# Patient Record
Sex: MALE | Race: BLACK OR AFRICAN AMERICAN | Employment: OTHER | URBAN - METROPOLITAN AREA
[De-identification: names, ages, dates, MRNs, and addresses within clinical notes are randomized per-mention and may not be internally consistent; named-entity substitution may affect disease eponyms.]

---

## 2017-01-03 ENCOUNTER — HOSPITAL ENCOUNTER (OUTPATIENT)
Dept: WOUND CARE | Age: 52
Discharge: HOME | End: 2017-01-03
Attending: PODIATRIST | Admitting: PODIATRIST

## 2017-01-03 VITALS
HEART RATE: 97 BPM | DIASTOLIC BLOOD PRESSURE: 90 MMHG | RESPIRATION RATE: 18 BRPM | TEMPERATURE: 98.5 F | SYSTOLIC BLOOD PRESSURE: 147 MMHG

## 2017-01-03 RX ORDER — LIDOCAINE 50 MG/G
OINTMENT TOPICAL ONCE
Status: COMPLETED | OUTPATIENT
Start: 2017-01-03 | End: 2017-01-03

## 2017-01-03 RX ADMIN — LIDOCAINE: 50 OINTMENT TOPICAL at 13:38

## 2017-01-06 ENCOUNTER — HOSPITAL ENCOUNTER (OUTPATIENT)
Dept: WOUND CARE | Age: 52
Discharge: HOME | End: 2017-01-06
Attending: PODIATRIST | Admitting: PODIATRIST

## 2017-01-10 ENCOUNTER — HOSPITAL ENCOUNTER (OUTPATIENT)
Dept: WOUND CARE | Age: 52
Discharge: HOME | End: 2017-01-10
Attending: PODIATRIST | Admitting: PODIATRIST

## 2017-01-16 ENCOUNTER — OFFICE VISIT (OUTPATIENT)
Dept: BARIATRICS/WEIGHT MGMT | Age: 52
End: 2017-01-16

## 2017-01-16 VITALS
WEIGHT: 315 LBS | BODY MASS INDEX: 40.43 KG/M2 | RESPIRATION RATE: 15 BRPM | SYSTOLIC BLOOD PRESSURE: 134 MMHG | HEART RATE: 88 BPM | HEIGHT: 74 IN | DIASTOLIC BLOOD PRESSURE: 88 MMHG

## 2017-01-16 DIAGNOSIS — E66.01 MORBID OBESITY WITH BMI OF 50.0-59.9, ADULT (HCC): Primary | ICD-10-CM

## 2017-01-16 PROCEDURE — 99213 OFFICE O/P EST LOW 20 MIN: CPT | Performed by: FAMILY MEDICINE

## 2017-01-16 ASSESSMENT — ENCOUNTER SYMPTOMS
EYES NEGATIVE: 1
GASTROINTESTINAL NEGATIVE: 1
RESPIRATORY NEGATIVE: 1

## 2017-01-17 ENCOUNTER — HOSPITAL ENCOUNTER (OUTPATIENT)
Dept: WOUND CARE | Age: 52
Discharge: HOME | End: 2017-01-17
Attending: PODIATRIST | Admitting: PODIATRIST

## 2017-01-17 VITALS
SYSTOLIC BLOOD PRESSURE: 132 MMHG | HEART RATE: 76 BPM | RESPIRATION RATE: 18 BRPM | DIASTOLIC BLOOD PRESSURE: 77 MMHG | TEMPERATURE: 98.1 F

## 2017-01-17 RX ORDER — LIDOCAINE HYDROCHLORIDE 40 MG/ML
SOLUTION TOPICAL ONCE
Status: COMPLETED | OUTPATIENT
Start: 2017-01-17 | End: 2017-01-17

## 2017-01-17 RX ADMIN — LIDOCAINE HYDROCHLORIDE: 40 SOLUTION TOPICAL at 16:26

## 2017-01-17 ASSESSMENT — PAIN SCALES - GENERAL: PAINLEVEL_OUTOF10: 3

## 2017-01-17 ASSESSMENT — PAIN DESCRIPTION - PAIN TYPE: TYPE: ACUTE PAIN

## 2017-01-17 ASSESSMENT — PAIN DESCRIPTION - ORIENTATION: ORIENTATION: LEFT;LOWER

## 2017-01-17 ASSESSMENT — PAIN DESCRIPTION - DESCRIPTORS: DESCRIPTORS: ACHING

## 2017-01-17 ASSESSMENT — PAIN DESCRIPTION - LOCATION: LOCATION: LEG

## 2017-01-24 ENCOUNTER — HOSPITAL ENCOUNTER (OUTPATIENT)
Dept: WOUND CARE | Age: 52
Discharge: HOME | End: 2017-01-24
Attending: PODIATRIST | Admitting: PODIATRIST

## 2017-01-24 VITALS — RESPIRATION RATE: 16 BRPM | TEMPERATURE: 98.6 F | SYSTOLIC BLOOD PRESSURE: 138 MMHG | DIASTOLIC BLOOD PRESSURE: 85 MMHG

## 2017-01-24 RX ORDER — IBUPROFEN 800 MG/1
800 TABLET ORAL EVERY 6 HOURS PRN
Qty: 120 TABLET | Refills: 3 | Status: ON HOLD | OUTPATIENT
Start: 2017-01-24 | End: 2020-06-10 | Stop reason: HOSPADM

## 2017-01-24 RX ORDER — LIDOCAINE HYDROCHLORIDE 40 MG/ML
SOLUTION TOPICAL ONCE
Status: DISCONTINUED | OUTPATIENT
Start: 2017-01-24 | End: 2017-01-25 | Stop reason: HOSPADM

## 2017-01-27 ENCOUNTER — HOSPITAL ENCOUNTER (OUTPATIENT)
Dept: WOUND CARE | Age: 52
Discharge: HOME | End: 2017-01-27
Attending: PODIATRIST | Admitting: PODIATRIST

## 2017-01-31 ENCOUNTER — HOSPITAL ENCOUNTER (OUTPATIENT)
Dept: WOUND CARE | Age: 52
Discharge: HOME | End: 2017-01-31
Attending: PODIATRIST | Admitting: PODIATRIST

## 2017-01-31 VITALS
RESPIRATION RATE: 16 BRPM | DIASTOLIC BLOOD PRESSURE: 66 MMHG | SYSTOLIC BLOOD PRESSURE: 101 MMHG | HEART RATE: 99 BPM | TEMPERATURE: 98 F

## 2017-01-31 RX ORDER — LIDOCAINE HYDROCHLORIDE 40 MG/ML
SOLUTION TOPICAL ONCE
Status: COMPLETED | OUTPATIENT
Start: 2017-01-31 | End: 2017-01-31

## 2017-01-31 RX ADMIN — LIDOCAINE HYDROCHLORIDE: 40 SOLUTION TOPICAL at 16:56

## 2017-02-03 ENCOUNTER — HOSPITAL ENCOUNTER (OUTPATIENT)
Dept: WOUND CARE | Age: 52
Discharge: HOME | End: 2017-02-03
Attending: PODIATRIST | Admitting: PODIATRIST

## 2017-02-07 ENCOUNTER — HOSPITAL ENCOUNTER (OUTPATIENT)
Dept: WOUND CARE | Age: 52
Discharge: HOME | End: 2017-02-07
Attending: PODIATRIST | Admitting: PODIATRIST

## 2017-02-07 VITALS
SYSTOLIC BLOOD PRESSURE: 123 MMHG | HEART RATE: 71 BPM | DIASTOLIC BLOOD PRESSURE: 82 MMHG | TEMPERATURE: 98.2 F | RESPIRATION RATE: 16 BRPM

## 2017-02-07 RX ORDER — LIDOCAINE HYDROCHLORIDE 40 MG/ML
SOLUTION TOPICAL ONCE
Status: COMPLETED | OUTPATIENT
Start: 2017-02-07 | End: 2017-02-07

## 2017-02-07 RX ADMIN — LIDOCAINE HYDROCHLORIDE: 40 SOLUTION TOPICAL at 15:10

## 2017-02-14 ENCOUNTER — OFFICE VISIT (OUTPATIENT)
Dept: BARIATRICS/WEIGHT MGMT | Age: 52
End: 2017-02-14

## 2017-02-14 ENCOUNTER — HOSPITAL ENCOUNTER (OUTPATIENT)
Dept: WOUND CARE | Age: 52
Discharge: HOME | End: 2017-02-14
Attending: PODIATRIST | Admitting: PODIATRIST

## 2017-02-14 VITALS
DIASTOLIC BLOOD PRESSURE: 97 MMHG | SYSTOLIC BLOOD PRESSURE: 176 MMHG | RESPIRATION RATE: 14 BRPM | HEART RATE: 92 BPM | TEMPERATURE: 98.1 F

## 2017-02-14 VITALS
SYSTOLIC BLOOD PRESSURE: 138 MMHG | DIASTOLIC BLOOD PRESSURE: 76 MMHG | HEIGHT: 74 IN | BODY MASS INDEX: 40.43 KG/M2 | HEART RATE: 76 BPM | WEIGHT: 315 LBS

## 2017-02-14 DIAGNOSIS — Z71.3 DIETARY COUNSELING AND SURVEILLANCE: ICD-10-CM

## 2017-02-14 DIAGNOSIS — E66.01 MORBID OBESITY WITH BMI OF 50.0-59.9, ADULT (HCC): Primary | ICD-10-CM

## 2017-02-14 PROCEDURE — 99213 OFFICE O/P EST LOW 20 MIN: CPT | Performed by: FAMILY MEDICINE

## 2017-02-14 RX ORDER — LIDOCAINE HYDROCHLORIDE 40 MG/ML
SOLUTION TOPICAL ONCE
Status: COMPLETED | OUTPATIENT
Start: 2017-02-14 | End: 2017-02-14

## 2017-02-14 RX ADMIN — LIDOCAINE HYDROCHLORIDE: 40 SOLUTION TOPICAL at 16:57

## 2017-02-14 ASSESSMENT — ENCOUNTER SYMPTOMS
RESPIRATORY NEGATIVE: 1
GASTROINTESTINAL NEGATIVE: 1
EYES NEGATIVE: 1

## 2017-02-17 ENCOUNTER — HOSPITAL ENCOUNTER (OUTPATIENT)
Dept: WOUND CARE | Age: 52
Discharge: HOME | End: 2017-02-17
Attending: PODIATRIST | Admitting: PODIATRIST

## 2017-02-21 ENCOUNTER — HOSPITAL ENCOUNTER (OUTPATIENT)
Dept: WOUND CARE | Age: 52
Discharge: HOME | End: 2017-02-21
Attending: PODIATRIST | Admitting: PODIATRIST

## 2017-02-21 VITALS — DIASTOLIC BLOOD PRESSURE: 90 MMHG | SYSTOLIC BLOOD PRESSURE: 151 MMHG

## 2017-02-24 ENCOUNTER — HOSPITAL ENCOUNTER (OUTPATIENT)
Dept: WOUND CARE | Age: 52
Discharge: HOME | End: 2017-02-24
Attending: PODIATRIST | Admitting: PODIATRIST

## 2017-02-28 ENCOUNTER — HOSPITAL ENCOUNTER (OUTPATIENT)
Dept: WOUND CARE | Age: 52
Discharge: HOME | End: 2017-02-28
Attending: PODIATRIST | Admitting: PODIATRIST

## 2017-02-28 VITALS
DIASTOLIC BLOOD PRESSURE: 80 MMHG | RESPIRATION RATE: 18 BRPM | TEMPERATURE: 98.2 F | SYSTOLIC BLOOD PRESSURE: 132 MMHG | HEART RATE: 83 BPM

## 2017-09-19 ENCOUNTER — HOSPITAL ENCOUNTER (OUTPATIENT)
Dept: WOUND CARE | Age: 52
Discharge: HOME | End: 2017-09-19
Attending: PODIATRIST | Admitting: PODIATRIST

## 2017-09-19 VITALS
HEIGHT: 74 IN | RESPIRATION RATE: 18 BRPM | DIASTOLIC BLOOD PRESSURE: 99 MMHG | WEIGHT: 315 LBS | BODY MASS INDEX: 40.43 KG/M2 | TEMPERATURE: 99.1 F | SYSTOLIC BLOOD PRESSURE: 162 MMHG | HEART RATE: 96 BPM

## 2017-09-19 DIAGNOSIS — L97.929 VENOUS STASIS ULCER OF LEFT LOWER EXTREMITY (HCC): Primary | ICD-10-CM

## 2017-09-19 DIAGNOSIS — I83.029 VENOUS STASIS ULCER OF LEFT LOWER EXTREMITY (HCC): Primary | ICD-10-CM

## 2017-09-19 RX ORDER — LIDOCAINE HYDROCHLORIDE 40 MG/ML
2.5 SOLUTION TOPICAL ONCE
Status: COMPLETED | OUTPATIENT
Start: 2017-09-19 | End: 2017-09-19

## 2017-09-19 RX ADMIN — LIDOCAINE HYDROCHLORIDE 2.5 ML: 40 SOLUTION TOPICAL at 14:05

## 2017-09-19 ASSESSMENT — PAIN DESCRIPTION - PAIN TYPE: TYPE: ACUTE PAIN

## 2017-09-19 ASSESSMENT — PAIN DESCRIPTION - LOCATION: LOCATION: LEG

## 2017-09-19 ASSESSMENT — PAIN DESCRIPTION - ORIENTATION: ORIENTATION: LEFT

## 2017-09-19 ASSESSMENT — PAIN SCALES - GENERAL: PAINLEVEL_OUTOF10: 5

## 2017-09-19 ASSESSMENT — PAIN DESCRIPTION - DESCRIPTORS: DESCRIPTORS: BURNING

## 2017-09-21 ENCOUNTER — OFFICE VISIT (OUTPATIENT)
Dept: BARIATRICS/WEIGHT MGMT | Age: 52
End: 2017-09-21

## 2017-09-21 VITALS
HEART RATE: 84 BPM | BODY MASS INDEX: 40.43 KG/M2 | WEIGHT: 315 LBS | HEIGHT: 74 IN | DIASTOLIC BLOOD PRESSURE: 94 MMHG | SYSTOLIC BLOOD PRESSURE: 144 MMHG

## 2017-09-21 DIAGNOSIS — Z71.3 DIETARY COUNSELING AND SURVEILLANCE: ICD-10-CM

## 2017-09-21 DIAGNOSIS — E66.01 MORBID OBESITY WITH BMI OF 50.0-59.9, ADULT (HCC): Primary | ICD-10-CM

## 2017-09-21 DIAGNOSIS — R03.0 ELEVATED BP WITHOUT DIAGNOSIS OF HYPERTENSION: ICD-10-CM

## 2017-09-21 PROCEDURE — 99213 OFFICE O/P EST LOW 20 MIN: CPT | Performed by: FAMILY MEDICINE

## 2017-09-21 ASSESSMENT — ENCOUNTER SYMPTOMS
RESPIRATORY NEGATIVE: 1
GASTROINTESTINAL NEGATIVE: 1
EYES NEGATIVE: 1

## 2017-09-22 ENCOUNTER — HOSPITAL ENCOUNTER (OUTPATIENT)
Dept: WOUND CARE | Age: 52
Discharge: HOME | End: 2017-09-22
Attending: PODIATRIST | Admitting: PODIATRIST

## 2017-09-22 ENCOUNTER — TELEPHONE (OUTPATIENT)
Dept: BARIATRICS/WEIGHT MGMT | Age: 52
End: 2017-09-22

## 2017-09-22 ENCOUNTER — HOSPITAL ENCOUNTER (OUTPATIENT)
Dept: VASCULAR LAB | Age: 52
Discharge: HOME | End: 2017-09-22
Attending: PODIATRIST | Admitting: PODIATRIST

## 2017-09-22 VITALS
DIASTOLIC BLOOD PRESSURE: 98 MMHG | RESPIRATION RATE: 16 BRPM | TEMPERATURE: 98.7 F | HEART RATE: 78 BPM | SYSTOLIC BLOOD PRESSURE: 190 MMHG

## 2017-09-22 DIAGNOSIS — L97.929 VENOUS STASIS ULCER OF LEFT LOWER EXTREMITY (HCC): Primary | ICD-10-CM

## 2017-09-22 DIAGNOSIS — I83.029 VENOUS STASIS ULCER OF LEFT LOWER EXTREMITY (HCC): Primary | ICD-10-CM

## 2017-09-22 DIAGNOSIS — I83.029 VARICOSE VEINS OF LEFT LOWER EXTREMITY WITH ULCER (HCC): ICD-10-CM

## 2017-09-22 DIAGNOSIS — L97.929 VARICOSE VEINS OF LEFT LOWER EXTREMITY WITH ULCER (HCC): ICD-10-CM

## 2017-09-26 ENCOUNTER — HOSPITAL ENCOUNTER (OUTPATIENT)
Dept: WOUND CARE | Age: 52
Discharge: HOME | End: 2017-09-26
Attending: PODIATRIST | Admitting: PODIATRIST

## 2017-09-26 VITALS
RESPIRATION RATE: 16 BRPM | DIASTOLIC BLOOD PRESSURE: 97 MMHG | TEMPERATURE: 98.8 F | HEART RATE: 97 BPM | SYSTOLIC BLOOD PRESSURE: 196 MMHG

## 2017-09-26 RX ORDER — LIDOCAINE HYDROCHLORIDE 40 MG/ML
2.5 SOLUTION TOPICAL ONCE
Status: COMPLETED | OUTPATIENT
Start: 2017-09-26 | End: 2017-09-26

## 2017-09-26 RX ADMIN — LIDOCAINE HYDROCHLORIDE 2.5 ML: 40 SOLUTION TOPICAL at 17:05

## 2017-09-29 ENCOUNTER — HOSPITAL ENCOUNTER (OUTPATIENT)
Dept: WOUND CARE | Age: 52
Discharge: HOME | End: 2017-09-29
Attending: PODIATRIST | Admitting: PODIATRIST

## 2017-09-29 ENCOUNTER — HOSPITAL ENCOUNTER (OUTPATIENT)
Dept: WOUND CARE | Age: 52
Discharge: HOME | End: 2017-09-29

## 2017-09-29 VITALS
TEMPERATURE: 97.2 F | DIASTOLIC BLOOD PRESSURE: 111 MMHG | HEART RATE: 81 BPM | SYSTOLIC BLOOD PRESSURE: 184 MMHG | RESPIRATION RATE: 18 BRPM

## 2017-09-29 ASSESSMENT — PAIN DESCRIPTION - ORIENTATION: ORIENTATION: LEFT

## 2017-09-29 ASSESSMENT — PAIN DESCRIPTION - LOCATION: LOCATION: LEG

## 2017-09-29 ASSESSMENT — PAIN DESCRIPTION - DESCRIPTORS: DESCRIPTORS: ACHING

## 2017-09-29 ASSESSMENT — PAIN SCALES - GENERAL: PAINLEVEL_OUTOF10: 5

## 2017-09-29 ASSESSMENT — PAIN DESCRIPTION - PAIN TYPE: TYPE: ACUTE PAIN

## 2017-09-29 NOTE — IP AVS SNAPSHOT
After Visit Summary  (Discharge Instructions)    Medication List for Home    Based on the information you provided to us as well as any changes during this visit, the following is your updated medication list.  Compare this with your prescription bottles at home. If you have any questions or concerns, contact your primary care physician's office. Daily Medication List (This medication list can be shared with any healthcare provider who is helping you manage your medications)      ASK your doctor about these medications if you have questions        Last Dose    Next Dose Due AM NOON PM NIGHT    Compression Stockings Misc   by Does not apply route Diagnosis: I83.029 Location of wound: Left Lower Leg Gradient IV (30-40 mm Hg) Style: Knee Length Extremity: Bilateral                                         GLUCOSAMINE HCL PO   Take by mouth daily                                         ibuprofen 800 MG tablet   Commonly known as:  ADVIL;MOTRIN   Take 1 tablet by mouth every 6 hours as needed for Pain                                         MULTI VITAMIN PO   Take by mouth daily                                         vitamin D 2000 units Caps capsule   Take 1 capsule by mouth daily                                                 Allergies as of 9/29/2017     No Known Allergies      Immunizations as of 9/29/2017     Name Date Dose VIS Date Route    Tdap (Boostrix, Adacel) 1/24/2013 0.5 mL 1/24/2012 Intramuscular      Last Vitals          Most Recent Value    Temp  97.2 °F (36.2 °C)    Pulse  81    Resp  18    BP  (!)  184/111         After Visit Summary    This summary was created for you. Thank you for entrusting your care to us.   The following information includes details about your hospital/visit stay along with steps you should take to help with your recovery once you leave the hospital.  In this packet, you will find information about the topics listed below: · Instructions about your medications including a list of your home medications  · A summary of your hospital visit  · Follow-up appointments once you have left the hospital  · Your care plan at home      You may receive a survey regarding the care you received during your stay. Your input is valuable to us. We encourage you to complete and return your survey in the envelope provided. We hope you will choose us in the future for your healthcare needs. Patient Information     Patient Name NICOLLE Mike 1965      Care Provided at:     Name Address Phone       1 [facility] [address] [facility]  [address] [place], [place] [id number] [phone]            Your Visit    Here you will find information about your visit, including the reason for your visit. Please take this sheet with you when you visit your doctor or other health care provider in the future. It will help determine the best possible medical care for you at that time. If you have any questions once you leave the hospital, please call the department phone number listed below. Why you were here     Your primary diagnosis was:  Not on File      Visit Information     Date & Time Department Dept. Phone    2017 [facility] WOUND CARE [phone]       Follow-up Appointments    Below is a list of your follow-up and future appointments. This may not be a complete list as you may have made appointments directly with providers that we are not aware of or your providers may have made some for you. Please call your providers to confirm appointments. It is important to keep your appointments. Please bring your current insurance card, photo ID, co-pay, and all medication bottles to your appointment. If self-pay, payment is expected at the time of service.         Future Appointments     2017 10:30 AM     [facility] ([phone]) Please arrive 15 minutes prior to appointment time, bring insurance card and photo ID.    [address] [place] [address]         Preventive Care        Date Due    Hepatitis C screening is recommended for all adults regardless of risk factors born between [facility] at least once (lifetime) who have never been tested. 1965    Colonoscopy 6/27/2015    Diabetes Screening 2/25/2017    Yearly Flu Vaccine (1) 9/1/2017    Cholesterol Screening 6/2/2020    Tetanus Combination Vaccine (2 - Td) 1/24/2023                 Care Plan Once You Return Home    This section includes instructions you will need to follow once you leave the hospital.  Your care team will discuss these with you, so you and those caring for you know how to best care for your health needs at home. This section may also include educational information about certain health topics that may be of help to you. Discharge [facility] Physician Orders and Discharge Instructions  Karli Adrian [address] [address]  Telephone: [phone]    NAME:  Ramy Mckeon III  YOB: 1965  MEDICAL RECORD NUMBER:  [id number]  DATE:  9/29/2017      Wound care: Continue to follow the instructions and recommendations from your last doctor visit. Please call if you have any questions or concerns.         Return Appointment:        Return Appointment with Dr Xin Thrasher is on 10/02/17      Your nurse  is:  Butch Moralez [place] [address]     Electronically signed by Samanta Cochran RN on 9/29/2017 at 3:42 PM     [address] [place] [address] Information: Should you experience any significant changes in your wound(s) or have questions about your wound care, please contact the [address] [place] [address] at [phone] Monday and Wednesday 8:00 am - 2:00 pm and Tuesday, Thursday and Friday from 8:00 am - 4:30 pm.   If you need help with your wound outside these hours and cannot wait until we are again available, contact your PCP or go to the hospital emergency room. Discharge Nurse Signature:_______________________    Date: ___________ Time:  ____________    Physician orders by:      Dr Lorinda Paschal Dr Weldon Felty Dr Veronda Furth Dr Murleen Quale Dr Venus Bolls Dr Serita Clipper       Physician Signature:__________________________________        The information contained in the After Visit Summary has been reviewed with me, the patient and/or responsible adult, by my health care provider(s). I had the opportunity to ask questions regarding this information. I have elected to receive; Patient Signature:_______________________    Date:_________Time:________         Patient unable to sign Discharge Instructions given to ECF/Transportation/POA        After Visit Summary    Comprehensive Discharge Instruction          [address] allows you to send messages to your doctor, view your test results, renew your prescriptions, schedule appointments, view visit notes, and more. How Do I Sign Up? 1. In your Internet browser, go to [email or web address]  2. Click on the Sign Up Now link in the Sign In box. You will see the New Member Sign Up page. 3. Enter your [employer] Access Code exactly as it appears below. You will not need to use this code after youve completed the sign-up process. If you do not sign up before the expiration date, you must request a new code. [employer] Access Code: [email or web address]  Expires: 11/18/2017  2:18 PM    4. Enter your Social Security Number ([id number]) and Date of Birth (mm/dd/yyyy) as indicated and click Submit. You will be taken to the next sign-up page. 5. Create a [employer] ID. This will be your [employer] login ID and cannot be changed, so think of one that is secure and easy to remember.

## 2017-10-03 ENCOUNTER — HOSPITAL ENCOUNTER (OUTPATIENT)
Dept: WOUND CARE | Age: 52
Discharge: HOME | End: 2017-10-03
Attending: PODIATRIST | Admitting: PODIATRIST

## 2017-10-03 VITALS
HEART RATE: 92 BPM | RESPIRATION RATE: 18 BRPM | DIASTOLIC BLOOD PRESSURE: 92 MMHG | SYSTOLIC BLOOD PRESSURE: 168 MMHG | TEMPERATURE: 98.3 F

## 2017-10-03 RX ORDER — LIDOCAINE HYDROCHLORIDE 40 MG/ML
2.5 SOLUTION TOPICAL ONCE
Status: COMPLETED | OUTPATIENT
Start: 2017-10-03 | End: 2017-10-03

## 2017-10-03 RX ADMIN — LIDOCAINE HYDROCHLORIDE 2.5 ML: 40 SOLUTION TOPICAL at 15:52

## 2017-10-03 ASSESSMENT — PAIN SCALES - GENERAL: PAINLEVEL_OUTOF10: 3

## 2017-10-03 ASSESSMENT — PAIN DESCRIPTION - ORIENTATION: ORIENTATION: LEFT

## 2017-10-03 ASSESSMENT — PAIN DESCRIPTION - LOCATION: LOCATION: LEG

## 2017-10-03 ASSESSMENT — PAIN DESCRIPTION - PAIN TYPE: TYPE: ACUTE PAIN

## 2017-10-03 NOTE — PROGRESS NOTES
[address]anna [address]   Progress Note and Procedure Note      Ana Rosa Jefferson III  MEDICAL RECORD NUMBER:  [id number]  AGE: 46 y.o. GENDER: male  : 1965  EPISODE DATE:  10/3/2017    Subjective:     No chief complaint on file. HISTORY of PRESENT ILLNESS HPI     Ana Rosa Jefferson III is a 46 y.o. male who presents today for wound/ulcer evaluation. History of Wound Context: left leg wound. He has kept his dressing clean and intact. He has not followed up with vascular surgery as recommended yet. Wound/Ulcer Pain Timing/Severity: constant  Quality of pain: burning  Severity:  3 / 10   Modifying Factors: None  Associated Signs/Symptoms: edema    Ulcer Identification:  Ulcer Type: venous  Contributing Factors: edema and venous stasis    Wound: N/A        PAST MEDICAL HISTORY        Diagnosis Date    Breast disorder 2009    MAMMOGRAM -VE. S/P BREAST SURGERY EVAL    Obesity     PPD positive     Venous stasis ulcer of left lower extremity ([facility] [address] 10/25/2016       PAST SURGICAL HISTORY    No past surgical history on file. FAMILY HISTORY    Family History   Problem Relation Age of Onset    Heart Disease Mother     Diabetes Maternal Uncle     Diabetes Paternal Uncle     Cancer Paternal Uncle      ?  pancreatic       SOCIAL HISTORY    Social History   Substance Use Topics    Smoking status: Never Smoker    Smokeless tobacco: Never Used    Alcohol use Yes      Comment: moderate use       ALLERGIES    No Known Allergies    MEDICATIONS    Current Outpatient Prescriptions on File Prior to Encounter   Medication Sig Dispense Refill    GLUCOSAMINE HCL PO Take by mouth daily      Multiple Vitamin (MULTI VITAMIN PO) Take by mouth daily      ibuprofen (ADVIL;MOTRIN) 800 MG tablet Take 1 tablet by mouth every 6 hours as needed for Pain 120 tablet 3    Cholecalciferol (VITAMIN D) 2000 UNITS CAPS capsule Take 1 capsule by mouth daily 30 capsule 3    Compression Stockings MISC by Does not apply route Diagnosis: I83.029  Location of wound: Left Lower Leg  Gradient IV (30-40 mm Hg)  Style: Knee Length  Extremity: Bilateral 1 each 2     No current facility-administered medications on file prior to encounter. REVIEW OF SYSTEMS    Pertinent items are noted in HPI. Objective:        BP (!) 168/92  Pulse 92  Temp 98.3 °F (36.8 °C) (Oral)   Resp 18    Wt Readings from Last 3 Encounters:   09/21/17 (!) 423 lb (191.9 kg)   09/19/17 (!) 390 lb (176.9 kg)   02/14/17 (!) 392 lb 8 oz (178 kg)       PHYSICAL EXAM    DP/PT pulses palpable bilaterally. CFT brisk to all digits. Digits are pink and warm to the touch. Hair growth is normal in appearance. No calf pain with palpation noted. Patient has pitting edema noted on the bilateral lower extremities. Epicritic sensation is grossly intact bilaterally. Negative clonus and babinski reflex is down going. Wound noted on the posterior lateral aspect of the left lower extremity. Wound has fibrotic and nonviable tissue that extends down through and includes the subcutaneous tissue/fascia/muscle. After debridement the wound has a fibro-granular base with eschar noted around the periphery of the wound. There is no surrounding erythema, edema, warmth or malodor noted. The wound does not probe or track to bone. Assessment:      Patient Active Problem List   Diagnosis Code    Family history of early CAD Z80.55    PPD positive R76.11    Vitamin D deficiency E55.9    Edema R60.9    Morbid obesity ([address] E66.01    Anemia D64.9    Venous stasis ulcer of left lower extremity ([facility] [address] I83.029        Excisional Debridement Procedure Note  Indications:  Based on my examination of this patient's wound(s)/ulcer(s) today, debridement is required to promote healing and evaluate the wound base. Performed by: Sofia White DPM    Consent obtained?  Yes    Time out taken: Yes    Pain Control: Anesthetic: 4% Topical Xylocaine     Debridement: Excisional Achieved: by pressure    Procedural Pain: 3  / 10     Post Procedural Pain: 3 / 10     Response to treatment:  Well tolerated by patient. Plan:   E/M x 30 minutes of a new problem of left leg ulceration. Venous reflux study was significantly abnormal.  As patient has had a history of re ulceration despite wearing compression stocking will refer to Dr. Nhi Sullivan for further evaluation and management of his chronic venous disease as it is likely contributing to his re ulcerating. Will control edema with multilayer compression dressing. Will see patient back in 3-4 days for a dressing change due to the amount of drainage in the wound. Treatment Note please see attached Discharge Instructions    In my professional opinion this patient would benefit from HBO Therapy: No    Written patient dismissal instructions given to patient and signed by patient or POA.          RTC 1 week    Electronically signed by Randolph Espinosa DPM on 10/3/2017 at 4:48 PM

## 2017-10-03 NOTE — IP AVS SNAPSHOT
After Visit Summary  (Discharge Instructions)    Medication List for Home    Based on the information you provided to us as well as any changes during this visit, the following is your updated medication list.  Compare this with your prescription bottles at home. If you have any questions or concerns, contact your primary care physician's office. Daily Medication List (This medication list can be shared with any healthcare provider who is helping you manage your medications)      Notice     Cannot display patient medications because the patient has not yet been checked in. Allergies as of 10/3/2017     No Known Allergies      Immunizations as of 10/3/2017     Name Date Dose VIS Date Route    Tdap (Boostrix, Adacel) 2013 0.5 mL 2012 Intramuscular      Last Vitals          Most Recent Value    Temp  98.3 °F (36.8 °C)    Pulse  92    Resp  18    BP  (!)  168/92         After Visit Summary    This summary was created for you. Thank you for entrusting your care to us. The following information includes details about your hospital/visit stay along with steps you should take to help with your recovery once you leave the hospital.  In this packet, you will find information about the topics listed below:    · Instructions about your medications including a list of your home medications  · A summary of your hospital visit  · Follow-up appointments once you have left the hospital  · Your care plan at home      You may receive a survey regarding the care you received during your stay. Your input is valuable to us. We encourage you to complete and return your survey in the envelope provided. We hope you will choose us in the future for your healthcare needs.           Patient Information     Patient Name NICOLLE Alberts [facility] 1965      Care Provided at:     Name Address Phone       [address] [facility] [address] [address] Your Visit    Here you will find information about your visit, including the reason for your visit. Please take this sheet with you when you visit your doctor or other health care provider in the future. It will help determine the best possible medical care for you at that time. If you have any questions once you leave the hospital, please call the department phone number listed below. Why you were here     Your primary diagnosis was:  Not on File      Visit Information     Date & Time Provider Department Dept. Phone    10/3/2017 Jhon Diallo DPM [facility] WOUND CARE [phone]       Follow-up Appointments    Below is a list of your follow-up and future appointments. This may not be a complete list as you may have made appointments directly with providers that we are not aware of or your providers may have made some for you. Please call your providers to confirm appointments. It is important to keep your appointments. Please bring your current insurance card, photo ID, co-pay, and all medication bottles to your appointment. If self-pay, payment is expected at the time of service. Future Appointments     11/1/2017 10:30 AM     Appointment with Mckenna Dickens MD at [address] [place] [address] [facility] ([phone])   Please arrive 15 minutes prior to appointment time, bring insurance card and photo ID.    [address]         Preventive Care        Date Due    Hepatitis C screening is recommended for all adults regardless of risk factors born between [facility] at least once (lifetime) who have never been tested.  1965    Colonoscopy 6/27/2015    Diabetes Screening 2/25/2017    Yearly Flu Vaccine (1) 9/1/2017    Cholesterol Screening 6/2/2020    Tetanus Combination Vaccine (2 - Td) 1/24/2023                 Care Plan Once You Return Home    This section includes instructions you will need to follow once you leave [place]  Ace Wrap  Cover Roll Tape     Other:    Avoid contact of tape with skin. Change dressing:  Daily     Every Other Day  Three times per week    Once a week  Do Not Change Dressing    Other:                            · Compression:  Type:Profore            Multilayer Compression Wrap Applied in Clinic Right Leg Left Leg  · Do not get leg(s) with wrap wet. ·  If wraps become too tight call the center or completely remove the wrap. · Elevate leg(s) above the level of the heart when sitting. · Avoid prolonged standing in one place. [facility] remove wrap, cleanse affected leg(s) with soap and water, apply wound dressings as ordered above and reapply compression wraps on:     Off-Loading:    Off-loading when:  walking   in bed  sitting     Total non-weight bearing:   Right Leg   Left Leg      Partial weight bearing:    Right Leg   Left Leg      Assistive Device:  Walker  Crutches   Wheelchair  Roll About    Surgical shoe/Darco     Podus Boot(s)    Prevalon Boot(s)   CROW Boot     [employer]  Other:        Dietary:  Important dietary reminders:  1. Increase Protein intake (i.e. Lean meats, fish, eggs, legumes, and yogurt)  2. No added salt  3. If diabetic, follow a diabetic diet and check glucose prior to meals or as instructed by your physician.         Return Appointment:   Wound and dressing supply provider:    ECF or Home Healthcare:   Nurse visit:     Return Appointment: With Dr Fuller Rather  in [address] [place] [address])     Ordered tests:       Consults:  Weight Management  Diabetes Education  Vascular Surgery   Endocrinology  Nutrition Counseling   Lymphedema Therapy     Your nurse  is:  Kalpana     Electronically signed by Britta Rutledge RN on 10/3/2017 at 4:20 PM     [facility] Information: Should you experience any significant changes in your wound(s) or have questions about your wound care, please contact the [address] [place] [address] at [phone] Monday and has been given to me, the patient and/or responsible adult.            Patient Signature/Responsible Adult:____________________    Clinician Signature:_____________________    Date:_____________________    Time:_____________________

## 2017-10-06 ENCOUNTER — HOSPITAL ENCOUNTER (OUTPATIENT)
Dept: WOUND CARE | Age: 52
Discharge: HOME | End: 2017-10-06
Attending: PODIATRIST | Admitting: PODIATRIST

## 2017-10-06 NOTE — IP AVS SNAPSHOT
Patient Information     Patient Name DOB Lamount Romberg 1965         This is your updated medication list to keep with you all times      Notice     Cannot display patient medications because the patient has not yet been checked in.

## 2017-10-06 NOTE — IP AVS SNAPSHOT
After Visit Summary  (Discharge Instructions)    Medication List for Home    Based on the information you provided to us as well as any changes during this visit, the following is your updated medication list.  Compare this with your prescription bottles at home. If you have any questions or concerns, contact your primary care physician's office. Daily Medication List (This medication list can be shared with any healthcare provider who is helping you manage your medications)      Notice     Cannot display patient medications because the patient has not yet been checked in. Allergies as of 10/6/2017     No Known Allergies      Immunizations as of 10/6/2017     Name Date Dose VIS Date Route    Tdap (Boostrix, Adacel) 2013 0.5 mL 2012 Intramuscular         After Visit Summary    This summary was created for you. Thank you for entrusting your care to us. The following information includes details about your hospital/visit stay along with steps you should take to help with your recovery once you leave the hospital.  In this packet, you will find information about the topics listed below:    · Instructions about your medications including a list of your home medications  · A summary of your hospital visit  · Follow-up appointments once you have left the hospital  · Your care plan at home      You may receive a survey regarding the care you received during your stay. Your input is valuable to us. We encourage you to complete and return your survey in the envelope provided. We hope you will choose us in the future for your healthcare needs.           Patient Information     Patient Name NICOLLE Seay 1965      Care Provided at:     Name Address Phone       [address] [employer] [facility] [address] [place] [id number] [phone]            Your Visit    Here you will find information about your visit, including the reason for

## 2017-10-06 NOTE — PROGRESS NOTES
Multilayer Compression Wrap   (Not Unna) Below the Knee    NAME:  Arya Waterman III  YOB: 1965  MEDICAL RECORD NUMBER:  [id number]  DATE:  10/6/2017       [x] Removed old Multilayer wrap if indicated and wash leg with mild soap/water.  [x] Applied moisturizing agent to dry skin as needed.  [x] Applied primary and secondary dressing as ordered    [x] Applied multilayered dressing below the knee to lle (/Profore) per [employer].  [x] Instructed patient/caregiver not to remove dressing and to keep it clean and dry.  [x] Instructed patient/caregiver on complications to report to provider, such as pain, numbness in toes, heavy drainage, and slippage of dressing.  [] Instructed patient on purpose of compression dressing and on activity and exercise recommendations.        Applied per   Guidelines    Electronically signed by Josselin Hernandez RN on 10/6/2017 at 10:32 AM

## 2017-10-10 ENCOUNTER — HOSPITAL ENCOUNTER (OUTPATIENT)
Dept: WOUND CARE | Age: 52
Discharge: HOME | End: 2017-10-10
Attending: PODIATRIST | Admitting: PODIATRIST

## 2017-10-10 VITALS
SYSTOLIC BLOOD PRESSURE: 168 MMHG | DIASTOLIC BLOOD PRESSURE: 97 MMHG | TEMPERATURE: 98.9 F | RESPIRATION RATE: 18 BRPM | HEART RATE: 79 BPM

## 2017-10-10 RX ORDER — LIDOCAINE HYDROCHLORIDE 40 MG/ML
2.5 SOLUTION TOPICAL ONCE
Status: COMPLETED | OUTPATIENT
Start: 2017-10-10 | End: 2017-10-10

## 2017-10-10 RX ADMIN — LIDOCAINE HYDROCHLORIDE 2.5 ML: 40 SOLUTION TOPICAL at 15:29

## 2017-10-10 NOTE — PROGRESS NOTES
[facility]   Progress Note and Procedure Note      Luli Fajardo III  MEDICAL RECORD NUMBER:  [id number]  AGE: 46 y.o. GENDER: male  : 1965  EPISODE DATE:  10/10/2017    Subjective:     Chief Complaint   Patient presents with    Wound Check     f/u left lateral foot         HISTORY of PRESENT ILLNESS HPI     Luli Fajardo III is a 46 y.o. male who presents today for wound/ulcer evaluation. History of Wound Context: left leg wound. He has kept his dressing clean and intact. He has not followed up with vascular surgery as recommended yet but relates that he has an appointment scheduled. Wound/Ulcer Pain Timing/Severity: constant  Quality of pain: burning  Severity:  3 / 10   Modifying Factors: None  Associated Signs/Symptoms: edema    Ulcer Identification:  Ulcer Type: venous  Contributing Factors: edema and venous stasis    Wound: N/A        PAST MEDICAL HISTORY        Diagnosis Date    Breast disorder 2009    MAMMOGRAM -VE. S/P BREAST SURGERY EVAL    Obesity     PPD positive     Venous stasis ulcer of left lower extremity ([address] 10/25/2016       PAST SURGICAL HISTORY    No past surgical history on file. FAMILY HISTORY    Family History   Problem Relation Age of Onset    Heart Disease Mother     Diabetes Maternal Uncle     Diabetes Paternal Uncle     Cancer Paternal Uncle      ?  pancreatic       SOCIAL HISTORY    Social History   Substance Use Topics    Smoking status: Never Smoker    Smokeless tobacco: Never Used    Alcohol use Yes      Comment: moderate use       ALLERGIES    No Known Allergies    MEDICATIONS    Current Outpatient Prescriptions on File Prior to Encounter   Medication Sig Dispense Refill    GLUCOSAMINE HCL PO Take by mouth daily      Multiple Vitamin (MULTI VITAMIN PO) Take by mouth daily      ibuprofen (ADVIL;MOTRIN) 800 MG tablet Take 1 tablet by mouth every 6 hours as needed for Pain 120 tablet 3    Cholecalciferol (VITAMIN D) 2000 UNITS CAPS capsule Take 1 capsule by mouth daily 30 capsule 3    Compression Stockings MISC by Does not apply route Diagnosis: I83.029  Location of wound: Left Lower Leg  Gradient IV (30-40 mm Hg)  Style: Knee Length  Extremity: Bilateral 1 each 2     No current facility-administered medications on file prior to encounter. REVIEW OF SYSTEMS    Pertinent items are noted in HPI. Objective:        BP (!) 168/97   Pulse 79   Temp 98.9 °F (37.2 °C) (Oral)   Resp 18     Wt Readings from Last 3 Encounters:   09/21/17 (!) 423 lb (191.9 kg)   09/19/17 (!) 390 lb (176.9 kg)   02/14/17 (!) 392 lb 8 oz (178 kg)       PHYSICAL EXAM    DP/PT pulses palpable bilaterally. CFT brisk to all digits. Digits are pink and warm to the touch. Hair growth is normal in appearance. No calf pain with palpation noted. Patient has pitting edema noted on the bilateral lower extremities. Epicritic sensation is grossly intact bilaterally. Negative clonus and babinski reflex is down going. Wound noted on the posterior lateral aspect of the left lower extremity. Wound has fibrotic and nonviable tissue that extends down through and includes the subcutaneous tissue/fascia/muscle. After debridement the wound has a fibro-granular base with eschar noted around the periphery of the wound. There is no surrounding erythema, edema, warmth or malodor noted. The wound does not probe or track to bone. Assessment:      Patient Active Problem List   Diagnosis Code    Family history of early CAD Z80.55    PPD positive R76.11    Vitamin D deficiency E55.9    Edema R60.9    Morbid obesity ([address] E66.01    Anemia D64.9    Venous stasis ulcer of left lower extremity ([facility] I83.029        Excisional Debridement Procedure Note  Indications:  Based on my examination of this patient's wound(s)/ulcer(s) today, debridement is required to promote healing and evaluate the wound base. Performed by: Rosie Ivory DPM    Consent obtained?  Yes    Time Debrided:  61.75 sq cm    Diabetic/Pressure/Non Pressure Ulcers only:  Ulcer: Non-Pressure ulcer, muscle necrosis    Bleeding: Minimal    Hemostasis Achieved: by pressure    Procedural Pain: 3  / 10     Post Procedural Pain: 3 / 10     Response to treatment:  Well tolerated by patient. Plan:   E/M x 30 minutes of a new problem of left leg ulceration. Venous reflux study was significantly abnormal.  As patient has had a history of re ulceration despite wearing compression stocking will refer to Dr. Jasson Ko for further evaluation and management of his chronic venous disease as it is likely contributing to his re ulcerating. Will control edema with multilayer compression dressing. Will see patient back in 3-4 days for a dressing change due to the amount of drainage in the wound. Treatment Note please see attached Discharge Instructions    In my professional opinion this patient would benefit from HBO Therapy: No    Written patient dismissal instructions given to patient and signed by patient or POA.          RTC 1 week    Electronically signed by Cheyanne Watson DPM on 10/10/2017 at 3:51 PM

## 2017-10-10 NOTE — PROGRESS NOTES
Multilayer Compression Wrap   (Not Unna) Below the Knee    NAME:  Mel Huizar III  YOB: 1965  MEDICAL RECORD NUMBER:  [id number]  DATE:  10/10/2017       [x] Removed old Multilayer wrap if indicated and wash leg with mild soap/water.  [x] Applied moisturizing agent to dry skin as needed.  [x] Applied primary and secondary dressing as ordered    [x] Applied multilayered dressing below the left  knee to Profore per 's instructions.  [x] Instructed patient/caregiver not to remove dressing and to keep it clean and dry.  [x] Instructed patient/caregiver on complications to report to provider, such as pain, numbness in toes, heavy drainage, and slippage of dressing.  [x] Instructed patient on purpose of compression dressing and on activity and exercise recommendations.        Applied per   Guidelines    Electronically signed by Andrew Washburn RN on 10/10/2017 at 4:20 PM

## 2017-10-13 ENCOUNTER — HOSPITAL ENCOUNTER (OUTPATIENT)
Dept: WOUND CARE | Age: 52
Discharge: HOME | End: 2017-10-13
Attending: PODIATRIST | Admitting: PODIATRIST

## 2017-10-13 VITALS
HEART RATE: 83 BPM | SYSTOLIC BLOOD PRESSURE: 155 MMHG | RESPIRATION RATE: 18 BRPM | DIASTOLIC BLOOD PRESSURE: 80 MMHG | TEMPERATURE: 98.1 F

## 2017-10-17 ENCOUNTER — HOSPITAL ENCOUNTER (OUTPATIENT)
Dept: WOUND CARE | Age: 52
Discharge: HOME | End: 2017-10-17
Attending: PODIATRIST | Admitting: PODIATRIST

## 2017-10-17 VITALS
HEART RATE: 93 BPM | DIASTOLIC BLOOD PRESSURE: 85 MMHG | RESPIRATION RATE: 18 BRPM | SYSTOLIC BLOOD PRESSURE: 150 MMHG | TEMPERATURE: 98 F

## 2017-10-17 RX ORDER — LIDOCAINE HYDROCHLORIDE 40 MG/ML
SOLUTION TOPICAL ONCE
Status: COMPLETED | OUTPATIENT
Start: 2017-10-17 | End: 2017-10-17

## 2017-10-17 RX ORDER — ACETAMINOPHEN 325 MG/1
650 TABLET ORAL EVERY 6 HOURS PRN
Status: ON HOLD | COMMUNITY
End: 2020-06-10 | Stop reason: HOSPADM

## 2017-10-17 RX ADMIN — LIDOCAINE HYDROCHLORIDE: 40 SOLUTION TOPICAL at 15:47

## 2017-10-17 ASSESSMENT — PAIN SCALES - GENERAL: PAINLEVEL_OUTOF10: 0

## 2017-10-17 NOTE — PROGRESS NOTES
Multilayer Compression Wrap   (Not Unna) Below the Knee    NAME:  Ana Rosa Jefferson III  YOB: 1965  MEDICAL RECORD NUMBER:  [id number]  DATE:  10/17/2017       [x] Removed old Multilayer wrap if indicated and wash leg with mild soap/water.  [x] Applied moisturizing agent to dry skin as needed.  [x] Applied primary and secondary dressing as ordered    [x] Applied multilayered dressing below the knee to lle /Profore) per [facility] instructions.  [x] Instructed patient/caregiver not to remove dressing and to keep it clean and dry.  [x] Instructed patient/caregiver on complications to report to provider, such as pain, numbness in toes, heavy drainage, and slippage of dressing.  [x] Instructed patient on purpose of compression dressing and on activity and exercise recommendations.        Applied per   Guidelines    Electronically signed by Luis Felipe Meier RN on 10/17/2017 at 4:08 PM

## 2017-10-17 NOTE — PROGRESS NOTES
[facility]  Progress Note and Procedure Note      Ludin Post III  MEDICAL RECORD NUMBER:  [id number]  AGE: 46 y.o. GENDER: male  : 1965  EPISODE DATE:  10/17/2017    Subjective:       Chief Complaint   Patient presents with    Wound Check     left lower leg         HISTORY of PRESENT ILLNESS HPI     Ludin Post III is a 46 y.o. male who presents today for wound evaluation. History of Wound Context: LEFT posterior calf wound with mostly granular tissues and nice, beefy base, with continued improvement after debridement and compression  Wound Pain Timing/Severity: mild  Quality of pain: aching  Severity:  1 / 10   Modifying Factors: Pain worsens with edema uncontrolled  Associated Signs/Symptoms: edema and drainage    Ulcer Identification:  Ulcer Type: venous  Contributing Factors: edema, venous stasis, chronic pressure and obesity          PAST MEDICAL HISTORY        Diagnosis Date    Breast disorder 2009    MAMMOGRAM -VE. S/P BREAST SURGERY EVAL    Obesity     PPD positive     Venous stasis ulcer of left lower extremity ([address] 10/25/2016       PAST SURGICAL HISTORY    History reviewed. No pertinent surgical history. FAMILY HISTORY    Family History   Problem Relation Age of Onset    Heart Disease Mother     Diabetes Maternal Uncle     Diabetes Paternal Uncle     Cancer Paternal Uncle      ?  pancreatic       SOCIAL HISTORY    Social History   Substance Use Topics    Smoking status: Never Smoker    Smokeless tobacco: Never Used    Alcohol use Yes      Comment: moderate use       ALLERGIES    No Known Allergies    MEDICATIONS    Current Outpatient Prescriptions on File Prior to Encounter   Medication Sig Dispense Refill    GLUCOSAMINE HCL PO Take by mouth daily      Multiple Vitamin (MULTI VITAMIN PO) Take by mouth daily      Compression Stockings MISC by Does not apply route Diagnosis: I83.029  Location of wound: Left Lower Leg  Gradient IV (30-40 mm Hg)  Style: Knee Length  Extremity: Bilateral 1 each 2    ibuprofen (ADVIL;MOTRIN) 800 MG tablet Take 1 tablet by mouth every 6 hours as needed for Pain 120 tablet 3    Cholecalciferol (VITAMIN D) 2000 UNITS CAPS capsule Take 1 capsule by mouth daily 30 capsule 3     No current facility-administered medications on file prior to encounter. REVIEW OF SYSTEMS    Pertinent items are noted in HPI. Objective:      BP (!) 150/85   Pulse 93   Temp 98 °F (36.7 °C)   Resp 18     Wt Readings from Last 3 Encounters:   09/21/17 (!) 423 lb (191.9 kg)   09/19/17 (!) 390 lb (176.9 kg)   02/14/17 (!) 392 lb 8 oz (178 kg)       PHYSICAL EXAM    General Appearance: alert and oriented to person, place and time, well-developed and well-nourished, in no acute distress          Assessment:     Patient Active Problem List   Diagnosis    Family history of early CAD    PPD positive    Vitamin D deficiency    Edema    Morbid obesity ([address]    Anemia    Venous stasis ulcer of left lower extremity ([address]         Procedure Note  Indications:  Based on my examination of this patient's wound(s)/ulcer(s) today, debridement is required to promote healing and evaluate the extent healing. Performed by: Vickey Garcia DPM    Consent obtained: Yes    Time out taken:  Yes    Pain Control: Anesthetic  Anesthetic: 4% Topical Xylocaine       Debridement:Excisional Debridement    Using curette, scissors and forceps the wound(s)/ulcer(s) was/were sharply debrided down through and including the removal of subcutaneous tissue. Devitalized Tissue Debrided:  fibrin, slough, exudate and callus    Pre Debridement Measurements:  Are located in the [place]  Documentation Flow Sheet    Wound/Ulcer #: 5    Post Debridement Measurements:  Wound/Ulcer Descriptions are Pre Debridement except measurements:    Wound 09/19/17 Venous ulcer Leg Left; Lower; Lateral;Distal #5 (existing 1 month) (Active)   Wound Type Wound 10/17/2017  3:36 PM   Wound Venous 10/17/2017  3:36 PM   Dressing/Treatment Other (Comment) 9/22/2017 11:34 AM   Wound Cleansed Rinsed/Irrigated with saline 10/17/2017  3:36 PM   Wound Length (cm) 9.4 cm 10/17/2017  3:36 PM   Wound Width (cm) 6 cm 10/17/2017  3:36 PM   Wound Depth (cm)  0.1 10/17/2017  3:36 PM   Calculated Wound Size (cm^2) (l*w) 56.4 cm^2 10/17/2017  3:36 PM   Change in Wound Size % (l*w) -192.99 10/17/2017  3:36 PM   Distance Tunneling (cm) 0 cm 10/3/2017  3:43 PM   Undermining Starts ___ O'Clock 0 9/29/2017  3:31 PM   Undermining Maxium Distance (cm) 0 10/3/2017  3:43 PM   Wound Assessment Yellow 10/17/2017  3:36 PM   Margins Defined edges 10/17/2017  3:36 PM   Georgina-wound Assessment Brown; Maceration; White 10/17/2017  3:36 PM   Non-staged Wound Description Full thickness 10/13/2017  3:44 PM   [place]%Wound Bed 15 10/13/2017  3:44 PM   Red%Wound Bed 80 10/17/2017  3:36 PM   Yellow%Wound Bed 20 10/17/2017  3:36 PM   Black%Wound Bed 30 9/29/2017  3:31 PM   Purple%Wound Bed 50 9/22/2017  9:52 AM   Drainage Amount Moderate 10/17/2017  3:36 PM   Drainage Description Brown;Serosanguinous 10/17/2017  3:36 PM   Odor Mild 10/17/2017  3:36 PM   Op First Treatment Date 09/19/17 9/19/2017  1:36 PM   Number of days: 28       Percent of Wound Debrided: 50%    Total Surface Area Debrided:  28.2 sq cm     Diabetic/Pressure/Non Pressure Ulcers:  Ulcer: Diabetic ulcer, fat layer exposed        Bleeding:  Minimal    Hemostasis Achieved:  by pressure    Procedural Pain:  1  / 10     Post Procedural Pain:  0 / 10     Response to treatment:  Well tolerated by patient.      Plan:   Very nice response to therapies with serial debridement and applications of multi-layer compression    Treatment Note please see attached Discharge Instructions    In my professional opinion this patient would benefit from HBO Therapy: Undecided       Patient is to return to wound care center in:  1 week(s)    Written patient dismissal instructions given to patient and signed by Not Change Dressing   [] Other:                            · Compression:  Type: PROFORE Multilayer Compression Wrap Applied in Clinic []Right Leg [x]Left Leg  · Do not get leg(s) with wrap wet. ·  If wraps become too tight call the center or completely remove the wrap. · Elevate leg(s) above the level of the heart when sitting. · Avoid prolonged standing in one place. [] [facility] remove wrap, cleanse affected leg(s) with soap and water, apply wound dressings as ordered above and reapply compression wraps on:       Dietary:  Important dietary reminders:  1. Increase Protein intake (i.e. Lean meats, fish, eggs, legumes, and yogurt)  2. No added salt  3. If diabetic, follow a diabetic diet and check glucose prior to meals or as instructed by your physician. Return Appointment:  [] Wound and dressing supply provider:   [] ECF or Home Healthcare:  [] Nurse visit:  Friday Oct 20  [x] Return Appointment: With Dr Darius Costello  in  [address] [place] [address])    [] Ordered tests:       Consults: [] Weight Management [] Diabetes Education [] Vascular Surgery  [] Endocrinology [] Nutrition Counseling  [] Lymphedema Therapy     Your nurse  is: Butch Moralez [place] [address]     Electronically signed by Tuyet Luis RN on 10/17/2017 at 12:59 PM     [facility] Information: Should you experience any significant changes in your wound(s) or have questions about your wound care, please contact the [address] [place] [address] at [phone] Monday and Wednesday 8:00 am - 2:00 pm and Tuesday, Thursday and Friday from 8:00 am - 4:30 pm.   If you need help with your wound outside these hours and cannot wait until we are again available, contact your PCP or go to the hospital emergency room. PLEASE NOTE: IF YOU ARE UNABLE TO OBTAIN WOUND SUPPLIES, CONTINUE TO USE THE SUPPLIES YOU HAVE AVAILABLE UNTIL YOU ARE ABLE TO REACH US. IT IS MOST IMPORTANT TO KEEP THE WOUND COVERED AT ALL TIMES.       Discharge Nurse Signature:_______________________    Date: ___________ Time:  ____________    Physician orders by:     [x] Dr Dakota Crockett [] Dr Herminia Rasmussen    [] Dr Giselle Hussein     [] Dr Darrell Barr   [] Dr Robe Ayala  [] Dr Vonnie Jimenez       Physician Signature:__________________________________        The information contained in the After Visit Summary has been reviewed with me, the patient and/or responsible adult, by my health care provider(s). I had the opportunity to ask questions regarding this information. I have elected to receive;       Patient Signature:_______________________    Date:_________Time:________        [] Patient unable to sign Discharge Instructions given to ECF/Transportation/POA      []  After Visit Summary  []  Comprehensive Discharge Instruction              Electronically signed by Iman Alex DPM on 10/17/2017 at 4:23 PM

## 2017-10-24 ENCOUNTER — HOSPITAL ENCOUNTER (OUTPATIENT)
Dept: WOUND CARE | Age: 52
Discharge: HOME | End: 2017-10-25
Attending: PODIATRIST | Admitting: PODIATRIST

## 2017-10-24 VITALS
HEART RATE: 83 BPM | SYSTOLIC BLOOD PRESSURE: 143 MMHG | RESPIRATION RATE: 16 BRPM | TEMPERATURE: 98.1 F | DIASTOLIC BLOOD PRESSURE: 96 MMHG

## 2017-10-24 RX ORDER — LIDOCAINE HYDROCHLORIDE 40 MG/ML
SOLUTION TOPICAL ONCE
Status: COMPLETED | OUTPATIENT
Start: 2017-10-24 | End: 2017-10-24

## 2017-10-24 RX ADMIN — LIDOCAINE HYDROCHLORIDE: 40 SOLUTION TOPICAL at 16:59

## 2017-10-25 NOTE — PROGRESS NOTES
[facility] [address]   Progress Note and Procedure Note      Olya Wheeler III  MEDICAL RECORD NUMBER:  [id number]  AGE: 46 y.o. GENDER: male  : 1965  EPISODE DATE:  10/24/2017    Subjective:     Chief Complaint   Patient presents with    Wound Check     left leg         HISTORY of PRESENT ILLNESS HPI     Olya Wheeler III is a 46 y.o. male who presents today for wound/ulcer evaluation. History of Wound Context: left leg wound. He has kept his dressing clean and intact. He has not followed up with vascular surgery as recommended yet but relates that he has an appointment scheduled. Wound/Ulcer Pain Timing/Severity: constant  Quality of pain: burning  Severity:  3 / 10   Modifying Factors: None  Associated Signs/Symptoms: edema    Ulcer Identification:  Ulcer Type: venous  Contributing Factors: edema and venous stasis    Wound: N/A        PAST MEDICAL HISTORY        Diagnosis Date    Breast disorder 2009    MAMMOGRAM -VE. S/P BREAST SURGERY EVAL    Obesity     PPD positive     Venous stasis ulcer of left lower extremity ([address] 10/25/2016       PAST SURGICAL HISTORY    No past surgical history on file. FAMILY HISTORY    Family History   Problem Relation Age of Onset    Heart Disease Mother     Diabetes Maternal Uncle     Diabetes Paternal Uncle     Cancer Paternal Uncle      ?  pancreatic       SOCIAL HISTORY    Social History   Substance Use Topics    Smoking status: Never Smoker    Smokeless tobacco: Never Used    Alcohol use Yes      Comment: moderate use       ALLERGIES    No Known Allergies    MEDICATIONS    Current Outpatient Prescriptions on File Prior to Encounter   Medication Sig Dispense Refill    acetaminophen (TYLENOL) 325 MG tablet Take 650 mg by mouth every 6 hours as needed for Pain      GLUCOSAMINE HCL PO Take by mouth daily      Multiple Vitamin (MULTI VITAMIN PO) Take by mouth daily      ibuprofen (ADVIL;MOTRIN) 800 MG tablet Take 1 tablet by mouth every 6 hours wound(s)/ulcer(s) today, debridement is required to promote healing and evaluate the wound base. Performed by: Brannon Lawrence DPM    Consent obtained? Yes    Time out taken: Yes    Pain Control: Anesthetic: 4% Topical Xylocaine     Debridement: Excisional Debridement    Using curette, #15 blade scalpel, scissors and forceps the wound/ulcer was sharply debrided    down through and including the removal of  epidermis, dermis and subcutaneous tissue. Devitalized Tissue Debrided:  fibrin, slough and necrotic/eschar      Pre Debridement Measurements:  Are located in the [place]  Documentation Flow Sheet   Wound/Ulcer #: 5     Post  Debridement Measurements:    Wound 09/19/17 Venous ulcer Leg Left; Lower; Lateral;Distal #5 (existing 1 month) (Active)   Wound Type Wound 10/24/2017  4:46 PM   Wound Venous 10/24/2017  4:46 PM   Dressing/Treatment Other (Comment) 9/22/2017 11:34 AM   Wound Cleansed Rinsed/Irrigated with saline 10/24/2017  4:46 PM   Wound Length (cm) 9.5 cm 10/24/2017  4:46 PM   Wound Width (cm) 6 cm 10/24/2017  4:46 PM   Wound Depth (cm)  0.1 10/24/2017  4:46 PM   Calculated Wound Size (cm^2) (l*w) 55.2 cm^2 10/24/2017  4:46 PM   Change in Wound Size % (l*w) -186.75 10/24/2017  4:46 PM   Distance Tunneling (cm) 0 cm 10/3/2017  3:43 PM   Undermining Starts ___ O'Clock 0 9/29/2017  3:31 PM   Undermining Maxium Distance (cm) 0 10/3/2017  3:43 PM   Wound Assessment Black; Red 10/24/2017  4:46 PM   Margins Defined edges 10/24/2017  4:46 PM   Georgina-wound Assessment Eston Shames; Maceration 10/24/2017  4:46 PM   Non-staged Wound Description Full thickness 10/13/2017  3:44 PM   [place]%Wound Bed 15 10/13/2017  3:44 PM   Red%Wound Bed 80 10/24/2017  4:46 PM   Yellow%Wound Bed 20 10/24/2017  4:46 PM   Black%Wound Bed 30 9/29/2017  3:31 PM   Purple%Wound Bed 50 9/22/2017  9:52 AM   Drainage Amount Moderate 10/24/2017  4:46 PM   Drainage Description Brown;Yellow 10/24/2017  4:46 PM   Odor Strong 10/24/2017  4:46 PM   Op First Treatment Date 09/19/17 9/19/2017  1:36 PM   Number of days: 35     Percent of Wound/Ulcer Debrided: 100%    Total Surface Area Debrided:  57 sq cm    Diabetic/Pressure/Non Pressure Ulcers only:  Ulcer: Non-Pressure ulcer, muscle necrosis    Bleeding: Minimal    Hemostasis Achieved: by pressure    Procedural Pain: 3  / 10     Post Procedural Pain: 3 / 10     Response to treatment:  Well tolerated by patient. Plan:   E/M x 30 minutes of a new problem of left leg ulceration. Venous reflux study was significantly abnormal.  As patient has had a history of re ulceration despite wearing compression stocking will refer to Dr. Mirian Dos Santos for further evaluation and management of his chronic venous disease as it is likely contributing to his re ulcerating. Patient relates that he has a follow up appointment scheduled. Will apply hydrophera blue to the wound to decrease the hypertrophic granulation tissue. Will control edema with multilayer compression dressing. Will see patient back in 3-4 days for a dressing change due to the amount of drainage in the wound. Treatment Note please see attached Discharge Instructions    In my professional opinion this patient would benefit from HBO Therapy: No    Written patient dismissal instructions given to patient and signed by patient or POA.          RTC 1 week    Electronically signed by Sumanth Mills DPM on 10/25/2017 at 7:30 AM

## 2017-10-31 ENCOUNTER — HOSPITAL ENCOUNTER (OUTPATIENT)
Dept: WOUND CARE | Age: 52
Discharge: HOME | End: 2017-10-31
Attending: PODIATRIST | Admitting: PODIATRIST

## 2017-10-31 VITALS
TEMPERATURE: 98.1 F | RESPIRATION RATE: 16 BRPM | HEART RATE: 88 BPM | DIASTOLIC BLOOD PRESSURE: 88 MMHG | SYSTOLIC BLOOD PRESSURE: 147 MMHG

## 2017-10-31 RX ORDER — LIDOCAINE HYDROCHLORIDE 40 MG/ML
SOLUTION TOPICAL ONCE
Status: COMPLETED | OUTPATIENT
Start: 2017-10-31 | End: 2017-10-31

## 2017-10-31 RX ADMIN — LIDOCAINE HYDROCHLORIDE 2.5 ML: 40 SOLUTION TOPICAL at 15:40

## 2017-10-31 ASSESSMENT — PAIN SCALES - GENERAL: PAINLEVEL_OUTOF10: 0

## 2017-10-31 NOTE — PROGRESS NOTES
Cholecalciferol (VITAMIN D) 2000 UNITS CAPS capsule Take 1 capsule by mouth daily 30 capsule 3    GLUCOSAMINE HCL PO Take by mouth daily      Compression Stockings MISC by Does not apply route Diagnosis: I83.029  Location of wound: Left Lower Leg  Gradient IV (30-40 mm Hg)  Style: Knee Length  Extremity: Bilateral 1 each 2     No current facility-administered medications on file prior to encounter. REVIEW OF SYSTEMS    Pertinent items are noted in HPI. Objective:        BP (!) 147/88   Pulse 88   Temp 98.1 °F (36.7 °C) (Oral)   Resp 16     Wt Readings from Last 3 Encounters:   09/21/17 (!) 423 lb (191.9 kg)   09/19/17 (!) 390 lb (176.9 kg)   02/14/17 (!) 392 lb 8 oz (178 kg)       PHYSICAL EXAM    DP/PT pulses palpable bilaterally. CFT brisk to all digits. Digits are pink and warm to the touch. Hair growth is normal in appearance. No calf pain with palpation noted. Patient has pitting edema noted on the bilateral lower extremities. Epicritic sensation is grossly intact bilaterally. Negative clonus and babinski reflex is down going. Wound noted on the posterior lateral aspect of the left lower extremity. Wound has fibrotic and nonviable tissue that extends down through and includes the subcutaneous tissue. After debridement the wound has a fibro-granular base with eschar noted around the periphery of the wound. There is no surrounding erythema, edema, warmth or malodor noted. The wound does not probe or track to bone. There is hypertrophic granulation tissue noted at the superior aspect of the wound.        Assessment:      Patient Active Problem List   Diagnosis Code    Family history of early CAD Z80.55    PPD positive R76.11    Vitamin D deficiency E55.9    Edema R60.9    Morbid obesity ([address] E66.01    Anemia D64.9    Venous stasis ulcer of left lower extremity ([facility] [address] I83.029        Excisional Debridement Procedure Note  Indications:  Based on my examination of this patient's wound(s)/ulcer(s) today, debridement is required to promote healing and evaluate the wound base. Performed by: Dakota Crockett DPM    Consent obtained? Yes    Time out taken: Yes    Pain Control: Anesthetic: 4% Topical Xylocaine     Debridement: Excisional Debridement    Using curette, #15 blade scalpel, scissors and forceps the wound/ulcer was sharply debrided    down through and including the removal of  epidermis, dermis and subcutaneous tissue. Devitalized Tissue Debrided:  fibrin, slough and necrotic/eschar      Pre Debridement Measurements:  Are located in the [place]  Documentation Flow Sheet   Wound/Ulcer #: 5     Post  Debridement Measurements:    Wound 09/19/17 Venous ulcer Leg Left; Lower; Lateral;Distal #5 (existing 1 month) (Active)   Wound Type Wound 10/31/2017  3:33 PM   Wound Venous 10/31/2017  3:33 PM   Dressing/Treatment Other (Comment) 9/22/2017 11:34 AM   Wound Cleansed Rinsed/Irrigated with saline 10/31/2017  3:33 PM   Wound Length (cm) 8.2 cm 10/31/2017  3:33 PM   Wound Width (cm) 6.2 cm 10/31/2017  3:33 PM   Wound Depth (cm)  0.1 10/31/2017  3:33 PM   Calculated Wound Size (cm^2) (l*w) 48 cm^2 10/31/2017  3:33 PM   Change in Wound Size % (l*w) -149.35 10/31/2017  3:33 PM   Distance Tunneling (cm) 0 cm 10/3/2017  3:43 PM   Undermining Starts ___ O'Clock 0 9/29/2017  3:31 PM   Undermining Maxium Distance (cm) 0 10/3/2017  3:43 PM   Wound Assessment Red; White;Gray 10/31/2017  3:33 PM   Margins Defined edges 10/31/2017  3:33 PM   Georgina-wound Assessment Magnus Latrell; Maceration 10/24/2017  4:46 PM   Non-staged Wound Description Full thickness 10/13/2017  3:44 PM   [place]%Wound Bed 15 10/13/2017  3:44 PM   Red%Wound Bed 80 10/31/2017  3:33 PM   Yellow%Wound Bed 20 10/24/2017  4:46 PM   Black%Wound Bed 30 9/29/2017  3:31 PM   Purple%Wound Bed 50 9/22/2017  9:52 AM   Other%Wound Bed 20 10/31/2017  3:33 PM   Drainage Amount Moderate 10/31/2017  3:33 PM   Drainage Description Serosanguinous 10/31/2017  3:33 PM   Odor Strong 10/31/2017  3:33 PM   Op First Treatment Date 09/19/17 9/19/2017  1:36 PM   Number of days: 42     Percent of Wound/Ulcer Debrided: 100%    Total Surface Area Debrided:  50.84 sq cm    Diabetic/Pressure/Non Pressure Ulcers only:  Ulcer: Non-Pressure ulcer, muscle necrosis    Bleeding: Minimal    Hemostasis Achieved: by pressure    Procedural Pain: 3  / 10     Post Procedural Pain: 3 / 10     Response to treatment:  Well tolerated by patient. Plan:   E/M x 30 minutes of a new problem of left leg ulceration. Venous reflux study was significantly abnormal.  As patient has had a history of re ulceration despite wearing compression stocking will refer to Dr. Mary Kate Belcher for further evaluation and management of his chronic venous disease as it is likely contributing to his re ulcerating. Patient relates that he does not follow up appointment scheduled but will do so next week. He is running for Macrotherapy and has not had time due to the [place]. Will apply hydrophera blue to the wound to decrease the hypertrophic granulation tissue. Will control edema with multilayer compression dressing. Will see patient back in 3-4 days for a dressing change due to the amount of drainage in the wound. Treatment Note please see attached Discharge Instructions    In my professional opinion this patient would benefit from HBO Therapy: No    Written patient dismissal instructions given to patient and signed by patient or POA.          RTC 1 week    Electronically signed by Abdon Rucker DPM on 10/31/2017 at 3:56 PM

## 2017-10-31 NOTE — PROGRESS NOTES
Multilayer Compression Wrap   (Not Unna) Below the Knee    NAME:  Desi Nolasco III  YOB: 1965  MEDICAL RECORD NUMBER:  [id number]  DATE:  10/31/2017       [x] Removed old Multilayer wrap if indicated and wash leg with mild soap/water.  [x] Applied moisturizing agent to dry skin as needed.  [x] Applied primary and secondary dressing as ordered    [x] Applied multilayered dressing below the knee to left lower leg(s). profore 4 layer wrap    [x] Instructed patient/caregiver not to remove dressing and to keep it clean and dry.  [x] Instructed patient/caregiver on complications to report to provider, such as pain, numbness in toes, heavy drainage, and slippage of dressing.  [x] Instructed patient on purpose of compression dressing and on activity and exercise recommendations.     Electronically signed by Romi Dumont RN on 10/31/2017 at 4:08 PM

## 2017-11-03 ENCOUNTER — HOSPITAL ENCOUNTER (OUTPATIENT)
Dept: WOUND CARE | Age: 52
Discharge: HOME | End: 2017-11-03
Attending: SPECIALIST | Admitting: SPECIALIST

## 2017-11-03 VITALS
RESPIRATION RATE: 16 BRPM | TEMPERATURE: 98.2 F | SYSTOLIC BLOOD PRESSURE: 169 MMHG | HEART RATE: 85 BPM | DIASTOLIC BLOOD PRESSURE: 100 MMHG

## 2017-11-03 NOTE — PROGRESS NOTES
Multilayer Compression Wrap   (Not Unna) Below the Knee    NAME:  Maria D Mcfadden III  YOB: 1965  MEDICAL RECORD NUMBER:  [id number]  DATE:  11/3/2017       [x] Removed old Multilayer wrap if indicated and wash leg with mild soap/water.  [x] Applied moisturizing agent to dry skin as needed.  [x] Applied primary and secondary dressing as ordered    [x] Applied multilayered dressing below the left knee to foot ( Profore) per 's instructions.  [x] Instructed patient/caregiver not to remove dressing and to keep it clean and dry.  [x] Instructed patient/caregiver on complications to report to provider, such as pain, numbness in toes, heavy drainage, and slippage of dressing.  [x] Instructed patient on purpose of compression dressing and on activity and exercise recommendations.      Applied per  [employer] Guidelines    Electronically signed by Nicole Thomason.  Kristin Garcia on 11/3/2017 at 10:15 AM

## 2017-11-07 ENCOUNTER — HOSPITAL ENCOUNTER (OUTPATIENT)
Dept: WOUND CARE | Age: 52
Discharge: HOME | End: 2017-11-07
Attending: PODIATRIST | Admitting: PODIATRIST

## 2017-11-07 VITALS
DIASTOLIC BLOOD PRESSURE: 88 MMHG | SYSTOLIC BLOOD PRESSURE: 158 MMHG | HEART RATE: 76 BPM | TEMPERATURE: 98.5 F | RESPIRATION RATE: 16 BRPM

## 2017-11-07 RX ORDER — LIDOCAINE HYDROCHLORIDE 40 MG/ML
2.5 SOLUTION TOPICAL ONCE
Status: COMPLETED | OUTPATIENT
Start: 2017-11-07 | End: 2017-11-07

## 2017-11-07 RX ADMIN — LIDOCAINE HYDROCHLORIDE 2.5 ML: 40 SOLUTION TOPICAL at 17:08

## 2017-11-09 NOTE — PROGRESS NOTES
[facility]   Progress Note and Procedure Note      Veronica Solorzano III  MEDICAL RECORD NUMBER:  [id number]  AGE: 46 y.o. GENDER: male  : 1965  EPISODE DATE:  2017    Subjective:     Chief Complaint   Patient presents with    Wound Check     f/u left lower leg         HISTORY of PRESENT ILLNESS HPI     Veronica Solorzano III is a 46 y.o. male who presents today for wound/ulcer evaluation. History of Wound Context: left leg wound. He has kept his dressing clean and intact. He has not followed up with vascular surgery as recommended yet but relates that he has an appointment scheduled. Wound/Ulcer Pain Timing/Severity: constant  Quality of pain: burning  Severity:  3 / 10   Modifying Factors: None  Associated Signs/Symptoms: edema    Ulcer Identification:  Ulcer Type: venous  Contributing Factors: edema and venous stasis    Wound: N/A        PAST MEDICAL HISTORY        Diagnosis Date    Breast disorder 2009    MAMMOGRAM -VE. S/P BREAST SURGERY EVAL    Obesity     PPD positive     Venous stasis ulcer of left lower extremity ([facility] [address] 10/25/2016       PAST SURGICAL HISTORY    No past surgical history on file. FAMILY HISTORY    Family History   Problem Relation Age of Onset    Heart Disease Mother     Diabetes Maternal Uncle     Diabetes Paternal Uncle     Cancer Paternal Uncle      ?  pancreatic       SOCIAL HISTORY    Social History   Substance Use Topics    Smoking status: Never Smoker    Smokeless tobacco: Never Used    Alcohol use Yes      Comment: moderate use       ALLERGIES    No Known Allergies    MEDICATIONS    Current Outpatient Prescriptions on File Prior to Encounter   Medication Sig Dispense Refill    acetaminophen (TYLENOL) 325 MG tablet Take 650 mg by mouth every 6 hours as needed for Pain      GLUCOSAMINE HCL PO Take by mouth daily      Multiple Vitamin (MULTI VITAMIN PO) Take by mouth daily      ibuprofen (ADVIL;MOTRIN) 800 MG tablet Take 1 tablet by mouth every

## 2017-11-10 ENCOUNTER — HOSPITAL ENCOUNTER (OUTPATIENT)
Dept: WOUND CARE | Age: 52
Discharge: HOME | End: 2017-11-10
Attending: PODIATRIST | Admitting: PODIATRIST

## 2017-11-14 ENCOUNTER — HOSPITAL ENCOUNTER (OUTPATIENT)
Dept: WOUND CARE | Age: 52
Discharge: HOME | End: 2017-11-14
Attending: PODIATRIST | Admitting: PODIATRIST

## 2017-11-14 VITALS
HEART RATE: 98 BPM | TEMPERATURE: 98.3 F | RESPIRATION RATE: 16 BRPM | DIASTOLIC BLOOD PRESSURE: 93 MMHG | SYSTOLIC BLOOD PRESSURE: 156 MMHG

## 2017-11-14 RX ORDER — LIDOCAINE HYDROCHLORIDE 40 MG/ML
2.5 SOLUTION TOPICAL ONCE
Status: COMPLETED | OUTPATIENT
Start: 2017-11-14 | End: 2017-11-14

## 2017-11-14 RX ADMIN — LIDOCAINE HYDROCHLORIDE 2.5 ML: 40 SOLUTION TOPICAL at 15:33

## 2017-11-14 NOTE — PROGRESS NOTES
Multilayer Compression Wrap   (Not Unna) Below the Knee    NAME:  Luli Fajardo III  YOB: 1965  MEDICAL RECORD NUMBER:  [id number]  DATE:  11/14/2017       [x] Removed old Multilayer wrap if indicated and wash leg with mild soap/water.  [x] Applied moisturizing agent to dry skin as needed.  [x] Applied primary and secondary dressing as ordered    [x] Applied multilayered dressing below the left knee to   /Profore) per [facility] instructions.  [x] Instructed patient/caregiver not to remove dressing and to keep it clean and dry.  [x] Instructed patient/caregiver on complications to report to provider, such as pain, numbness in toes, heavy drainage, and slippage of dressing.  [x] Instructed patient on purpose of compression dressing and on activity and exercise recommendations.        Applied per   Guidelines    Electronically signed by Carlita Ledbetter RN on 11/14/2017 at 4:19 PM

## 2017-11-14 NOTE — PROGRESS NOTES
[facility]  Progress Note and Procedure Note      Gely Graves III  MEDICAL RECORD NUMBER:  [id number]  AGE: 46 y.o. GENDER: male  : 1965  EPISODE DATE:  2017    Subjective:       Chief Complaint   Patient presents with    Wound Check     f/u lle         HISTORY of PRESENT ILLNESS HPI     Gely Graves III is a 46 y.o. male who presents today for wound evaluation. History of Wound Context: LEFT lateral leg wound which has responded to debridement and compression  Wound Pain Timing/Severity: none  Quality of pain: N/A  Severity:  0 / 10   Modifying Factors: None  Associated Signs/Symptoms: edema    Ulcer Identification:  Ulcer Type: venous and diabetic  Contributing Factors: edema, venous stasis, lymphedema, diabetes, chronic pressure, decreased mobility and obesity          PAST MEDICAL HISTORY        Diagnosis Date    Breast disorder     MAMMOGRAM -VE. S/P BREAST SURGERY EVAL    Obesity     PPD positive     Venous stasis ulcer of left lower extremity ([address] 10/25/2016       PAST SURGICAL HISTORY    History reviewed. No pertinent surgical history. FAMILY HISTORY    Family History   Problem Relation Age of Onset    Heart Disease Mother     Diabetes Maternal Uncle     Diabetes Paternal Uncle     Cancer Paternal Uncle      ?  pancreatic       SOCIAL HISTORY    Social History   Substance Use Topics    Smoking status: Never Smoker    Smokeless tobacco: Never Used    Alcohol use Yes      Comment: moderate use       ALLERGIES    No Known Allergies    MEDICATIONS    Current Outpatient Prescriptions on File Prior to Encounter   Medication Sig Dispense Refill    acetaminophen (TYLENOL) 325 MG tablet Take 650 mg by mouth every 6 hours as needed for Pain      GLUCOSAMINE HCL PO Take by mouth daily      Multiple Vitamin (MULTI VITAMIN PO) Take by mouth daily      ibuprofen (ADVIL;MOTRIN) 800 MG tablet Take 1 tablet by mouth every 6 hours as needed for Pain 120 tablet 3 month) (Active)   Wound Type Wound 11/14/2017  3:34 PM   Wound Venous 11/14/2017  3:34 PM   Dressing/Treatment Other (Comment) 9/22/2017 11:34 AM   Wound Cleansed Rinsed/Irrigated with saline 11/14/2017  3:34 PM   Wound Length (cm) 8 cm 11/14/2017  3:34 PM   Wound Width (cm) 4 cm 11/14/2017  3:34 PM   Wound Depth (cm)  0.1 11/14/2017  3:34 PM   Calculated Wound Size (cm^2) (l*w) 32 cm^2 11/14/2017  3:34 PM   Change in Wound Size % (l*w) -66.23 11/14/2017  3:34 PM   Distance Tunneling (cm) 0 cm 10/3/2017  3:43 PM   Undermining Starts ___ O'Clock 0 9/29/2017  3:31 PM   Undermining Maxium Distance (cm) 0 10/3/2017  3:43 PM   Wound Assessment Red; White;Yellow;Pink 11/14/2017  3:34 PM   Drainage Amount Moderate 11/14/2017  3:34 PM   Drainage Description Brown;Yellow 11/14/2017  3:34 PM   Odor Mild 11/14/2017  3:34 PM   Margins Defined edges 11/14/2017  3:34 PM   Georgina-wound Assessment Brown 11/14/2017  3:34 PM   Non-staged Wound Description Full thickness 11/14/2017  3:34 PM   [place]%Wound Bed 10 11/14/2017  3:34 PM   Red%Wound Bed 70 11/14/2017  3:34 PM   Yellow%Wound Bed 10 11/14/2017  3:34 PM   Black%Wound Bed 30 9/29/2017  3:31 PM   Purple%Wound Bed 50 9/22/2017  9:52 AM   Other%Wound Bed 10 % white 11/14/2017  3:34 PM   Op First Treatment Date 09/19/17 9/19/2017  1:36 PM   Number of days: 56       Percent of Wound Debrided: 100%    Total Surface Area Debrided:  32.0 sq cm     Diabetic/Pressure/Non Pressure Ulcers:  Ulcer: Non-Pressure ulcer, fat layer exposed        Bleeding:  Minimal    Hemostasis Achieved:  by pressure    Procedural Pain:  0  / 10     Post Procedural Pain:  0 / 10     Response to treatment:  Well tolerated by patient.      Plan:   Continued serial debridement with compression LEFT leg    Treatment Note please see attached Discharge Instructions    In my professional opinion this patient would benefit from HBO Therapy: Undecided       Patient is to return to wound care center in:  1 week(s)    Written patient dismissal instructions given to patient and signed by patient or POA. Discharge [facility] Physician Orders and Discharge Instructions  Karli Arthurmaria elena Nguy[address], [facility]  Telephone: [phone]    NAME:  Edward Pizano III  YOB: 1965  MEDICAL RECORD NUMBER:  [id number]  DATE:  11/14/2017    Wash hands with soap and water prior to and after every dressing change. Wound Cleansing:   · Do not scrub or use excessive force. · With each dressing change, rinse wounds with 0.9% Saline. (May use wound wash or soft contact solution. Both can be purchased at a local drug store). · If unable to obtain saline, may use a gentle soap and water. · Keep wounds dry in the shower unless otherwise instructed by the physician. Topical Treatments:  Do not apply lotions, creams, or ointments to wound bed unless directed. [] Apply moisturizing lotion to skin surrounding the wound prior to dressing change.  [] Apply antifungal ointment to skin surrounding the wound prior to dressing change.  [] Apply thin film of moisture barrier ointment to skin immediately around wound. [] Other:      Dressings:           Wound Location: Left Lateral Lower Leg   [x] Apply Primary Dressing to wound:       [] Foam/Foam with Border  [] Mepitel/Non-adherent/Xeroform   [] Alginate with Silver    [] Alginate   [] Collagen [] Collagen with Silver     [] Hydrocolloid  [] Hydrafera Blue moistened with saline   [] MediHoney Paste/Gel [] Hydrogel      [x] Santyl with Moisten saline gauze    [] Bactroban/Mupirocin [] Polysporin  [] Other:      Pack wound loosely with  [] Iodoform   [] Plain Packing  [] Other      [x] Cover and Secure with:     [] Gauze [x] ABD [] Stretch bandage roll [] Kerlix   [] Coban [] Ace Wrap [] Cover Roll Tape    [] Other:    Avoid contact of tape with skin.     [] Change dressing: [] Daily    [] Every Other Day [] Three times per week   [] Once a week [x] Do Not Change Dressing   [] Other:                                  Compression:  Type:   profore  ·  Multilayer Compression Wrap Applied in Clinic []Right Leg [x]Left Leg  · Do not get leg(s) with wrap wet. ·  If wraps become too tight call the center or completely remove the wrap. · Elevate leg(s) above the level of the heart when sitting. · Avoid prolonged standing in one place. [] [facility] remove wrap, cleanse affected leg(s) with soap and water, apply wound dressings as ordered above and reapply compression wraps on:                 Dietary:  Important dietary reminders:  1. Increase Protein intake (i.e. Lean meats, fish, eggs, legumes, and yogurt)  2. No added salt  3. If diabetic, follow a diabetic diet and check glucose prior to meals or as instructed by your physician. Return Appointment:  [] Wound and dressing supply provider: []  [x] Return Appointment: With Dr Sahra Goodrich  in  [address] [place] [address])  NURSE VISIT Friday November 17TH      Consults: [] Weight Management [] Diabetes Education [] Vascular Surgery  [] Endocrinology [] Nutrition Counseling  [] Lymphedema Therapy     Your nurse  is: Corey Leo     Electronically signed by Severa Guile, RN on 11/14/2017 at 12:59 PM     [address] Information: Should you experience any significant changes in your wound(s) or have questions about your wound care, please contact the [address] [place] [address] at [phone] Monday and Wednesday 8:00 am - 2:00 pm and Tuesday, Thursday and Friday from 8:00 am - 4:30 pm.   If you need help with your wound outside these hours and cannot wait until we are again available, contact your PCP or go to the hospital emergency room. PLEASE NOTE: IF YOU ARE UNABLE TO OBTAIN WOUND SUPPLIES, CONTINUE TO USE THE SUPPLIES YOU HAVE AVAILABLE UNTIL YOU ARE ABLE TO REACH US.  IT IS MOST IMPORTANT TO KEEP THE WOUND COVERED AT ALL TIMES. Physician orders by:     [x] Dr Jamilah Johnson [] Dr Weldon Felty    [] Dr Fernando Antunez     [] Dr Nik Garcia   [] Dr Deb Cooper  [] Dr Rebecca Dash       Physician Signature:__________________________________        The information contained in the After Visit Summary has been reviewed with me, the patient and/or responsible adult, by my health care provider(s). I had the opportunity to ask questions regarding this information.   I have elected to receive;      [] Patient unable to sign Discharge Instructions given to ECF/Transportation/POA      []  After Visit Summary  []  Comprehensive Discharge Instruction              Electronically signed by Sidra Raymond DPM on 11/14/2017 at 5:00 PM

## 2017-11-17 ENCOUNTER — HOSPITAL ENCOUNTER (OUTPATIENT)
Dept: WOUND CARE | Age: 52
Discharge: HOME | End: 2017-11-17
Attending: SPECIALIST | Admitting: SPECIALIST

## 2017-11-17 VITALS
RESPIRATION RATE: 16 BRPM | TEMPERATURE: 98.7 F | DIASTOLIC BLOOD PRESSURE: 94 MMHG | SYSTOLIC BLOOD PRESSURE: 161 MMHG | HEART RATE: 78 BPM

## 2017-11-21 ENCOUNTER — HOSPITAL ENCOUNTER (OUTPATIENT)
Dept: WOUND CARE | Age: 52
Discharge: HOME | End: 2017-11-21
Attending: PODIATRIST | Admitting: PODIATRIST

## 2017-11-21 VITALS — DIASTOLIC BLOOD PRESSURE: 100 MMHG | SYSTOLIC BLOOD PRESSURE: 163 MMHG

## 2017-11-22 NOTE — PROGRESS NOTES
[facility]   Progress Note and Procedure Note      Olya Wheeler III  MEDICAL RECORD NUMBER:  [id number]  AGE: 46 y.o. GENDER: male  : 1965  EPISODE DATE:  2017    Subjective:     Chief Complaint   Patient presents with    Wound Check     f/u left lower leg         HISTORY of PRESENT ILLNESS HPI     Olya Wheeler III is a 46 y.o. male who presents today for wound/ulcer evaluation. History of Wound Context: left leg wound. He has kept his dressing clean and intact. He has not followed up with vascular surgery as recommended yet but relates that he has an appointment scheduled. Wound/Ulcer Pain Timing/Severity: constant  Quality of pain: burning  Severity:  3 / 10   Modifying Factors: None  Associated Signs/Symptoms: edema    Ulcer Identification:  Ulcer Type: venous  Contributing Factors: edema and venous stasis    Wound: N/A        PAST MEDICAL HISTORY        Diagnosis Date    Breast disorder 2009    MAMMOGRAM -VE. S/P BREAST SURGERY EVAL    Obesity     PPD positive     Venous stasis ulcer of left lower extremity ([facility] [address] 10/25/2016       PAST SURGICAL HISTORY    No past surgical history on file. FAMILY HISTORY    Family History   Problem Relation Age of Onset    Heart Disease Mother     Diabetes Maternal Uncle     Diabetes Paternal Uncle     Cancer Paternal Uncle      ?  pancreatic       SOCIAL HISTORY    Social History   Substance Use Topics    Smoking status: Never Smoker    Smokeless tobacco: Never Used    Alcohol use Yes      Comment: moderate use       ALLERGIES    No Known Allergies    MEDICATIONS    Current Outpatient Prescriptions on File Prior to Encounter   Medication Sig Dispense Refill    acetaminophen (TYLENOL) 325 MG tablet Take 650 mg by mouth every 6 hours as needed for Pain      GLUCOSAMINE HCL PO Take by mouth daily      Multiple Vitamin (MULTI VITAMIN PO) Take by mouth daily      ibuprofen (ADVIL;MOTRIN) 800 MG tablet Take 1 tablet by mouth every 6 hours as needed for Pain 120 tablet 3    Cholecalciferol (VITAMIN D) 2000 UNITS CAPS capsule Take 1 capsule by mouth daily 30 capsule 3    Compression Stockings MISC by Does not apply route Diagnosis: I83.029  Location of wound: Left Lower Leg  Gradient IV (30-40 mm Hg)  Style: Knee Length  Extremity: Bilateral 1 each 2     No current facility-administered medications on file prior to encounter. REVIEW OF SYSTEMS    Pertinent items are noted in HPI. Objective:        BP (!) 163/100     Wt Readings from Last 3 Encounters:   09/21/17 (!) 423 lb (191.9 kg)   09/19/17 (!) 390 lb (176.9 kg)   02/14/17 (!) 392 lb 8 oz (178 kg)       PHYSICAL EXAM    DP/PT pulses palpable bilaterally. CFT brisk to all digits. Digits are pink and warm to the touch. Hair growth is normal in appearance. No calf pain with palpation noted. Patient has pitting edema noted on the bilateral lower extremities. Epicritic sensation is grossly intact bilaterally. Negative clonus and babinski reflex is down going. Wound noted on the posterior lateral aspect of the left lower extremity. Wound has fibrotic and nonviable tissue that extends down through and includes the subcutaneous tissue. After debridement the wound has a fibro-granular base with eschar noted around the periphery of the wound. There is no surrounding erythema, edema, warmth or malodor noted. The wound does not probe or track to bone. There is hypertrophic granulation tissue noted at the superior aspect of the wound.        Assessment:      Patient Active Problem List   Diagnosis Code    Family history of early CAD Z80.55    PPD positive R76.11    Vitamin D deficiency E55.9    Edema R60.9    Morbid obesity ([address] E66.01    Anemia D64.9    Venous stasis ulcer of left lower extremity ([facility] [address] I83.029        Excisional Debridement Procedure Note  Indications:  Based on my examination of this patient's wound(s)/ulcer(s) today, debridement is required to PM   Number of days: 63     Percent of Wound/Ulcer Debrided: 100%    Total Surface Area Debrided: 42.5 sq cm    Diabetic/Pressure/Non Pressure Ulcers only:  Ulcer: Non-Pressure ulcer, fat layer exposed    Bleeding: Minimal    Hemostasis Achieved: by pressure    Procedural Pain: 3  / 10     Post Procedural Pain: 3 / 10     Response to treatment:  Well tolerated by patient. Plan:   E/M x 30 minutes of a new problem of left leg ulceration. Venous reflux study was significantly abnormal.  As patient has had a history of re ulceration despite wearing compression stocking will refer to Dr. Spike Trinidad for further evaluation and management of his chronic venous disease as it is likely contributing to his re ulcerating. Patient relates that he does have a follow up appointment scheduled but will do so next week. Will apply hydrophera blue to the wound to decrease the hypertrophic granulation tissue. Will control edema with multilayer compression dressing. Will see patient back in 3-4 days for a dressing change due to the amount of drainage in the wound. Treatment Note please see attached Discharge Instructions    In my professional opinion this patient would benefit from HBO Therapy: No    Written patient dismissal instructions given to patient and signed by patient or POA.          RTC 1 week    Electronically signed by Amanda Montemayor DPM on 11/22/2017 at 11:06 AM

## 2017-11-28 ENCOUNTER — HOSPITAL ENCOUNTER (OUTPATIENT)
Dept: WOUND CARE | Age: 52
Discharge: HOME | End: 2017-11-28
Attending: PODIATRIST | Admitting: PODIATRIST

## 2017-11-28 VITALS — HEART RATE: 88 BPM | SYSTOLIC BLOOD PRESSURE: 200 MMHG | RESPIRATION RATE: 18 BRPM | DIASTOLIC BLOOD PRESSURE: 106 MMHG

## 2017-11-28 RX ORDER — LIDOCAINE HYDROCHLORIDE 40 MG/ML
2.5 SOLUTION TOPICAL ONCE
Status: COMPLETED | OUTPATIENT
Start: 2017-11-28 | End: 2017-11-28

## 2017-11-28 RX ADMIN — LIDOCAINE HYDROCHLORIDE 2.5 ML: 40 SOLUTION TOPICAL at 16:20

## 2017-11-29 NOTE — PROGRESS NOTES
[facility]   Progress Note and Procedure Note      Ludin Post III  MEDICAL RECORD NUMBER:  [id number]  AGE: 46 y.o. GENDER: male  : 1965  EPISODE DATE:  2017    Subjective:     Chief Complaint   Patient presents with    Wound Check     f/u left lateral leg         HISTORY of PRESENT ILLNESS HPI     Ludin Post III is a 46 y.o. male who presents today for wound/ulcer evaluation. History of Wound Context: left leg wound. He has kept his dressing clean and intact. He has not followed up with vascular surgery as recommended yet but relates that he has an appointment scheduled. Wound/Ulcer Pain Timing/Severity: constant  Quality of pain: burning  Severity:  3 / 10   Modifying Factors: None  Associated Signs/Symptoms: edema    Ulcer Identification:  Ulcer Type: venous  Contributing Factors: edema and venous stasis    Wound: N/A        PAST MEDICAL HISTORY        Diagnosis Date    Breast disorder 2009    MAMMOGRAM -VE. S/P BREAST SURGERY EVAL    Obesity     PPD positive     Venous stasis ulcer of left lower extremity ([facility] [address] 10/25/2016       PAST SURGICAL HISTORY    No past surgical history on file. FAMILY HISTORY    Family History   Problem Relation Age of Onset    Heart Disease Mother     Diabetes Maternal Uncle     Diabetes Paternal Uncle     Cancer Paternal Uncle      ?  pancreatic       SOCIAL HISTORY    Social History   Substance Use Topics    Smoking status: Never Smoker    Smokeless tobacco: Never Used    Alcohol use Yes      Comment: moderate use       ALLERGIES    No Known Allergies    MEDICATIONS    Current Outpatient Prescriptions on File Prior to Encounter   Medication Sig Dispense Refill    acetaminophen (TYLENOL) 325 MG tablet Take 650 mg by mouth every 6 hours as needed for Pain      GLUCOSAMINE HCL PO Take by mouth daily      Multiple Vitamin (MULTI VITAMIN PO) Take by mouth daily      ibuprofen (ADVIL;MOTRIN) 800 MG tablet Take 1 tablet by mouth every 6 hours as needed for Pain 120 tablet 3    Cholecalciferol (VITAMIN D) 2000 UNITS CAPS capsule Take 1 capsule by mouth daily 30 capsule 3    Compression Stockings MISC by Does not apply route Diagnosis: I83.029  Location of wound: Left Lower Leg  Gradient IV (30-40 mm Hg)  Style: Knee Length  Extremity: Bilateral 1 each 2     No current facility-administered medications on file prior to encounter. REVIEW OF SYSTEMS    Pertinent items are noted in HPI. Objective:        BP (!) 200/106   Pulse 88   Resp 18     Wt Readings from Last 3 Encounters:   09/21/17 (!) 423 lb (191.9 kg)   09/19/17 (!) 390 lb (176.9 kg)   02/14/17 (!) 392 lb 8 oz (178 kg)       PHYSICAL EXAM    DP/PT pulses palpable bilaterally. CFT brisk to all digits. Digits are pink and warm to the touch. Hair growth is normal in appearance. No calf pain with palpation noted. Patient has pitting edema noted on the bilateral lower extremities. Epicritic sensation is grossly intact bilaterally. Negative clonus and babinski reflex is down going. Wound noted on the posterior lateral aspect of the left lower extremity. Wound has fibrotic and nonviable tissue that extends down through and includes the subcutaneous tissue. After debridement the wound has a fibro-granular base with eschar noted around the periphery of the wound. There is no surrounding erythema, edema, warmth or malodor noted. The wound does not probe or track to bone. There is hypertrophic granulation tissue noted at the superior aspect of the wound.        Assessment:      Patient Active Problem List   Diagnosis Code    Family history of early CAD Z80.55    PPD positive R76.11    Vitamin D deficiency E55.9    Edema R60.9    Morbid obesity ([address] E66.01    Anemia D64.9    Venous stasis ulcer of left lower extremity ([address] I83.029        Excisional Debridement Procedure Note  Indications:  Based on my examination of this patient's wound(s)/ulcer(s) today, debridement is required to promote healing and evaluate the wound base. Performed by: Sumanth Mills DPM    Consent obtained? Yes    Time out taken: Yes    Pain Control: Anesthetic: 4% Lidocaine Liquid Topical     Debridement: Excisional Debridement    Using curette, #15 blade scalpel, scissors and forceps the wound/ulcer was sharply debrided    down through and including the removal of  epidermis, dermis and subcutaneous tissue. Devitalized Tissue Debrided:  fibrin, slough and necrotic/eschar      Pre Debridement Measurements:  Are located in the [place]  Documentation Flow Sheet   Wound/Ulcer #: 5     Post  Debridement Measurements:  Wound 09/19/17 Venous ulcer Leg Left; Lower; Lateral;Distal #5 (existing 1 month) (Active)   Wound Type Wound 11/28/2017  4:19 PM   Wound Venous 11/28/2017  4:19 PM   Dressing/Treatment Other (Comment) 11/21/2017  4:19 PM   Wound Cleansed Rinsed/Irrigated with saline 11/28/2017  4:19 PM   Wound Length (cm) 5.5 cm 11/28/2017  4:19 PM   Wound Width (cm) 2 cm 11/28/2017  4:19 PM   Wound Depth (cm)  0.1 11/28/2017  4:19 PM   Calculated Wound Size (cm^2) (l*w) 9.54 cm^2 11/28/2017  4:19 PM   Change in Wound Size % (l*w) 50.44 11/28/2017  4:19 PM   Distance Tunneling (cm) 0 cm 10/3/2017  3:43 PM   Undermining Starts ___ O'Clock 0 9/29/2017  3:31 PM   Undermining Maxium Distance (cm) 0 10/3/2017  3:43 PM   Wound Assessment Red;Black 11/28/2017  4:19 PM   Drainage Amount Small 11/28/2017  4:19 PM   Drainage Description Serosanguinous 11/28/2017  4:19 PM   Odor Mild 11/28/2017  4:19 PM   Margins Defined edges 11/17/2017 11:23 AM   Georgina-wound Assessment Pink;Brown 11/28/2017  4:19 PM   Non-staged Wound Description Full thickness 11/28/2017  4:19 PM   [place]%Wound Bed 10 11/21/2017  4:19 PM   Red%Wound Bed 90 11/28/2017  4:19 PM   Yellow%Wound Bed 10 11/21/2017  4:19 PM   Black%Wound Bed 10 11/28/2017  4:19 PM   Purple%Wound Bed 50 9/22/2017  9:52 AM   Other%Wound Bed 10 % white 11/14/2017

## 2017-12-01 ENCOUNTER — HOSPITAL ENCOUNTER (OUTPATIENT)
Dept: WOUND CARE | Age: 52
Discharge: HOME | End: 2017-12-01
Attending: PODIATRIST | Admitting: PODIATRIST

## 2017-12-01 VITALS — RESPIRATION RATE: 18 BRPM | HEART RATE: 87 BPM | SYSTOLIC BLOOD PRESSURE: 200 MMHG | DIASTOLIC BLOOD PRESSURE: 102 MMHG

## 2017-12-01 NOTE — PROGRESS NOTES
Multilayer Compression Wrap   (Not Unna) Below the Knee    NAME:  Arya Waterman III  YOB: 1965  MEDICAL RECORD NUMBER:  [id number]  DATE:  12/1/2017       [x] Removed old Multilayer wrap if indicated and wash leg with mild soap/water.  [x] Applied moisturizing agent to dry skin as needed.  [x] Applied primary and secondary dressing as ordered    [x] Applied multilayered dressing below the knee to lle (Profore) per [facility] instructions.  [x] Instructed patient/caregiver not to remove dressing and to keep it clean and dry.  [x] Instructed patient/caregiver on complications to report to provider, such as pain, numbness in toes, heavy drainage, and slippage of dressing.  [x] Instructed patient on purpose of compression dressing and on activity and exercise recommendations.        Applied per   Guidelines    Electronically signed by Josselin Hernandez RN on 12/1/2017 at 10:05 AM

## 2017-12-05 ENCOUNTER — HOSPITAL ENCOUNTER (OUTPATIENT)
Dept: WOUND CARE | Age: 52
Discharge: HOME | End: 2017-12-05
Attending: PODIATRIST | Admitting: PODIATRIST

## 2017-12-05 VITALS
HEART RATE: 109 BPM | TEMPERATURE: 98.1 F | RESPIRATION RATE: 16 BRPM | DIASTOLIC BLOOD PRESSURE: 76 MMHG | SYSTOLIC BLOOD PRESSURE: 164 MMHG

## 2017-12-05 RX ORDER — LIDOCAINE HYDROCHLORIDE 40 MG/ML
2.5 SOLUTION TOPICAL ONCE
Status: COMPLETED | OUTPATIENT
Start: 2017-12-05 | End: 2017-12-05

## 2017-12-05 RX ADMIN — LIDOCAINE HYDROCHLORIDE 2.5 ML: 40 SOLUTION TOPICAL at 16:56

## 2017-12-05 NOTE — PROGRESS NOTES
Multilayer Compression Wrap   (Not Unna) Below the Knee    NAME:  Rolande Duverney III  YOB: 1965  MEDICAL RECORD NUMBER:  [id number]  DATE:  12/5/2017       [x] Removed old Multilayer wrap if indicated and wash leg with mild soap/water.  [x] Applied moisturizing agent to dry skin as needed.  [x] Applied primary and secondary dressing as ordered    [x] Applied multilayered dressing below the knee to lle( L/Profore) per [facility] instructions.  [x] Instructed patient/caregiver not to remove dressing and to keep it clean and dry.  [x] Instructed patient/caregiver on complications to report to provider, such as pain, numbness in toes, heavy drainage, and slippage of dressing.  [] Instructed patient on purpose of compression dressing and on activity and exercise recommendations.        Applied per   Guidelines    Electronically signed by Severa Guile, RN on 12/5/2017 at 5:22 PM

## 2017-12-05 NOTE — PROGRESS NOTES
Apligraf Treatment Note    NAME:  Gely Graves III  YOB: 1965  MEDICAL RECORD NUMBER:  [id number]  DATE:  12/5/2017    Goal: Patient will receive safe and proper application of skin substitute. Patient will comply with caring for dressing, offloading and reporting complications. [x]Expiration date and pH of Apligraf checked immediately prior to use. [x] Package intact prior to use and no damage noted. [x] Transport temperature controlled and acceptable. Apligraf was removed from protective sterile packaging by physician and applied to prepared wound bed. Apligraf was placed dermal side down onto the wound bed. Apligraf was applied toleft lower legand affixed with steri-strips by the physician. [x] Apligraf was covered with non-adherent ulcer dressing. [x] Applied=hydrogel over non-adherent. [x] Applied dry gauze and/or roll gauze. [] Coban or ace wrap was applied to secure graft and decrease edema. Patient/caregiver was instructed not to remove dressing and to keep it clean         and dry. Pt/family/caregiver was instructed on signs and symptoms of complications       to report such as draining through dressing, dressing falling down/slipping,            getting wet, or severe pain or tingling in toes   Pt/family/caregiver was instructed on need for offloading and elevation of               affected extremity and on use of prescribed offloading device. Apligraf may be applied a total of 5 times per wound over a 12 month period. Date of first application of Apligraf for this current wound is December 5, 2017.       Guidelines followed    Electronically signed by Nuris Myles RN on 12/5/2017 at 5:21 PM

## 2017-12-06 NOTE — PROGRESS NOTES
[facility]anna Teran [facility]   Progress Note and Procedure Note      Saman Perdomo III  MEDICAL RECORD NUMBER:  [id number]  AGE: 46 y.o. GENDER: male  : 1965  EPISODE DATE:  2017    Subjective:     Chief Complaint   Patient presents with    Wound Check     f/u left lower leg         HISTORY of PRESENT ILLNESS HPI     Saman Perdomo III is a 46 y.o. male who presents today for wound/ulcer evaluation. History of Wound Context: left leg wound. He has kept his dressing clean and intact. He has not followed up with vascular surgery as recommended yet but relates that he has an appointment scheduled for tomorrow. Wound/Ulcer Pain Timing/Severity: constant  Quality of pain: burning  Severity:  3 / 10   Modifying Factors: None  Associated Signs/Symptoms: edema    Ulcer Identification:  Ulcer Type: venous  Contributing Factors: edema and venous stasis    Wound: N/A        PAST MEDICAL HISTORY        Diagnosis Date    Breast disorder     MAMMOGRAM -VE. S/P BREAST SURGERY EVAL    Obesity     PPD positive     Venous stasis ulcer of left lower extremity ([facility] [address] 10/25/2016       PAST SURGICAL HISTORY    History reviewed. No pertinent surgical history. FAMILY HISTORY    Family History   Problem Relation Age of Onset    Heart Disease Mother     Diabetes Maternal Uncle     Diabetes Paternal Uncle     Cancer Paternal Uncle      ?  pancreatic       SOCIAL HISTORY    Social History   Substance Use Topics    Smoking status: Never Smoker    Smokeless tobacco: Never Used    Alcohol use Yes      Comment: moderate use       ALLERGIES    No Known Allergies    MEDICATIONS    Current Outpatient Prescriptions on File Prior to Encounter   Medication Sig Dispense Refill    acetaminophen (TYLENOL) 325 MG tablet Take 650 mg by mouth every 6 hours as needed for Pain      GLUCOSAMINE HCL PO Take by mouth daily      Multiple Vitamin (MULTI VITAMIN PO) Take by mouth daily      ibuprofen (ADVIL;MOTRIN) 800 MG tablet 9:52 AM   Other%Wound Bed 10 % white 11/14/2017  3:34 PM   Op First Treatment Date 09/19/17 9/19/2017  1:36 PM   Number of days: 77     Percent of Wound/Ulcer Debrided: 100%    Total Surface Area Debrided:4.1 sq cm    Diabetic/Pressure/Non Pressure Ulcers only:  Ulcer: Non-Pressure ulcer, fat layer exposed    Bleeding: Minimal    Hemostasis Achieved: by pressure    Procedural Pain: 3  / 10     Post Procedural Pain: 3 / 10     Response to treatment:  Well tolerated by patient. Procedure:  Skin Substitute Application    Performed by: Sofia White DPM    Ulcer Type:venous    Consent obtained: Yes    Time out taken: Yes    Product Utilized:         [x] Apligraf   [x]44 sq/cm   []88 sq/cm    []132 sq/cm  []176  Sq/cm        [] Dermagraft   [] 38 sq/cm   [] 76 sq/cm             [] EpiFix      [] 1.54 sq/cm disc   #of pieces  [] 2 pieces (3.08 sq/cm)    [] 3 pieces (4.62 sq/cm)                                 [] 6 sq/cm    [] 8sq/cm (Mesh sheet 3.5 X 3.5)   [] 12 sq/cm (Mesh sheet 4 X 4.5)      [] PriMatrix   [] 9 sq/cm   []16 sq/cm    []25 sq/cm  []36 sq/cm         [] PuraPly AM   []4 sq/cm   []8 sq/cm    []25 sq/cm   []54sq/cm               [] TheraSkin      [] 13 sq/cm    [] 39 sq/cm                     Fenestrated: Yes    Mesher Utilized:  No    Instrument(s) #15 blade scalpel and forceps    Skin Substitute was Applied to Ulcer Number(s):    Ulcer #: 5    Diabetic/Pressure/Non Pressure Ulcers:  Ulcer: Non-Pressure ulcer, fat layer exposed      Total Surface Area of Ulcer(s) Covered 4 sq/cm    Was the Product Layered  No     Amount of Product Applied 4 sq/cm     Amount of Product Wasted 40 sq/cm     Reason for Waste Wound Size smaller then product size      Surgically Fixated: Yes    Secured With: Steri Strips and Mepitel     Procedural Pain: 0/10     Post Procedural Pain: 0 / 10    Response to Treatment:  Well tolerated by patient. Plan:   E/M x 30 minutes of a problem of left leg ulceration.  Venous reflux

## 2017-12-12 ENCOUNTER — HOSPITAL ENCOUNTER (OUTPATIENT)
Dept: WOUND CARE | Age: 52
Discharge: HOME | End: 2017-12-12
Attending: PODIATRIST | Admitting: PODIATRIST

## 2017-12-12 VITALS
DIASTOLIC BLOOD PRESSURE: 98 MMHG | RESPIRATION RATE: 18 BRPM | TEMPERATURE: 98 F | SYSTOLIC BLOOD PRESSURE: 157 MMHG | HEART RATE: 89 BPM

## 2017-12-12 RX ORDER — LIDOCAINE HYDROCHLORIDE 40 MG/ML
2.5 SOLUTION TOPICAL ONCE
Status: COMPLETED | OUTPATIENT
Start: 2017-12-12 | End: 2017-12-12

## 2017-12-12 RX ADMIN — LIDOCAINE HYDROCHLORIDE 2.5 ML: 40 SOLUTION TOPICAL at 15:55

## 2017-12-12 NOTE — PROGRESS NOTES
Take 1 tablet by mouth every 6 hours as needed for Pain 120 tablet 3    Cholecalciferol (VITAMIN D) 2000 UNITS CAPS capsule Take 1 capsule by mouth daily 30 capsule 3    Compression Stockings MISC by Does not apply route Diagnosis: I83.029  Location of wound: Left Lower Leg  Gradient IV (30-40 mm Hg)  Style: Knee Length  Extremity: Bilateral 1 each 2     No current facility-administered medications on file prior to encounter. REVIEW OF SYSTEMS    Pertinent items are noted in HPI. Objective:        BP (!) 157/98   Pulse 89   Temp 98 °F (36.7 °C) (Oral)   Resp 18     Wt Readings from Last 3 Encounters:   09/21/17 (!) 423 lb (191.9 kg)   09/19/17 (!) 390 lb (176.9 kg)   02/14/17 (!) 392 lb 8 oz (178 kg)       PHYSICAL EXAM    DP/PT pulses palpable bilaterally. CFT brisk to all digits. Digits are pink and warm to the touch. Hair growth is normal in appearance. No calf pain with palpation noted. Patient has pitting edema noted on the bilateral lower extremities. Epicritic sensation is grossly intact bilaterally. Negative clonus and babinski reflex is down going. Wound noted on the posterior lateral aspect of the left lower extremity. Wound has fibrotic and nonviable tissue that extends down through and includes the subcutaneous tissue. After debridement the wound has a fibro-granular base with eschar noted around the periphery of the wound. There is no surrounding erythema, edema, warmth or malodor noted. The wound does not probe or track to bone. There is hypertrophic granulation tissue noted at the superior aspect of the wound.        Assessment:      Patient Active Problem List   Diagnosis Code    Family history of early CAD Z80.55    PPD positive R76.11    Vitamin D deficiency E55.9    Edema R60.9    Morbid obesity ([facility] [address] E66.01    Anemia D64.9    Venous stasis ulcer of left lower extremity ([facility] [address] I83.029        Excisional Debridement Procedure Note  Indications:  Based on my examination of this patient's wound(s)/ulcer(s) today, debridement is required to promote healing and evaluate the wound base. Performed by: Monica Anne DPM    Consent obtained? Yes    Time out taken: Yes    Pain Control: Anesthetic: 4% Lidocaine Liquid Topical     Debridement: Excisional Debridement    Using curette, #15 blade scalpel, scissors and forceps the wound/ulcer was sharply debrided    down through and including the removal of  epidermis, dermis and subcutaneous tissue. Devitalized Tissue Debrided:  fibrin, slough and necrotic/eschar      Pre Debridement Measurements:  Are located in the [place]  Documentation Flow Sheet   Wound/Ulcer #: 5     Post  Debridement Measurements:    Wound 09/19/17 Venous ulcer Leg Left; Lower; Lateral;Distal #5 (existing 1 month) (Active)   Wound Type Wound 12/12/2017  3:48 PM   Wound Venous 12/12/2017  3:48 PM   Dressing/Treatment Other (Comment) 11/21/2017  4:19 PM   Wound Cleansed Rinsed/Irrigated with saline 12/12/2017  3:48 PM   Wound Length (cm) 2 cm 12/12/2017  3:48 PM   Wound Width (cm) 2 cm 12/12/2017  3:48 PM   Wound Depth (cm)  0.1 12/12/2017  3:48 PM   Calculated Wound Size (cm^2) (l*w) 4 cm^2 12/12/2017  3:48 PM   Change in Wound Size % (l*w) 79.22 12/12/2017  3:48 PM   Distance Tunneling (cm) 0 cm 12/5/2017  4:57 PM   Undermining Starts ___ O'Clock 0 9/29/2017  3:31 PM   Undermining Maxium Distance (cm) 0 12/5/2017  4:57 PM   Wound Assessment Red 12/12/2017  3:48 PM   Drainage Amount Small 12/12/2017  3:48 PM   Drainage Description Yellow 12/12/2017  3:48 PM   Odor Mild 12/12/2017  3:48 PM   Margins Defined edges 11/17/2017 11:23 AM   Georgina-wound Assessment Brown;Pink 12/12/2017  3:48 PM   Non-staged Wound Description Full thickness 12/12/2017  3:48 PM   [place]%Wound Bed 10 11/21/2017  4:19 PM   Red%Wound Bed 100 12/12/2017  3:48 PM   Yellow%Wound Bed 10 11/21/2017  4:19 PM   Black%Wound Bed 10 11/28/2017  4:19 PM   Purple%Wound Bed 50 9/22/2017  9:52 AM

## 2017-12-15 ENCOUNTER — HOSPITAL ENCOUNTER (OUTPATIENT)
Dept: WOUND CARE | Age: 52
Discharge: HOME | End: 2017-12-15
Attending: SPECIALIST | Admitting: SPECIALIST

## 2017-12-15 VITALS
HEART RATE: 87 BPM | DIASTOLIC BLOOD PRESSURE: 92 MMHG | RESPIRATION RATE: 20 BRPM | SYSTOLIC BLOOD PRESSURE: 152 MMHG | TEMPERATURE: 98.1 F

## 2017-12-19 ENCOUNTER — HOSPITAL ENCOUNTER (OUTPATIENT)
Dept: WOUND CARE | Age: 52
Discharge: HOME | End: 2017-12-19
Attending: PODIATRIST | Admitting: PODIATRIST

## 2017-12-19 VITALS
DIASTOLIC BLOOD PRESSURE: 89 MMHG | HEART RATE: 93 BPM | RESPIRATION RATE: 20 BRPM | TEMPERATURE: 98.1 F | SYSTOLIC BLOOD PRESSURE: 179 MMHG

## 2017-12-19 RX ORDER — LIDOCAINE HYDROCHLORIDE 40 MG/ML
SOLUTION TOPICAL ONCE
Status: COMPLETED | OUTPATIENT
Start: 2017-12-19 | End: 2017-12-19

## 2017-12-19 RX ADMIN — LIDOCAINE HYDROCHLORIDE: 40 SOLUTION TOPICAL at 13:19

## 2017-12-21 NOTE — PROGRESS NOTES
examination of this patient's wound(s)/ulcer(s) today, debridement is required to promote healing and evaluate the wound base. Performed by: Dakota Crockett DPM    Consent obtained? Yes    Time out taken: Yes    Pain Control: Anesthetic: 4% Lidocaine Liquid Topical     Debridement: Excisional Debridement    Using curette, #15 blade scalpel, scissors and forceps the wound/ulcer was sharply debrided    down through and including the removal of  epidermis, dermis and subcutaneous tissue. Devitalized Tissue Debrided:  fibrin, slough and necrotic/eschar      Pre Debridement Measurements:  Are located in the [facility]  Documentation Flow Sheet   Wound/Ulcer #: 5     Post  Debridement Measurements:  Wound 09/19/17 Venous ulcer Leg Left; Lower; Lateral;Distal #5 (existing 1 month) (Active)   Wound Type Wound 12/19/2017  1:04 PM   Wound Venous 12/12/2017  3:48 PM   Dressing/Treatment Other (Comment) 12/19/2017  1:04 PM   Wound Cleansed Rinsed/Irrigated with saline 12/19/2017  1:04 PM   Wound Length (cm) 2 cm 12/19/2017  1:04 PM   Wound Width (cm) 2 cm 12/19/2017  1:04 PM   Wound Depth (cm)  0.1 12/19/2017  1:04 PM   Calculated Wound Size (cm^2) (l*w) 3.8 cm^2 12/19/2017  1:04 PM   Change in Wound Size % (l*w) 80.26 12/19/2017  1:04 PM   Distance Tunneling (cm) 0 cm 12/15/2017  3:35 PM   Undermining Starts ___ O'Clock 0 9/29/2017  3:31 PM   Undermining Maxium Distance (cm) 0 12/15/2017  3:35 PM   Wound Assessment Red 12/19/2017  1:04 PM   Drainage Amount Small 12/19/2017  1:04 PM   Drainage Description Brown 12/19/2017  1:04 PM   Odor None 12/15/2017  3:35 PM   Margins Defined edges 11/17/2017 11:23 AM   Georgina-wound Assessment Brown;Pink 12/19/2017  1:04 PM   Non-staged Wound Description Full thickness 12/19/2017  1:04 PM   [place]%Wound Bed 10 11/21/2017  4:19 PM   Red%Wound Bed 100 12/19/2017  1:04 PM   Yellow%Wound Bed 10 11/21/2017  4:19 PM   Black%Wound Bed 10 11/28/2017  4:19 PM   Purple%Wound Bed 50 9/22/2017  9:52 AM Other%Wound Bed 10 % white 11/14/2017  3:34 PM   Op First Treatment Date 09/19/17 9/19/2017  1:36 PM   Number of days: 92     Percent of Wound/Ulcer Debrided: 100%    Total Surface Area Debrided: 4 sq cm    Diabetic/Pressure/Non Pressure Ulcers only:  Ulcer: Non-Pressure ulcer, fat layer exposed    Bleeding: Minimal    Hemostasis Achieved: by pressure    Procedural Pain: 3  / 10     Post Procedural Pain: 3 / 10     Response to treatment:  Well tolerated by patient. Procedure:  Skin Substitute Application    Performed by: Sofia White DPM    Ulcer Type:venous    Consent obtained: Yes    Time out taken: Yes    Product Utilized:         [x] Apligraf   [x]44 sq/cm   []88 sq/cm    []132 sq/cm  []176  Sq/cm        [] Dermagraft   [] 38 sq/cm   [] 76 sq/cm             [] EpiFix      [] 1.54 sq/cm disc   #of pieces  [] 2 pieces (3.08 sq/cm)    [] 3 pieces (4.62 sq/cm)                                 [] 6 sq/cm    [] 8sq/cm (Mesh sheet 3.5 X 3.5)   [] 12 sq/cm (Mesh sheet 4 X 4.5)      [] PriMatrix   [] 9 sq/cm   []16 sq/cm    []25 sq/cm  []36 sq/cm         [] PuraPly AM   []4 sq/cm   []8 sq/cm    []25 sq/cm   []54sq/cm               [] TheraSkin      [] 13 sq/cm    [] 39 sq/cm                     Fenestrated: Yes    Mesher Utilized:  No    Instrument(s) #15 blade scalpel and forceps    Skin Substitute was Applied to Ulcer Number(s):    Ulcer #: 5    Diabetic/Pressure/Non Pressure Ulcers:  Ulcer: Non-Pressure ulcer, fat layer exposed      Total Surface Area of Ulcer(s) Covered 4 sq/cm    Was the Product Layered  No     Amount of Product Applied 4 sq/cm     Amount of Product Wasted 40 sq/cm     Reason for Waste Wound Size smaller then product size      Surgically Fixated: Yes    Secured With: Steri Strips and Mepitel     Procedural Pain: 0/10     Post Procedural Pain: 0 / 10    Response to Treatment:  Well tolerated by patient. Plan:   E/M x 30 minutes of a problem of left leg ulceration.  Venous reflux study

## 2017-12-22 ENCOUNTER — HOSPITAL ENCOUNTER (OUTPATIENT)
Dept: WOUND CARE | Age: 52
Discharge: HOME | End: 2017-12-22
Attending: PODIATRIST | Admitting: PODIATRIST

## 2017-12-22 VITALS
HEART RATE: 93 BPM | TEMPERATURE: 98.7 F | RESPIRATION RATE: 18 BRPM | DIASTOLIC BLOOD PRESSURE: 78 MMHG | SYSTOLIC BLOOD PRESSURE: 181 MMHG

## 2017-12-22 NOTE — PROGRESS NOTES
Multilayer Compression Wrap   (Not Unna) Below the Knee    NAME:  Teresita Mclaughlin III  YOB: 1965  MEDICAL RECORD NUMBER:  [id number]  DATE:  12/22/2017       [x] Removed old Multilayer wrap if indicated and wash leg with mild soap/water.  [x] Applied moisturizing agent to dry skin as needed.  [x] Applied primary and secondary dressing as ordered    [x] Applied multilayered dressing below the left knee Proforeper 's instructions.  [x] Instructed patient/caregiver not to remove dressing and to keep it clean and dry.  [x] Instructed patient/caregiver on complications to report to provider, such as pain, numbness in toes, heavy drainage, and slippage of dressing.  [x] Instructed patient on purpose of compression dressing and on activity and exercise recommendations.        Applied per   Guidelines    Electronically signed by Nancy Nguyen RN on 12/22/2017 at 3:31 PM

## 2017-12-27 ENCOUNTER — HOSPITAL ENCOUNTER (OUTPATIENT)
Dept: WOUND CARE | Age: 52
Discharge: HOME | End: 2017-12-27
Attending: PODIATRIST | Admitting: PODIATRIST

## 2017-12-27 VITALS
SYSTOLIC BLOOD PRESSURE: 157 MMHG | DIASTOLIC BLOOD PRESSURE: 67 MMHG | TEMPERATURE: 98.1 F | HEART RATE: 86 BPM | RESPIRATION RATE: 18 BRPM

## 2018-01-02 ENCOUNTER — HOSPITAL ENCOUNTER (OUTPATIENT)
Dept: WOUND CARE | Age: 53
Discharge: HOME | End: 2018-01-02
Attending: PODIATRIST | Admitting: PODIATRIST

## 2018-01-02 VITALS
SYSTOLIC BLOOD PRESSURE: 159 MMHG | DIASTOLIC BLOOD PRESSURE: 96 MMHG | HEART RATE: 90 BPM | TEMPERATURE: 98.1 F | RESPIRATION RATE: 18 BRPM

## 2018-01-02 RX ORDER — LIDOCAINE HYDROCHLORIDE 40 MG/ML
2.5 SOLUTION TOPICAL ONCE
Status: COMPLETED | OUTPATIENT
Start: 2018-01-02 | End: 2018-01-02

## 2018-01-02 RX ADMIN — LIDOCAINE HYDROCHLORIDE 2.5 ML: 40 SOLUTION TOPICAL at 13:36

## 2018-01-02 NOTE — PROGRESS NOTES
tablet by mouth every 6 hours as needed for Pain 120 tablet 3    Cholecalciferol (VITAMIN D) 2000 UNITS CAPS capsule Take 1 capsule by mouth daily 30 capsule 3    Compression Stockings MISC by Does not apply route Diagnosis: I83.029  Location of wound: Left Lower Leg  Gradient IV (30-40 mm Hg)  Style: Knee Length  Extremity: Bilateral 1 each 2     No current facility-administered medications on file prior to encounter. REVIEW OF SYSTEMS    Pertinent items are noted in HPI. Objective:        BP (!) 159/96   Pulse 90   Temp 98.1 °F (36.7 °C) (Oral)   Resp 18     Wt Readings from Last 3 Encounters:   09/21/17 (!) 423 lb (191.9 kg)   09/19/17 (!) 390 lb (176.9 kg)   02/14/17 (!) 392 lb 8 oz (178 kg)       PHYSICAL EXAM    DP/PT pulses palpable bilaterally. CFT brisk to all digits. Digits are pink and warm to the touch. Hair growth is normal in appearance. No calf pain with palpation noted. Patient has pitting edema noted on the bilateral lower extremities. Epicritic sensation is grossly intact bilaterally. Negative clonus and babinski reflex is down going. Wound noted on the posterior lateral aspect of the left lower extremity. Wound has fibrotic and nonviable tissue that extends down through and includes the subcutaneous tissue. After debridement the wound has a fibro-granular base. In addition to the fibrotic tissue, there is collagen stuck to the wound. Edema is well controlled with multilayer compression dressing. There is no surrounding erythema, edema, warmth or malodor noted. The wound does not probe or track to bone. There is hypertrophic granulation tissue noted at the superior aspect of the wound.        Assessment:      Patient Active Problem List   Diagnosis Code    Family history of early CAD Z80.55    PPD positive R76.11    Vitamin D deficiency E55.9    Edema R60.9    Morbid obesity ([address] E66.01    Anemia D64.9    Venous stasis ulcer of left lower extremity ([facility] [address] I83.029        Excisional Debridement Procedure Note  Indications:  Based on my examination of this patient's wound(s)/ulcer(s) today, debridement is required to promote healing and evaluate the wound base. Performed by: aKri Booker DPM    Consent obtained? Yes    Time out taken: Yes    Pain Control: Anesthetic: 4% Lidocaine Liquid Topical     Debridement: Excisional Debridement    Using curette, #15 blade scalpel, scissors and forceps the wound/ulcer was sharply debrided    down through and including the removal of  epidermis, dermis and subcutaneous tissue. Devitalized Tissue Debrided:  fibrin, slough and necrotic/eschar      Pre Debridement Measurements:  Are located in the [place]  Documentation Flow Sheet   Wound/Ulcer #: 5    Wound 09/19/17 Venous ulcer Leg Left; Lower; Lateral;Distal #5 (existing 1 month) (Active)   Wound Type Wound 1/2/2018  1:18 PM   Wound Venous 12/12/2017  3:48 PM   Dressing/Treatment Other (Comment) 12/27/2017  1:55 PM   Wound Cleansed Rinsed/Irrigated with saline 1/2/2018  1:18 PM   Wound Length (cm) 2 cm 1/2/2018  1:18 PM   Wound Width (cm) 1.4 cm 1/2/2018  1:18 PM   Wound Depth (cm)  0.1 1/2/2018  1:18 PM   Calculated Wound Size (cm^2) (l*w) 2.8 cm^2 1/2/2018  1:18 PM   Change in Wound Size % (l*w) 85.45 1/2/2018  1:18 PM   Distance Tunneling (cm) 0 cm 12/27/2017  1:55 PM   Undermining Starts ___ O'Clock 0 9/29/2017  3:31 PM   Undermining Maxium Distance (cm) 0 12/27/2017  1:55 PM   Wound Assessment Black; Red 1/2/2018  1:18 PM   Drainage Amount Small 1/2/2018  1:18 PM   Drainage Description Serosanguinous 1/2/2018  1:18 PM   Odor None 12/15/2017  3:35 PM   Margins Defined edges 11/17/2017 11:23 AM   Georgina-wound Assessment Brown 1/2/2018  1:18 PM   Non-staged Wound Description Full thickness 1/2/2018  1:18 PM   [place]%Wound Bed 10 11/21/2017  4:19 PM   Red%Wound Bed 90 1/2/2018  1:18 PM   Yellow%Wound Bed 10 11/21/2017  4:19 PM   Black%Wound Bed 10 1/2/2018  1:18 PM Purple%Wound Bed 50 9/22/2017  9:52 AM   Other%Wound Bed 10 % white 11/14/2017  3:34 PM   Op First Treatment Date 09/19/17 9/19/2017  1:36 PM   Number of days: 105   Percent of Wound/Ulcer Debrided: 100%    Total Surface Area Debrided: 2.8 sq cm    Diabetic/Pressure/Non Pressure Ulcers only:  Ulcer: Non-Pressure ulcer, fat layer exposed    Bleeding: Minimal    Hemostasis Achieved: by pressure    Procedural Pain: 3  / 10     Post Procedural Pain: 3 / 10     Response to treatment:  Well tolerated by patient. Plan:   E/M x 30 minutes of a problem of left leg ulceration. Since the collagen dressing was building up on the wound, I changed to an alginate. Will control edema with multilayer compression dressing. Will see patient back in 3-4 days for a dressing change due to the amount of drainage in the wound. Treatment Note please see attached Discharge Instructions    In my professional opinion this patient would benefit from HBO Therapy: No    Written patient dismissal instructions given to patient and signed by patient or POA.          RTC 1 week    Electronically signed by Tammi Purvis DPM on 1/2/2018 at 2:44 PM

## 2018-01-05 ENCOUNTER — HOSPITAL ENCOUNTER (OUTPATIENT)
Dept: WOUND CARE | Age: 53
Discharge: HOME | End: 2018-01-05
Attending: PODIATRIST | Admitting: PODIATRIST

## 2018-01-05 VITALS
HEART RATE: 94 BPM | SYSTOLIC BLOOD PRESSURE: 188 MMHG | TEMPERATURE: 98 F | DIASTOLIC BLOOD PRESSURE: 62 MMHG | RESPIRATION RATE: 18 BRPM

## 2018-01-05 NOTE — PROGRESS NOTES
Multilayer Compression Wrap   (Not Unna) Below the Knee    NAME:  Jean Marie Palma III  YOB: 1965  MEDICAL RECORD NUMBER:  [id number]  DATE:  1/5/2018       [x] Removed old Multilayer wrap if indicated and wash leg with mild soap/water.  [x] Applied moisturizing agent to dry skin as needed.  [x] Applied primary and secondary dressing as ordered    [x] Applied multilayered dressing below the knee to left lower leg(s). profore 4 layer wrap    [x] Instructed patient/caregiver not to remove dressing and to keep it clean and dry.  [x] Instructed patient/caregiver on complications to report to provider, such as pain, numbness in toes, heavy drainage, and slippage of dressing.  [x] Instructed patient on purpose of compression dressing and on activity and exercise recommendations.     Electronically signed by Milagros Daigle RN on 1/5/2018 at 9:47 AM

## 2018-01-09 ENCOUNTER — OFFICE VISIT (OUTPATIENT)
Dept: VASCULAR SURGERY | Age: 53
End: 2018-01-09

## 2018-01-09 ENCOUNTER — HOSPITAL ENCOUNTER (OUTPATIENT)
Dept: WOUND CARE | Age: 53
Discharge: HOME | End: 2018-01-09
Attending: PODIATRIST | Admitting: PODIATRIST

## 2018-01-09 VITALS
DIASTOLIC BLOOD PRESSURE: 72 MMHG | RESPIRATION RATE: 16 BRPM | SYSTOLIC BLOOD PRESSURE: 143 MMHG | TEMPERATURE: 98.2 F | HEART RATE: 82 BPM

## 2018-01-09 VITALS
OXYGEN SATURATION: 97 % | DIASTOLIC BLOOD PRESSURE: 94 MMHG | HEART RATE: 85 BPM | BODY MASS INDEX: 40.43 KG/M2 | HEIGHT: 74 IN | WEIGHT: 315 LBS | SYSTOLIC BLOOD PRESSURE: 160 MMHG

## 2018-01-09 DIAGNOSIS — I83.029 VENOUS STASIS ULCER OF LEFT LOWER EXTREMITY (HCC): Primary | ICD-10-CM

## 2018-01-09 DIAGNOSIS — L97.929 VENOUS STASIS ULCER OF LEFT LOWER EXTREMITY (HCC): Primary | ICD-10-CM

## 2018-01-09 PROCEDURE — 99204 OFFICE O/P NEW MOD 45 MIN: CPT | Performed by: SURGERY

## 2018-01-09 RX ORDER — LIDOCAINE HYDROCHLORIDE 40 MG/ML
2.5 SOLUTION TOPICAL ONCE
Status: DISCONTINUED | OUTPATIENT
Start: 2018-01-09 | End: 2018-01-10 | Stop reason: HOSPADM

## 2018-01-09 ASSESSMENT — ENCOUNTER SYMPTOMS
ORTHOPNEA: 0
EYES NEGATIVE: 1
RESPIRATORY NEGATIVE: 1
GASTROINTESTINAL NEGATIVE: 1

## 2018-01-09 NOTE — PROGRESS NOTES
continue compression at this point. His weight is a major factor as well and he needs to continue weight management. It appears to be up at this point. I will be happy to continue to follow him for his left ankle ulceration either endovascular office or in the wound care center.

## 2018-01-10 NOTE — PROGRESS NOTES
base.    Performed by: Makayla Sampson DPM    Consent obtained? Yes    Time out taken: Yes    Pain Control: Anesthetic: 4% Lidocaine Liquid Topical     Debridement: Excisional Debridement    Using curette, #15 blade scalpel, scissors and forceps the wound/ulcer was sharply debrided    down through and including the removal of  epidermis, dermis and subcutaneous tissue. Devitalized Tissue Debrided:  fibrin, slough and necrotic/eschar      Pre Debridement Measurements:  Are located in the [facility]  Documentation Flow Sheet   Wound/Ulcer #: 5     Post  Debridement Measurements:    Wound 09/19/17 Venous ulcer Leg Left; Lower; Lateral;Proximal #5 (existing 1 month) (Active)   Wound Type Wound 1/9/2018  3:36 PM   Wound Venous 12/12/2017  3:48 PM   Dressing/Treatment Other (Comment) 1/5/2018  9:46 AM   Wound Cleansed Rinsed/Irrigated with saline 1/9/2018  3:36 PM   Wound Length (cm) 2.3 cm 1/9/2018  3:36 PM   Wound Width (cm) 1.5 cm 1/9/2018  3:36 PM   Wound Depth (cm)  0.1 1/9/2018  3:36 PM   Calculated Wound Size (cm^2) (l*w) 3.45 cm^2 1/9/2018  3:36 PM   Change in Wound Size % (l*w) 82.08 1/9/2018  3:36 PM   Distance Tunneling (cm) 0 cm 1/9/2018  3:36 PM   Undermining Starts ___ O'Clock 0 9/29/2017  3:31 PM   Undermining Maxium Distance (cm) 0 1/9/2018  3:36 PM   Wound Assessment Red 1/9/2018  3:36 PM   Drainage Amount ROMERO 1/9/2018  3:36 PM   Drainage Description ROMERO 1/9/2018  3:36 PM   Odor Mild 1/9/2018  3:36 PM   Margins Defined edges 1/9/2018  3:36 PM   Georgina-wound Assessment Brown; Maceration; White;Pink 1/9/2018  3:36 PM   Non-staged Wound Description Full thickness 1/9/2018  3:36 PM   [place]%Wound Bed 0 1/9/2018  3:36 PM   Red%Wound Bed 95 1/9/2018  3:36 PM   Yellow%Wound Bed 5 1/9/2018  3:36 PM   Black%Wound Bed 0 1/9/2018  3:36 PM   Purple%Wound Bed 0 1/9/2018  3:36 PM   Other%Wound Bed 0 1/5/2018  9:42 AM   Op First Treatment Date 09/19/17 9/19/2017  1:36 PM   Number of days: 112       Wound 01/09/18 Leg Distal;Lateral;Lower (Active)   Wound Type Wound 1/9/2018  3:36 PM   Wound Cleansed Rinsed/Irrigated with saline 1/9/2018  3:36 PM   Wound Length (cm) 4.4 cm 1/9/2018  3:36 PM   Wound Width (cm) 2.5 cm 1/9/2018  3:36 PM   Wound Depth (cm)  0.1 1/9/2018  3:36 PM   Calculated Wound Size (cm^2) (l*w) 11 cm^2 1/9/2018  3:36 PM   Distance Tunneling (cm) 0 cm 1/9/2018  3:36 PM   Undermining Maxium Distance (cm) 0 1/9/2018  3:36 PM   Wound Assessment Red 1/9/2018  3:36 PM   Drainage Amount ROMERO 1/9/2018  3:36 PM   Drainage Description ROMERO 1/9/2018  3:36 PM   Odor Mild 1/9/2018  3:36 PM   Margins Defined edges 1/9/2018  3:36 PM   Georgina-wound Assessment White;Pink;Brown 1/9/2018  3:36 PM   Red%Wound Bed 100 1/9/2018  3:36 PM   Number of days: 0     Percent of Wound/Ulcer Debrided: 100%    Total Surface Area Debrided: 3.45 sq cm    Diabetic/Pressure/Non Pressure Ulcers only:  Ulcer: Non-Pressure ulcer, fat layer exposed    Bleeding: Minimal    Hemostasis Achieved: by pressure    Procedural Pain: 3  / 10     Post Procedural Pain: 3 / 10     Response to treatment:  Well tolerated by patient.            Procedure:  Skin Substitute Application    Performed by: Gurmeet Osborne DPM    Ulcer Type:venous    Consent obtained: Yes    Time out taken: Yes    Product Utilized:         [x] Apligraf   [x]44 sq/cm   []88 sq/cm    []132 sq/cm  []176  Sq/cm        [] Dermagraft   [] 38 sq/cm   [] 76 sq/cm             [] EpiFix      [] 1.54 sq/cm disc   #of pieces  [] 2 pieces (3.08 sq/cm)    [] 3 pieces (4.62 sq/cm)                                 [] 6 sq/cm    [] 8sq/cm (Mesh sheet 3.5 X 3.5)   [] 12 sq/cm (Mesh sheet 4 X 4.5)      [] PriMatrix   [] 9 sq/cm   []16 sq/cm    []25 sq/cm  []36 sq/cm         [] PuraPly AM   []4 sq/cm   []8 sq/cm    []25 sq/cm   []54sq/cm               [] TheraSkin      [] 13 sq/cm    [] 39 sq/cm                     Fenestrated: Yes    Mesher Utilized:  No    Instrument(s) #15 blade scalpel and forceps    Skin Substitute

## 2018-01-12 ENCOUNTER — HOSPITAL ENCOUNTER (OUTPATIENT)
Dept: WOUND CARE | Age: 53
Discharge: HOME | End: 2018-01-12
Attending: PODIATRIST | Admitting: PODIATRIST

## 2018-01-12 VITALS
DIASTOLIC BLOOD PRESSURE: 88 MMHG | SYSTOLIC BLOOD PRESSURE: 148 MMHG | RESPIRATION RATE: 18 BRPM | HEART RATE: 84 BPM | TEMPERATURE: 98.1 F

## 2018-01-16 ENCOUNTER — HOSPITAL ENCOUNTER (OUTPATIENT)
Dept: WOUND CARE | Age: 53
Discharge: HOME | End: 2018-01-16
Attending: PODIATRIST | Admitting: PODIATRIST

## 2018-01-16 VITALS
TEMPERATURE: 97.9 F | DIASTOLIC BLOOD PRESSURE: 90 MMHG | HEART RATE: 75 BPM | SYSTOLIC BLOOD PRESSURE: 157 MMHG | RESPIRATION RATE: 18 BRPM

## 2018-01-16 DIAGNOSIS — I83.013 VENOUS STASIS ULCER OF ANKLE, RIGHT (HCC): ICD-10-CM

## 2018-01-16 DIAGNOSIS — L97.319 VENOUS STASIS ULCER OF ANKLE, RIGHT (HCC): ICD-10-CM

## 2018-01-16 RX ORDER — LIDOCAINE HYDROCHLORIDE 40 MG/ML
SOLUTION TOPICAL ONCE
Status: COMPLETED | OUTPATIENT
Start: 2018-01-16 | End: 2018-01-16

## 2018-01-16 RX ADMIN — LIDOCAINE HYDROCHLORIDE 2.5 ML: 40 SOLUTION TOPICAL at 15:54

## 2018-01-16 ASSESSMENT — PAIN SCALES - GENERAL: PAINLEVEL_OUTOF10: 0

## 2018-01-16 NOTE — PROGRESS NOTES
Multilayer Compression Wrap   (Not Unna) Below the Knee    NAME:  Car Cole III  YOB: 1965  MEDICAL RECORD NUMBER:  [id number]  DATE:  1/16/2018       [x] Removed old Multilayer wrap if indicated and wash leg with mild soap/water.  [x] Applied moisturizing agent to dry skin as needed.  [x] Applied primary and secondary dressing as ordered    [x] Applied multilayered dressing below the bilateral   knee to  Proforeper 's instructions.  [x] Instructed patient/caregiver not to remove dressing and to keep it clean and dry.  [x] Instructed patient/caregiver on complications to report to provider, such as pain, numbness in toes, heavy drainage, and slippage of dressing.  [x] Instructed patient on purpose of compression dressing and on activity and exercise recommendations.        Applied per   Guidelines    Electronically signed by Melchor Jade RN on 1/16/2018 at 4:20 PM

## 2018-01-16 NOTE — PROGRESS NOTES
[facility]   Progress Note and Procedure Note      Marcellus Mcdaniels III  MEDICAL RECORD NUMBER:  [id number]  AGE: 46 y.o. GENDER: male  : 1965  EPISODE DATE:  2018    Subjective:     Chief Complaint   Patient presents with    Wound Check     left leg wound         HISTORY of PRESENT ILLNESS HPI     Marcellus Mcdaniels III is a 46 y.o. male who presents today for wound/ulcer evaluation. History of Wound Context: left leg wound. He has kept his dressing clean and intact. Wound/Ulcer Pain Timing/Severity: constant  Quality of pain: burning  Severity:  3  10   Modifying Factors: None  Associated Signs/Symptoms: edema    Ulcer Identification:  Ulcer Type: venous  Contributing Factors: edema and venous stasis    Wound: N/A        PAST MEDICAL HISTORY        Diagnosis Date    Breast disorder 2009    MAMMOGRAM -VE. S/P BREAST SURGERY EVAL    Obesity     PPD positive     Venous stasis ulcer of left lower extremity ([address] 10/25/2016       PAST SURGICAL HISTORY    History reviewed. No pertinent surgical history. FAMILY HISTORY    Family History   Problem Relation Age of Onset    Heart Disease Mother     Diabetes Maternal Uncle     Diabetes Paternal Uncle     Cancer Paternal Uncle      ?  pancreatic       SOCIAL HISTORY    Social History   Substance Use Topics    Smoking status: Never Smoker    Smokeless tobacco: Never Used    Alcohol use Yes      Comment: moderate use       ALLERGIES    No Known Allergies    MEDICATIONS    Current Outpatient Prescriptions on File Prior to Encounter   Medication Sig Dispense Refill    acetaminophen (TYLENOL) 325 MG tablet Take 650 mg by mouth every 6 hours as needed for Pain      GLUCOSAMINE HCL PO Take by mouth daily      Multiple Vitamin (MULTI VITAMIN PO) Take by mouth daily      ibuprofen (ADVIL;MOTRIN) 800 MG tablet Take 1 tablet by mouth every 6 hours as needed for Pain 120 tablet 3    Cholecalciferol (VITAMIN D) 2000 UNITS CAPS capsule Take 1  Vitamin D deficiency E55.9    Edema R60.9    Morbid obesity (HCC) E66.01    Anemia D64.9    Venous stasis ulcer of left lower extremity ([address] I83.029        Excisional Debridement Procedure Note  Indications:  Based on my examination of this patient's wound(s)/ulcer(s) today, debridement is required to promote healing and evaluate the wound base. Performed by: Robinette Epley, DPM    Consent obtained? Yes    Time out taken: Yes    Pain Control: Anesthetic: 4% Lidocaine Liquid Topical     Debridement: Excisional Debridement    Using curette, #15 blade scalpel, scissors and forceps the wound/ulcer was sharply debrided    down through and including the removal of  epidermis, dermis and subcutaneous tissue. Devitalized Tissue Debrided:  fibrin, slough and necrotic/eschar      Pre Debridement Measurements:  Are located in the Wound/Ulcer Documentation Flow Sheet   Wound/Ulcer #: 5, 6 and 7     Post  Debridement Measurements:    Wound 09/19/17 Venous ulcer Leg Left; Lower; Lateral;Proximal #5 (existing 1 month) (Active)   Wound Type Wound 1/16/2018  3:19 PM   Wound Venous 12/12/2017  3:48 PM   Dressing/Treatment Other (Comment) 1/5/2018  9:46 AM   Wound Cleansed Rinsed/Irrigated with saline 1/16/2018  3:19 PM   Wound Length (cm) 2 cm 1/16/2018  3:19 PM   Wound Width (cm) 2 cm 1/16/2018  3:19 PM   Wound Depth (cm)  0.1 1/16/2018  3:19 PM   Calculated Wound Size (cm^2) (l*w) 3.6 cm^2 1/16/2018  3:19 PM   Change in Wound Size % (l*w) 81.3 1/16/2018  3:19 PM   Distance Tunneling (cm) 0 cm 1/9/2018  3:36 PM   Undermining Starts ___ O'Clock 0 9/29/2017  3:31 PM   Undermining Maxium Distance (cm) 0 1/9/2018  3:36 PM   Wound Assessment Red; White 1/16/2018  3:19 PM   Drainage Amount Moderate 1/16/2018  3:19 PM   Drainage Description Serosanguinous 1/16/2018  3:19 PM   Odor Mild 1/16/2018  3:19 PM   Margins Defined edges 1/16/2018  3:19 PM   Georgina-wound Assessment ROMERO 1/12/2018  1:35 PM   Non-staged Wound Description Full thickness 1/16/2018  3:19 PM   [place]%Wound Bed 0 1/9/2018  3:36 PM   Red%Wound Bed 50 1/16/2018  3:19 PM   Yellow%Wound Bed 50 1/16/2018  3:19 PM   Black%Wound Bed 0 1/9/2018  3:36 PM   Purple%Wound Bed 0 1/9/2018  3:36 PM   Other%Wound Bed 0 1/5/2018  9:42 AM   Op First Treatment Date 09/19/17 9/19/2017  1:36 PM   Number of days: 119       Wound 01/09/18 Venous ulcer Leg Distal;Lateral;Lower #6 (Active)   Wound Type Wound 1/16/2018  3:19 PM   Wound Cleansed Rinsed/Irrigated with saline 1/16/2018  3:19 PM   Wound Length (cm) 5 cm 1/16/2018  3:19 PM   Wound Width (cm) 3 cm 1/16/2018  3:19 PM   Wound Depth (cm)  0.1 1/16/2018  3:19 PM   Calculated Wound Size (cm^2) (l*w) 11 cm^2 1/16/2018  3:19 PM   Change in Wound Size % (l*w) 0 1/16/2018  3:19 PM   Distance Tunneling (cm) 0 cm 1/9/2018  3:36 PM   Undermining Maxium Distance (cm) 0 1/9/2018  3:36 PM   Wound Assessment Black; Red 1/16/2018  3:19 PM   Drainage Amount Moderate 1/16/2018  3:19 PM   Drainage Description Serosanguinous 1/16/2018  3:19 PM   Odor Mild 1/16/2018  3:19 PM   Margins Defined edges 1/16/2018  3:19 PM   Georgina-wound Assessment White;Pink;Brown 1/9/2018  3:36 PM   Red%Wound Bed 50 1/16/2018  3:19 PM   Black%Wound Bed 50 1/16/2018  3:19 PM   Number of days: 6       Wound 01/16/18 Venous ulcer # 7 left lower leg; posterior  (since 1/2/2018) (Active)   Wound Type Wound 1/16/2018  3:19 PM   Wound Venous 1/16/2018  3:19 PM   Wound Cleansed Rinsed/Irrigated with saline 1/16/2018  3:19 PM   Wound Length (cm) 1 cm 1/16/2018  3:19 PM   Wound Width (cm) 2.5 cm 1/16/2018  3:19 PM   Wound Depth (cm)  0.1 1/16/2018  3:19 PM   Calculated Wound Size (cm^2) (l*w) 2.16 cm^2 1/16/2018  3:19 PM   Distance Tunneling (cm) 0 cm 1/16/2018  3:19 PM   Undermining Maxium Distance (cm) 0 1/16/2018  3:19 PM   Wound Assessment Yellow 1/16/2018  3:19 PM   Drainage Amount Small 1/16/2018  3:19 PM   Drainage Description Serosanguinous 1/16/2018  3:19 PM   Odor None 1/16/2018  3:19 PM Margins Defined edges 1/16/2018  3:19 PM   Georgina-wound Assessment Brown;Pink 1/16/2018  3:19 PM   Yellow%Wound Bed 100 1/16/2018  3:19 PM   Number of days: 0     Percent of Wound/Ulcer Debrided: 100%    Total Surface Area Debrided: 21.5sq cm    Diabetic/Pressure/Non Pressure Ulcers only:  Ulcer: Non-Pressure ulcer, fat layer exposed    Bleeding: Minimal    Hemostasis Achieved: by pressure    Procedural Pain: 3  / 10     Post Procedural Pain: 3 / 10     Response to treatment:  Well tolerated by patient. Plan:   E/Mx 30 minutes of new problem of venous wound on the right lower extremity. Will add compression to the right lower extremity. Will control edema with multilayer compression dressing. Will see patient back in 3-4 days for a dressing change due to the amount of drainage in the wound. Treatment Note please see attached Discharge Instructions    In my professional opinion this patient would benefit from HBO Therapy: No    Written patient dismissal instructions given to patient and signed by patient or POA.          RTC 1 week    Electronically signed by Larisa Sorensen DPM on 1/16/2018 at 4:10 PM

## 2018-01-19 ENCOUNTER — HOSPITAL ENCOUNTER (OUTPATIENT)
Dept: WOUND CARE | Age: 53
Discharge: HOME | End: 2018-01-19
Attending: PODIATRIST | Admitting: PODIATRIST

## 2018-01-19 VITALS
DIASTOLIC BLOOD PRESSURE: 93 MMHG | RESPIRATION RATE: 16 BRPM | HEART RATE: 90 BPM | TEMPERATURE: 97.5 F | SYSTOLIC BLOOD PRESSURE: 163 MMHG

## 2018-01-22 NOTE — PROGRESS NOTES
Multilayer Compression Wrap   (Not Unna) Below the Knee    NAME:  Michele Irvin III  YOB: 1965  MEDICAL RECORD NUMBER:  [id number]  DATE:  1/19/2018       [x] Removed old Multilayer wrap if indicated and wash leg with mild soap/water.  [x] Applied moisturizing agent to dry skin as needed.  [x] Applied primary and secondary dressing as ordered    [x] Applied multilayered dressing below the  Bilateral knee to  Profore per 's instructions.  [x] Instructed patient/caregiver not to remove dressing and to keep it clean and dry.  [x] Instructed patient/caregiver on complications to report to provider, such as pain, numbness in toes, heavy drainage, and slippage of dressing.  [x] Instructed patient on purpose of compression dressing and on activity and exercise recommendations.        Applied per  [employer] Guidelines    Electronically signed by Yessica Cates RN on 1/22/2018 at [address] PM

## 2018-01-23 ENCOUNTER — HOSPITAL ENCOUNTER (OUTPATIENT)
Dept: WOUND CARE | Age: 53
Discharge: HOME | End: 2018-01-23
Attending: PODIATRIST | Admitting: PODIATRIST

## 2018-01-23 VITALS
HEART RATE: 78 BPM | RESPIRATION RATE: 16 BRPM | DIASTOLIC BLOOD PRESSURE: 100 MMHG | SYSTOLIC BLOOD PRESSURE: 169 MMHG | TEMPERATURE: 98.3 F

## 2018-01-23 RX ORDER — LIDOCAINE HYDROCHLORIDE 40 MG/ML
2.5 SOLUTION TOPICAL ONCE
Status: COMPLETED | OUTPATIENT
Start: 2018-01-23 | End: 2018-01-23

## 2018-01-23 RX ADMIN — LIDOCAINE HYDROCHLORIDE 2.5 ML: 40 SOLUTION TOPICAL at 16:24

## 2018-01-23 NOTE — PROGRESS NOTES
PM   Margins Defined edges 1/23/2018  4:11 PM   Georgina-wound Assessment Brown 1/23/2018  4:11 PM   Non-staged Wound Description Full thickness 1/23/2018  4:11 PM   [place]%Wound Bed 0 1/9/2018  3:36 PM   Red%Wound Bed 90 1/23/2018  4:11 PM   Yellow%Wound Bed 10 1/23/2018  4:11 PM   Black%Wound Bed 0 1/9/2018  3:36 PM   Purple%Wound Bed 0 1/9/2018  3:36 PM   Other%Wound Bed 0 1/5/2018  9:42 AM   Op First Treatment Date 09/19/17 9/19/2017  1:36 PM   Number of days: 126       Wound 01/09/18 Venous ulcer Leg Distal;Lateral;Lower #6 (Active)   Wound Type Wound 1/23/2018  4:11 PM   Dressing/Treatment Other (Comment) 1/23/2018  4:11 PM   Wound Cleansed Rinsed/Irrigated with saline 1/23/2018  4:11 PM   Wound Length (cm) 1 cm 1/23/2018  4:11 PM   Wound Width (cm) 1 cm 1/23/2018  4:11 PM   Wound Depth (cm)  0.1 1/23/2018  4:11 PM   Calculated Wound Size (cm^2) (l*w) 0.42 cm^2 1/23/2018  4:11 PM   Change in Wound Size % (l*w) 96.18 1/23/2018  4:11 PM   Distance Tunneling (cm) 0 cm 1/9/2018  3:36 PM   Tunneling Position ___ O'Clock 0 1/23/2018  4:11 PM   Undermining Ends___ O'Clock 0 1/23/2018  4:11 PM   Undermining Maxium Distance (cm) 0 1/9/2018  3:36 PM   Wound Assessment Red 1/23/2018  4:11 PM   Drainage Amount Small 1/23/2018  4:11 PM   Drainage Description Yellow 1/23/2018  4:11 PM   Odor None 1/23/2018  4:11 PM   Margins Defined edges 1/23/2018  4:11 PM   Georgina-wound Assessment Brown 1/23/2018  4:11 PM   Non-staged Wound Description Full thickness 1/23/2018  4:11 PM   Red%Wound Bed 100 1/23/2018  4:11 PM   Yellow%Wound Bed 0 1/23/2018  4:11 PM   Black%Wound Bed 0 1/23/2018  4:11 PM   Number of days: 14       Wound 01/16/18 Venous ulcer # 7 left lower leg; posterior  (since 1/2/2018) (Active)   Wound Type Wound 1/23/2018  4:11 PM   Wound Venous 1/23/2018  4:11 PM   Dressing/Treatment Other (Comment) 1/23/2018  4:11 PM   Wound Cleansed Rinsed/Irrigated with saline 1/23/2018  4:11 PM   Wound Length (cm) 1 cm 1/23/2018  4:11 PM

## 2018-01-25 ENCOUNTER — OFFICE VISIT (OUTPATIENT)
Dept: BARIATRICS/WEIGHT MGMT | Age: 53
End: 2018-01-25

## 2018-01-25 VITALS
HEIGHT: 74 IN | WEIGHT: 315 LBS | SYSTOLIC BLOOD PRESSURE: 144 MMHG | DIASTOLIC BLOOD PRESSURE: 82 MMHG | BODY MASS INDEX: 40.43 KG/M2 | HEART RATE: 76 BPM

## 2018-01-25 DIAGNOSIS — R03.0 ELEVATED BP WITHOUT DIAGNOSIS OF HYPERTENSION: ICD-10-CM

## 2018-01-25 DIAGNOSIS — E66.01 MORBID OBESITY WITH BMI OF 50.0-59.9, ADULT (HCC): Primary | ICD-10-CM

## 2018-01-25 DIAGNOSIS — Z71.3 DIETARY COUNSELING AND SURVEILLANCE: ICD-10-CM

## 2018-01-25 PROCEDURE — 99213 OFFICE O/P EST LOW 20 MIN: CPT | Performed by: FAMILY MEDICINE

## 2018-01-25 ASSESSMENT — ENCOUNTER SYMPTOMS
EYES NEGATIVE: 1
GASTROINTESTINAL NEGATIVE: 1
RESPIRATORY NEGATIVE: 1

## 2018-01-25 NOTE — PROGRESS NOTES
Tal Gillespie III gained 8 lbs over 4 months. Current treatment plan:   Patient is not on medication. Negative side effects from medications? no  Patient is not on Optifast.   Patient is not on diet and exercise only plan. Treatment plan details: NA    Is patient adhering to diet/meal plan: No    Carbohydrate consumption: Not tracking    Breakfast: Skips    Lunch: 2 PM: chicken wings + honey bbq fritos    Dinner: 7:30 PM turkey/ham/cheese sandwich w/ de guzman + chix noodle soup    Consuming at least 64oz of calorie free fluids? 20 oz Lemonade, 80 oz alkaline water    Participating in intentional exercise? No d/t hip issues.      Plan/Goals:   - Attend support group  - Eat 4-5 small protein based snacks per day  - Eliminate lemonade    Handouts: Protein bars      [employer]

## 2018-01-25 NOTE — PATIENT INSTRUCTIONS
Patient Education        Learning About Obesity  What is obesity? Obesity means having so much body fat that your health is in danger. Having too much body fat can lead to type 2 diabetes, heart disease, high blood pressure, arthritis, sleep apnea, and stroke. Even if you don't feel bad now, think about these health risks. Do they seem like a good reason to start on a new path toward a healthier weight? Or do you have another personal, powerful reason for wanting to lose weight? Whatever it is, keep it in mind. It can be hard to change eating habits and exercise habits. But with your own reason and plan, you can do it. How do you know if your weight is in the obesity range? To know if your weight is in the obesity range, your doctor looks at your body mass index (BMI) and waist size. Your BMI is a number that is calculated from your weight and your height. To figure your BMI for yourself, get a BMI table from your doctor or use an online tool, such as http://www.[employer].com/ on the [employer] of [employer]. What causes obesity? When you take in more calories than you burn off, you gain weight. How you eat, how active you are, and other things affect how your body uses calories and whether you gain weight. If you have family members who have too much body fat, you may have inherited a tendency to gain weight. And your family also helps form your eating and lifestyle habits, which can lead to obesity. Also, our busy lives make it harder to plan and cook healthy meals. For many of us, it's easier to reach for prepared foods, go out to eat, or go to the drive-through. But these foods are often high in saturated fat and calories. Portions are often too large. What can you do to reach a healthy weight? Focus on health, not diets. Diets are hard to stay on and don't work in the long run.  It is very hard to stay with a diet that includes lots of big meal.  · Details about each meal (like eating out or at home, eating alone, or with friends or family). · What you do to be active. Look and plan. As you track, look for patterns that you may want to change. Take note of:  · When you eat and whether you skip meals. · How often you eat out. · How many fruits and vegetables you eat. · When you eat beyond feeling full. · When and why you eat for reasons other than being hungry. When you stray from your plan, don't get upset. Figure out what made you slip up and how you can fix it. Can you take medicines or have surgery to lose weight? Before your doctor will prescribe medicines or surgery, he or she will probably want you to be more active and follow your healthy eating plan for a period of time. These habits are key lifelong changes for managing your weight, with or without other medical treatment. And these changes can help you avoid weight-related health problems. Follow-up care is a key part of your treatment and safety. Be sure to make and go to all appointments, and call your doctor if you are having problems. It's also a good idea to know your test results and keep a list of the medicines you take. Where can you learn more? Go to https://[employer]. org and sign in to your [employer] account. Enter N111 in the [employer] box to learn more about \"Learning About Obesity. \"     If you do not have an account, please click on the \"Sign Up Now\" link. Current as of: October 13, 2016  Content Version: 11.5  © 3363-3407 Healthwise, Incorporated. Care instructions adapted under license by [facility] [facility] If you have questions about a medical condition or this instruction, always ask your healthcare professional. [place] [address] any warranty or liability for your use of this information.      Plan/Goals:   - Attend support group  - Eat 4-5 small protein based snacks per day  - Eliminate lemonade

## 2018-01-25 NOTE — PROGRESS NOTES
Patient: Rudi Jung III                      Encounter Date: 1/25/2018    YOB: 1965               Age: 46 y.o. Chief Complaint   Patient presents with    Weight Management     5th MWM       BP (!) 144/82   Pulse 76   Ht 6' 2\" (1.88 m)   Wt (!) 431 lb (195.5 kg)   BMI 55.34 kg/m²     Body mass index is 55.34 kg/m². HPI: 46 y.o. male with a long-standing history of obesity presents today for follow-up. He has gained 8 pounds since his last visit in September. Did not start OPTIFAST last time. Got busy with running in the election. He is motivated to start losing weight again. He would like to start OPTIFAST. Met with the dietitian today. Food recall and assessment reviewed. Exercise: []Cardio     []Resistance/strength training     [x]Other: None due to hip pain- will be getting an epidural injection soon     No Known Allergies      Current Outpatient Prescriptions:     acetaminophen (TYLENOL) 325 MG tablet, Take 650 mg by mouth every 6 hours as needed for Pain, Disp: , Rfl:     GLUCOSAMINE HCL PO, Take by mouth daily, Disp: , Rfl:     Multiple Vitamin (MULTI VITAMIN PO), Take by mouth daily, Disp: , Rfl:     Compression Stockings MISC, by Does not apply route Diagnosis: I83.029 Location of wound: Left Lower Leg Gradient IV (30-40 mm Hg) Style: Knee Length Extremity: Bilateral, Disp: 1 each, Rfl: 2    ibuprofen (ADVIL;MOTRIN) 800 MG tablet, Take 1 tablet by mouth every 6 hours as needed for Pain, Disp: 120 tablet, Rfl: 3    Cholecalciferol (VITAMIN D) 2000 UNITS CAPS capsule, Take 1 capsule by mouth daily, Disp: 30 capsule, Rfl: 3    Patient Active Problem List   Diagnosis    Family history of early CAD    PPD positive    Vitamin D deficiency    Edema    Morbid obesity (HCC)    Anemia    Venous stasis ulcer of left lower extremity (HCC)    Venous stasis ulcer of ankle, right (HCC)       Review of Systems   HENT: Negative. Eyes: Negative. Respiratory: Negative. mmol/L Final    CO2 10/24/2016 27  21 - 32 mmol/L Final    Anion Gap 10/24/2016 11  3 - 16 Final    Glucose 10/24/2016 102* 70 - 99 mg/dL Final    BUN 10/24/2016 11  7 - 20 mg/dL Final    CREATININE 10/24/2016 0.9  0.9 - 1.3 mg/dL Final    GFR Non- 10/24/2016 >60  >60 Final    Comment: >60 mL/min/1.73m2 EGFR, calc. for ages 25 and older using the  MDRD formula (not corrected for weight), is valid for stable  renal function.  GFR  10/24/2016 >60  >60 Final    Comment: Chronic Kidney Disease: less than 60 ml/min/1.73 sq.m. Kidney Failure: less than 15 ml/min/1.73 sq.m. Results valid for patients 18 years and older.  Calcium 10/24/2016 8.9  8.3 - 10.6 mg/dL Final    Vit D, 25-Hydroxy 10/24/2016 22.7* >=30 ng/mL Final    Comment: <=20 ng/mL. ........... Ileana Banks Deficient  21-29 ng/mL. ......... Ileana Bnaks Insufficient  >=30 ng/mL. ........ Ileana Banks Sufficient      Total Protein 10/24/2016 7.2  6.4 - 8.2 g/dL Final    Alb 10/24/2016 4.1  3.4 - 5.0 g/dL Final    Alkaline Phosphatase 10/24/2016 75  40 - 129 U/L Final    ALT 10/24/2016 18  10 - 40 U/L Final    AST 10/24/2016 20  15 - 37 U/L Final    Total Bilirubin 10/24/2016 0.6  0.0 - 1.0 mg/dL Final    Bilirubin, Direct 10/24/2016 <0.2  0.0 - 0.3 mg/dL Final    Bilirubin, Indirect 10/24/2016 see below  0.0 - 1.0 mg/dL Final    Comment: Indirect Bilirubin cannot be calculated since Total Bilirubin  and/or Direct Bilirubin is below measurable range. Assessment and Plan:    ICD-10-CM ICD-9-CM    1. Morbid obesity with BMI of 50.0-59.9, adult ([facility] [address] E66.01 278.01 Reinforced dietitian's plan:  - Attend support group  - Eat 4-5 small protein based snacks per day  - Eliminate ssb's    Check labs as ordered. Goal is to start OPTIFAST 4:1 plan at his next visit (labs permitting). Z68.43 V85.43    2.  Dietary counseling and surveillance Z71.3 V65.3        Nutrition plan: [] LCHF/Ketogenic   [] Modified low-calorie diet (low

## 2018-01-26 ENCOUNTER — HOSPITAL ENCOUNTER (OUTPATIENT)
Dept: WOUND CARE | Age: 53
Discharge: HOME | End: 2018-01-26
Attending: PODIATRIST | Admitting: PODIATRIST

## 2018-01-26 VITALS
DIASTOLIC BLOOD PRESSURE: 89 MMHG | HEART RATE: 82 BPM | SYSTOLIC BLOOD PRESSURE: 129 MMHG | TEMPERATURE: 98.1 F | RESPIRATION RATE: 16 BRPM

## 2018-01-26 DIAGNOSIS — E66.01 MORBID OBESITY WITH BMI OF 50.0-59.9, ADULT (HCC): ICD-10-CM

## 2018-01-26 LAB
A/G RATIO: 1.3 (ref 1.1–2.2)
ALBUMIN SERPL-MCNC: 4.2 G/DL (ref 3.4–5)
ALP BLD-CCNC: 83 U/L (ref 40–129)
ALT SERPL-CCNC: 20 U/L (ref 10–40)
ANION GAP SERPL CALCULATED.3IONS-SCNC: 10 MMOL/L (ref 3–16)
AST SERPL-CCNC: 17 U/L (ref 15–37)
BASOPHILS ABSOLUTE: 0 K/UL (ref 0–0.2)
BASOPHILS RELATIVE PERCENT: 0.6 %
BILIRUB SERPL-MCNC: 0.6 MG/DL (ref 0–1)
BUN BLDV-MCNC: 9 MG/DL (ref 7–20)
CALCIUM SERPL-MCNC: 9.2 MG/DL (ref 8.3–10.6)
CHLORIDE BLD-SCNC: 103 MMOL/L (ref 99–110)
CHOLESTEROL, TOTAL: 190 MG/DL (ref 0–199)
CO2: 29 MMOL/L (ref 21–32)
CREAT SERPL-MCNC: 0.7 MG/DL (ref 0.9–1.3)
EOSINOPHILS ABSOLUTE: 0.1 K/UL (ref 0–0.6)
EOSINOPHILS RELATIVE PERCENT: 1.6 %
FOLATE: >20 NG/ML (ref 4.78–24.2)
GFR AFRICAN AMERICAN: >60
GFR NON-AFRICAN AMERICAN: >60
GLOBULIN: 3.2 G/DL
GLUCOSE BLD-MCNC: 109 MG/DL (ref 70–99)
HCT VFR BLD CALC: 37.6 % (ref 40.5–52.5)
HDLC SERPL-MCNC: 49 MG/DL (ref 40–60)
HEMOGLOBIN: 12.3 G/DL (ref 13.5–17.5)
LDL CHOLESTEROL CALCULATED: 126 MG/DL
LYMPHOCYTES ABSOLUTE: 0.9 K/UL (ref 1–5.1)
LYMPHOCYTES RELATIVE PERCENT: 22.9 %
MCH RBC QN AUTO: 28.6 PG (ref 26–34)
MCHC RBC AUTO-ENTMCNC: 32.8 G/DL (ref 31–36)
MCV RBC AUTO: 87.1 FL (ref 80–100)
MONOCYTES ABSOLUTE: 0.4 K/UL (ref 0–1.3)
MONOCYTES RELATIVE PERCENT: 10.7 %
NEUTROPHILS ABSOLUTE: 2.5 K/UL (ref 1.7–7.7)
NEUTROPHILS RELATIVE PERCENT: 64.2 %
PDW BLD-RTO: 16.7 % (ref 12.4–15.4)
PLATELET # BLD: 278 K/UL (ref 135–450)
PMV BLD AUTO: 8 FL (ref 5–10.5)
POTASSIUM SERPL-SCNC: 4.6 MMOL/L (ref 3.5–5.1)
RBC # BLD: 4.31 M/UL (ref 4.2–5.9)
SODIUM BLD-SCNC: 142 MMOL/L (ref 136–145)
TOTAL PROTEIN: 7.4 G/DL (ref 6.4–8.2)
TRIGL SERPL-MCNC: 74 MG/DL (ref 0–150)
TSH REFLEX: 1.53 UIU/ML (ref 0.27–4.2)
VITAMIN B-12: 461 PG/ML (ref 211–911)
VITAMIN D 25-HYDROXY: 34.1 NG/ML
VLDLC SERPL CALC-MCNC: 15 MG/DL
WBC # BLD: 3.9 K/UL (ref 4–11)

## 2018-01-26 ASSESSMENT — PAIN SCALES - GENERAL: PAINLEVEL_OUTOF10: 0

## 2018-01-26 NOTE — PROGRESS NOTES
Multilayer Compression Wrap   (Not Unna) Below the Knee    NAME:  Emeka Noble III  YOB: 1965  MEDICAL RECORD NUMBER:  [id number]  DATE:  1/26/2018       [x] Removed old Multilayer wrap if indicated and wash leg with mild soap/water.  [x] Applied moisturizing agent to dry skin as needed.  [x] Applied primary and secondary dressing as ordered    [x] Applied multilayered dressing below the  Bilateral   knee to /Profore per [facility] instructions.  [x] Instructed patient/caregiver not to remove dressing and to keep it clean and dry.  [x] Instructed patient/caregiver on complications to report to provider, such as pain, numbness in toes, heavy drainage, and slippage of dressing.  [x] Instructed patient on purpose of compression dressing and on activity and exercise recommendations.        Applied per   Guidelines    Electronically signed by Nirmala Garcia RN on 1/26/2018 at 11:28 AM

## 2018-01-27 LAB
ESTIMATED AVERAGE GLUCOSE: 137 MG/DL
HBA1C MFR BLD: 6.4 %

## 2018-01-30 ENCOUNTER — HOSPITAL ENCOUNTER (OUTPATIENT)
Dept: WOUND CARE | Age: 53
Discharge: HOME | End: 2018-01-30
Attending: PODIATRIST | Admitting: PODIATRIST

## 2018-01-30 VITALS
HEART RATE: 109 BPM | SYSTOLIC BLOOD PRESSURE: 165 MMHG | RESPIRATION RATE: 16 BRPM | TEMPERATURE: 98 F | DIASTOLIC BLOOD PRESSURE: 84 MMHG

## 2018-01-30 RX ORDER — LIDOCAINE HYDROCHLORIDE 40 MG/ML
2.5 SOLUTION TOPICAL ONCE
Status: COMPLETED | OUTPATIENT
Start: 2018-01-30 | End: 2018-01-30

## 2018-01-30 RX ADMIN — LIDOCAINE HYDROCHLORIDE 2.5 ML: 40 SOLUTION TOPICAL at 17:02

## 2018-01-31 NOTE — PROGRESS NOTES
Note  Indications:  Based on my examination of this patient's wound(s)/ulcer(s) today, debridement is required to promote healing and evaluate the wound base. Performed by: Amanda Smith DPM    Consent obtained? Yes    Time out taken: Yes    Pain Control: Anesthetic: 4% Lidocaine Liquid Topical     Debridement: Excisional Debridement    Using curette, #15 blade scalpel, scissors and forceps the wound/ulcer was sharply debrided    down through and including the removal of  epidermis, dermis and subcutaneous tissue. Devitalized Tissue Debrided:  fibrin, slough and necrotic/eschar      Pre Debridement Measurements:  Are located in the [place]  Documentation Flow Sheet   Wound/Ulcer #: 5 and 7     Post  Debridement Measurements:  Wound 09/19/17 Venous ulcer Leg Left; Lower; Lateral;Proximal #5 (existing 1 month) (Active)   Wound Type Wound 1/30/2018  4:36 PM   Wound Venous 12/12/2017  3:48 PM   Dressing/Treatment Other (Comment) 1/26/2018 11:29 AM   Wound Cleansed Rinsed/Irrigated with saline 1/23/2018  4:11 PM   Wound Length (cm) 2cm 1/30/2018  4:36 PM   Wound Width (cm) 2 cm 1/30/2018  4:36 PM   Wound Depth (cm)  0.1 1/30/2018  4:36 PM   Calculated Wound Size (cm^2) (l*w) 3.23 cm^2 1/30/2018  4:36 PM   Change in Wound Size % (l*w) 83.22 1/30/2018  4:36 PM   Distance Tunneling (cm) 0 cm 1/9/2018  3:36 PM   Tunneling Position ___ O'Clock 0 1/23/2018  4:11 PM   Undermining Starts ___ O'Clock 0 9/29/2017  3:31 PM   Undermining Ends___ O'Clock 0 1/23/2018  4:11 PM   Undermining Maxium Distance (cm) 0 1/9/2018  3:36 PM   Wound Assessment Red;Yellow 1/30/2018  4:36 PM   Drainage Amount Small 1/30/2018  4:36 PM   Drainage Description Serosanguinous 1/30/2018  4:36 PM   Odor None 1/30/2018  4:36 PM   Margins Defined edges 1/30/2018  4:36 PM   Georgina-wound Assessment Brown 1/30/2018  4:36 PM   Non-staged Wound Description Full thickness 1/30/2018  4:36 PM   [place]%Wound Bed 0 1/9/2018  3:36 PM   Red%Wound Bed 95 1/30/2018

## 2018-02-02 ENCOUNTER — OFFICE VISIT (OUTPATIENT)
Dept: BARIATRICS/WEIGHT MGMT | Age: 53
End: 2018-02-02

## 2018-02-02 VITALS
HEIGHT: 74 IN | DIASTOLIC BLOOD PRESSURE: 80 MMHG | WEIGHT: 315 LBS | HEART RATE: 72 BPM | BODY MASS INDEX: 40.43 KG/M2 | SYSTOLIC BLOOD PRESSURE: 136 MMHG

## 2018-02-02 DIAGNOSIS — R73.03 PREDIABETES: ICD-10-CM

## 2018-02-02 DIAGNOSIS — Z71.3 DIETARY COUNSELING AND SURVEILLANCE: ICD-10-CM

## 2018-02-02 DIAGNOSIS — D64.9 ANEMIA, UNSPECIFIED TYPE: ICD-10-CM

## 2018-02-02 DIAGNOSIS — E66.01 MORBID OBESITY WITH BMI OF 50.0-59.9, ADULT (HCC): Primary | ICD-10-CM

## 2018-02-02 PROCEDURE — 99214 OFFICE O/P EST MOD 30 MIN: CPT | Performed by: FAMILY MEDICINE

## 2018-02-02 ASSESSMENT — ENCOUNTER SYMPTOMS
RESPIRATORY NEGATIVE: 1
GASTROINTESTINAL NEGATIVE: 1
EYES NEGATIVE: 1

## 2018-02-02 NOTE — PATIENT INSTRUCTIONS
Patient Education        Learning About Obesity  What is obesity? Obesity means having so much body fat that your health is in danger. Having too much body fat can lead to type 2 diabetes, heart disease, high blood pressure, arthritis, sleep apnea, and stroke. Even if you don't feel bad now, think about these health risks. Do they seem like a good reason to start on a new path toward a healthier weight? Or do you have another personal, powerful reason for wanting to lose weight? Whatever it is, keep it in mind. It can be hard to change eating habits and exercise habits. But with your own reason and plan, you can do it. How do you know if your weight is in the obesity range? To know if your weight is in the obesity range, your doctor looks at your body mass index (BMI) and waist size. Your BMI is a number that is calculated from your weight and your height. To figure your BMI for yourself, get a BMI table from your doctor or use an online tool, such as http://www.perez.com/ on the [employer] of [employer]. What causes obesity? When you take in more calories than you burn off, you gain weight. How you eat, how active you are, and other things affect how your body uses calories and whether you gain weight. If you have family members who have too much body fat, you may have inherited a tendency to gain weight. And your family also helps form your eating and lifestyle habits, which can lead to obesity. Also, our busy lives make it harder to plan and cook healthy meals. For many of us, it's easier to reach for prepared foods, go out to eat, or go to the drive-through. But these foods are often high in saturated fat and calories. Portions are often too large. What can you do to reach a healthy weight? Focus on health, not diets. Diets are hard to stay on and don't work in the long run.  It is very hard to stay with a diet that includes lots of big

## 2018-02-02 NOTE — PROGRESS NOTES
0.0  0.0 - 0.2 K/uL Final         Assessment and Plan:    ICD-10-CM ICD-9-CM    1. Morbid obesity with BMI of 50.0-59.9, adult ([facility] E66.01 278.01 Start OPTIFAST 4:1 meal plan as presented. Counseled on proper use and potential side effects. F/u in 2 weeks. Z68.43 V85.43    2. Dietary counseling and surveillance Z71.3 V65.3 1100-Joey OPTIFAST plan. 3. Anemia, unspecified type D64.9 285.9 Iron and TIBC      Ferritin  F/u with PCP. 4. Prediabetes R73.03 790.29 New diagnosis. Counseled on prediabetes. Discussed lifesyle modification +/- adding metformin. Patient prefers 3-6 months of lifestyle changes before considering metformin. F/u with PCP. Nutrition plan: [] LCHF/Ketogenic   [x] Modified low-calorie diet (low carb/low-joey)                 [] Low-calorie diet    []Maintenance       []Other    Exercise: [x]Cardio     [x]Resistance/strength training                       [x]ACSM recommendations (150 minutes/week in active weight loss)                              Behavior: [x]Motivational interviewing performed    [] Referral for counseling                         [x]Discussed strategies to overcome habits/challenges for focus         [] Stress management   [x] Stimulus control                    [] Sleep hygiene    Reviewed:  [x] Nutrition and the importance of regular protein intake  [x] Hidden carbohydrate sources  [x] Alcohol use  [x] Tobacco use   [x] Importance of exercise and reducing sedentary time  [x] Proper use of OPTIFAST and side effects   [x] Labs     Treatment start date: 2/3/18  16 weeks: 6/3/18  Starting weight: 434 pounds       Orders Placed This Encounter   Procedures    Iron and TIBC     Standing Status:   Future     Standing Expiration Date:   4/2/2018     Order Specific Question:   Is Patient Fasting? Answer:   yes     Order Specific Question:   No of Hours?      Answer:   8 hours    Ferritin     Standing Status:   Future     Standing Expiration Date:   2/2/2019       No

## 2018-02-06 ENCOUNTER — HOSPITAL ENCOUNTER (OUTPATIENT)
Dept: WOUND CARE | Age: 53
Discharge: HOME | End: 2018-02-06
Attending: PODIATRIST | Admitting: PODIATRIST

## 2018-02-06 VITALS
TEMPERATURE: 98.2 F | SYSTOLIC BLOOD PRESSURE: 112 MMHG | HEART RATE: 99 BPM | RESPIRATION RATE: 16 BRPM | DIASTOLIC BLOOD PRESSURE: 46 MMHG

## 2018-02-06 RX ORDER — LIDOCAINE HYDROCHLORIDE 40 MG/ML
2.5 SOLUTION TOPICAL ONCE
Status: COMPLETED | OUTPATIENT
Start: 2018-02-06 | End: 2018-02-06

## 2018-02-06 RX ADMIN — LIDOCAINE HYDROCHLORIDE 2.5 ML: 40 SOLUTION TOPICAL at 13:23

## 2018-02-06 NOTE — PROGRESS NOTES
[facility]   Progress Note and Procedure Note      Reid Lema III  MEDICAL RECORD NUMBER:  [id number]  AGE: 46 y.o. GENDER: male  : 1965  EPISODE DATE:  2018    Subjective:     Chief Complaint   Patient presents with    Wound Check     f/u left lower leg wound         HISTORY of PRESENT ILLNESS HPI     Reid Lema III is a 46 y.o. male who presents today for wound/ulcer evaluation. History of Wound Context: left leg wound. He has kept his dressing clean and intact. Wound/Ulcer Pain Timing/Severity: constant  Quality of pain: burning  Severity:  3 / 10   Modifying Factors: None  Associated Signs/Symptoms: edema    Ulcer Identification:  Ulcer Type: venous  Contributing Factors: edema and venous stasis    Wound: N/A        PAST MEDICAL HISTORY        Diagnosis Date    Breast disorder 2009    MAMMOGRAM -VE. S/P BREAST SURGERY EVAL    Obesity     PPD positive     Venous stasis ulcer of left lower extremity ([address] 10/25/2016       PAST SURGICAL HISTORY    History reviewed. No pertinent surgical history. FAMILY HISTORY    Family History   Problem Relation Age of Onset    Heart Disease Mother     Diabetes Maternal Uncle     Diabetes Paternal Uncle     Cancer Paternal Uncle      ?  pancreatic       SOCIAL HISTORY    Social History   Substance Use Topics    Smoking status: Never Smoker    Smokeless tobacco: Never Used    Alcohol use Yes      Comment: moderate use       ALLERGIES    No Known Allergies    MEDICATIONS    Current Outpatient Prescriptions on File Prior to Encounter   Medication Sig Dispense Refill    GLUCOSAMINE HCL PO Take by mouth daily      Multiple Vitamin (MULTI VITAMIN PO) Take by mouth daily      ibuprofen (ADVIL;MOTRIN) 800 MG tablet Take 1 tablet by mouth every 6 hours as needed for Pain 120 tablet 3    Cholecalciferol (VITAMIN D) 2000 UNITS CAPS capsule Take 1 capsule by mouth daily 30 capsule 3    acetaminophen (TYLENOL) 325 MG tablet Take 650 mg by mouth every 6 hours as needed for Pain      Compression Stockings MISC by Does not apply route Diagnosis: I83.029  Location of wound: Left Lower Leg  Gradient IV (30-40 mm Hg)  Style: Knee Length  Extremity: Bilateral 1 each 2     No current facility-administered medications on file prior to encounter. REVIEW OF SYSTEMS    Pertinent items are noted in HPI. Objective:        BP (!) 112/46   Pulse 99   Temp 98.2 °F (36.8 °C) (Oral)   Resp 16     Wt Readings from Last 3 Encounters:   02/02/18 (!) 434 lb (196.9 kg)   01/25/18 (!) 431 lb (195.5 kg)   01/09/18 (!) 439 lb 12.8 oz (199.5 kg)       PHYSICAL EXAM    DP/PT pulses palpable bilaterally. CFT brisk to all digits. Digits are pink and warm to the touch. Hair growth is normal in appearance. No calf pain with palpation noted. Patient has pitting edema noted on the bilateral lower extremities. Epicritic sensation is grossly intact bilaterally. Negative clonus and babinski reflex is down going. Wound noted on the posterior lateral aspect of the left lower extremity. Wound has fibrotic and nonviable tissue that extends down through and includes the subcutaneous tissue. After debridement the wound has a fibro-granular base. Edema is well controlled with multilayer compression dressing. There is no surrounding erythema, edema, warmth or malodor noted. The wound does not probe or track to bone. The previously noted right lower extremity wound has completely epithelialized. There is hypertrophic granulation tissue noted at the superior aspect of the wound.        Assessment:      Patient Active Problem List   Diagnosis Code    Family history of early CAD Z80.55    PPD positive R76.11    Vitamin D deficiency E55.9    Edema R60.9    Morbid obesity ([address] E66.01    Anemia D64.9    Venous stasis ulcer of left lower extremity ([address] I83.029    Venous stasis ulcer of ankle, right ([address] I83.013        Excisional Debridement Procedure 2/6/2018 12:39 PM   Drainage Description Serosanguinous 2/6/2018 12:39 PM   Odor None 2/6/2018 12:39 PM   Margins Defined edges 2/6/2018 12:39 PM   Georgina-wound Assessment Brown 2/6/2018 12:39 PM   Non-staged Wound Description Full thickness 2/6/2018 12:39 PM   [place]%Wound Bed 85 2/6/2018 12:39 PM   [place]%Wound Bed 15 2/6/2018 12:39 PM   Number of days: 0     Percent of Wound/Ulcer Debrided: 100%    Total Surface Area Debrided:  5.5 sq cm    Diabetic/Pressure/Non Pressure Ulcers only:  Ulcer: Non-Pressure ulcer, fat layer exposed    Bleeding: Minimal    Hemostasis Achieved: by pressure    Procedural Pain: 3  / 10     Post Procedural Pain: 3 / 10     Response to treatment:  Well tolerated by patient. Plan: Will control edema with multilayer compression dressing. Will see patient back in 3-4 days for a dressing change due to the amount of drainage in the wound. Treatment Note please see attached Discharge Instructions    In my professional opinion this patient would benefit from HBO Therapy: No    Written patient dismissal instructions given to patient and signed by patient or POA.          RTC 1 week    Electronically signed by Polo Boast, DPM on 2/6/2018 at 5:46 PM

## 2018-02-09 ENCOUNTER — HOSPITAL ENCOUNTER (OUTPATIENT)
Dept: WOUND CARE | Age: 53
Discharge: HOME | End: 2018-02-09
Attending: PODIATRIST | Admitting: PODIATRIST

## 2018-02-09 VITALS
RESPIRATION RATE: 18 BRPM | HEART RATE: 99 BPM | SYSTOLIC BLOOD PRESSURE: 155 MMHG | TEMPERATURE: 98.6 F | DIASTOLIC BLOOD PRESSURE: 88 MMHG

## 2018-02-13 ENCOUNTER — HOSPITAL ENCOUNTER (OUTPATIENT)
Dept: WOUND CARE | Age: 53
Discharge: HOME | End: 2018-02-13
Attending: PODIATRIST | Admitting: PODIATRIST

## 2018-02-13 VITALS
TEMPERATURE: 98.5 F | DIASTOLIC BLOOD PRESSURE: 70 MMHG | SYSTOLIC BLOOD PRESSURE: 144 MMHG | HEART RATE: 91 BPM | RESPIRATION RATE: 18 BRPM

## 2018-02-13 RX ORDER — LIDOCAINE HYDROCHLORIDE 40 MG/ML
2.5 SOLUTION TOPICAL ONCE
Status: DISCONTINUED | OUTPATIENT
Start: 2018-02-13 | End: 2018-02-14 | Stop reason: HOSPADM

## 2018-02-13 ASSESSMENT — PAIN SCALES - GENERAL: PAINLEVEL_OUTOF10: 0

## 2018-02-13 NOTE — PROGRESS NOTES
[address] [address]   Progress Note and Procedure Note      Faiza Mott III  MEDICAL RECORD NUMBER:  [id number]  AGE: 46 y.o. GENDER: male  : 1965  EPISODE DATE:  2018    Subjective:     Chief Complaint   Patient presents with    Wound Check     bilat lower extremities         HISTORY of PRESENT ILLNESS HPI     Faiza Mott III is a 46 y.o. male who presents today for wound/ulcer evaluation. History of Wound Context: left leg wound. He has kept his dressing clean and intact. Wound/Ulcer Pain Timing/Severity: constant  Quality of pain: burning  Severity:  3 / 10   Modifying Factors: None  Associated Signs/Symptoms: edema    Ulcer Identification:  Ulcer Type: venous  Contributing Factors: edema and venous stasis    Wound: N/A        PAST MEDICAL HISTORY        Diagnosis Date    Breast disorder 2009    MAMMOGRAM -VE. S/P BREAST SURGERY EVAL    Obesity     PPD positive     Venous stasis ulcer of left lower extremity ([address] 10/25/2016       PAST SURGICAL HISTORY    History reviewed. No pertinent surgical history. FAMILY HISTORY    Family History   Problem Relation Age of Onset    Heart Disease Mother     Diabetes Maternal Uncle     Diabetes Paternal Uncle     Cancer Paternal Uncle      ?  pancreatic       SOCIAL HISTORY    Social History   Substance Use Topics    Smoking status: Never Smoker    Smokeless tobacco: Never Used    Alcohol use Yes      Comment: moderate use       ALLERGIES    No Known Allergies    MEDICATIONS    Current Outpatient Prescriptions on File Prior to Encounter   Medication Sig Dispense Refill    acetaminophen (TYLENOL) 325 MG tablet Take 650 mg by mouth every 6 hours as needed for Pain      GLUCOSAMINE HCL PO Take by mouth daily      Multiple Vitamin (MULTI VITAMIN PO) Take by mouth daily      ibuprofen (ADVIL;MOTRIN) 800 MG tablet Take 1 tablet by mouth every 6 hours as needed for Pain 120 tablet 3    Cholecalciferol (VITAMIN D) 2000 UNITS CAPS Nish Graham DPM    Consent obtained? Yes    Time out taken: Yes    Pain Control: Anesthetic: 4% Lidocaine Liquid Topical     Debridement: Excisional Debridement    Using curette, #15 blade scalpel, scissors and forceps the wound/ulcer was sharply debrided    down through and including the removal of  epidermis, dermis and subcutaneous tissue. Devitalized Tissue Debrided:  fibrin, slough and necrotic/eschar      Pre Debridement Measurements:  Are located in the [place]  Documentation Flow Sheet   Wound/Ulcer #: 8     Post  Debridement Measurements:    Wound 02/06/18 #8 left lower leg; medial (had since 2/3/2018) (Active)   Wound Type Wound 2/13/2018  4:18 PM   Wound Venous 2/6/2018 12:39 PM   Wound Cleansed Rinsed/Irrigated with saline 2/13/2018  4:18 PM   Wound Length (cm) 1.5 cm 2/13/2018  4:18 PM   Wound Width (cm) 1 cm 2/13/2018  4:18 PM   Wound Depth (cm)  0.1 2/13/2018  4:18 PM   Calculated Wound Size (cm^2) (l*w) 1.2 cm^2 2/13/2018  4:18 PM   Change in Wound Size % (l*w) 64.71 2/13/2018  4:18 PM   Distance Tunneling (cm) 0 cm 2/13/2018  4:18 PM   Undermining Maxium Distance (cm) 0 2/13/2018  4:18 PM   Wound Assessment Yellow;Slough;Red;Pink 2/13/2018  4:18 PM   Drainage Amount Scant 2/13/2018  4:18 PM   Drainage Description Serosanguinous 2/13/2018  4:18 PM   Odor None 2/13/2018  4:18 PM   Margins Defined edges 2/13/2018  4:18 PM   [place]-wound Assessment Brown; White 2/13/2018  4:18 PM   Non-staged Wound Description Full thickness 2/13/2018  4:18 PM   [place]%Wound Bed 50 2/13/2018  4:18 PM   Red%Wound Bed 40 2/13/2018  4:18 PM   Yellow%Wound Bed 10 2/13/2018  4:18 PM   Number of days: 7     Percent of Wound/Ulcer Debrided: 100%    Total Surface Area Debrided:  1.5 sq cm    Diabetic/Pressure/Non Pressure Ulcers only:  Ulcer: Non-Pressure ulcer, fat layer exposed    Bleeding: Minimal    Hemostasis Achieved: by pressure    Procedural Pain: 3  / 10     Post Procedural Pain: 3 / 10     Response to treatment:

## 2018-02-16 ENCOUNTER — OFFICE VISIT (OUTPATIENT)
Dept: BARIATRICS/WEIGHT MGMT | Age: 53
End: 2018-02-16

## 2018-02-16 ENCOUNTER — HOSPITAL ENCOUNTER (OUTPATIENT)
Dept: WOUND CARE | Age: 53
Discharge: HOME | End: 2018-02-16
Attending: PODIATRIST | Admitting: PODIATRIST

## 2018-02-16 VITALS
TEMPERATURE: 98.7 F | SYSTOLIC BLOOD PRESSURE: 130 MMHG | RESPIRATION RATE: 20 BRPM | HEART RATE: 84 BPM | DIASTOLIC BLOOD PRESSURE: 89 MMHG

## 2018-02-16 VITALS
WEIGHT: 315 LBS | SYSTOLIC BLOOD PRESSURE: 136 MMHG | BODY MASS INDEX: 40.43 KG/M2 | HEIGHT: 74 IN | DIASTOLIC BLOOD PRESSURE: 82 MMHG | HEART RATE: 88 BPM

## 2018-02-16 DIAGNOSIS — Z71.3 DIETARY COUNSELING AND SURVEILLANCE: ICD-10-CM

## 2018-02-16 DIAGNOSIS — E66.01 MORBID OBESITY WITH BMI OF 50.0-59.9, ADULT (HCC): Primary | ICD-10-CM

## 2018-02-16 PROCEDURE — 99213 OFFICE O/P EST LOW 20 MIN: CPT | Performed by: FAMILY MEDICINE

## 2018-02-16 ASSESSMENT — ENCOUNTER SYMPTOMS
GASTROINTESTINAL NEGATIVE: 1
RESPIRATORY NEGATIVE: 1
EYES NEGATIVE: 1

## 2018-02-16 NOTE — PATIENT INSTRUCTIONS
Patient Education        Learning About Obesity  What is obesity? Obesity means having so much body fat that your health is in danger. Having too much body fat can lead to type 2 diabetes, heart disease, high blood pressure, arthritis, sleep apnea, and stroke. Even if you don't feel bad now, think about these health risks. Do they seem like a good reason to start on a new path toward a healthier weight? Or do you have another personal, powerful reason for wanting to lose weight? Whatever it is, keep it in mind. It can be hard to change eating habits and exercise habits. But with your own reason and plan, you can do it. How do you know if your weight is in the obesity range? To know if your weight is in the obesity range, your doctor looks at your body mass index (BMI) and waist size. Your BMI is a number that is calculated from your weight and your height. To figure your BMI for yourself, get a BMI table from your doctor or use an online tool, such as http://www.Side.Cr.com/ on the [employer]. What causes obesity? When you take in more calories than you burn off, you gain weight. How you eat, how active you are, and other things affect how your body uses calories and whether you gain weight. If you have family members who have too much body fat, you may have inherited a tendency to gain weight. And your family also helps form your eating and lifestyle habits, which can lead to obesity. Also, our busy lives make it harder to plan and cook healthy meals. For many of us, it's easier to reach for prepared foods, go out to eat, or go to the drive-through. But these foods are often high in saturated fat and calories. Portions are often too large. What can you do to reach a healthy weight? Focus on health, not diets. Diets are hard to stay on and don't work in the long run.  It is very hard to stay with a diet that includes lots of big

## 2018-02-20 ENCOUNTER — HOSPITAL ENCOUNTER (OUTPATIENT)
Dept: WOUND CARE | Age: 53
Discharge: HOME | End: 2018-02-20
Attending: PODIATRIST | Admitting: PODIATRIST

## 2018-02-20 VITALS
RESPIRATION RATE: 20 BRPM | TEMPERATURE: 98.5 F | SYSTOLIC BLOOD PRESSURE: 150 MMHG | HEART RATE: 103 BPM | DIASTOLIC BLOOD PRESSURE: 92 MMHG

## 2018-02-20 RX ORDER — LIDOCAINE HYDROCHLORIDE 40 MG/ML
SOLUTION TOPICAL ONCE
Status: COMPLETED | OUTPATIENT
Start: 2018-02-20 | End: 2018-02-20

## 2018-02-20 RX ADMIN — LIDOCAINE HYDROCHLORIDE: 40 SOLUTION TOPICAL at 16:02

## 2018-02-20 ASSESSMENT — PAIN DESCRIPTION - DESCRIPTORS: DESCRIPTORS: BURNING;STABBING

## 2018-02-20 ASSESSMENT — PAIN DESCRIPTION - PAIN TYPE: TYPE: ACUTE PAIN

## 2018-02-20 ASSESSMENT — PAIN SCALES - GENERAL: PAINLEVEL_OUTOF10: 4

## 2018-02-21 ENCOUNTER — HOSPITAL ENCOUNTER (OUTPATIENT)
Dept: WOUND CARE | Age: 53
Discharge: HOME | End: 2018-02-21
Attending: PODIATRIST | Admitting: PODIATRIST

## 2018-02-21 VITALS
SYSTOLIC BLOOD PRESSURE: 148 MMHG | HEART RATE: 104 BPM | DIASTOLIC BLOOD PRESSURE: 90 MMHG | RESPIRATION RATE: 16 BRPM | TEMPERATURE: 98 F

## 2018-02-21 DIAGNOSIS — D64.9 ANEMIA, UNSPECIFIED TYPE: ICD-10-CM

## 2018-02-21 LAB
FERRITIN: 214 NG/ML (ref 30–400)
IRON SATURATION: 25 % (ref 20–50)
IRON: 55 UG/DL (ref 59–158)
TOTAL IRON BINDING CAPACITY: 223 UG/DL (ref 260–445)

## 2018-02-22 NOTE — PROGRESS NOTES
Multilayer Compression Wrap   (Not Unna) Below the Knee    NAME:  Rudi Jung III  YOB: 1965  MEDICAL RECORD NUMBER:  [id number]  DATE:  2/20/2018       [x] Removed old Multilayer wrap if indicated and wash leg with mild soap/water.  [x] Applied moisturizing agent to dry skin as needed.  [x] Applied primary and secondary dressing as ordered    [x] Applied multilayered dressing below the knee to bilateral lower legs Profore per 's instructions.  [x] Instructed patient/caregiver not to remove dressing and to keep it clean and dry.  [x] Instructed patient/caregiver on complications to report to provider, such as pain, numbness in toes, heavy drainage, and slippage of dressing.  [x] Instructed patient on purpose of compression dressing and on activity and exercise recommendations.        Applied per   Guidelines    Electronically signed by Adele Musa RN on 2/22/2018 at 11:51 AM

## 2018-02-27 ENCOUNTER — HOSPITAL ENCOUNTER (OUTPATIENT)
Dept: WOUND CARE | Age: 53
Discharge: HOME | End: 2018-02-27
Attending: PODIATRIST | Admitting: PODIATRIST

## 2018-02-27 VITALS
TEMPERATURE: 98.4 F | DIASTOLIC BLOOD PRESSURE: 92 MMHG | RESPIRATION RATE: 16 BRPM | HEART RATE: 93 BPM | SYSTOLIC BLOOD PRESSURE: 141 MMHG

## 2018-02-27 RX ORDER — LIDOCAINE HYDROCHLORIDE 40 MG/ML
2.5 SOLUTION TOPICAL ONCE
Status: COMPLETED | OUTPATIENT
Start: 2018-02-27 | End: 2018-02-27

## 2018-02-27 RX ADMIN — LIDOCAINE HYDROCHLORIDE 2.5 ML: 40 SOLUTION TOPICAL at 16:10

## 2018-02-27 NOTE — PROGRESS NOTES
by mouth daily      Compression Stockings MISC by Does not apply route Diagnosis: I83.029  Location of wound: Left Lower Leg  Gradient IV (30-40 mm Hg)  Style: Knee Length  Extremity: Bilateral 1 each 2    ibuprofen (ADVIL;MOTRIN) 800 MG tablet Take 1 tablet by mouth every 6 hours as needed for Pain 120 tablet 3    Cholecalciferol (VITAMIN D) 2000 UNITS CAPS capsule Take 1 capsule by mouth daily 30 capsule 3     No current facility-administered medications on file prior to encounter. REVIEW OF SYSTEMS    Pertinent items are noted in HPI. Objective:        BP (!) 141/92   Pulse 93   Temp 98.4 °F (36.9 °C) (Oral)   Resp 16     Wt Readings from Last 3 Encounters:   02/16/18 (!) 408 lb 8 oz (185.3 kg)   02/02/18 (!) 434 lb (196.9 kg)   01/25/18 (!) 431 lb (195.5 kg)       PHYSICAL EXAM    DP/PT pulses palpable bilaterally. CFT brisk to all digits. Digits are pink and warm to the touch. Hair growth is normal in appearance. No calf pain with palpation noted. Patient has pitting edema noted on the bilateral lower extremities. Epicritic sensation is grossly intact bilaterally. Negative clonus and babinski reflex is down going. Wound noted on the proximal medial aspect of the left lower extremity. Wound has fibrotic and nonviable tissue that extends down through and includes the subcutaneous tissue. After debridement the wound has a granular base. Edema is well controlled with multilayer compression dressing. There is no surrounding erythema, edema, warmth or malodor noted. The wound does not probe or track to bone.       Assessment:      Patient Active Problem List   Diagnosis Code    Family history of early CAD Z80.55    PPD positive R76.11    Vitamin D deficiency E55.9    Edema R60.9    Morbid obesity ([address] E66.01    Anemia D64.9    Venous stasis ulcer of left lower extremity ([address] I83.029    Venous stasis ulcer of ankle, right ([address] I83.013        Excisional Debridement Procedure Debrided:  0.25 sq cm    Diabetic/Pressure/Non Pressure Ulcers only:  Ulcer: Non-Pressure ulcer, fat layer exposed    Bleeding: Minimal    Hemostasis Achieved: by pressure    Procedural Pain: 3  / 10     Post Procedural Pain: 3 / 10     Response to treatment:  Well tolerated by patient. Plan: Will control edema with compression stockings. Will make arrangements for patient to be fitted with compression stockings at his next visit. These are medically necessary to provide even compression for wound healing. Once stockings are available OK to d/c compression wraps and use stockings for compression to allow for daily dressing changes. Treatment Note please see attached Discharge Instructions    In my professional opinion this patient would benefit from HBO Therapy: No    Written patient dismissal instructions given to patient and signed by patient or POA.          RTC 1 week    Electronically signed by Sly Galdamez DPM on 2/27/2018 at 5:14 PM

## 2018-03-05 ENCOUNTER — OFFICE VISIT (OUTPATIENT)
Dept: BARIATRICS/WEIGHT MGMT | Age: 53
End: 2018-03-05

## 2018-03-05 VITALS
BODY MASS INDEX: 40.43 KG/M2 | HEART RATE: 80 BPM | HEIGHT: 74 IN | DIASTOLIC BLOOD PRESSURE: 84 MMHG | SYSTOLIC BLOOD PRESSURE: 126 MMHG | WEIGHT: 315 LBS

## 2018-03-05 DIAGNOSIS — Z71.3 DIETARY COUNSELING AND SURVEILLANCE: ICD-10-CM

## 2018-03-05 DIAGNOSIS — E66.01 MORBID OBESITY WITH BMI OF 50.0-59.9, ADULT (HCC): Primary | ICD-10-CM

## 2018-03-05 DIAGNOSIS — R73.03 PREDIABETES: ICD-10-CM

## 2018-03-05 PROCEDURE — 99213 OFFICE O/P EST LOW 20 MIN: CPT | Performed by: FAMILY MEDICINE

## 2018-03-05 ASSESSMENT — ENCOUNTER SYMPTOMS
GASTROINTESTINAL NEGATIVE: 1
RESPIRATORY NEGATIVE: 1
EYES NEGATIVE: 1

## 2018-03-05 NOTE — PROGRESS NOTES
PPD positive    Vitamin D deficiency    Edema    Morbid obesity ([facility]    Anemia    Venous stasis ulcer of left lower extremity (HCC)    Venous stasis ulcer of ankle, right ([facility]       Review of Systems   HENT: Negative. Eyes: Negative. Respiratory: Negative. Cardiovascular: Negative. Gastrointestinal: Negative. Endocrine: Negative. Musculoskeletal: Negative. Neurological: Negative. Psychiatric/Behavioral: Negative. Physical Exam   Constitutional: He is oriented to person, place, and time. He appears well-developed and well-nourished. Eyes: Conjunctivae and EOM are normal.   Neck: No thyromegaly present. Cardiovascular: Normal rate, regular rhythm and normal heart sounds. Exam reveals no gallop and no friction rub. No murmur heard. Pulmonary/Chest: Effort normal and breath sounds normal. No respiratory distress. He has no wheezes. He has no rales. Abdominal: Soft. Bowel sounds are normal. He exhibits no mass. There is no tenderness. There is no rebound and no guarding. Neurological: He is alert and oriented to person, place, and time. Skin: Skin is warm and dry. Psychiatric: He has a normal mood and affect. His behavior is normal. Judgment and thought content normal.       Orders Only on 02/21/2018   Component Date Value Ref Range Status    Iron 02/21/2018 55* 59 - 158 ug/dL Final    TIBC 02/21/2018 223* 260 - 445 ug/dL Final    Iron Saturation 02/21/2018 25  20 - 50 % Final    Ferritin 02/21/2018 214.0  30.0 - 400.0 ng/mL Final         Assessment and Plan:    ICD-10-CM ICD-9-CM    1. Morbid obesity with BMI of 50.0-59.9, adult ([facility] E66.01 278.01 Improving. Continue current management. Follow-up in 4 weeks. Check labs as ordered. Z68.43 V85.43    2. Dietary counseling and surveillance Z71.3 V65.3    3.  Prediabetes R73.03 790.29        Nutrition plan: [] LCHF/Ketogenic   [] Modified low-calorie diet (low carb/low-yogi)               [x] Low-calorie diet

## 2018-03-05 NOTE — PATIENT INSTRUCTIONS
changes in your eating habits. Instead of a diet, focus on lifestyle changes that will improve your health and achieve the right balance of energy and calories. To lose weight, you need to burn more calories than you take in. You can do it by eating healthy foods in reasonable amounts and becoming more active, even a little bit every day. Making small changes over time can add up to a lot. Make a plan for change. Many people have found that naming their reasons for change and staying focused on their plan can make a big difference. Work with your doctor to create a plan that is right for you. · Ask yourself: Fidel Rooney are my personal, most powerful reasons for wanting this change? What will my life look like when I've made the change? \"  · Set your long-term goal. Make it specific, such as \"I will lose x pounds. \"  · Break your long-term goal into smaller, short-term goals. Make these small steps specific and within your reach, things you know you can do. These steps are what keep you going from day to day. How can you stay on your plan for change? Be ready. Choose to start during a time when there are few events that might trigger slip-ups, like holidays, social events, and high-stress periods. Decide on your first few steps. Most people have more success when they make small changes, one step at a time. For example, you might switch a daily candy bar to a piece of fruit, walk 10 minutes more, or add more vegetables to a meal.  Line up your support people. Make sure you're not going to be alone as you make this change. Connect with people who understand how important it is to you. Ask family members and friends for help in keeping with your plan. And think about who could make it harder for you, and how to handle them. Try tracking. People who keep track of what they eat, feel, and do are better at losing weight. Try writing down things like:  · What and how much you eat.   · How you feel before and after each

## 2018-04-04 ENCOUNTER — OFFICE VISIT (OUTPATIENT)
Dept: BARIATRICS/WEIGHT MGMT | Age: 53
End: 2018-04-04

## 2018-04-04 VITALS
SYSTOLIC BLOOD PRESSURE: 142 MMHG | HEIGHT: 74 IN | BODY MASS INDEX: 40.43 KG/M2 | DIASTOLIC BLOOD PRESSURE: 88 MMHG | WEIGHT: 315 LBS | HEART RATE: 70 BPM

## 2018-04-04 DIAGNOSIS — E66.01 MORBID OBESITY WITH BMI OF 50.0-59.9, ADULT (HCC): Primary | ICD-10-CM

## 2018-04-04 DIAGNOSIS — Z71.3 DIETARY COUNSELING AND SURVEILLANCE: ICD-10-CM

## 2018-04-04 PROCEDURE — 99213 OFFICE O/P EST LOW 20 MIN: CPT | Performed by: FAMILY MEDICINE

## 2018-04-04 ASSESSMENT — ENCOUNTER SYMPTOMS
GASTROINTESTINAL NEGATIVE: 1
EYES NEGATIVE: 1
RESPIRATORY NEGATIVE: 1

## 2018-05-02 ENCOUNTER — TELEPHONE (OUTPATIENT)
Dept: BARIATRICS/WEIGHT MGMT | Age: 53
End: 2018-05-02

## 2018-05-18 ENCOUNTER — TELEPHONE (OUTPATIENT)
Dept: BARIATRICS/WEIGHT MGMT | Age: 53
End: 2018-05-18

## 2018-06-06 ENCOUNTER — OFFICE VISIT (OUTPATIENT)
Dept: BARIATRICS/WEIGHT MGMT | Age: 53
End: 2018-06-06

## 2018-06-06 VITALS
HEART RATE: 76 BPM | DIASTOLIC BLOOD PRESSURE: 88 MMHG | SYSTOLIC BLOOD PRESSURE: 146 MMHG | BODY MASS INDEX: 40.43 KG/M2 | WEIGHT: 315 LBS | HEIGHT: 74 IN

## 2018-06-06 DIAGNOSIS — Z71.3 DIETARY COUNSELING AND SURVEILLANCE: ICD-10-CM

## 2018-06-06 DIAGNOSIS — R03.0 ELEVATED BP WITHOUT DIAGNOSIS OF HYPERTENSION: ICD-10-CM

## 2018-06-06 DIAGNOSIS — E66.01 MORBID OBESITY WITH BMI OF 50.0-59.9, ADULT (HCC): Primary | ICD-10-CM

## 2018-06-06 DIAGNOSIS — F32.A DEPRESSION, UNSPECIFIED DEPRESSION TYPE: ICD-10-CM

## 2018-06-06 PROCEDURE — 99214 OFFICE O/P EST MOD 30 MIN: CPT | Performed by: FAMILY MEDICINE

## 2018-06-06 ASSESSMENT — ENCOUNTER SYMPTOMS
GASTROINTESTINAL NEGATIVE: 1
RESPIRATORY NEGATIVE: 1
EYES NEGATIVE: 1

## 2018-06-29 ENCOUNTER — OFFICE VISIT (OUTPATIENT)
Dept: BARIATRICS/WEIGHT MGMT | Age: 53
End: 2018-06-29

## 2018-06-29 VITALS
SYSTOLIC BLOOD PRESSURE: 140 MMHG | HEIGHT: 74 IN | BODY MASS INDEX: 40.43 KG/M2 | HEART RATE: 80 BPM | WEIGHT: 315 LBS | DIASTOLIC BLOOD PRESSURE: 80 MMHG

## 2018-06-29 DIAGNOSIS — R03.0 ELEVATED BP WITHOUT DIAGNOSIS OF HYPERTENSION: ICD-10-CM

## 2018-06-29 DIAGNOSIS — Z71.3 DIETARY COUNSELING AND SURVEILLANCE: ICD-10-CM

## 2018-06-29 DIAGNOSIS — Z71.89 INDIVIDUAL COUNSELING ENCOUNTER: Primary | ICD-10-CM

## 2018-06-29 DIAGNOSIS — E66.01 MORBID OBESITY WITH BMI OF 50.0-59.9, ADULT (HCC): Primary | ICD-10-CM

## 2018-06-29 PROCEDURE — 99213 OFFICE O/P EST LOW 20 MIN: CPT | Performed by: FAMILY MEDICINE

## 2018-06-29 PROCEDURE — 90834 PSYTX W PT 45 MINUTES: CPT | Performed by: SOCIAL WORKER

## 2018-06-29 NOTE — PROGRESS NOTES
Jazzy Morales III lost 2 lbs over 3 weeks. Current treatment plan:   Patient is not on medication. Negative side effects from medications? no  Patient is  on Optifast.   Patient is not on diet and exercise only plan. Treatment plan details: Optifast 4+1    Is patient adhering to diet/meal plan?: Inconsistent    Carbohydrate consumption: Utilizing plan    Breakfast: Sometimes might skip OR MR at 8am    Snack: MR at 11am OR skipping    Lunch: None    Snack: 2pm salad with chicken OR lean cuisine OR salmon, lettuce and tomato sandwich OR if off track with MR with chips    Dinner: MR at 5pm    Snack: MR at [facility] alcohol    Fluids: water, alcohol, some coffee    Consuming at least 64oz of calorie free fluids? Yes    Participating in intentional exercise?  No Details: Going to start exercise at [facility]    Plan/Goals:   Be consistent with meal plan  Set alarm for 8am MR to start the day off right  [facility]    Handouts: None    Matt Juarez

## 2018-06-29 NOTE — PROGRESS NOTES
Patient: Fred Mena III                      Encounter Date: 6/29/2018    YOB: 1965               Age: 48 y.o. Chief Complaint   Patient presents with    Weight Management     11th MWM, Optifast       BP (!) 140/80   Pulse 80   Ht 6' 2\" (1.88 m)   Wt (!) 407 lb (184.6 kg)   BMI 52.26 kg/m²     Body mass index is 52.26 kg/m². HPI: 48 y.o. male with a long-standing history of obesity presents today for his 3-week follow-up. He has lost  2 pounds since his last visit. Current treatment includes OPTIFAST 4:1 meal plan. Met with the dietitian today. Food recall and assessment reviewed. Still struggling with adhering to the meal plan. Had anniversary celebration and other social events that made it difficult to stick with the plan. He wants to continue OPTIFAST. Hasn't followed-up with his PCP regarding his depression/BP due to busy schedule. Has an appointment with behaviorist today. Diet: []LCHF/Ketogenic   []Modified low-calorie/low carb diet  [x]Low-calorie diet          []Maintenance       []Other:         Adherent?  []Yes     [x]No       Side effects: No         Exercise: []Cardio     []Resistance/strength training     [x]Other: No intentional exercise, but trying to be physically active     No Known Allergies      Current Outpatient Prescriptions:     Compression Stockings MISC, by Does not apply route Diagnosis: I83.333 Location of wound: Left Leg Gradient IV (30-40 mm Hg) Style: Knee Length Extremity: Bilateral, Disp: 1 each, Rfl: 0    acetaminophen (TYLENOL) 325 MG tablet, Take 650 mg by mouth every 6 hours as needed for Pain, Disp: , Rfl:     GLUCOSAMINE HCL PO, Take by mouth daily, Disp: , Rfl:     Multiple Vitamin (MULTI VITAMIN PO), Take by mouth daily, Disp: , Rfl:     Compression Stockings MISC, by Does not apply route Diagnosis: I83.029 Location of wound: Left Lower Leg Gradient IV (30-40 mm Hg) Style: Knee Length Extremity: Bilateral, Disp: 1 each, Rfl: 2   ibuprofen (ADVIL;MOTRIN) 800 MG tablet, Take 1 tablet by mouth every 6 hours as needed for Pain, Disp: 120 tablet, Rfl: 3    Cholecalciferol (VITAMIN D) 2000 UNITS CAPS capsule, Take 1 capsule by mouth daily, Disp: 30 capsule, Rfl: 3    Patient Active Problem List   Diagnosis    Family history of early CAD    PPD positive    Vitamin D deficiency    Edema    Morbid obesity ([address]    Anemia    Venous stasis ulcer of left lower extremity (HCC)    Venous stasis ulcer of ankle, right ([facility]       Review of Systems   HENT: Negative. Eyes: Negative. Respiratory: Negative. Cardiovascular: Negative. Gastrointestinal: Negative. Endocrine: Negative. Musculoskeletal: Negative. Neurological: Negative. Psychiatric/Behavioral: Negative. Physical Exam   Constitutional: He is oriented to person, place, and time. He appears well-developed and well-nourished. Cardiovascular: Normal rate, regular rhythm and normal heart sounds. Exam reveals no gallop and no friction rub. No murmur heard. Pulmonary/Chest: Effort normal and breath sounds normal. No respiratory distress. He has no wheezes. He has no rales. Musculoskeletal: He exhibits no edema. Neurological: He is alert and oriented to person, place, and time. Skin: Skin is warm and dry. Psychiatric: He has a normal mood and affect. His behavior is normal. Judgment and thought content normal.       Orders Only on 02/21/2018   Component Date Value Ref Range Status    Iron 02/21/2018 55* 59 - 158 ug/dL Final    TIBC 02/21/2018 223* 260 - 445 ug/dL Final    Iron Saturation 02/21/2018 25  20 - 50 % Final    Ferritin 02/21/2018 214.0  30.0 - 400.0 ng/mL Final         Assessment and Plan:    ICD-10-CM ICD-9-CM    1. Morbid obesity with BMI of 50.0-59.9, adult ([facility]) E66.01 278.01 Refocus on meal plan. Reinforced dietitian's recommendations.   Meet with behaviorist today to help with overcoming behavioral obstacles to successful weight loss.      Z68.43 V85.43    2. Dietary counseling and surveillance Z71.3 V65.3 OPTIFAST 4:1 meal plan. 3. Elevated BP without diagnosis of hypertension R03.0 796.2 F/u with PCP. Nutrition plan: [] LCHF/Ketogenic   [] Modified low-calorie diet (low carb/low-yogi)               [x] Low-calorie diet    []Maintenance       []Other    Exercise: []Cardio     []Resistance/strength training                       [x]ACSM recommendations (150 minutes/week in active weight loss)                              Behavior: [x]Motivational interviewing performed    [] Referral for counseling                         [x]Discussed strategies to overcome habits/challenges for focus         [x] Stress management   [x] Stimulus control                    [] Sleep hygiene    Reviewed:  [x] Nutrition and the importance of regular protein intake  [x] Hidden carbohydrate sources  [x] Alcohol use  [x] Tobacco use   [x] Importance of exercise and reducing sedentary time  [x] Proper use of OPTIFAST and side effects     No orders of the defined types were placed in this encounter. No Follow-up on file. This dictation was performed with a verbal recognition program (Dragon) and all efforts were made to ensure accuracy of this dictation. It is possible that there are still dictated errors within this note. If so, please bring any errors to my attention for correction.

## 2018-07-03 ASSESSMENT — ENCOUNTER SYMPTOMS
GASTROINTESTINAL NEGATIVE: 1
EYES NEGATIVE: 1
RESPIRATORY NEGATIVE: 1

## 2018-07-10 ENCOUNTER — HOSPITAL ENCOUNTER (OUTPATIENT)
Dept: WOUND CARE | Age: 53
Discharge: HOME | End: 2018-07-10
Attending: PODIATRIST | Admitting: PODIATRIST

## 2018-07-10 VITALS
RESPIRATION RATE: 18 BRPM | TEMPERATURE: 98.1 F | HEART RATE: 91 BPM | SYSTOLIC BLOOD PRESSURE: 146 MMHG | DIASTOLIC BLOOD PRESSURE: 97 MMHG

## 2018-07-10 RX ORDER — LIDOCAINE HYDROCHLORIDE 40 MG/ML
2.5 SOLUTION TOPICAL ONCE
Status: COMPLETED | OUTPATIENT
Start: 2018-07-10 | End: 2018-07-10

## 2018-07-10 RX ADMIN — LIDOCAINE HYDROCHLORIDE 2.5 ML: 40 SOLUTION TOPICAL at 15:39

## 2018-07-10 NOTE — PROGRESS NOTES
Multilayer Compression Wrap   (Not Unna) Below the Knee    NAME:  Yvon Burns III  YOB: 1965  MEDICAL RECORD NUMBER:  [id number]  DATE:  7/10/2018       [] Removed old Multilayer wrap if present and washed leg with mild soap/water.  [x] Applied moisturizing agent to dry skin as needed.  [x] Applied primary and secondary dressing as ordered     [x] Applied multilayered dressing below the knee to left lower leg(s)  (profore 4 layer) per 's instructions.  [x] Instructed patient/caregiver not to remove dressing and to keep it clean and dry.  [x] Instructed patient/caregiver on complications to report to provider, such as pain, numbness in toes, heavy drainage, and slippage of dressing.  [x] Instructed patient on purpose of compression dressing and on activity and exercise recommendations.        Applied per   Guidelines    Electronically signed by Alfred Fischer LPN on 6/85/6281 at 2:29 PM

## 2018-07-12 NOTE — PROGRESS NOTES
[facility]   Progress Note and Procedure Note      Elkin Harvey III  MEDICAL RECORD NUMBER:  [id number]  AGE: 48 y.o. GENDER: male  : 1965  EPISODE DATE:  7/10/2018    Subjective:     Chief Complaint   Patient presents with    Wound Check     returning patient new wound         HISTORY of PRESENT ILLNESS HPI     Elkin Harvey III is a 48 y.o. male who presents today for wound/ulcer evaluation. History of Wound Context: left leg wound. He relates that it opened up a couple of weeks ago. He relates that he has been wearing his compression stockings. Wound/Ulcer Pain Timing/Severity: constant  Quality of pain: burning  Severity:  3 / 10   Modifying Factors: None  Associated Signs/Symptoms: edema    Ulcer Identification:  Ulcer Type: venous  Contributing Factors: edema and venous stasis    Wound: N/A        PAST MEDICAL HISTORY        Diagnosis Date    Breast disorder 2009    MAMMOGRAM -VE. S/P BREAST SURGERY EVAL    Obesity     PPD positive     Venous stasis ulcer of left lower extremity ([address] 10/25/2016       PAST SURGICAL HISTORY    History reviewed. No pertinent surgical history. FAMILY HISTORY    Family History   Problem Relation Age of Onset    Heart Disease Mother     Diabetes Maternal Uncle     Diabetes Paternal Uncle     Cancer Paternal Uncle         ?  pancreatic       SOCIAL HISTORY    Social History   Substance Use Topics    Smoking status: Never Smoker    Smokeless tobacco: Never Used    Alcohol use Yes      Comment: moderate use       ALLERGIES    No Known Allergies    MEDICATIONS    Current Outpatient Prescriptions on File Prior to Encounter   Medication Sig Dispense Refill    acetaminophen (TYLENOL) 325 MG tablet Take 650 mg by mouth every 6 hours as needed for Pain      GLUCOSAMINE HCL PO Take by mouth daily      Multiple Vitamin (MULTI VITAMIN PO) Take by mouth daily      ibuprofen (ADVIL;MOTRIN) 800 MG tablet Take 1 tablet by mouth every 6 hours as needed for Pain 120 tablet 3    Cholecalciferol (VITAMIN D) 2000 UNITS CAPS capsule Take 1 capsule by mouth daily 30 capsule 3    Compression Stockings MISC by Does not apply route Diagnosis: I83.333  Location of wound: Left Leg  Gradient IV (30-40 mm Hg)  Style: Knee Length  Extremity: Bilateral 1 each 0    Compression Stockings MISC by Does not apply route Diagnosis: I83.029  Location of wound: Left Lower Leg  Gradient IV (30-40 mm Hg)  Style: Knee Length  Extremity: Bilateral 1 each 2     No current facility-administered medications on file prior to encounter. REVIEW OF SYSTEMS    Pertinent items are noted in HPI. Review of Systems: A 12 point review of symptoms is unremarkable with the exception of the chief complaint. Patient specifically denies nausea, fever, vomiting, chills, shortness of breath, chest pain, abdominal pain, constipation or difficulty urinating. Objective:        BP (!) 146/97   Pulse 91   Temp 98.1 °F (36.7 °C) (Oral)   Resp 18     Wt Readings from Last 3 Encounters:   06/29/18 (!) 407 lb (184.6 kg)   06/06/18 (!) 409 lb (185.5 kg)   04/04/18 (!) 390 lb (176.9 kg)       PHYSICAL EXAM    DP/PT pulses palpable bilaterally. CFT brisk to all digits. Digits are pink and warm to the touch. Hair growth is normal in appearance. No calf pain with palpation noted. Patient has pitting edema noted on the bilateral lower extremities. There are brawny pigmentary changes noted bilaterally at the distal aspect of the leg. Epicritic sensation is grossly intact bilaterally. Negative clonus and babinski reflex is down going. Wound noted on the proximal medial aspect of the left lower extremity. Wound has fibrotic and nonviable tissue that extends down through and includes the subcutaneous tissue. After debridement the wound has a granular base. There is no surrounding erythema, edema, warmth or malodor noted. The wound does not probe or track to bone.       Assessment: Patient Active Problem List   Diagnosis Code    Family history of early CAD Z80.55    PPD positive R76.11    Vitamin D deficiency E55.9    Edema R60.9    Morbid obesity ([facility] [address] E66.01    Anemia D64.9    Venous stasis ulcer of left lower extremity ([facility] I83.029    Venous stasis ulcer of ankle, right ([facility] [address] I83.013        Excisional Debridement Procedure Note  Indications:  Based on my examination of this patient's wound(s)/ulcer(s) today, debridement is required to promote healing and evaluate the wound base. Performed by: Kathia Dwyer DPM    Consent obtained? Yes    Time out taken: Yes    Pain Control: Anesthetic: 4% Lidocaine Liquid Topical     Debridement: Excisional Debridement    Using curette, #15 blade scalpel, scissors and forceps the wound/ulcer was sharply debrided    down through and including the removal of  epidermis, dermis and subcutaneous tissue. Devitalized Tissue Debrided:  fibrin, slough and necrotic/eschar      Pre Debridement Measurements:  Are located in the [place]  Documentation Flow Sheet   Wound/Ulcer #: 9 and 10     Post  Debridement Measurements:  Wound 07/10/18 #9 Left Lateral Lower Leg Proximal (approx 6/10/2018) (Active)   Wound Image   7/10/2018  3:35 PM   Wound Type Wound 7/10/2018  3:35 PM   Dressing/Treatment Other (Comment) 7/10/2018  3:35 PM   Wound Cleansed Rinsed/Irrigated with saline 7/10/2018  3:35 PM   Wound Length (cm) 2.5 cm 7/10/2018  3:35 PM   Wound Width (cm) 1.5 cm 7/10/2018  3:35 PM   Wound Depth (cm)  0.1 7/10/2018  3:35 PM   Calculated Wound Size (cm^2) (l*w) 2.76 cm^2 7/10/2018  3:35 PM   Wound Assessment Dry;Pink;Yellow 7/10/2018  3:35 PM   Drainage Amount Small 7/10/2018  3:35 PM   Drainage Description Serosanguinous; Yellow 7/10/2018  3:35 PM   Odor None 7/10/2018  3:35 PM   Margins Defined edges 7/10/2018  3:35 PM   Georgina-wound Assessment Dry 7/10/2018  3:35 PM   [place]%Wound Bed 30 7/10/2018  3:35 PM   Yellow%Wound Bed 70 7/10/2018  3:35 PM

## 2018-07-13 ENCOUNTER — HOSPITAL ENCOUNTER (OUTPATIENT)
Dept: WOUND CARE | Age: 53
Discharge: HOME | End: 2018-07-14
Attending: SPECIALIST | Admitting: SPECIALIST

## 2018-07-13 VITALS
RESPIRATION RATE: 16 BRPM | DIASTOLIC BLOOD PRESSURE: 74 MMHG | HEART RATE: 82 BPM | SYSTOLIC BLOOD PRESSURE: 174 MMHG | TEMPERATURE: 98 F

## 2018-07-13 NOTE — PROGRESS NOTES
Multilayer Compression Wrap   (Not Unna) Below the Knee    NAME:  Saman Eastman III  YOB: 1965  MEDICAL RECORD NUMBER:  [id number]  DATE:  7/13/2018       [x] Removed old Multilayer wrap if present and washed leg with mild soap/water.  [x] Applied moisturizing agent to dry skin as needed.  [x] Applied primary and secondary dressing as ordered     [x] Applied multilayered dressing below the knee to Left lower leg(s)  (Profore 4 Layer Wrap) per 's instructions.  [x] Instructed patient/caregiver not to remove dressing and to keep it clean and dry.  [x] Instructed patient/caregiver on complications to report to provider, such as pain, numbness in toes, heavy drainage, and slippage of dressing.  [x] Instructed patient on purpose of compression dressing and on activity and exercise recommendations.        Applied per   Guidelines    Electronically signed by Miguel A Mckinney RN on 7/13/2018 at 10:53 AM

## 2018-07-17 ENCOUNTER — HOSPITAL ENCOUNTER (OUTPATIENT)
Dept: WOUND CARE | Age: 53
Discharge: HOME | End: 2018-07-17
Payer: COMMERCIAL

## 2018-07-17 VITALS
DIASTOLIC BLOOD PRESSURE: 91 MMHG | TEMPERATURE: 98.5 F | RESPIRATION RATE: 16 BRPM | SYSTOLIC BLOOD PRESSURE: 158 MMHG | HEART RATE: 108 BPM

## 2018-07-17 PROCEDURE — 29581 APPL MULTLAYER CMPRN SYS LEG: CPT

## 2018-07-17 PROCEDURE — 11042 DBRDMT SUBQ TIS 1ST 20SQCM/<: CPT

## 2018-07-17 PROCEDURE — 6370000000 HC RX 637 (ALT 250 FOR IP): Performed by: PODIATRIST

## 2018-07-17 RX ORDER — LIDOCAINE HYDROCHLORIDE 40 MG/ML
2.5 SOLUTION TOPICAL ONCE
Status: COMPLETED | OUTPATIENT
Start: 2018-07-17 | End: 2018-07-17

## 2018-07-17 RX ADMIN — LIDOCAINE HYDROCHLORIDE 2.5 ML: 40 SOLUTION TOPICAL at 15:14

## 2018-07-19 NOTE — PROGRESS NOTES
[facility]   Progress Note and Procedure Note      Fred Mena III  MEDICAL RECORD NUMBER:  [id number]  AGE: 48 y.o. GENDER: male  : 1965  EPISODE DATE:  2018    Subjective:     Chief Complaint   Patient presents with    Wound Check     f/u left lower leg         HISTORY of PRESENT ILLNESS HPI     Fred Mena III is a 48 y.o. male who presents today for wound/ulcer evaluation. History of Wound Context: left leg wound. He relates that it opened up a couple of weeks ago. He relates that he has been wearing his compression stockings. Wound/Ulcer Pain Timing/Severity: constant  Quality of pain: burning  Severity:  3 / 10   Modifying Factors: None  Associated Signs/Symptoms: edema    Ulcer Identification:  Ulcer Type: venous  Contributing Factors: edema and venous stasis    Wound: N/A        PAST MEDICAL HISTORY        Diagnosis Date    Breast disorder 2009    MAMMOGRAM -VE. S/P BREAST SURGERY EVAL    Obesity     PPD positive     Venous stasis ulcer of left lower extremity ([address] 10/25/2016       PAST SURGICAL HISTORY    History reviewed. No pertinent surgical history. FAMILY HISTORY    Family History   Problem Relation Age of Onset    Heart Disease Mother     Diabetes Maternal Uncle     Diabetes Paternal Uncle     Cancer Paternal Uncle         ?  pancreatic       SOCIAL HISTORY    Social History   Substance Use Topics    Smoking status: Never Smoker    Smokeless tobacco: Never Used    Alcohol use Yes      Comment: moderate use       ALLERGIES    No Known Allergies    MEDICATIONS    Current Outpatient Prescriptions on File Prior to Encounter   Medication Sig Dispense Refill    GLUCOSAMINE HCL PO Take by mouth daily      Multiple Vitamin (MULTI VITAMIN PO) Take by mouth daily      Cholecalciferol (VITAMIN D) 2000 UNITS CAPS capsule Take 1 capsule by mouth daily 30 capsule 3    Compression Stockings MISC by Does not apply route Diagnosis: I83.333  Location of Patient Active Problem List   Diagnosis Code    Family history of early CAD Z80.55    PPD positive R76.11    Vitamin D deficiency E55.9    Edema R60.9    Morbid obesity ([facility] [address] E66.01    Anemia D64.9    Venous stasis ulcer of left lower extremity ([facility] [address] I83.029    Venous stasis ulcer of ankle, right ([facility] [address] I83.013        Excisional Debridement Procedure Note  Indications:  Based on my examination of this patient's wound(s)/ulcer(s) today, debridement is required to promote healing and evaluate the wound base. Performed by: Nano Begum DPM    Consent obtained? Yes    Time out taken: Yes    Pain Control: Anesthetic: 4% Lidocaine Liquid Topical     Debridement: Excisional Debridement    Using curette, #15 blade scalpel, scissors and forceps the wound/ulcer was sharply debrided    down through and including the removal of  epidermis, dermis and subcutaneous tissue. Devitalized Tissue Debrided:  fibrin, slough and necrotic/eschar      Pre Debridement Measurements:  Are located in the [place]  Documentation Flow Sheet   Wound/Ulcer #: 9 and 10     Post  Debridement Measurements:    Wound 07/10/18 #9 Left Lateral Lower Leg Proximal (approx 6/10/2018) (Active)   Wound Image   7/10/2018  3:35 PM   Wound Type Wound 7/17/2018  3:06 PM   Wound Venous 7/17/2018  3:06 PM   Dressing/Treatment Other (Comment) 7/17/2018  3:06 PM   Wound Cleansed Rinsed/Irrigated with saline 7/17/2018  3:06 PM   Wound Length (cm) 2.5 cm 7/17/2018  3:06 PM   Wound Width (cm) 1.5 cm 7/17/2018  3:06 PM   Wound Depth (cm)  0.1 7/17/2018  3:06 PM   Calculated Wound Size (cm^2) (l*w) 2.75 cm^2 7/17/2018  3:06 PM   Change in Wound Size % (l*w) 0.36 7/17/2018  3:06 PM   Distance Tunneling (cm) 0 cm 7/13/2018 10:57 AM   Undermining Maxium Distance (cm) 0 7/17/2018  3:06 PM   Wound Assessment Yellow;Slough; Red 7/17/2018  3:06 PM   Drainage Amount Small 7/17/2018  3:06 PM   Drainage Description Serosanguinous; Yellow 7/17/2018  3:06 PM   Odor

## 2018-07-20 ENCOUNTER — HOSPITAL ENCOUNTER (OUTPATIENT)
Dept: WOUND CARE | Age: 53
Discharge: HOME | End: 2018-07-20
Payer: COMMERCIAL

## 2018-07-20 VITALS
RESPIRATION RATE: 16 BRPM | DIASTOLIC BLOOD PRESSURE: 82 MMHG | TEMPERATURE: 98.1 F | SYSTOLIC BLOOD PRESSURE: 139 MMHG | HEART RATE: 82 BPM

## 2018-07-20 PROCEDURE — 29581 APPL MULTLAYER CMPRN SYS LEG: CPT

## 2018-07-24 ENCOUNTER — HOSPITAL ENCOUNTER (OUTPATIENT)
Dept: WOUND CARE | Age: 53
Discharge: HOME | End: 2018-07-24
Payer: COMMERCIAL

## 2018-07-24 VITALS
RESPIRATION RATE: 18 BRPM | DIASTOLIC BLOOD PRESSURE: 79 MMHG | HEART RATE: 86 BPM | TEMPERATURE: 98.1 F | SYSTOLIC BLOOD PRESSURE: 168 MMHG

## 2018-07-24 PROCEDURE — 29581 APPL MULTLAYER CMPRN SYS LEG: CPT

## 2018-07-24 PROCEDURE — 11042 DBRDMT SUBQ TIS 1ST 20SQCM/<: CPT

## 2018-07-24 PROCEDURE — 6370000000 HC RX 637 (ALT 250 FOR IP): Performed by: PODIATRIST

## 2018-07-24 RX ORDER — LIDOCAINE HYDROCHLORIDE 40 MG/ML
2.5 SOLUTION TOPICAL ONCE
Status: COMPLETED | OUTPATIENT
Start: 2018-07-24 | End: 2018-07-24

## 2018-07-24 RX ADMIN — LIDOCAINE HYDROCHLORIDE 2.5 ML: 40 SOLUTION TOPICAL at 14:39

## 2018-07-25 NOTE — PROGRESS NOTES
[facility]anna Teran [facility]   Progress Note and Procedure Note      Hyacinth Stanton III  MEDICAL RECORD NUMBER:  [id number]  AGE: 48 y.o. GENDER: male  : 1965  EPISODE DATE:  2018    Subjective:     Chief Complaint   Patient presents with    Wound Check     Left leg         HISTORY of PRESENT ILLNESS HPI     Hyacinth Stanton III is a 48 y.o. male who presents today for wound/ulcer evaluation. History of Wound Context: left leg wound. He relates that it opened up a couple of weeks ago. He relates that he has been wearing his compression stockings. Wound/Ulcer Pain Timing/Severity: constant  Quality of pain: burning  Severity:  3 / 10   Modifying Factors: None  Associated Signs/Symptoms: edema    Ulcer Identification:  Ulcer Type: venous  Contributing Factors: edema and venous stasis    Wound: N/A        PAST MEDICAL HISTORY        Diagnosis Date    Breast disorder 2009    MAMMOGRAM -VE. S/P BREAST SURGERY EVAL    Obesity     PPD positive     Venous stasis ulcer of left lower extremity ([address] 10/25/2016       PAST SURGICAL HISTORY    History reviewed. No pertinent surgical history. FAMILY HISTORY    Family History   Problem Relation Age of Onset    Heart Disease Mother     Diabetes Maternal Uncle     Diabetes Paternal Uncle     Cancer Paternal Uncle         ?  pancreatic       SOCIAL HISTORY    Social History   Substance Use Topics    Smoking status: Never Smoker    Smokeless tobacco: Never Used    Alcohol use Yes      Comment: moderate use       ALLERGIES    No Known Allergies    MEDICATIONS    Current Outpatient Prescriptions on File Prior to Encounter   Medication Sig Dispense Refill    acetaminophen (TYLENOL) 325 MG tablet Take 650 mg by mouth every 6 hours as needed for Pain      GLUCOSAMINE HCL PO Take by mouth daily      Multiple Vitamin (MULTI VITAMIN PO) Take by mouth daily      ibuprofen (ADVIL;MOTRIN) 800 MG tablet Take 1 tablet by mouth every 6 hours as needed for Pain 120 tablet 3    Cholecalciferol (VITAMIN D) 2000 UNITS CAPS capsule Take 1 capsule by mouth daily 30 capsule 3    Compression Stockings MISC by Does not apply route Diagnosis: I83.333  Location of wound: Left Leg  Gradient IV (30-40 mm Hg)  Style: Knee Length  Extremity: Bilateral 1 each 0    Compression Stockings MISC by Does not apply route Diagnosis: I83.029  Location of wound: Left Lower Leg  Gradient IV (30-40 mm Hg)  Style: Knee Length  Extremity: Bilateral 1 each 2     No current facility-administered medications on file prior to encounter. REVIEW OF SYSTEMS    Pertinent items are noted in HPI. Review of Systems: A 12 point review of symptoms is unremarkable with the exception of the chief complaint. Patient specifically denies nausea, fever, vomiting, chills, shortness of breath, chest pain, abdominal pain, constipation or difficulty urinating. Objective:        BP (!) 168/79   Pulse 86   Temp 98.1 °F (36.7 °C) (Oral)   Resp 18     Wt Readings from Last 3 Encounters:   06/29/18 (!) 407 lb (184.6 kg)   06/06/18 (!) 409 lb (185.5 kg)   04/04/18 (!) 390 lb (176.9 kg)       PHYSICAL EXAM    DP/PT pulses palpable bilaterally. CFT brisk to all digits. Digits are pink and warm to the touch. Hair growth is normal in appearance. No calf pain with palpation noted. Patient has pitting edema noted on the bilateral lower extremities. There are brawny pigmentary changes noted bilaterally at the distal aspect of the leg. Epicritic sensation is grossly intact bilaterally. Negative clonus and babinski reflex is down going. Wound noted on the proximal medial aspect of the left lower extremity. Wound has fibrotic and nonviable tissue that extends down through and includes the subcutaneous tissue. After debridement the wound has a granular base. There is no surrounding erythema, edema, warmth or malodor noted. The wound does not probe or track to bone.       Assessment:      Patient Active Problem List   Diagnosis Code    Family history of early CAD Z80.55    PPD positive R76.11    Vitamin D deficiency E55.9    Edema R60.9    Morbid obesity ([facility] [address] E66.01    Anemia D64.9    Venous stasis ulcer of left lower extremity ([facility] [address] I83.029    Venous stasis ulcer of ankle, right ([facility] [address] I83.013        Excisional Debridement Procedure Note  Indications:  Based on my examination of this patient's wound(s)/ulcer(s) today, debridement is required to promote healing and evaluate the wound base. Performed by: Janett Arshad DPM    Consent obtained? Yes    Time out taken: Yes    Pain Control: Anesthetic: 4% Lidocaine Liquid Topical     Debridement: Excisional Debridement    Using curette, #15 blade scalpel, scissors and forceps the wound/ulcer was sharply debrided    down through and including the removal of  epidermis, dermis and subcutaneous tissue.         Devitalized Tissue Debrided:  fibrin, slough and necrotic/eschar      Pre Debridement Measurements:  Are located in the [place]  Documentation Flow Sheet   Wound/Ulcer #: 9 and 10     Post  Debridement:  Wound 07/10/18 #9 Left Lateral Lower Leg Proximal (approx 6/10/2018) (Active)   Wound Image   7/10/2018  3:35 PM   Wound Type Wound 7/24/2018  2:36 PM   Wound Venous 7/24/2018  2:36 PM   Dressing/Treatment Other (Comment) 7/24/2018  2:36 PM   Wound Cleansed Rinsed/Irrigated with saline 7/24/2018  2:36 PM   Wound Length (cm) 2.5 cm 7/24/2018  2:36 PM   Wound Width (cm) 1 cm 7/24/2018  2:36 PM   Wound Depth (cm)  0.1 7/24/2018  2:36 PM   Calculated Wound Size (cm^2) (l*w) 2.1 cm^2 7/24/2018  2:36 PM   Change in Wound Size % (l*w) 23.91 7/24/2018  2:36 PM   Distance Tunneling (cm) 0 cm 7/13/2018 10:57 AM   Undermining Maxium Distance (cm) 0 7/17/2018  3:06 PM   Wound Assessment Red;Yellow 7/24/2018  2:36 PM   Drainage Amount Small 7/24/2018  2:36 PM   Drainage Description Serosanguinous 7/24/2018  2:36 PM   Odor None 7/24/2018  2:36 PM   Margins Defined edges 7/24/2018

## 2018-07-27 ENCOUNTER — HOSPITAL ENCOUNTER (OUTPATIENT)
Dept: WOUND CARE | Age: 53
Discharge: HOME | End: 2018-07-27
Payer: COMMERCIAL

## 2018-07-27 VITALS
TEMPERATURE: 98 F | RESPIRATION RATE: 18 BRPM | HEART RATE: 73 BPM | SYSTOLIC BLOOD PRESSURE: 152 MMHG | DIASTOLIC BLOOD PRESSURE: 83 MMHG

## 2018-07-27 PROCEDURE — 29581 APPL MULTLAYER CMPRN SYS LEG: CPT

## 2018-07-31 ENCOUNTER — HOSPITAL ENCOUNTER (OUTPATIENT)
Dept: WOUND CARE | Age: 53
Discharge: HOME | End: 2018-07-31
Payer: COMMERCIAL

## 2018-07-31 VITALS
TEMPERATURE: 98.3 F | HEART RATE: 73 BPM | RESPIRATION RATE: 18 BRPM | SYSTOLIC BLOOD PRESSURE: 164 MMHG | DIASTOLIC BLOOD PRESSURE: 84 MMHG

## 2018-07-31 PROCEDURE — 6370000000 HC RX 637 (ALT 250 FOR IP): Performed by: PODIATRIST

## 2018-07-31 PROCEDURE — 29581 APPL MULTLAYER CMPRN SYS LEG: CPT

## 2018-07-31 PROCEDURE — 11042 DBRDMT SUBQ TIS 1ST 20SQCM/<: CPT

## 2018-07-31 RX ORDER — LIDOCAINE HYDROCHLORIDE 40 MG/ML
2.5 SOLUTION TOPICAL ONCE
Status: COMPLETED | OUTPATIENT
Start: 2018-07-31 | End: 2018-07-31

## 2018-07-31 RX ADMIN — LIDOCAINE HYDROCHLORIDE 2.5 ML: 40 SOLUTION TOPICAL at 16:15

## 2018-08-03 ENCOUNTER — HOSPITAL ENCOUNTER (OUTPATIENT)
Dept: WOUND CARE | Age: 53
Discharge: HOME | End: 2018-08-03
Payer: COMMERCIAL

## 2018-08-03 VITALS
RESPIRATION RATE: 18 BRPM | HEART RATE: 94 BPM | TEMPERATURE: 98.4 F | DIASTOLIC BLOOD PRESSURE: 80 MMHG | SYSTOLIC BLOOD PRESSURE: 157 MMHG

## 2018-08-03 PROCEDURE — 29581 APPL MULTLAYER CMPRN SYS LEG: CPT

## 2018-08-03 NOTE — PROGRESS NOTES
Multilayer Compression Wrap   (Not Unna) Below the Knee    NAME:  Rey Rae III  YOB: 1965  MEDICAL RECORD NUMBER:  [id number]  DATE:  8/3/2018       [x] Removed old Multilayer wrap if indicated and wash leg with mild soap/water.  [x] Applied moisturizing agent to dry skin as needed.  [x] Applied primary and secondary dressing as ordered    [x] Applied multilayered dressing below the knee to Left lower leg(s). profore four layer     [x] Instructed patient/caregiver not to remove dressing and to keep it clean and dry.  [x] Instructed patient/caregiver on complications to report to provider, such as pain, numbness in toes, heavy drainage, and slippage of dressing.  [x] Instructed patient on purpose of compression dressing and on activity and exercise recommendations.     Electronically signed by Josiah Harding RN on 8/3/2018 at 4:27 PM

## 2018-08-14 ENCOUNTER — HOSPITAL ENCOUNTER (OUTPATIENT)
Dept: WOUND CARE | Age: 53
Discharge: HOME | End: 2018-08-14
Payer: COMMERCIAL

## 2018-08-14 VITALS
TEMPERATURE: 98.6 F | RESPIRATION RATE: 18 BRPM | SYSTOLIC BLOOD PRESSURE: 143 MMHG | HEART RATE: 94 BPM | DIASTOLIC BLOOD PRESSURE: 88 MMHG

## 2018-08-14 PROCEDURE — 6370000000 HC RX 637 (ALT 250 FOR IP): Performed by: PODIATRIST

## 2018-08-14 PROCEDURE — 11042 DBRDMT SUBQ TIS 1ST 20SQCM/<: CPT

## 2018-08-14 PROCEDURE — 29581 APPL MULTLAYER CMPRN SYS LEG: CPT

## 2018-08-14 RX ORDER — LIDOCAINE HYDROCHLORIDE 40 MG/ML
2.5 SOLUTION TOPICAL ONCE
Status: COMPLETED | OUTPATIENT
Start: 2018-08-14 | End: 2018-08-14

## 2018-08-14 RX ADMIN — LIDOCAINE HYDROCHLORIDE 2.5 ML: 40 SOLUTION TOPICAL at 15:37

## 2018-08-15 NOTE — PROGRESS NOTES
Multilayer Compression Wrap   (Not Unna) Below the Knee    NAME:  Roxanna Class III  YOB: 1965  MEDICAL RECORD NUMBER:  [id number]  DATE:  8/14/2018       [x] Removed old Multilayer wrap if present and washed leg with mild soap/water.  [x] Applied moisturizing agent to dry skin as needed.  [x] Applied primary and secondary dressing as ordered     [x] Applied multilayered dressing below the knee to Left lower leg(s)  (Profore 4 Layer Wrap) per 's instructions.  [x] Instructed patient/caregiver not to remove dressing and to keep it clean and dry.  [x] Instructed patient/caregiver on complications to report to provider, such as pain, numbness in toes, heavy drainage, and slippage of dressing.  [x] Instructed patient on purpose of compression dressing and on activity and exercise recommendations.        Applied per   Guidelines    Electronically signed by Krisitna Lanier RN on 8/14/2018 at 4:12 PM
Dry;Maceration 8/3/2018  4:20 PM   [place]%Wound Bed 0 7/17/2018  3:06 PM   Red%Wound Bed 100 8/3/2018  4:20 PM   Yellow%Wound Bed 10 7/27/2018 11:43 AM   Number of days: 35       Wound 07/10/18 #10 Left Lateral Lower Leg Distal (approx 6/10/2018) (Active)   Wound Image   7/10/2018  3:35 PM   Wound Type Wound 8/14/2018  3:25 PM   Wound Venous 8/14/2018  3:25 PM   Dressing/Treatment Other (Comment) 8/14/2018  3:25 PM   Wound Cleansed Rinsed/Irrigated with saline 8/14/2018  3:25 PM   Wound Length (cm) 1 cm 8/14/2018  3:25 PM   Wound Width (cm) 1 cm 8/14/2018  3:25 PM   Wound Depth (cm)  0.1 8/14/2018  3:25 PM   Calculated Wound Size (cm^2) (l*w) 0.42 cm^2 8/14/2018  3:25 PM   Change in Wound Size % (l*w) 92.47 8/14/2018  3:25 PM   Distance Tunneling (cm) 0 cm 8/14/2018  3:25 PM   Undermining Maxium Distance (cm) 0 8/14/2018  3:25 PM   Wound Assessment Red 8/14/2018  3:25 PM   Drainage Amount Small 8/14/2018  3:25 PM   Drainage Description Serosanguinous 8/14/2018  3:25 PM   Odor None 8/14/2018  3:25 PM   Margins Defined edges 8/14/2018  3:25 PM   Georgina-wound Assessment Brown;Dry 8/14/2018  3:25 PM   Non-staged Wound Description Full thickness 8/14/2018  3:25 PM   [place]%Wound Bed 0 7/17/2018  3:06 PM   Red%Wound Bed 100 8/14/2018  3:25 PM   Yellow%Wound Bed 10 7/27/2018 11:43 AM   Number of days: 35     Percent of Wound/Ulcer Debrided: 100%    Total Surface Area Debrided:  1 sq cm    Diabetic/Pressure/Non Pressure Ulcers only:  Ulcer: Non-Pressure ulcer, fat layer exposed    Bleeding: Minimal    Hemostasis Achieved: by pressure    Procedural Pain: 3  / 10     Post Procedural Pain: 3 / 10     Response to treatment:  Well tolerated by patient. Plan: Will control edema with multilayer compression dressing. Patient will return to clinic on Friday for a nurse visit to change his dressing.      Treatment Note please see attached Discharge Instructions    In my professional opinion this patient would benefit from HBO

## 2018-08-21 ENCOUNTER — HOSPITAL ENCOUNTER (OUTPATIENT)
Dept: WOUND CARE | Age: 53
Discharge: HOME | End: 2018-08-21
Payer: COMMERCIAL

## 2018-08-21 VITALS
TEMPERATURE: 98 F | DIASTOLIC BLOOD PRESSURE: 83 MMHG | RESPIRATION RATE: 18 BRPM | HEART RATE: 88 BPM | SYSTOLIC BLOOD PRESSURE: 137 MMHG

## 2018-08-21 PROCEDURE — 11042 DBRDMT SUBQ TIS 1ST 20SQCM/<: CPT

## 2018-08-21 PROCEDURE — 6370000000 HC RX 637 (ALT 250 FOR IP): Performed by: PODIATRIST

## 2018-08-21 PROCEDURE — 29581 APPL MULTLAYER CMPRN SYS LEG: CPT

## 2018-08-21 RX ORDER — LIDOCAINE HYDROCHLORIDE 40 MG/ML
SOLUTION TOPICAL ONCE
Status: COMPLETED | OUTPATIENT
Start: 2018-08-21 | End: 2018-08-21

## 2018-08-21 RX ADMIN — LIDOCAINE HYDROCHLORIDE 2.5 ML: 40 SOLUTION TOPICAL at 15:00

## 2018-08-21 ASSESSMENT — PAIN SCALES - GENERAL: PAINLEVEL_OUTOF10: 0

## 2018-08-22 NOTE — PROGRESS NOTES
CAPS capsule Take 1 capsule by mouth daily 30 capsule 3    Compression Stockings MISC by Does not apply route Diagnosis: I83.333  Location of wound: Left Leg  Gradient IV (30-40 mm Hg)  Style: Knee Length  Extremity: Bilateral 1 each 0    Compression Stockings MISC by Does not apply route Diagnosis: I83.029  Location of wound: Left Lower Leg  Gradient IV (30-40 mm Hg)  Style: Knee Length  Extremity: Bilateral 1 each 2     No current facility-administered medications on file prior to encounter. REVIEW OF SYSTEMS    Pertinent items are noted in HPI. Review of Systems: A 12 point review of symptoms is unremarkable with the exception of the chief complaint. Patient specifically denies nausea, fever, vomiting, chills, shortness of breath, chest pain, abdominal pain, constipation or difficulty urinating. Objective:        /83   Pulse 88   Temp 98 °F (36.7 °C) (Oral)   Resp 18     Wt Readings from Last 3 Encounters:   06/29/18 (!) 407 lb (184.6 kg)   06/06/18 (!) 409 lb (185.5 kg)   04/04/18 (!) 390 lb (176.9 kg)       PHYSICAL EXAM    DP/PT pulses palpable bilaterally. CFT brisk to all digits. Digits are pink and warm to the touch. Hair growth is normal in appearance. No calf pain with palpation noted. Patient has pitting edema noted on the bilateral lower extremities. There are brawny pigmentary changes noted bilaterally at the distal aspect of the leg. Epicritic sensation is grossly intact bilaterally. Negative clonus and babinski reflex is down going. Wound noted on the proximal medial aspect of the left lower extremity. Wound has fibrotic and nonviable tissue that extends down through and includes the subcutaneous tissue. After debridement the wound has a granular base. There is no surrounding erythema, edema, warmth or malodor noted. The wound does not probe or track to bone.       Assessment:      Patient Active Problem List   Diagnosis Code    Family history of early CAD Z82.49    PPD positive R76.11    Vitamin D deficiency E55.9    Edema R60.9    Morbid obesity ([facility]) E66.01    Anemia D64.9    Venous stasis ulcer of left lower extremity ([facility]) I83.029, L97.929    Venous stasis ulcer of ankle, right ([facility]) I83.013, L97.319        Excisional Debridement Procedure Note  Indications:  Based on my examination of this patient's wound(s)/ulcer(s) today, debridement is required to promote healing and evaluate the wound base. Performed by: Ryan Linda DPM    Consent obtained? Yes    Time out taken: Yes    Pain Control: Anesthetic: 4% Lidocaine Liquid Topical     Debridement: Excisional Debridement    Using curette, #15 blade scalpel, scissors and forceps the wound/ulcer was sharply debrided    down through and including the removal of  epidermis, dermis and subcutaneous tissue.         Devitalized Tissue Debrided:  fibrin, slough and necrotic/eschar      Pre Debridement Measurements:  Are located in the [place]  Documentation Flow Sheet   Wound/Ulcer #: 10     Post  Debridement:    Wound 07/10/18 #9 Left Lateral Lower Leg Proximal (approx 6/10/2018) (Active)   Wound Image   8/14/2018  3:25 PM   Wound Type Wound 8/14/2018  3:25 PM   Wound Venous 8/14/2018  3:25 PM   Dressing/Treatment Other (Comment) 7/31/2018  4:15 PM   Wound Cleansed Rinsed/Irrigated with saline 8/14/2018  3:25 PM   Wound Length (cm) 0 cm 8/21/2018  2:26 PM   Wound Width (cm) 0 cm 8/21/2018  2:26 PM   Wound Depth (cm)  0 8/21/2018  2:26 PM   Calculated Wound Size (cm^2) (l*w) 0 cm^2 8/21/2018  2:26 PM   Change in Wound Size % (l*w) 100 8/21/2018  2:26 PM   Distance Tunneling (cm) 0 cm 8/3/2018  4:20 PM   Undermining Maxium Distance (cm) 0 8/3/2018  4:20 PM   Wound Assessment Epithelialization 8/14/2018  3:25 PM   Drainage Amount Small 8/3/2018  4:20 PM   Drainage Description Serosanguinous 8/3/2018  4:20 PM   Odor None 8/3/2018  4:20 PM   Margins Defined edges 8/3/2018  4:20 PM   Georgina-wound Assessment from HBO Therapy: No    Written patient dismissal instructions given to patient and signed by patient or POA.          RTC 1 week    Electronically signed by Rachelle Mayes DPM on 8/22/2018 at 10:55 AM

## 2018-08-24 ENCOUNTER — HOSPITAL ENCOUNTER (OUTPATIENT)
Dept: WOUND CARE | Age: 53
Discharge: HOME | End: 2018-08-24
Payer: COMMERCIAL

## 2018-08-24 VITALS
SYSTOLIC BLOOD PRESSURE: 140 MMHG | RESPIRATION RATE: 18 BRPM | DIASTOLIC BLOOD PRESSURE: 90 MMHG | HEART RATE: 89 BPM | TEMPERATURE: 97.6 F

## 2018-08-24 PROCEDURE — 29581 APPL MULTLAYER CMPRN SYS LEG: CPT

## 2018-08-24 ASSESSMENT — PAIN SCALES - GENERAL: PAINLEVEL_OUTOF10: 0

## 2018-08-24 NOTE — PROGRESS NOTES
Multilayer Compression Wrap   (Not Unna) Below the Knee    NAME:  Elkin Harvey III  YOB: 1965  MEDICAL RECORD NUMBER:  [id number]  DATE:  8/24/2018       [x] Removed old Multilayer wrap if present and washed leg with mild soap/water.  [x] Applied moisturizing agent to dry skin as needed.  [x] Applied primary and secondary dressing as ordered     [x] Applied multilayered dressing below the knee to Left lower leg(s)  (Profore 4 Layer Wrap) per 's instructions.  [x] Instructed patient/caregiver not to remove dressing and to keep it clean and dry.  [x] Instructed patient/caregiver on complications to report to provider, such as pain, numbness in toes, heavy drainage, and slippage of dressing.  [x] Instructed patient on purpose of compression dressing and on activity and exercise recommendations.        Applied per   Guidelines    Electronically signed by Tyrus Pallas, RN on 8/24/2018 at 10:18 AM

## 2018-08-28 ENCOUNTER — HOSPITAL ENCOUNTER (OUTPATIENT)
Dept: WOUND CARE | Age: 53
Discharge: HOME | End: 2018-08-28
Payer: COMMERCIAL

## 2018-08-28 VITALS
RESPIRATION RATE: 18 BRPM | TEMPERATURE: 97.5 F | SYSTOLIC BLOOD PRESSURE: 166 MMHG | DIASTOLIC BLOOD PRESSURE: 84 MMHG | HEART RATE: 78 BPM

## 2018-08-28 PROCEDURE — 6370000000 HC RX 637 (ALT 250 FOR IP): Performed by: PODIATRIST

## 2018-08-28 PROCEDURE — 11721 DEBRIDE NAIL 6 OR MORE: CPT

## 2018-08-28 RX ORDER — LIDOCAINE HYDROCHLORIDE 40 MG/ML
SOLUTION TOPICAL ONCE
Status: COMPLETED | OUTPATIENT
Start: 2018-08-28 | End: 2018-08-28

## 2018-08-28 RX ADMIN — LIDOCAINE HYDROCHLORIDE 2.5 ML: 40 SOLUTION TOPICAL at 15:06

## 2018-08-28 ASSESSMENT — PAIN SCALES - GENERAL: PAINLEVEL_OUTOF10: 0

## 2018-08-30 NOTE — PROGRESS NOTES
capsule Take 1 capsule by mouth daily 30 capsule 3    Compression Stockings MISC by Does not apply route Diagnosis: I83.333  Location of wound: Left Leg  Gradient IV (30-40 mm Hg)  Style: Knee Length  Extremity: Bilateral 1 each 0    Compression Stockings MISC by Does not apply route Diagnosis: I83.029  Location of wound: Left Lower Leg  Gradient IV (30-40 mm Hg)  Style: Knee Length  Extremity: Bilateral 1 each 2     No current facility-administered medications on file prior to encounter. REVIEW OF SYSTEMS    Pertinent items are noted in HPI. Objective:        BP (!) 166/84   Pulse 78   Temp 97.5 °F (36.4 °C) (Oral)   Resp 18     Wt Readings from Last 3 Encounters:   06/29/18 (!) 407 lb (184.6 kg)   06/06/18 (!) 409 lb (185.5 kg)   04/04/18 (!) 390 lb (176.9 kg)       PHYSICAL EXAM    DP/PT pulses palpable bilaterally. CFT brisk to all digits. Digits are pink and warm to the touch. Hair growth is normal in appearance. No calf pain with palpation noted. Patient has pitting edema noted on the bilateral lower extremities. Epicritic sensation is grossly intact bilaterally. Negative clonus and babinski reflex is down going. Wound noted on the proximal medial aspect of the left lower extremity has healed. Nails x 10 are thick, yellow, crumbly and elongated with subungual debris. Assessment:      Patient Active Problem List   Diagnosis Code    Family history of early CAD Z80.55    PPD positive R76.11    Vitamin D deficiency E55.9    Edema R60.9    Morbid obesity ([address] E66.01    Anemia D64.9    Venous stasis ulcer of left lower extremity ([address] I83.029, L97.929    Venous stasis ulcer of ankle, right ([address] I83.013, L97.319          Plan:   E/M x 30 minutes of new problem of onychomycosis. Nails x 10 were debrided in length and thickness. Recommend patient follow up with podiatry as an outpatient for routine foot care to prevent further diabetic foot complications.      Will control

## 2018-09-11 ENCOUNTER — HOSPITAL ENCOUNTER (OUTPATIENT)
Dept: WOUND CARE | Age: 53
Discharge: HOME | End: 2018-09-11
Payer: COMMERCIAL

## 2018-09-11 VITALS
TEMPERATURE: 98.6 F | SYSTOLIC BLOOD PRESSURE: 174 MMHG | HEART RATE: 85 BPM | DIASTOLIC BLOOD PRESSURE: 97 MMHG | RESPIRATION RATE: 20 BRPM

## 2018-09-11 PROCEDURE — 6370000000 HC RX 637 (ALT 250 FOR IP): Performed by: PODIATRIST

## 2018-09-11 PROCEDURE — 29581 APPL MULTLAYER CMPRN SYS LEG: CPT

## 2018-09-11 PROCEDURE — 11042 DBRDMT SUBQ TIS 1ST 20SQCM/<: CPT

## 2018-09-11 RX ORDER — LIDOCAINE HYDROCHLORIDE 40 MG/ML
2.5 SOLUTION TOPICAL ONCE
Status: COMPLETED | OUTPATIENT
Start: 2018-09-11 | End: 2018-09-11

## 2018-09-11 RX ADMIN — LIDOCAINE HYDROCHLORIDE 2.5 ML: 40 SOLUTION TOPICAL at 15:26

## 2018-09-14 ENCOUNTER — HOSPITAL ENCOUNTER (OUTPATIENT)
Dept: WOUND CARE | Age: 53
Discharge: HOME | End: 2018-09-14
Payer: COMMERCIAL

## 2018-09-14 ENCOUNTER — OFFICE VISIT (OUTPATIENT)
Dept: BARIATRICS/WEIGHT MGMT | Age: 53
End: 2018-09-14

## 2018-09-14 VITALS
HEART RATE: 78 BPM | DIASTOLIC BLOOD PRESSURE: 79 MMHG | SYSTOLIC BLOOD PRESSURE: 158 MMHG | TEMPERATURE: 98.4 F | RESPIRATION RATE: 18 BRPM

## 2018-09-14 VITALS
DIASTOLIC BLOOD PRESSURE: 88 MMHG | BODY MASS INDEX: 40.43 KG/M2 | WEIGHT: 315 LBS | HEART RATE: 76 BPM | SYSTOLIC BLOOD PRESSURE: 150 MMHG | HEIGHT: 74 IN

## 2018-09-14 DIAGNOSIS — E66.01 MORBID OBESITY WITH BMI OF 50.0-59.9, ADULT (HCC): ICD-10-CM

## 2018-09-14 DIAGNOSIS — Z71.3 DIETARY COUNSELING AND SURVEILLANCE: ICD-10-CM

## 2018-09-14 DIAGNOSIS — I10 HTN (HYPERTENSION), BENIGN: ICD-10-CM

## 2018-09-14 DIAGNOSIS — E66.01 MORBID OBESITY WITH BMI OF 50.0-59.9, ADULT (HCC): Primary | ICD-10-CM

## 2018-09-14 LAB
A/G RATIO: 1.4 (ref 1.1–2.2)
ALBUMIN SERPL-MCNC: 4.2 G/DL (ref 3.4–5)
ALP BLD-CCNC: 75 U/L (ref 40–129)
ALT SERPL-CCNC: 13 U/L (ref 10–40)
ANION GAP SERPL CALCULATED.3IONS-SCNC: 14 MMOL/L (ref 3–16)
AST SERPL-CCNC: 19 U/L (ref 15–37)
BASOPHILS ABSOLUTE: 0 K/UL (ref 0–0.2)
BASOPHILS RELATIVE PERCENT: 0.6 %
BILIRUB SERPL-MCNC: 0.5 MG/DL (ref 0–1)
BUN BLDV-MCNC: 10 MG/DL (ref 7–20)
CALCIUM SERPL-MCNC: 10 MG/DL (ref 8.3–10.6)
CHLORIDE BLD-SCNC: 101 MMOL/L (ref 99–110)
CO2: 28 MMOL/L (ref 21–32)
CREAT SERPL-MCNC: 0.9 MG/DL (ref 0.9–1.3)
EOSINOPHILS ABSOLUTE: 0.1 K/UL (ref 0–0.6)
EOSINOPHILS RELATIVE PERCENT: 1.4 %
FOLATE: >20 NG/ML (ref 4.78–24.2)
GFR AFRICAN AMERICAN: >60
GFR NON-AFRICAN AMERICAN: >60
GLOBULIN: 2.9 G/DL
GLUCOSE BLD-MCNC: 94 MG/DL (ref 70–99)
HCT VFR BLD CALC: 39 % (ref 40.5–52.5)
HEMOGLOBIN: 12.8 G/DL (ref 13.5–17.5)
LYMPHOCYTES ABSOLUTE: 0.8 K/UL (ref 1–5.1)
LYMPHOCYTES RELATIVE PERCENT: 19.2 %
MCH RBC QN AUTO: 29.2 PG (ref 26–34)
MCHC RBC AUTO-ENTMCNC: 32.7 G/DL (ref 31–36)
MCV RBC AUTO: 89.3 FL (ref 80–100)
MONOCYTES ABSOLUTE: 0.4 K/UL (ref 0–1.3)
MONOCYTES RELATIVE PERCENT: 8.4 %
NEUTROPHILS ABSOLUTE: 3.1 K/UL (ref 1.7–7.7)
NEUTROPHILS RELATIVE PERCENT: 70.4 %
PDW BLD-RTO: 16.1 % (ref 12.4–15.4)
PLATELET # BLD: 294 K/UL (ref 135–450)
PMV BLD AUTO: 7.8 FL (ref 5–10.5)
POTASSIUM SERPL-SCNC: 4.5 MMOL/L (ref 3.5–5.1)
RBC # BLD: 4.37 M/UL (ref 4.2–5.9)
SODIUM BLD-SCNC: 143 MMOL/L (ref 136–145)
TOTAL PROTEIN: 7.1 G/DL (ref 6.4–8.2)
TSH REFLEX: 0.9 UIU/ML (ref 0.27–4.2)
VITAMIN B-12: 514 PG/ML (ref 211–911)
VITAMIN D 25-HYDROXY: 31.9 NG/ML
WBC # BLD: 4.4 K/UL (ref 4–11)

## 2018-09-14 PROCEDURE — 99213 OFFICE O/P EST LOW 20 MIN: CPT | Performed by: FAMILY MEDICINE

## 2018-09-14 PROCEDURE — 29581 APPL MULTLAYER CMPRN SYS LEG: CPT

## 2018-09-14 NOTE — PATIENT INSTRUCTIONS
Patient Education        Learning About Obesity  What is obesity? Obesity means having so much body fat that your health is in danger. Having too much body fat can lead to type 2 diabetes, heart disease, high blood pressure, arthritis, sleep apnea, and stroke. Even if you don't feel bad now, think about these health risks. Do they seem like a good reason to start on a new path toward a healthier weight? Or do you have another personal, powerful reason for wanting to lose weight? Whatever it is, keep it in mind. It can be hard to change eating habits and exercise habits. But with your own reason and plan, you can do it. How do you know if your weight is in the obesity range? To know if your weight is in the obesity range, your doctor looks at your body mass index (BMI) and waist size. Your BMI is a number that is calculated from your weight and your height. To figure your BMI for yourself, get a BMI table from your doctor or use an online tool, such as http://www.perez.com/ on the [employer]. A healthy BMI is from 18.5 to 24.9. If your BMI is from 30.0 to 39.9, you are considered to have obesity. If your BMI is over 40.0, you are considered to have extreme obesity. What causes obesity? When you take in more calories than you burn off, you gain weight. How you eat, how active you are, and other things affect how your body uses calories and whether you gain weight. If you have family members who have too much body fat, you may have inherited a tendency to gain weight. And your family also helps form your eating and lifestyle habits, which can lead to obesity. Also, our busy lives make it harder to plan and cook healthy meals. For many of us, it's easier to reach for prepared foods, go out to eat, or go to the drive-through. But these foods are often high in saturated fat and calories. Portions are often too large.   What can you do to few steps. Most people have more success when they make small changes, one step at a time. For example, you might switch a daily candy bar to a piece of fruit, walk 10 minutes more, or add more vegetables to a meal.  Line up your support people. Make sure you're not going to be alone as you make this change. Connect with people who understand how important it is to you. Ask family members and friends for help in keeping with your plan. And think about who could make it harder for you, and how to handle them. Try tracking. People who keep track of what they eat, feel, and do are better at losing weight. Try writing down things like:  · What and how much you eat. · How you feel before and after each meal.  · Details about each meal (like eating out or at home, eating alone, or with friends or family). · What you do to be active. Look and plan. As you track, look for patterns that you may want to change. Take note of:  · When you eat and whether you skip meals. · How often you eat out. · How many fruits and vegetables you eat. · When you eat beyond feeling full. · When and why you eat for reasons other than being hungry. When you stray from your plan, don't get upset. Figure out what made you slip up and how you can fix it. Can you take medicines or have surgery to lose weight? If you have a BMI in a certain range and have not been able to lose weight with diet and exercise, medicine or surgery may be an option for you. If you have a BMI of at least 30.0 (or a BMI of at least 27.0 and another health problem related to your weight), ask your doctor about weight-loss medicines. They work by making you feel less hungry, making you feel full more quickly, or changing how you digest fat. Medicines are used along with diet changes and more physical activity to help you make lasting changes.   If you have a BMI of 40.0 or more (or a BMI of 35.0 or more and another health problem related to your weight), your doctor

## 2018-09-14 NOTE — PROGRESS NOTES
Triston Porter III gained 14 lbs over past 2.5 months. Pt has worked with behaviorist Alee Haines. Current treatment plan:   Patient is not on medication. Negative side effects from medications? N/A  Patient is  on Optifast.   Patient is not on diet and exercise only plan. Treatment plan details: Optifast 4+1 - ran out about 1.5 months ago     Is patient adhering to diet/meal plan?: no    Carbohydrate consumption:     Breakfast: turkey cano and toast     Snack: nothing    Lunch: nothing     Snack: doritos/potato chips     Dinner: [employer]- chicken sub with cheese/de guzman/corinna/jitendra/onion OR pasta with broccoli and tracy sauce with shrimp     Snack: not usually     Fluids: water, crown and coke and yuengling light, SF koolaid     Consuming at least 64oz of calorie free fluids? Yes    Participating in intentional exercise? No. Reports his wound opened back up and hip are impacting his exercise.      Plan/Goals: re-start 4+1 plan, start exercising, limit alcohol     Handouts: none     Indra Hung

## 2018-09-14 NOTE — PROGRESS NOTES
[facility]   Progress Note and Procedure Note      Feng Romero III  MEDICAL RECORD NUMBER:  [id number]  AGE: 48 y.o. GENDER: male  : 1965  EPISODE DATE:  2018    Subjective:     Chief Complaint   Patient presents with    Wound Check     returning  patient         HISTORY of PRESENT ILLNESS HPI     Feng Romero III is a 48 y.o. male who presents today for wound/ulcer evaluation. History of Wound Context: left leg wound. He relates that it opened up about a week ago. He relates that he has been wearing his compression stockings. Wound/Ulcer Pain Timing/Severity: constant  Quality of pain: burning  Severity:  3 / 10   Modifying Factors: None  Associated Signs/Symptoms: edema    Ulcer Identification:  Ulcer Type: venous  Contributing Factors: edema and venous stasis    Wound: N/A        PAST MEDICAL HISTORY        Diagnosis Date    Breast disorder 2009    MAMMOGRAM -VE. S/P BREAST SURGERY EVAL    Obesity     PPD positive     Venous stasis ulcer of left lower extremity ([address] 10/25/2016       PAST SURGICAL HISTORY    History reviewed. No pertinent surgical history. FAMILY HISTORY    Family History   Problem Relation Age of Onset    Heart Disease Mother     Diabetes Maternal Uncle     Diabetes Paternal Uncle     Cancer Paternal Uncle         ?  pancreatic       SOCIAL HISTORY    Social History   Substance Use Topics    Smoking status: Never Smoker    Smokeless tobacco: Never Used    Alcohol use Yes      Comment: moderate use       ALLERGIES    No Known Allergies    MEDICATIONS    Current Outpatient Prescriptions on File Prior to Encounter   Medication Sig Dispense Refill    GLUCOSAMINE HCL PO Take by mouth daily      Multiple Vitamin (MULTI VITAMIN PO) Take by mouth daily      ibuprofen (ADVIL;MOTRIN) 800 MG tablet Take 1 tablet by mouth every 6 hours as needed for Pain 120 tablet 3    Cholecalciferol (VITAMIN D) 2000 UNITS CAPS capsule Take 1 capsule by mouth Problem List   Diagnosis Code    Family history of early CAD Z80.55    PPD positive R76.11    Vitamin D deficiency E55.9    Edema R60.9    Morbid obesity ([facility] E66.01    Anemia D64.9    Venous stasis ulcer of left lower extremity ([facility] I83.029, L97.929    Venous stasis ulcer of ankle, right ([email or web address] I83.013, L97.319        Excisional Debridement Procedure Note  Indications:  Based on my examination of this patient's wound(s)/ulcer(s) today, debridement is required to promote healing and evaluate the wound base. Performed by: Satinder Yen DPM    Consent obtained? Yes    Time out taken: Yes    Pain Control: Anesthetic: 4% Lidocaine Liquid Topical     Debridement: Excisional Debridement    Using curette, #15 blade scalpel, scissors and forceps the wound/ulcer was sharply debrided    down through and including the removal of  epidermis, dermis and subcutaneous tissue.         Devitalized Tissue Debrided:  fibrin, slough and necrotic/eschar      Pre Debridement Measurements:  Are located in the [place]  Documentation Flow Sheet   Wound/Ulcer #: 9 and 10     Post  Debridement Measurements:    Wound 07/10/18 #9 Left Lateral Lower Leg Proximal (approx 6/10/2018) (Active)   Wound Image   8/14/2018  3:25 PM   Wound Type Wound 9/11/2018  3:09 PM   Wound Venous 9/11/2018  3:09 PM   Dressing/Treatment Other (Comment) 8/28/2018  2:53 PM   Wound Cleansed Rinsed/Irrigated with saline 9/11/2018  3:09 PM   Wound Length (cm) 2 cm 9/11/2018  3:09 PM   Wound Width (cm) 1 cm 9/11/2018  3:09 PM   Wound Depth (cm)  0.1 9/11/2018  3:09 PM   Calculated Wound Size (cm^2) (l*w) 1.44 cm^2 9/11/2018  3:09 PM   Change in Wound Size % (l*w) 47.83 9/11/2018  3:09 PM   Distance Tunneling (cm) 0 cm 9/11/2018  3:09 PM   Undermining Maxium Distance (cm) 0 9/11/2018  3:09 PM   Wound Assessment Red;Yellow 9/11/2018  3:09 PM   Drainage Amount Small 9/11/2018  3:09 PM   Drainage Description Serosanguinous 9/11/2018  3:09 PM   Odor Mild 9/11/2018 Plan:   E/M x 30 minutes of a new problem of venous leg ulceration left lower leg. As patient has re-ulcerated despite compression stockings and with abnormal venous reflux studies will refer patient to Dr. Gonsalo Hutton for further evaluation and management to see if he is a candidate for a venous ablation procedure to prevent reulceration. Will control edema with multilayer compression dressing. Treatment Note please see attached Discharge Instructions    In my professional opinion this patient would benefit from HBO Therapy: No    Written patient dismissal instructions given to patient and signed by patient or POA.          RTC 1 week    Electronically signed by Sylvia Sanabria DPM on 9/14/2018 at 6:36 AM

## 2018-09-14 NOTE — PROGRESS NOTES
Patient: Cathie Wade III                      Encounter Date: 9/14/2018    YOB: 1965               Age: 48 y.o. Chief Complaint   Patient presents with    Weight Management     12th MWM, Optifast       BP (!) 150/88   Pulse 76   Ht 6' 2\" (1.88 m)   Wt (!) 421 lb (191 kg)   BMI 54.05 kg/m²     Body mass index is 54.05 kg/m². HPI: 48 y.o. male with a long-standing history of obesity presents today for his 3-month follow-up. He has gained 14 pounds since his last visit. Has been off of OPTIFAST for about 6-8 weeks. Met with the dietitian today. Food recall and assessment reviewed. Interested in restarting OPTIFAST. Diet: []LCHF/Ketogenic   []Modified low-calorie/low carb diet  [x]Low-calorie diet          []Maintenance       []Other:         Adherent?  []Yes     [x]No       Side effects: No         Exercise: []Cardio     []Resistance/strength training     [x]Other: None    No Known Allergies      Current Outpatient Prescriptions:     Compression Stockings MISC, by Does not apply route Diagnosis: I83.333 Location of wound: Left Leg Gradient IV (30-40 mm Hg) Style: Knee Length Extremity: Bilateral, Disp: 1 each, Rfl: 0    acetaminophen (TYLENOL) 325 MG tablet, Take 650 mg by mouth every 6 hours as needed for Pain, Disp: , Rfl:     GLUCOSAMINE HCL PO, Take by mouth daily, Disp: , Rfl:     Multiple Vitamin (MULTI VITAMIN PO), Take by mouth daily, Disp: , Rfl:     Compression Stockings MISC, by Does not apply route Diagnosis: I83.029 Location of wound: Left Lower Leg Gradient IV (30-40 mm Hg) Style: Knee Length Extremity: Bilateral, Disp: 1 each, Rfl: 2    ibuprofen (ADVIL;MOTRIN) 800 MG tablet, Take 1 tablet by mouth every 6 hours as needed for Pain, Disp: 120 tablet, Rfl: 3    Cholecalciferol (VITAMIN D) 2000 UNITS CAPS capsule, Take 1 capsule by mouth daily, Disp: 30 capsule, Rfl: 3    Patient Active Problem List   Diagnosis    Family history of early CAD    PPD positive   

## 2018-09-17 ASSESSMENT — ENCOUNTER SYMPTOMS
GASTROINTESTINAL NEGATIVE: 1
EYES NEGATIVE: 1
RESPIRATORY NEGATIVE: 1

## 2018-09-18 ENCOUNTER — HOSPITAL ENCOUNTER (OUTPATIENT)
Dept: WOUND CARE | Age: 53
Discharge: HOME | End: 2018-09-18
Payer: COMMERCIAL

## 2018-09-18 VITALS
SYSTOLIC BLOOD PRESSURE: 175 MMHG | TEMPERATURE: 98.5 F | RESPIRATION RATE: 18 BRPM | HEART RATE: 77 BPM | DIASTOLIC BLOOD PRESSURE: 107 MMHG

## 2018-09-18 DIAGNOSIS — L97.929 VENOUS STASIS ULCER OF LEFT LOWER EXTREMITY (HCC): Primary | ICD-10-CM

## 2018-09-18 DIAGNOSIS — I83.029 VENOUS STASIS ULCER OF LEFT LOWER EXTREMITY (HCC): Primary | ICD-10-CM

## 2018-09-18 PROCEDURE — 6370000000 HC RX 637 (ALT 250 FOR IP): Performed by: PODIATRIST

## 2018-09-18 PROCEDURE — 11042 DBRDMT SUBQ TIS 1ST 20SQCM/<: CPT

## 2018-09-18 PROCEDURE — 29581 APPL MULTLAYER CMPRN SYS LEG: CPT

## 2018-09-18 RX ORDER — LIDOCAINE HYDROCHLORIDE 40 MG/ML
2.5 SOLUTION TOPICAL ONCE
Status: COMPLETED | OUTPATIENT
Start: 2018-09-18 | End: 2018-09-18

## 2018-09-18 RX ADMIN — LIDOCAINE HYDROCHLORIDE 2.5 ML: 40 SOLUTION TOPICAL at 14:51

## 2018-09-18 ASSESSMENT — PAIN SCALES - GENERAL: PAINLEVEL_OUTOF10: 0

## 2018-09-18 NOTE — PROGRESS NOTES
Multilayer Compression Wrap   (Not Unna) Below the Knee    NAME:  Meka Ramos III  YOB: 1965  MEDICAL RECORD NUMBER:  [id number]  DATE:  9/18/2018       [x] Removed old Multilayer wrap if indicated and wash leg with mild soap/water.  [x] Applied moisturizing agent to dry skin as needed.  [x] Applied primary and secondary dressing as ordered    [x] Applied multilayered dressing below the knee to Left lower leg(s). profore 4 layer   [x] Instructed patient/caregiver not to remove dressing and to keep it clean and dry.  [x] Instructed patient/caregiver on complications to report to provider, such as pain, numbness in toes, heavy drainage, and slippage of dressing.  [x] Instructed patient on purpose of compression dressing and on activity and exercise recommendations.     Electronically signed by Anabella Laurent RN on 9/18/2018 at 3:28 PM Multilayer Compression Wrap   (Not Rhys Viji) Below the Knee

## 2018-09-20 NOTE — PROGRESS NOTES
[facility]   Progress Note and Procedure Note      Claude Burrows III  MEDICAL RECORD NUMBER:  [id number]  AGE: 48 y.o. GENDER: male  : 1965  EPISODE DATE:  2018    Subjective:     Chief Complaint   Patient presents with    Wound Check     f/u left lower leg wound         HISTORY of PRESENT ILLNESS HPI     Claude Burrows III is a 48 y.o. male who presents today for wound/ulcer evaluation. History of Wound Context: left leg wound. He has kept his dressing clean and intact. Wound/Ulcer Pain Timing/Severity: constant  Quality of pain: burning  Severity:  3 / 10   Modifying Factors: None  Associated Signs/Symptoms: edema    Ulcer Identification:  Ulcer Type: venous  Contributing Factors: edema and venous stasis    Wound: N/A        PAST MEDICAL HISTORY        Diagnosis Date    Breast disorder 2009    MAMMOGRAM -VE. S/P BREAST SURGERY EVAL    Obesity     PPD positive     Venous stasis ulcer of left lower extremity ([facility] 10/25/2016       PAST SURGICAL HISTORY    History reviewed. No pertinent surgical history. FAMILY HISTORY    Family History   Problem Relation Age of Onset    Heart Disease Mother     Diabetes Maternal Uncle     Diabetes Paternal Uncle     Cancer Paternal Uncle         ?  pancreatic       SOCIAL HISTORY    Social History   Substance Use Topics    Smoking status: Never Smoker    Smokeless tobacco: Never Used    Alcohol use Yes      Comment: moderate use       ALLERGIES    No Known Allergies    MEDICATIONS    Current Outpatient Prescriptions on File Prior to Encounter   Medication Sig Dispense Refill    acetaminophen (TYLENOL) 325 MG tablet Take 650 mg by mouth every 6 hours as needed for Pain      GLUCOSAMINE HCL PO Take by mouth daily      Multiple Vitamin (MULTI VITAMIN PO) Take by mouth daily      ibuprofen (ADVIL;MOTRIN) 800 MG tablet Take 1 tablet by mouth every 6 hours as needed for Pain 120 tablet 3    Cholecalciferol (VITAMIN D) 2000 UNITS CAPS Z82.49    PPD positive R76.11    Vitamin D deficiency E55.9    Edema R60.9    Morbid obesity ([facility]) E66.01    Anemia D64.9    Venous stasis ulcer of left lower extremity ([facility]) I83.029, L97.929    Venous stasis ulcer of ankle, right ([facility]) I83.013, L97.319        Excisional Debridement Procedure Note  Indications:  Based on my examination of this patient's wound(s)/ulcer(s) today, debridement is required to promote healing and evaluate the wound base. Performed by: Toni Mccormick DPM    Consent obtained? Yes    Time out taken: Yes    Pain Control: Anesthetic: 4% Lidocaine Liquid Topical     Debridement: Excisional Debridement    Using curette, #15 blade scalpel, scissors and forceps the wound/ulcer was sharply debrided    down through and including the removal of  epidermis, dermis and subcutaneous tissue. Devitalized Tissue Debrided:  fibrin, slough and necrotic/eschar      Pre Debridement Measurements:  Are located in the [place]  Documentation Flow Sheet   Wound/Ulcer #: 9 and 10     Post  Debridement Measurements:    Wound 07/10/18 #9 Left Lateral Lower Leg Proximal (approx 6/10/2018) (Active)   Wound Image   8/14/2018  3:25 PM   Wound Type Wound 9/18/2018  2:40 PM   Wound Venous 9/18/2018  2:40 PM   Dressing/Treatment Other (Comment) 8/28/2018  2:53 PM   Wound Cleansed Rinsed/Irrigated with saline 9/18/2018  2:40 PM   Wound Length (cm) 3.4 cm 9/18/2018  2:40 PM   Wound Width (cm) 2 cm 9/18/2018  2:40 PM   Wound Depth (cm)  0.1 9/18/2018  2:40 PM   Calculated Wound Size (cm^2) (l*w) 5.78 cm^2 9/18/2018  2:40 PM   Change in Wound Size % (l*w) -109.42 9/18/2018  2:40 PM   Distance Tunneling (cm) 0 cm 9/11/2018  3:09 PM   Undermining Maxium Distance (cm) 0 9/11/2018  3:09 PM   Wound Assessment Red;Slough; Yellow 9/18/2018  2:40 PM   Drainage Amount Moderate 9/18/2018  2:40 PM   Drainage Description Brown;Serosanguinous 9/18/2018  2:40 PM   Odor None 9/18/2018  2:40 PM   Margins Defined edges 9/18/2018

## 2018-09-21 ENCOUNTER — OFFICE VISIT (OUTPATIENT)
Dept: BARIATRICS/WEIGHT MGMT | Age: 53
End: 2018-09-21

## 2018-09-21 VITALS
DIASTOLIC BLOOD PRESSURE: 92 MMHG | WEIGHT: 315 LBS | HEIGHT: 74 IN | HEART RATE: 88 BPM | SYSTOLIC BLOOD PRESSURE: 142 MMHG | BODY MASS INDEX: 40.43 KG/M2 | RESPIRATION RATE: 16 BRPM | OXYGEN SATURATION: 97 %

## 2018-09-21 DIAGNOSIS — I10 HTN (HYPERTENSION), BENIGN: ICD-10-CM

## 2018-09-21 DIAGNOSIS — Z71.3 DIETARY COUNSELING AND SURVEILLANCE: ICD-10-CM

## 2018-09-21 DIAGNOSIS — E66.01 MORBID OBESITY WITH BMI OF 50.0-59.9, ADULT (HCC): Primary | ICD-10-CM

## 2018-09-21 PROCEDURE — 99213 OFFICE O/P EST LOW 20 MIN: CPT | Performed by: FAMILY MEDICINE

## 2018-09-21 ASSESSMENT — ENCOUNTER SYMPTOMS
GASTROINTESTINAL NEGATIVE: 1
RESPIRATORY NEGATIVE: 1
EYES NEGATIVE: 1

## 2018-09-21 NOTE — PROGRESS NOTES
Claude Burrows III lost 16 lbs over 1 week. Current treatment plan:   Patient is not on medication. Negative side effects from medications? no  Patient is  on Optifast.   Patient is not on diet and exercise only plan. Treatment plan details: VLCD with OPTIFAST 5 MR/day (800 Joey) +/- high protein/low carb snack    Is patient adhering to diet/meal plan?: yes    Carbohydrate consumption: not tracking    Breakfast: Optifast Shake    Snack: Optifast Shake    Lunch: sometimes grilled chicken breast + Optifast Shake    Snack: Optifast Shake    Dinner: sometimes salmon + Optifast Shake    Snack: none    Fluids: water/ SF angelito aid    Consuming at least 64oz of calorie free fluids? Yes    Participating in intentional exercise?  No     Plan/Goals: continue plan     Handouts: none    Caryl Khan

## 2018-09-21 NOTE — PROGRESS NOTES
Patient: Hyacinth Stanton III                      Encounter Date: 9/21/2018    YOB: 1965               Age: 48 y.o. Chief Complaint   Patient presents with    Obesity     13th MWM, Optifast 5/day        BP (!) 142/92   Pulse 88   Resp 16   Ht 6' 2\" (1.88 m)   Wt (!) 405 lb (183.7 kg)   SpO2 97%   BMI 52.00 kg/m²     Body mass index is 52 kg/m². HPI: 48 y.o. male with a long-standing history of obesity presents today for his 1-week follow-up. He has lost 16 pounds since his last visit. Current treatment includes OPTIFAST 800 Joey diet with 5 meal replacements a day. Met with the dietitian today. Food recall and assessment reviewed. Following the meal plan. Sometimes including a high-protein/low carb snack to supplement Optifast.  Denies any side effects. Motivated to continue losing weight. Diet: []LCHF/Ketogenic   []Modified low-calorie/low carb diet  [x]Low-calorie diet          []Maintenance       []Other:         Adherent?  [x]Yes     []No       Side effects: No          Exercise: []Cardio     []Resistance/strength training     [x]Other: No intentional exercise, but trying to be physically active     Labs: unremarkable     No Known Allergies      Current Outpatient Prescriptions:     Compression Stockings MISC, by Does not apply route Diagnosis: I83.333 Location of wound: Left Leg Gradient IV (30-40 mm Hg) Style: Knee Length Extremity: Bilateral, Disp: 1 each, Rfl: 0    acetaminophen (TYLENOL) 325 MG tablet, Take 650 mg by mouth every 6 hours as needed for Pain, Disp: , Rfl:     GLUCOSAMINE HCL PO, Take by mouth daily, Disp: , Rfl:     Multiple Vitamin (MULTI VITAMIN PO), Take by mouth daily, Disp: , Rfl:     Compression Stockings MISC, by Does not apply route Diagnosis: I83.029 Location of wound: Left Lower Leg Gradient IV (30-40 mm Hg) Style: Knee Length Extremity: Bilateral, Disp: 1 each, Rfl: 2    ibuprofen (ADVIL;MOTRIN) 800 MG tablet, Take 1 tablet by mouth every 6 hours

## 2018-09-25 ENCOUNTER — HOSPITAL ENCOUNTER (OUTPATIENT)
Dept: WOUND CARE | Age: 53
Discharge: HOME | End: 2018-09-25
Payer: COMMERCIAL

## 2018-09-25 VITALS — HEART RATE: 92 BPM | DIASTOLIC BLOOD PRESSURE: 79 MMHG | SYSTOLIC BLOOD PRESSURE: 137 MMHG

## 2018-09-25 PROCEDURE — 29581 APPL MULTLAYER CMPRN SYS LEG: CPT

## 2018-09-25 PROCEDURE — 11042 DBRDMT SUBQ TIS 1ST 20SQCM/<: CPT

## 2018-09-25 PROCEDURE — 6370000000 HC RX 637 (ALT 250 FOR IP): Performed by: PODIATRIST

## 2018-09-25 RX ORDER — LIDOCAINE HYDROCHLORIDE 40 MG/ML
2.5 SOLUTION TOPICAL ONCE
Status: COMPLETED | OUTPATIENT
Start: 2018-09-25 | End: 2018-09-25

## 2018-09-25 RX ADMIN — LIDOCAINE HYDROCHLORIDE 2.5 ML: 40 SOLUTION TOPICAL at 14:54

## 2018-09-25 NOTE — PROGRESS NOTES
Multilayer Compression Wrap   (Not Unna) Below the Knee    NAME:  Feng Romero III  YOB: 1965  MEDICAL RECORD NUMBER:  [id number]  DATE:  9/25/2018    Profore   [x] Removed old Multilayer wrap if indicated and wash leg with mild soap/water.  [x] Applied moisturizing agent to dry skin as needed.  [x] Applied primary and secondary dressing as ordered    [x] Applied multilayered dressing below the knee to Left lower leg(s).  [x] Instructed patient/caregiver not to remove dressing and to keep it clean and dry.  [x] Instructed patient/caregiver on complications to report to provider, such as pain, numbness in toes, heavy drainage, and slippage of dressing.  [x] Instructed patient on purpose of compression dressing and on activity and exercise recommendations.     Electronically signed by Shyla Roldan RN on 9/25/2018 at 3:51 PM Multilayer Compression Wrap   (Not Vallerie Breach) Below the Knee
PM   Non-staged Wound Description Full thickness 9/25/2018  2:36 PM   [place]%Wound Bed 0 9/25/2018  2:36 PM   Red%Wound Bed 30 9/25/2018  2:36 PM   Yellow%Wound Bed 10 9/25/2018  2:36 PM   Black%Wound Bed 60 9/25/2018  2:36 PM   Op First Treatment Date 09/11/18 9/11/2018  3:09 PM   Number of days: 77       Wound 07/10/18 #10 Left Lateral Lower Leg Distal (approx 6/10/2018) (Active)   Wound Image   7/10/2018  3:35 PM   Wound Type Wound 9/25/2018  2:36 PM   Wound Traumatic 9/25/2018  2:36 PM   Dressing/Treatment Other (Comment) 8/21/2018  2:26 PM   Wound Cleansed Rinsed/Irrigated with saline 9/25/2018  2:36 PM   Wound Length (cm) 4 cm 9/25/2018  2:36 PM   Wound Width (cm) 2 cm 9/25/2018  2:36 PM   Wound Depth (cm)  0.1 9/25/2018  2:36 PM   Calculated Wound Size (cm^2) (l*w) 6.4 cm^2 9/25/2018  2:36 PM   Change in Wound Size % (l*w) -14.7 9/25/2018  2:36 PM   Distance Tunneling (cm) 0 cm 9/25/2018  2:36 PM   Undermining Maxium Distance (cm) 0 9/25/2018  2:36 PM   Wound Assessment Brown;Red;Yellow;Sl[place] 9/25/2018  2:36 PM   Drainage Amount Moderate 9/25/2018  2:36 PM   Drainage Description [place] Tierney 9/25/2018  2:36 PM   Odor None 9/25/2018  2:36 PM   Margins Defined edges 9/25/2018  2:36 PM   Georgina-wound Assessment Guilherme Tierney; White 9/25/2018  2:36 PM   Non-staged Wound Description Full thickness 9/25/2018  2:36 PM   [place]%Wound Bed 0 9/25/2018  2:36 PM   Red%Wound Bed 50 9/25/2018  2:36 PM   Yellow%Wound Bed 10 9/25/2018  2:36 PM   Black%Wound Bed 40 9/25/2018  2:36 PM   Op First Treatment Date 09/11/18 9/11/2018  3:09 PM   Number of days: 76     Percent of Wound/Ulcer Debrided: 100%    Total Surface Area Debrided: 13 sq cm    Diabetic/Pressure/Non Pressure Ulcers only:  Ulcer: Non-Pressure ulcer, fat layer exposed    Bleeding: Minimal    Hemostasis Achieved: by pressure    Procedural Pain: 3  / 10     Post Procedural Pain: 3 / 10     Response to treatment:  Well tolerated by patient.        Plan:   E/M x 30 minutes of problem of

## 2018-09-28 ENCOUNTER — HOSPITAL ENCOUNTER (OUTPATIENT)
Dept: WOUND CARE | Age: 53
Discharge: HOME | End: 2018-09-28
Payer: COMMERCIAL

## 2018-09-28 ENCOUNTER — OFFICE VISIT (OUTPATIENT)
Dept: BARIATRICS/WEIGHT MGMT | Age: 53
End: 2018-09-28
Payer: COMMERCIAL

## 2018-09-28 VITALS
DIASTOLIC BLOOD PRESSURE: 88 MMHG | HEIGHT: 74 IN | SYSTOLIC BLOOD PRESSURE: 140 MMHG | HEART RATE: 80 BPM | WEIGHT: 315 LBS | BODY MASS INDEX: 40.43 KG/M2

## 2018-09-28 VITALS
HEART RATE: 84 BPM | RESPIRATION RATE: 16 BRPM | DIASTOLIC BLOOD PRESSURE: 98 MMHG | TEMPERATURE: 98.5 F | SYSTOLIC BLOOD PRESSURE: 162 MMHG

## 2018-09-28 DIAGNOSIS — Z71.89 INDIVIDUAL COUNSELING ENCOUNTER: Primary | ICD-10-CM

## 2018-09-28 DIAGNOSIS — Z71.3 DIETARY COUNSELING AND SURVEILLANCE: ICD-10-CM

## 2018-09-28 DIAGNOSIS — E66.01 MORBID OBESITY WITH BMI OF 50.0-59.9, ADULT (HCC): Primary | ICD-10-CM

## 2018-09-28 DIAGNOSIS — E66.01 MORBID OBESITY WITH BMI OF 50.0-59.9, ADULT (HCC): ICD-10-CM

## 2018-09-28 PROCEDURE — 99213 OFFICE O/P EST LOW 20 MIN: CPT | Performed by: FAMILY MEDICINE

## 2018-09-28 PROCEDURE — 29581 APPL MULTLAYER CMPRN SYS LEG: CPT

## 2018-09-28 PROCEDURE — 90832 PSYTX W PT 30 MINUTES: CPT | Performed by: SOCIAL WORKER

## 2018-09-28 ASSESSMENT — ENCOUNTER SYMPTOMS
EYES NEGATIVE: 1
GASTROINTESTINAL NEGATIVE: 1
RESPIRATORY NEGATIVE: 1

## 2018-09-28 NOTE — PATIENT INSTRUCTIONS
Patient Education        Learning About Obesity  What is obesity? Obesity means having so much body fat that your health is in danger. Having too much body fat can lead to type 2 diabetes, heart disease, high blood pressure, arthritis, sleep apnea, and stroke. Even if you don't feel bad now, think about these health risks. Do they seem like a good reason to start on a new path toward a healthier weight? Or do you have another personal, powerful reason for wanting to lose weight? Whatever it is, keep it in mind. It can be hard to change eating habits and exercise habits. But with your own reason and plan, you can do it. How do you know if your weight is in the obesity range? To know if your weight is in the obesity range, your doctor looks at your body mass index (BMI) and waist size. Your BMI is a number that is calculated from your weight and your height. To figure your BMI for yourself, get a BMI table from your doctor or use an online tool, such as http://www.perez.com/ on the [employer]. A healthy BMI is from 18.5 to 24.9. If your BMI is from 30.0 to 39.9, you are considered to have obesity. If your BMI is over 40.0, you are considered to have extreme obesity. What causes obesity? When you take in more calories than you burn off, you gain weight. How you eat, how active you are, and other things affect how your body uses calories and whether you gain weight. If you have family members who have too much body fat, you may have inherited a tendency to gain weight. And your family also helps form your eating and lifestyle habits, which can lead to obesity. Also, our busy lives make it harder to plan and cook healthy meals. For many of us, it's easier to reach for prepared foods, go out to eat, or go to the drive-through. But these foods are often high in saturated fat and calories. Portions are often too large.   What can you do to reach a healthy weight? Focus on health, not diets. Diets are hard to stay on and don't work in the long run. It is very hard to stay with a diet that includes lots of big changes in your eating habits. Instead of a diet, focus on lifestyle changes that will improve your health and achieve the right balance of energy and calories. To lose weight, you need to burn more calories than you take in. You can do it by eating healthy foods in reasonable amounts and becoming more active, even a little bit every day. Making small changes over time can add up to a lot. Make a plan for change. Many people have found that naming their reasons for change and staying focused on their plan can make a big difference. Work with your doctor to create a plan that is right for you. · Ask yourself: Darren James are my personal, most powerful reasons for wanting this change? What will my life look like when I've made the change? \"  · Set your long-term goal. Make it specific, such as \"I will lose x pounds. \"  · Break your long-term goal into smaller, short-term goals. Make these small steps specific and within your reach, things you know you can do. These steps are what keep you going from day to day. Talk with your doctor about other weight-loss options. If you have a BMI in a certain range and have not been able to lose weight with diet and exercise, medicine or surgery may be an option for you. Before your doctor will prescribe medicines or surgery, he or she will probably want you to be more active and follow your healthy eating plan for a period of time. These habits are key lifelong changes for managing your weight, with or without other medical treatment. And these changes can help you avoid weight-related health problems. How can you stay on your plan for change? Be ready. Choose to start during a time when there are few events that might trigger slip-ups, like holidays, social events, and high-stress periods.   Decide on your first few steps. Most people have more success when they make small changes, one step at a time. For example, you might switch a daily candy bar to a piece of fruit, walk 10 minutes more, or add more vegetables to a meal.  Line up your support people. Make sure you're not going to be alone as you make this change. Connect with people who understand how important it is to you. Ask family members and friends for help in keeping with your plan. And think about who could make it harder for you, and how to handle them. Try tracking. People who keep track of what they eat, feel, and do are better at losing weight. Try writing down things like:  · What and how much you eat. · How you feel before and after each meal.  · Details about each meal (like eating out or at home, eating alone, or with friends or family). · What you do to be active. Look and plan. As you track, look for patterns that you may want to change. Take note of:  · When you eat and whether you skip meals. · How often you eat out. · How many fruits and vegetables you eat. · When you eat beyond feeling full. · When and why you eat for reasons other than being hungry. When you stray from your plan, don't get upset. Figure out what made you slip up and how you can fix it. Can you take medicines or have surgery to lose weight? If you have a BMI in a certain range and have not been able to lose weight with diet and exercise, medicine or surgery may be an option for you. If you have a BMI of at least 30.0 (or a BMI of at least 27.0 and another health problem related to your weight), ask your doctor about weight-loss medicines. They work by making you feel less hungry, making you feel full more quickly, or changing how you digest fat. Medicines are used along with diet changes and more physical activity to help you make lasting changes.   If you have a BMI of 40.0 or more (or a BMI of 35.0 or more and another health problem related to your weight), your doctor

## 2018-09-28 NOTE — PROGRESS NOTES
Derrick Begin III is stable / unchanged. He reports he had some chicken + rice at 1 meal 5 days ago, and has 5 unused products (shakes) left from this week that he has not used. Pt also reports being at the bar this week and buying rounds for everyone, but states he did not consume any ETOH himself. He reports he just does \"shots of water\". Current treatment plan:   Patient is not on medication. Negative side effects from medications? no  Patient is  on Optifast.   Patient is not on diet and exercise only plan. Treatment plan details: VLCD with OPTIFAST 5 MR/day (800 Joey) +/- high protein/low carb snack    Is patient adhering to diet/meal plan?: reports he is    Carbohydrate consumption: not tracking    Breakfast: Optifast Shake     Snack: sometimes Optifast Shake (has missed a couple during the week)     Lunch: sometimes grilled chicken breast + Optifast Shake     Snack: sometimes Optifast Shake (has missed a couple during the week)     Dinner: sometimes grilled chicken breast + Optifast Shake     Snack: none    Consuming at least 64oz of calorie free fluids? Yes, water    Participating in intentional exercise?  No    Plan/Goals: continue plan    Handouts: none    Hali Lopez

## 2018-10-02 ENCOUNTER — HOSPITAL ENCOUNTER (OUTPATIENT)
Dept: WOUND CARE | Age: 53
Discharge: HOME | End: 2018-10-02
Payer: COMMERCIAL

## 2018-10-02 VITALS
SYSTOLIC BLOOD PRESSURE: 148 MMHG | HEART RATE: 98 BPM | DIASTOLIC BLOOD PRESSURE: 84 MMHG | TEMPERATURE: 98.9 F | RESPIRATION RATE: 18 BRPM

## 2018-10-02 PROCEDURE — 29581 APPL MULTLAYER CMPRN SYS LEG: CPT

## 2018-10-02 PROCEDURE — 11042 DBRDMT SUBQ TIS 1ST 20SQCM/<: CPT

## 2018-10-02 RX ORDER — LIDOCAINE HYDROCHLORIDE 40 MG/ML
2.5 SOLUTION TOPICAL ONCE
Status: DISCONTINUED | OUTPATIENT
Start: 2018-10-02 | End: 2018-10-03 | Stop reason: HOSPADM

## 2018-10-02 NOTE — PROGRESS NOTES
Multilayer Compression Wrap   (Not Unna) Below the Knee    NAME:  Jonathan Lal III  YOB: 1965  MEDICAL RECORD NUMBER:  [id number]  DATE:  10/2/2018       [x] Removed old Multilayer wrap if indicated and wash leg with mild soap/water.  [x] Applied moisturizing agent to dry skin as needed.  [x] Applied primary and secondary dressing as ordered    [x] Applied multilayered dressing below the knee to Left lower leg(s). profore 4 layer   [x] Instructed patient/caregiver not to remove dressing and to keep it clean and dry.  [x] Instructed patient/caregiver on complications to report to provider, such as pain, numbness in toes, heavy drainage, and slippage of dressing.  [x] Instructed patient on purpose of compression dressing and on activity and exercise recommendations.     Electronically signed by Elizabeth Ramos RN on 10/2/2018 at 5:10 PM

## 2018-10-09 ENCOUNTER — HOSPITAL ENCOUNTER (OUTPATIENT)
Dept: WOUND CARE | Age: 53
Discharge: HOME | End: 2018-10-09
Payer: COMMERCIAL

## 2018-10-09 VITALS
TEMPERATURE: 97.8 F | HEART RATE: 71 BPM | DIASTOLIC BLOOD PRESSURE: 90 MMHG | RESPIRATION RATE: 18 BRPM | SYSTOLIC BLOOD PRESSURE: 143 MMHG

## 2018-10-09 PROCEDURE — 6370000000 HC RX 637 (ALT 250 FOR IP): Performed by: PODIATRIST

## 2018-10-09 PROCEDURE — 11042 DBRDMT SUBQ TIS 1ST 20SQCM/<: CPT

## 2018-10-09 RX ORDER — LIDOCAINE HYDROCHLORIDE 40 MG/ML
2.5 SOLUTION TOPICAL ONCE
Status: COMPLETED | OUTPATIENT
Start: 2018-10-09 | End: 2018-10-09

## 2018-10-09 RX ADMIN — LIDOCAINE HYDROCHLORIDE 2.5 ML: 40 SOLUTION TOPICAL at 15:11

## 2018-10-11 NOTE — PROGRESS NOTES
of problem of venous leg ulceration left lower leg. As patient has re-ulcerated despite compression stockings and with abnormal venous reflux studies will refer patient to Dr. Meghan Ho for further evaluation and management to see if he is a candidate for a venous ablation procedure to prevent reulceration. Will control edema with multilayer compression dressing. Treatment Note please see attached Discharge Instructions    In my professional opinion this patient would benefit from HBO Therapy: No    Written patient dismissal instructions given to patient and signed by patient or POA.          RTC 1 week    Electronically signed by George Orr DPM on 10/11/2018 at 10:12 AM

## 2018-10-16 ENCOUNTER — HOSPITAL ENCOUNTER (OUTPATIENT)
Dept: WOUND CARE | Age: 53
Discharge: HOME | End: 2018-10-16
Payer: COMMERCIAL

## 2018-10-16 VITALS
HEART RATE: 18 BPM | DIASTOLIC BLOOD PRESSURE: 75 MMHG | SYSTOLIC BLOOD PRESSURE: 152 MMHG | TEMPERATURE: 97.8 F | RESPIRATION RATE: 18 BRPM

## 2018-10-16 PROCEDURE — 11042 DBRDMT SUBQ TIS 1ST 20SQCM/<: CPT

## 2018-10-16 PROCEDURE — 87077 CULTURE AEROBIC IDENTIFY: CPT

## 2018-10-16 PROCEDURE — 29581 APPL MULTLAYER CMPRN SYS LEG: CPT

## 2018-10-16 PROCEDURE — 6370000000 HC RX 637 (ALT 250 FOR IP): Performed by: PODIATRIST

## 2018-10-16 PROCEDURE — 87205 SMEAR GRAM STAIN: CPT

## 2018-10-16 PROCEDURE — 87186 SC STD MICRODIL/AGAR DIL: CPT

## 2018-10-16 PROCEDURE — 87070 CULTURE OTHR SPECIMN AEROBIC: CPT

## 2018-10-16 RX ORDER — AMOXICILLIN AND CLAVULANATE POTASSIUM 875; 125 MG/1; MG/1
1 TABLET, FILM COATED ORAL 2 TIMES DAILY
Qty: 20 TABLET | Refills: 0 | Status: SHIPPED | OUTPATIENT
Start: 2018-10-16 | End: 2018-10-26

## 2018-10-16 RX ORDER — LIDOCAINE HYDROCHLORIDE 40 MG/ML
2.5 SOLUTION TOPICAL ONCE
Status: COMPLETED | OUTPATIENT
Start: 2018-10-16 | End: 2018-10-16

## 2018-10-16 RX ADMIN — LIDOCAINE HYDROCHLORIDE 2.5 ML: 40 SOLUTION TOPICAL at 15:08

## 2018-10-19 ENCOUNTER — HOSPITAL ENCOUNTER (OUTPATIENT)
Dept: WOUND CARE | Age: 53
Discharge: HOME | End: 2018-10-19
Payer: COMMERCIAL

## 2018-10-19 PROCEDURE — 29581 APPL MULTLAYER CMPRN SYS LEG: CPT

## 2018-10-20 LAB
GRAM STAIN RESULT: ABNORMAL
ORGANISM: ABNORMAL
WOUND/ABSCESS: ABNORMAL

## 2018-10-23 ENCOUNTER — HOSPITAL ENCOUNTER (OUTPATIENT)
Dept: WOUND CARE | Age: 53
Discharge: HOME | End: 2018-10-23
Payer: COMMERCIAL

## 2018-10-23 VITALS
SYSTOLIC BLOOD PRESSURE: 160 MMHG | RESPIRATION RATE: 18 BRPM | HEART RATE: 81 BPM | TEMPERATURE: 98.3 F | DIASTOLIC BLOOD PRESSURE: 92 MMHG

## 2018-10-23 DIAGNOSIS — L97.919 IDIOPATHIC CHRONIC VENOUS HYPERTENSION OF BOTH LOWER EXTREMITIES WITH ULCER AND INFLAMMATION (HCC): ICD-10-CM

## 2018-10-23 DIAGNOSIS — L97.929 IDIOPATHIC CHRONIC VENOUS HYPERTENSION OF BOTH LOWER EXTREMITIES WITH ULCER AND INFLAMMATION (HCC): ICD-10-CM

## 2018-10-23 DIAGNOSIS — I87.333 IDIOPATHIC CHRONIC VENOUS HYPERTENSION OF BOTH LOWER EXTREMITIES WITH ULCER AND INFLAMMATION (HCC): ICD-10-CM

## 2018-10-23 PROCEDURE — 29581 APPL MULTLAYER CMPRN SYS LEG: CPT

## 2018-10-23 PROCEDURE — 6370000000 HC RX 637 (ALT 250 FOR IP): Performed by: SPECIALIST

## 2018-10-23 PROCEDURE — 29581 APPL MULTLAYER CMPRN SYS LEG: CPT | Performed by: SPECIALIST

## 2018-10-23 RX ORDER — LIDOCAINE HYDROCHLORIDE 40 MG/ML
2.5 SOLUTION TOPICAL ONCE
Status: COMPLETED | OUTPATIENT
Start: 2018-10-23 | End: 2018-10-23

## 2018-10-23 RX ADMIN — LIDOCAINE HYDROCHLORIDE 2.5 ML: 40 SOLUTION TOPICAL at 15:38

## 2018-10-23 NOTE — PROGRESS NOTES
Multilayer Compression Wrap   (Not Unna) Below the Knee    NAME:  Priyanka Serna III  YOB: 1965  MEDICAL RECORD NUMBER:  [id number]  DATE:  10/23/2018       [] Removed old Multilayer wrap if present and washed leg with mild soap/water.  [x] Applied moisturizing agent to dry skin as needed.  [x] Applied primary and secondary dressing as ordered     [x] Applied multilayered dressing below the knee to Bilateral lower leg(s)  (4 Layer Compression Wrap) per 's instructions.  [x] Instructed patient/caregiver not to remove dressing and to keep it clean and dry.  [x] Instructed patient/caregiver on complications to report to provider, such as pain, numbness in toes, heavy drainage, and slippage of dressing.  [x] Instructed patient on purpose of compression dressing and on activity and exercise recommendations.        Applied per   Guidelines    Electronically signed by Saba Deshpande RN on 10/23/2018 at 4:27 PM

## 2018-10-24 PROBLEM — L97.929 IDIOPATHIC CHRONIC VENOUS HYPERTENSION OF BOTH LOWER EXTREMITIES WITH ULCER AND INFLAMMATION (HCC): Status: ACTIVE | Noted: 2018-10-24

## 2018-10-24 PROBLEM — I87.333 IDIOPATHIC CHRONIC VENOUS HYPERTENSION OF BOTH LOWER EXTREMITIES WITH ULCER AND INFLAMMATION (HCC): Status: ACTIVE | Noted: 2018-10-24

## 2018-10-24 PROBLEM — L97.919 IDIOPATHIC CHRONIC VENOUS HYPERTENSION OF BOTH LOWER EXTREMITIES WITH ULCER AND INFLAMMATION (HCC): Status: ACTIVE | Noted: 2018-10-24

## 2018-10-30 ENCOUNTER — HOSPITAL ENCOUNTER (OUTPATIENT)
Dept: WOUND CARE | Age: 53
Discharge: HOME | End: 2018-10-30
Payer: COMMERCIAL

## 2018-10-30 VITALS
DIASTOLIC BLOOD PRESSURE: 99 MMHG | RESPIRATION RATE: 18 BRPM | SYSTOLIC BLOOD PRESSURE: 186 MMHG | TEMPERATURE: 98.1 F | HEART RATE: 88 BPM

## 2018-10-30 PROCEDURE — 6370000000 HC RX 637 (ALT 250 FOR IP): Performed by: PODIATRIST

## 2018-10-30 PROCEDURE — 29581 APPL MULTLAYER CMPRN SYS LEG: CPT

## 2018-10-30 PROCEDURE — 11042 DBRDMT SUBQ TIS 1ST 20SQCM/<: CPT

## 2018-10-30 RX ORDER — LIDOCAINE HYDROCHLORIDE 40 MG/ML
2.5 SOLUTION TOPICAL ONCE
Status: COMPLETED | OUTPATIENT
Start: 2018-10-30 | End: 2018-10-30

## 2018-10-30 RX ADMIN — LIDOCAINE HYDROCHLORIDE 2.5 ML: 40 SOLUTION TOPICAL at 15:47

## 2018-10-30 NOTE — PROGRESS NOTES
Odor None 10/30/2018  3:35 PM   Margins Defined edges 10/30/2018  3:35 PM   Georgina-wound Assessment Brown 10/30/2018  3:35 PM   Non-staged Wound Description Full thickness 10/30/2018  3:35 PM   [place]%Wound Bed 0 9/25/2018  2:36 PM   Red%Wound Bed 100 10/30/2018  3:35 PM   Yellow%Wound Bed 0 10/30/2018  3:35 PM   Black%Wound Bed 0 9/28/2018 11:15 AM   Op First Treatment Date 09/11/18 9/11/2018  3:09 PM   Number of days: 112       Wound 07/10/18 #10 Left Lateral Lower Leg Distal (approx 6/10/2018) (Active)   Wound Image   10/30/2018  3:35 PM   Wound Type Wound 10/23/2018  3:35 PM   Wound Venous 10/19/2018 11:52 AM   Dressing/Treatment Other (Comment) 8/21/2018  2:26 PM   Wound Cleansed Rinsed/Irrigated with saline 10/23/2018  3:35 PM   Wound Length (cm) 0 cm 10/30/2018  3:35 PM   Wound Width (cm) 0 cm 10/30/2018  3:35 PM   Wound Depth (cm)  0 10/30/2018  3:35 PM   Calculated Wound Size (cm^2) (l*w) 0 cm^2 10/30/2018  3:35 PM   Change in Wound Size % (l*w) 100 10/30/2018  3:35 PM   Distance Tunneling (cm) 0 cm 10/16/2018  2:48 PM   Undermining Maxium Distance (cm) 0 10/16/2018  2:48 PM   Wound Assessment Granulation tissue 10/23/2018  3:35 PM   Drainage Amount Small 10/23/2018  3:35 PM   Drainage Description Brown 10/23/2018  3:35 PM   Odor None 10/23/2018  3:35 PM   Margins Defined edges 10/23/2018  3:35 PM   Georgina-wound Assessment Brown 10/23/2018  3:35 PM   Non-staged Wound Description Full thickness 10/23/2018  3:35 PM   [place]%Wound Bed 0 9/25/2018  2:36 PM   Red%Wound Bed 80 10/23/2018  3:35 PM   Yellow%Wound Bed 20 10/23/2018  3:35 PM   Black%Wound Bed 0 9/28/2018 11:15 AM   Op First Treatment Date 09/11/18 9/11/2018  3:09 PM   Number of days: 112       Wound 10/16/18 #11 right lower lateral leg ( had since 10/6/2018) (Active)   Wound Image   10/30/2018  3:35 PM   Wound Type Wound 10/30/2018  3:35 PM   Wound Traumatic 10/30/2018  3:35 PM   Wound Cleansed Rinsed/Irrigated with saline 10/30/2018  3:35 PM   Wound Length (cm) 3 cm 10/30/2018  3:35 PM   Wound Width (cm) 3.5 cm 10/30/2018  3:35 PM   Wound Depth (cm)  0.1 10/30/2018  3:35 PM   Calculated Wound Size (cm^2) (l*w) 9.3 cm^2 10/30/2018  3:35 PM   Change in Wound Size % (l*w) -24 10/30/2018  3:35 PM   Distance Tunneling (cm) 0 cm 10/16/2018  2:48 PM   Undermining Maxium Distance (cm) 0 10/16/2018  2:48 PM   Wound Assessment Red;Yellow 10/30/2018  3:35 PM   Drainage Amount Small 10/30/2018  3:35 PM   Drainage Description [place] 10/30/2018  3:35 PM   Odor None 10/30/2018  3:35 PM   Margins Defined edges 10/30/2018  3:35 PM   Georgina-wound Assessment Brown 10/30/2018  3:35 PM   Non-staged Wound Description Full thickness 10/30/2018  3:35 PM   Red%Wound Bed 90 10/30/2018  3:35 PM   Yellow%Wound Bed 10 10/30/2018  3:35 PM   Other%Wound Bed 0 10/19/2018 11:52 AM   Number of days: 14     Percent of Wound/Ulcer Debrided: 100%    Total Surface Area Debrided:  11.5 sq cm    Diabetic/Pressure/Non Pressure Ulcers only:  Ulcer: Non-Pressure ulcer, fat layer exposed    Bleeding: Minimal    Hemostasis Achieved: by pressure    Procedural Pain: 0  / 10     Post Procedural Pain: 0 / 10     Response to treatment:  Well tolerated by patient. Plan:   E/M x 30 minutes of problem of venous leg ulceration lower leg. Will treat with multilayer compression dressing. As patient has re-ulcerated despite compression stockings and with abnormal venous reflux studies will refer patient to Dr. Meghan oH for further evaluation and management to see if he is a candidate for a venous ablation procedure to prevent reulceration. Will control edema with multilayer compression dressing. Treatment Note please see attached Discharge Instructions    In my professional opinion this patient would benefit from HBO Therapy: No    Written patient dismissal instructions given to patient and signed by patient or POA.          RTC 1 week    Electronically signed by George Orr DPM on 10/30/2018 at

## 2018-11-02 ENCOUNTER — TELEPHONE (OUTPATIENT)
Dept: BARIATRICS/WEIGHT MGMT | Age: 53
End: 2018-11-02

## 2018-11-02 ENCOUNTER — TELEPHONE (OUTPATIENT)
Dept: VASCULAR SURGERY | Age: 53
End: 2018-11-02

## 2018-11-02 DIAGNOSIS — I87.333 IDIOPATHIC CHRONIC VENOUS HYPERTENSION OF BOTH LOWER EXTREMITIES WITH ULCER AND INFLAMMATION (HCC): Primary | ICD-10-CM

## 2018-11-02 DIAGNOSIS — I83.029 VENOUS STASIS ULCER OF LEFT LOWER EXTREMITY (HCC): ICD-10-CM

## 2018-11-02 DIAGNOSIS — L97.929 VENOUS STASIS ULCER OF LEFT LOWER EXTREMITY (HCC): ICD-10-CM

## 2018-11-02 DIAGNOSIS — I83.013 VENOUS STASIS ULCER OF ANKLE, RIGHT (HCC): ICD-10-CM

## 2018-11-02 DIAGNOSIS — L97.319 VENOUS STASIS ULCER OF ANKLE, RIGHT (HCC): ICD-10-CM

## 2018-11-02 DIAGNOSIS — L97.919 IDIOPATHIC CHRONIC VENOUS HYPERTENSION OF BOTH LOWER EXTREMITIES WITH ULCER AND INFLAMMATION (HCC): Primary | ICD-10-CM

## 2018-11-02 DIAGNOSIS — L97.929 IDIOPATHIC CHRONIC VENOUS HYPERTENSION OF BOTH LOWER EXTREMITIES WITH ULCER AND INFLAMMATION (HCC): Primary | ICD-10-CM

## 2018-11-06 ENCOUNTER — HOSPITAL ENCOUNTER (OUTPATIENT)
Dept: WOUND CARE | Age: 53
Discharge: HOME | End: 2018-11-06
Payer: COMMERCIAL

## 2018-11-06 PROCEDURE — 29581 APPL MULTLAYER CMPRN SYS LEG: CPT

## 2018-11-06 PROCEDURE — 11042 DBRDMT SUBQ TIS 1ST 20SQCM/<: CPT

## 2018-11-06 PROCEDURE — 6370000000 HC RX 637 (ALT 250 FOR IP): Performed by: PODIATRIST

## 2018-11-06 RX ORDER — LIDOCAINE HYDROCHLORIDE 40 MG/ML
2.5 SOLUTION TOPICAL ONCE
Status: COMPLETED | OUTPATIENT
Start: 2018-11-06 | End: 2018-11-06

## 2018-11-06 RX ADMIN — LIDOCAINE HYDROCHLORIDE 2.5 ML: 40 SOLUTION TOPICAL at 15:08

## 2018-11-06 NOTE — PROGRESS NOTES
Georgina-wound Assessment Brown 11/6/2018  3:08 PM   Non-staged Wound Description Full thickness 11/6/2018  3:08 PM   [employer]%Wound Bed 0 9/25/2018  2:36 PM   Red%Wound Bed 85 11/6/2018  3:08 PM   Yellow%Wound Bed 15 11/6/2018  3:08 PM   Black%Wound Bed 0 9/28/2018 11:15 AM   Op First Treatment Date 09/11/18 9/11/2018  3:09 PM   Number of days: 119       Wound 07/10/18 #10 Left Lateral Lower Leg Distal (approx 6/10/2018) (Active)   Wound Image   10/30/2018  3:35 PM   Wound Type Wound 10/23/2018  3:35 PM   Wound Venous 10/19/2018 11:52 AM   Dressing/Treatment Other (Comment) 8/21/2018  2:26 PM   Wound Cleansed Rinsed/Irrigated with saline 10/23/2018  3:35 PM   Wound Length (cm) 0 cm 10/30/2018  3:35 PM   Wound Width (cm) 0 cm 10/30/2018  3:35 PM   Wound Depth (cm)  0 10/30/2018  3:35 PM   Calculated Wound Size (cm^2) (l*w) 0 cm^2 10/30/2018  3:35 PM   Change in Wound Size % (l*w) 100 10/30/2018  3:35 PM   Distance Tunneling (cm) 0 cm 10/16/2018  2:48 PM   Undermining Maxium Distance (cm) 0 10/16/2018  2:48 PM   Wound Assessment Granulation tissue 10/23/2018  3:35 PM   Drainage Amount Small 10/23/2018  3:35 PM   Drainage Description Brown 10/23/2018  3:35 PM   Odor None 10/23/2018  3:35 PM   Margins Defined edges 10/23/2018  3:35 PM   Georgina-wound Assessment Brown 10/23/2018  3:35 PM   Non-staged Wound Description Full thickness 10/23/2018  3:35 PM   [employer]%Wound Bed 0 9/25/2018  2:36 PM   Red%Wound Bed 80 10/23/2018  3:35 PM   Yellow%Wound Bed 20 10/23/2018  3:35 PM   Black%Wound Bed 0 9/28/2018 11:15 AM   Op First Treatment Date 09/11/18 9/11/2018  3:09 PM   Number of days: 119       Wound 10/16/18 #11 right lower lateral leg ( had since 10/6/2018) (Active)   Wound Image   10/30/2018  3:35 PM   Wound Type Wound 11/6/2018  3:08 PM   Wound Traumatic 11/6/2018  3:08 PM   Wound Cleansed Rinsed/Irrigated with saline 11/6/2018  3:08 PM   Wound Length (cm) 3 cm 11/6/2018  3:08 PM   Wound Width (cm) 3 cm 11/6/2018  3:08 PM   Wound

## 2018-11-09 ENCOUNTER — HOSPITAL ENCOUNTER (OUTPATIENT)
Dept: WOUND CARE | Age: 53
Discharge: HOME | End: 2018-11-09
Payer: COMMERCIAL

## 2018-11-09 VITALS
TEMPERATURE: 99 F | SYSTOLIC BLOOD PRESSURE: 178 MMHG | HEART RATE: 98 BPM | DIASTOLIC BLOOD PRESSURE: 109 MMHG | RESPIRATION RATE: 18 BRPM

## 2018-11-09 PROCEDURE — 29581 APPL MULTLAYER CMPRN SYS LEG: CPT

## 2018-11-13 ENCOUNTER — HOSPITAL ENCOUNTER (OUTPATIENT)
Dept: WOUND CARE | Age: 53
Discharge: HOME | End: 2018-11-13
Payer: COMMERCIAL

## 2018-11-13 VITALS
TEMPERATURE: 98 F | DIASTOLIC BLOOD PRESSURE: 96 MMHG | SYSTOLIC BLOOD PRESSURE: 167 MMHG | HEART RATE: 98 BPM | RESPIRATION RATE: 18 BRPM

## 2018-11-13 PROCEDURE — 29581 APPL MULTLAYER CMPRN SYS LEG: CPT

## 2018-11-13 PROCEDURE — 6370000000 HC RX 637 (ALT 250 FOR IP): Performed by: PODIATRIST

## 2018-11-13 PROCEDURE — 11042 DBRDMT SUBQ TIS 1ST 20SQCM/<: CPT

## 2018-11-13 RX ORDER — LIDOCAINE HYDROCHLORIDE 40 MG/ML
2.5 SOLUTION TOPICAL ONCE
Status: COMPLETED | OUTPATIENT
Start: 2018-11-13 | End: 2018-11-13

## 2018-11-13 RX ADMIN — LIDOCAINE HYDROCHLORIDE 2.5 ML: 40 SOLUTION TOPICAL at 15:35

## 2018-11-20 ENCOUNTER — HOSPITAL ENCOUNTER (OUTPATIENT)
Dept: WOUND CARE | Age: 53
Discharge: HOME | End: 2018-11-20
Payer: COMMERCIAL

## 2018-11-20 VITALS
SYSTOLIC BLOOD PRESSURE: 161 MMHG | RESPIRATION RATE: 18 BRPM | TEMPERATURE: 98.2 F | HEART RATE: 96 BPM | DIASTOLIC BLOOD PRESSURE: 80 MMHG

## 2018-11-20 PROCEDURE — 11042 DBRDMT SUBQ TIS 1ST 20SQCM/<: CPT

## 2018-11-20 PROCEDURE — 29581 APPL MULTLAYER CMPRN SYS LEG: CPT

## 2018-11-20 PROCEDURE — 6370000000 HC RX 637 (ALT 250 FOR IP): Performed by: PODIATRIST

## 2018-11-20 RX ORDER — LIDOCAINE HYDROCHLORIDE 40 MG/ML
2.5 SOLUTION TOPICAL ONCE
Status: COMPLETED | OUTPATIENT
Start: 2018-11-20 | End: 2018-11-20

## 2018-11-20 RX ADMIN — LIDOCAINE HYDROCHLORIDE 2.5 ML: 40 SOLUTION TOPICAL at 15:29

## 2018-11-26 ENCOUNTER — TELEPHONE (OUTPATIENT)
Dept: VASCULAR SURGERY | Age: 53
End: 2018-11-26

## 2018-11-27 ENCOUNTER — HOSPITAL ENCOUNTER (OUTPATIENT)
Dept: WOUND CARE | Age: 53
Discharge: HOME | End: 2018-11-27
Payer: COMMERCIAL

## 2018-11-27 VITALS
RESPIRATION RATE: 18 BRPM | HEART RATE: 73 BPM | TEMPERATURE: 98 F | SYSTOLIC BLOOD PRESSURE: 158 MMHG | DIASTOLIC BLOOD PRESSURE: 93 MMHG

## 2018-11-27 PROCEDURE — 6370000000 HC RX 637 (ALT 250 FOR IP): Performed by: PODIATRIST

## 2018-11-27 PROCEDURE — 29581 APPL MULTLAYER CMPRN SYS LEG: CPT

## 2018-11-27 PROCEDURE — 11042 DBRDMT SUBQ TIS 1ST 20SQCM/<: CPT

## 2018-11-27 RX ORDER — LIDOCAINE HYDROCHLORIDE 40 MG/ML
2.5 SOLUTION TOPICAL ONCE
Status: COMPLETED | OUTPATIENT
Start: 2018-11-27 | End: 2018-11-27

## 2018-11-27 RX ADMIN — LIDOCAINE HYDROCHLORIDE 2.5 ML: 40 SOLUTION TOPICAL at 14:28

## 2018-11-27 NOTE — PROGRESS NOTES
0    acetaminophen (TYLENOL) 325 MG tablet Take 650 mg by mouth every 6 hours as needed for Pain      Compression Stockings MISC by Does not apply route Diagnosis: I83.029  Location of wound: Left Lower Leg  Gradient IV (30-40 mm Hg)  Style: Knee Length  Extremity: Bilateral 1 each 2    Cholecalciferol (VITAMIN D) 2000 UNITS CAPS capsule Take 1 capsule by mouth daily 30 capsule 3     No current facility-administered medications on file prior to encounter. REVIEW OF SYSTEMS    Pertinent items are noted in HPI. Review of Systems: A 12 point review of symptoms is unremarkable with the exception of the chief complaint. Patient specifically denies nausea, fever, vomiting, chills, shortness of breath, chest pain, abdominal pain, constipation or difficulty urinating. Objective:        BP (!) 158/93   Pulse 73   Temp 98 °F (36.7 °C) (Oral)   Resp 18     Wt Readings from Last 3 Encounters:   09/28/18 (!) 405 lb (183.7 kg)   09/21/18 (!) 405 lb (183.7 kg)   09/14/18 (!) 421 lb (191 kg)       PHYSICAL EXAM    DP/PT pulses palpable bilaterally. CFT brisk to all digits. Digits are pink and warm to the touch. Hair growth is normal in appearance. No calf pain with palpation noted. Patient has pitting edema noted on the bilateral lower extremities. There are brawny pigmentary changes noted bilaterally at the distal aspect of the leg. Epicritic sensation is grossly intact bilaterally. Negative clonus and babinski reflex is down going. Wound note on the right  lower extremity. Wound has fibrotic and nonviable tissue that extends down through and includes the subcutaneous tissue. After debridement the wound has a granular base. There is no surrounding erythema, edema, warmth or malodor noted. The wound does not probe or track to bone. Previously noted ulceration on the left lower leg has epithelialized.        Assessment:      Patient Active Problem List   Diagnosis Code    Family history of early CAD Z82.49    PPD positive R76.11    Vitamin D deficiency E55.9    Edema R60.9    Morbid obesity ([facility]) E66.01    Anemia D64.9    Venous stasis ulcer of left lower extremity (HCC) I83.029, L97.929    Venous stasis ulcer of ankle, right ([facility]) I83.013, L97.319    Idiopathic chronic venous hypertension of both lower extremities with ulcer and inflammation ([facility] [address] I87.333, L97.919, L97.929        Excisional Debridement Procedure Note  Indications:  Based on my examination of this patient's wound(s)/ulcer(s) today, debridement is required to promote healing and evaluate the wound base. Performed by: Dajuan Aguillon DPM    Consent obtained? Yes    Time out taken: Yes    Pain Control: Anesthetic: 4% Lidocaine Liquid Topical     Debridement: Excisional Debridement    Using curette, #15 blade scalpel, scissors and forceps the wound/ulcer was sharply debrided    down through and including the removal of  epidermis, dermis and subcutaneous tissue. Devitalized Tissue Debrided:  fibrin and slough      Pre Debridement Measurements:  Are located in the [facility]  Documentation Flow Sheet   Wound/Ulcer #: 11     Post  Debridement Measurements:  Wound 10/16/18 #11 right lower lateral leg ( had since 10/6/2018) (Active)   Wound Image   11/27/2018  2:28 PM   Wound Type Wound 11/27/2018  2:28 PM   Wound Traumatic 11/27/2018  2:28 PM   Wound Cleansed Rinsed/Irrigated with saline 11/27/2018  2:28 PM   Wound Length (cm) 3.7 cm 11/27/2018  2:28 PM   Wound Width (cm) 2.6 cm 11/27/2018  2:28 PM   Wound Depth (cm)  01 11/27/2018  2:28 PM   Calculated Wound Size (cm^2) (l*w) 8.4 cm^2 11/27/2018  2:28 PM   Change in Wound Size % (l*w) -12 11/27/2018  2:28 PM   Distance Tunneling (cm) 0 cm 11/27/2018  2:28 PM   Undermining Starts ___ O'Clock 0 11/27/2018  2:28 PM   Undermining Maxium Distance (cm) 0 11/27/2018  2:28 PM   Wound Assessment Black; Yellow 11/27/2018  2:28 PM   Drainage Amount Small 11/27/2018  2:28 PM   Drainage

## 2018-12-04 ENCOUNTER — HOSPITAL ENCOUNTER (OUTPATIENT)
Dept: WOUND CARE | Age: 53
Discharge: HOME | End: 2018-12-04
Payer: COMMERCIAL

## 2018-12-04 VITALS
TEMPERATURE: 97.7 F | DIASTOLIC BLOOD PRESSURE: 93 MMHG | HEART RATE: 83 BPM | SYSTOLIC BLOOD PRESSURE: 166 MMHG | RESPIRATION RATE: 16 BRPM

## 2018-12-04 DIAGNOSIS — I87.333 IDIOPATHIC CHRONIC VENOUS HYPERTENSION OF BOTH LOWER EXTREMITIES WITH ULCER AND INFLAMMATION (HCC): Primary | ICD-10-CM

## 2018-12-04 DIAGNOSIS — L97.929 IDIOPATHIC CHRONIC VENOUS HYPERTENSION OF BOTH LOWER EXTREMITIES WITH ULCER AND INFLAMMATION (HCC): Primary | ICD-10-CM

## 2018-12-04 DIAGNOSIS — L97.919 IDIOPATHIC CHRONIC VENOUS HYPERTENSION OF BOTH LOWER EXTREMITIES WITH ULCER AND INFLAMMATION (HCC): Primary | ICD-10-CM

## 2018-12-04 PROCEDURE — 11042 DBRDMT SUBQ TIS 1ST 20SQCM/<: CPT

## 2018-12-04 PROCEDURE — 29581 APPL MULTLAYER CMPRN SYS LEG: CPT

## 2018-12-04 PROCEDURE — 6370000000 HC RX 637 (ALT 250 FOR IP): Performed by: PODIATRIST

## 2018-12-04 RX ORDER — LIDOCAINE HYDROCHLORIDE 40 MG/ML
2.5 SOLUTION TOPICAL ONCE
Status: DISCONTINUED | OUTPATIENT
Start: 2018-12-04 | End: 2018-12-04

## 2018-12-04 RX ORDER — LIDOCAINE HYDROCHLORIDE 40 MG/ML
2.5 SOLUTION TOPICAL ONCE
Status: COMPLETED | OUTPATIENT
Start: 2018-12-04 | End: 2018-12-04

## 2018-12-04 RX ADMIN — LIDOCAINE HYDROCHLORIDE 2.5 ML: 40 SOLUTION TOPICAL at 15:09

## 2018-12-04 NOTE — PROGRESS NOTES
[facility]   Progress Note and Procedure Note      Mariama Murray III  MEDICAL RECORD NUMBER:  [id number]  AGE: 48 y.o. GENDER: male  : 1965  EPISODE DATE:  2018    Subjective:     Chief Complaint   Patient presents with    Wound Check         HISTORY of PRESENT ILLNESS HPI     Mariama Murray III is a 48 y.o. male who presents today for wound/ulcer evaluation. History of Wound Context: left leg wound. He has kept his dressing clean and intact. Wound/Ulcer Pain Timing/Severity: constant  Quality of pain: burning  Severity:  3  10   Modifying Factors: None  Associated Signs/Symptoms: edema    Ulcer Identification:  Ulcer Type: venous  Contributing Factors: edema and venous stasis    Wound: N/A        PAST MEDICAL HISTORY        Diagnosis Date    Breast disorder 2009    MAMMOGRAM -VE. S/P BREAST SURGERY EVAL    Obesity     PPD positive     Venous stasis ulcer of left lower extremity ([address] 10/25/2016       PAST SURGICAL HISTORY    History reviewed. No pertinent surgical history. FAMILY HISTORY    Family History   Problem Relation Age of Onset    Heart Disease Mother     Diabetes Maternal Uncle     Diabetes Paternal Uncle     Cancer Paternal Uncle         ?  pancreatic       SOCIAL HISTORY    Social History   Substance Use Topics    Smoking status: Never Smoker    Smokeless tobacco: Never Used    Alcohol use No       ALLERGIES    No Known Allergies    MEDICATIONS    Current Outpatient Prescriptions on File Prior to Encounter   Medication Sig Dispense Refill    acetaminophen (TYLENOL) 325 MG tablet Take 650 mg by mouth every 6 hours as needed for Pain      GLUCOSAMINE HCL PO Take by mouth daily      Multiple Vitamin (MULTI VITAMIN PO) Take by mouth daily      ibuprofen (ADVIL;MOTRIN) 800 MG tablet Take 1 tablet by mouth every 6 hours as needed for Pain 120 tablet 3    Cholecalciferol (VITAMIN D) 2000 UNITS CAPS capsule Take 1 capsule by mouth daily 30 capsule 3    Compression Stockings MISC by Does not apply route Diagnosis: I83.333  Location of wound: Left Leg  Gradient IV (30-40 mm Hg)  Style: Knee Length  Extremity: Bilateral 1 each 0    Compression Stockings MISC by Does not apply route Diagnosis: I83.029  Location of wound: Left Lower Leg  Gradient IV (30-40 mm Hg)  Style: Knee Length  Extremity: Bilateral 1 each 2     No current facility-administered medications on file prior to encounter. REVIEW OF SYSTEMS    Pertinent items are noted in HPI. Review of Systems: A 12 point review of symptoms is unremarkable with the exception of the chief complaint. Patient specifically denies nausea, fever, vomiting, chills, shortness of breath, chest pain, abdominal pain, constipation or difficulty urinating. Objective:        BP (!) 166/93   Pulse 83   Temp 97.7 °F (36.5 °C) (Oral)   Resp 16     Wt Readings from Last 3 Encounters:   09/28/18 (!) 405 lb (183.7 kg)   09/21/18 (!) 405 lb (183.7 kg)   09/14/18 (!) 421 lb (191 kg)       PHYSICAL EXAM    DP/PT pulses palpable bilaterally. CFT brisk to all digits. Digits are pink and warm to the touch. Hair growth is normal in appearance. No calf pain with palpation noted. Patient has pitting edema noted on the bilateral lower extremities. There are brawny pigmentary changes noted bilaterally at the distal aspect of the leg. Epicritic sensation is grossly intact bilaterally. Negative clonus and babinski reflex is down going. Wound note on the right  lower extremity. Wound has fibrotic and nonviable tissue that extends down through and includes the subcutaneous tissue. After debridement the wound has a granular base. There is no surrounding erythema, edema, warmth or malodor noted. The wound does not probe or track to bone. Previously noted ulceration on the left lower leg has epithelialized.        Assessment:      Patient Active Problem List   Diagnosis Code    Family history of early CAD Z80.55    PPD PM   Number of days: 59     Percent of Wound/Ulcer Debrided: 100%    Total Surface Area Debrided:  9 sq cm    Diabetic/Pressure/Non Pressure Ulcers only:  Ulcer: Non-Pressure ulcer, fat layer exposed    Bleeding: Minimal    Hemostasis Achieved: by pressure    Procedural Pain: 0  / 10     Post Procedural Pain: 0 / 10     Response to treatment:  Well tolerated by patient. Plan:   E/M x 30 minutes of problem of venous leg ulceration right lower leg. Will treat with multilayer compression dressing. As patient has re-ulcerated despite compression stockings and with abnormal venous reflux studies will refer patient to Dr. Doretha Trevizo for further evaluation and management to see if he is a candidate for a venous ablation procedure to prevent reulceration. Will control edema with multilayer compression dressing on the right and compression stockings on the left. Treatment Note please see attached Discharge Instructions    In my professional opinion this patient would benefit from HBO Therapy: No    Written patient dismissal instructions given to patient and signed by patient or POA.          RTC 1 week    Electronically signed by Moriah Barnard DPM on 12/4/2018 at 4:17 PM

## 2018-12-07 ENCOUNTER — HOSPITAL ENCOUNTER (OUTPATIENT)
Dept: WOUND CARE | Age: 53
Discharge: HOME | End: 2018-12-07
Payer: COMMERCIAL

## 2018-12-07 VITALS
SYSTOLIC BLOOD PRESSURE: 163 MMHG | DIASTOLIC BLOOD PRESSURE: 90 MMHG | HEART RATE: 76 BPM | TEMPERATURE: 97.5 F | RESPIRATION RATE: 18 BRPM

## 2018-12-07 PROCEDURE — 29581 APPL MULTLAYER CMPRN SYS LEG: CPT

## 2018-12-11 ENCOUNTER — TELEPHONE (OUTPATIENT)
Dept: SURGERY | Age: 53
End: 2018-12-11

## 2018-12-11 ENCOUNTER — HOSPITAL ENCOUNTER (OUTPATIENT)
Dept: WOUND CARE | Age: 53
Discharge: HOME | End: 2018-12-11
Payer: COMMERCIAL

## 2018-12-11 VITALS
TEMPERATURE: 98.2 F | SYSTOLIC BLOOD PRESSURE: 149 MMHG | RESPIRATION RATE: 20 BRPM | DIASTOLIC BLOOD PRESSURE: 87 MMHG | HEART RATE: 72 BPM

## 2018-12-11 PROCEDURE — 29581 APPL MULTLAYER CMPRN SYS LEG: CPT

## 2018-12-11 PROCEDURE — 11042 DBRDMT SUBQ TIS 1ST 20SQCM/<: CPT

## 2018-12-11 PROCEDURE — 6370000000 HC RX 637 (ALT 250 FOR IP): Performed by: PODIATRIST

## 2018-12-11 RX ORDER — LIDOCAINE HYDROCHLORIDE 40 MG/ML
2.5 SOLUTION TOPICAL ONCE
Status: COMPLETED | OUTPATIENT
Start: 2018-12-11 | End: 2018-12-11

## 2018-12-11 RX ADMIN — LIDOCAINE HYDROCHLORIDE 2.5 ML: 40 SOLUTION TOPICAL at 15:17

## 2018-12-11 NOTE — PROGRESS NOTES
[facility]   Progress Note and Procedure Note      Kimberly Gant III  MEDICAL RECORD NUMBER:  [id number]  AGE: 48 y.o. GENDER: male  : 1965  EPISODE DATE:  2018    Subjective:     Chief Complaint   Patient presents with    Wound Check     right lower leg         HISTORY of PRESENT ILLNESS HPI     Kimberly Gant III is a 48 y.o. male who presents today for wound/ulcer evaluation. History of Wound Context: left leg wound. He has kept his dressing clean and intact. Wound/Ulcer Pain Timing/Severity: constant  Quality of pain: burning  Severity:  3 / 10   Modifying Factors: None  Associated Signs/Symptoms: edema    Ulcer Identification:  Ulcer Type: venous  Contributing Factors: edema and venous stasis    Wound: N/A        PAST MEDICAL HISTORY        Diagnosis Date    Breast disorder 2009    MAMMOGRAM -VE. S/P BREAST SURGERY EVAL    Obesity     PPD positive     Venous stasis ulcer of left lower extremity ([facility] [address] 10/25/2016       PAST SURGICAL HISTORY    History reviewed. No pertinent surgical history. FAMILY HISTORY    Family History   Problem Relation Age of Onset    Heart Disease Mother     Diabetes Maternal Uncle     Diabetes Paternal Uncle     Cancer Paternal Uncle         ?  pancreatic       SOCIAL HISTORY    Social History   Substance Use Topics    Smoking status: Never Smoker    Smokeless tobacco: Never Used    Alcohol use No       ALLERGIES    No Known Allergies    MEDICATIONS    Current Outpatient Prescriptions on File Prior to Encounter   Medication Sig Dispense Refill    Compression Stockings MISC by Does not apply route Diagnosis: I83.333  Location of wound: Left Leg  Gradient IV (30-40 mm Hg)  Style: Knee Length  Extremity: Bilateral 1 each 0    GLUCOSAMINE HCL PO Take by mouth daily      Multiple Vitamin (MULTI VITAMIN PO) Take by mouth daily      Cholecalciferol (VITAMIN D) 2000 UNITS CAPS capsule Take 1 capsule by mouth daily 30 capsule 3    acetaminophen (TYLENOL) 325 MG tablet Take 650 mg by mouth every 6 hours as needed for Pain      Compression Stockings MISC by Does not apply route Diagnosis: I83.029  Location of wound: Left Lower Leg  Gradient IV (30-40 mm Hg)  Style: Knee Length  Extremity: Bilateral 1 each 2    ibuprofen (ADVIL;MOTRIN) 800 MG tablet Take 1 tablet by mouth every 6 hours as needed for Pain 120 tablet 3     No current facility-administered medications on file prior to encounter. REVIEW OF SYSTEMS    Pertinent items are noted in HPI. Review of Systems: A 12 point review of symptoms is unremarkable with the exception of the chief complaint. Patient specifically denies nausea, fever, vomiting, chills, shortness of breath, chest pain, abdominal pain, constipation or difficulty urinating. Objective:        BP (!) 149/87   Pulse 72   Temp 98.2 °F (36.8 °C) (Oral)   Resp 20     Wt Readings from Last 3 Encounters:   09/28/18 (!) 405 lb (183.7 kg)   09/21/18 (!) 405 lb (183.7 kg)   09/14/18 (!) 421 lb (191 kg)       PHYSICAL EXAM    DP/PT pulses palpable bilaterally. CFT brisk to all digits. Digits are pink and warm to the touch. Hair growth is normal in appearance. No calf pain with palpation noted. Patient has pitting edema noted on the bilateral lower extremities. There are brawny pigmentary changes noted bilaterally at the distal aspect of the leg. Epicritic sensation is grossly intact bilaterally. Negative clonus and babinski reflex is down going. Wound note on the right  lower extremity. Wound has fibrotic and nonviable tissue that extends down through and includes the subcutaneous tissue. After debridement the wound has a granular base. There is no surrounding erythema, edema, warmth or malodor noted. The wound does not probe or track to bone. Previously noted ulceration on the left lower leg has epithelialized.        Assessment:      Patient Active Problem List   Diagnosis Code    Family history of early CAD Z80.55    PPD positive R76.11    Vitamin D deficiency E55.9    Edema R60.9    Morbid obesity (HCC) E66.01    Anemia D64.9    Venous stasis ulcer of left lower extremity (HCC) I83.029, L97.929    Venous stasis ulcer of ankle, right (HCC) I83.013, L97.319    Idiopathic chronic venous hypertension of both lower extremities with ulcer and inflammation ([facility] [address] I87.333, L97.919, L97.929        Excisional Debridement Procedure Note  Indications:  Based on my examination of this patient's wound(s)/ulcer(s) today, debridement is required to promote healing and evaluate the wound base. Performed by: Jenise Post DPM    Consent obtained? Yes    Time out taken: Yes    Pain Control: Anesthetic: 4% Lidocaine Liquid Topical     Debridement: Excisional Debridement    Using curette, #15 blade scalpel, scissors and forceps the wound/ulcer was sharply debrided    down through and including the removal of  epidermis, dermis and subcutaneous tissue.         Devitalized Tissue Debrided:  fibrin and slough      Pre Debridement Measurements:  Are located in the [facility]  Documentation Flow Sheet   Wound/Ulcer #: 11     Post  Debridement Measurements:    Wound 10/06/18 #11 right lower lateral leg (since 10/6/18) (Active)   Wound Traumatic 12/11/2018  3:18 PM   Wound Cleansed Rinsed/Irrigated with saline 12/11/2018  3:18 PM   Wound Length (cm) 3.4 cm 12/11/2018  3:18 PM   Wound Width (cm) 2.4 cm 12/11/2018  3:18 PM   Wound Depth (cm) 0.1 cm 12/11/2018  3:18 PM   Wound Surface Area (cm^2) 8.16 cm^2 12/11/2018  3:18 PM   Change in Wound Size % (l*w) 6.21 12/11/2018  3:18 PM   Wound Volume (cm^3) 0.82 cm^3 12/11/2018  3:18 PM   Wound Healing % 6 12/11/2018  3:18 PM   Post-Procedure Length (cm) 3.5 cm 12/11/2018  3:38 PM   Post-Procedure Width (cm) 2.5 cm 12/11/2018  3:38 PM   Post-Procedure Depth (cm) 0.2 cm 12/11/2018  3:38 PM   Post-Procedure Surface Area (cm^2) 8.75 cm^2 12/11/2018  3:38 PM   Post-Procedure Volume (cm^3)

## 2018-12-12 NOTE — TELEPHONE ENCOUNTER
Patient called--Mail box if full. Numerous previous calls were made to patient. Patient will need to confirm with referring doctor, Dr Cyndi Littlejohn, if referral is still necessary. As noted above, patient called and unable to reach due to mail box is full.

## 2018-12-14 ENCOUNTER — HOSPITAL ENCOUNTER (OUTPATIENT)
Dept: WOUND CARE | Age: 53
Discharge: HOME | End: 2018-12-14
Payer: COMMERCIAL

## 2018-12-14 PROCEDURE — 29581 APPL MULTLAYER CMPRN SYS LEG: CPT

## 2018-12-18 ENCOUNTER — HOSPITAL ENCOUNTER (OUTPATIENT)
Dept: WOUND CARE | Age: 53
Discharge: HOME | End: 2018-12-18
Payer: COMMERCIAL

## 2018-12-18 VITALS
DIASTOLIC BLOOD PRESSURE: 82 MMHG | SYSTOLIC BLOOD PRESSURE: 163 MMHG | TEMPERATURE: 98.4 F | HEART RATE: 75 BPM | RESPIRATION RATE: 18 BRPM

## 2018-12-18 DIAGNOSIS — L97.929 IDIOPATHIC CHRONIC VENOUS HYPERTENSION OF BOTH LOWER EXTREMITIES WITH ULCER AND INFLAMMATION (HCC): Primary | ICD-10-CM

## 2018-12-18 DIAGNOSIS — L97.919 IDIOPATHIC CHRONIC VENOUS HYPERTENSION OF BOTH LOWER EXTREMITIES WITH ULCER AND INFLAMMATION (HCC): Primary | ICD-10-CM

## 2018-12-18 DIAGNOSIS — I87.333 IDIOPATHIC CHRONIC VENOUS HYPERTENSION OF BOTH LOWER EXTREMITIES WITH ULCER AND INFLAMMATION (HCC): Primary | ICD-10-CM

## 2018-12-18 PROCEDURE — 6370000000 HC RX 637 (ALT 250 FOR IP): Performed by: PODIATRIST

## 2018-12-18 PROCEDURE — 11042 DBRDMT SUBQ TIS 1ST 20SQCM/<: CPT

## 2018-12-18 PROCEDURE — 29581 APPL MULTLAYER CMPRN SYS LEG: CPT

## 2018-12-18 RX ORDER — LIDOCAINE HYDROCHLORIDE 40 MG/ML
2.5 SOLUTION TOPICAL ONCE
Status: COMPLETED | OUTPATIENT
Start: 2018-12-18 | End: 2018-12-18

## 2018-12-18 RX ADMIN — LIDOCAINE HYDROCHLORIDE 2.5 ML: 40 SOLUTION TOPICAL at 15:23

## 2018-12-18 NOTE — PROGRESS NOTES
[facility]   Progress Note and Procedure Note      Jonathan Lal III  MEDICAL RECORD NUMBER:  [id number]  AGE: 48 y.o. GENDER: male  : 1965  EPISODE DATE:  2018    Subjective:     Chief Complaint   Patient presents with    Wound Check     right lower leg         HISTORY of PRESENT ILLNESS HPI     Jonathan Lal III is a 48 y.o. male who presents today for wound/ulcer evaluation. History of Wound Context: left leg wound. He has kept his dressing clean and intact. Wound/Ulcer Pain Timing/Severity: constant  Quality of pain: burning  Severity:  3  10   Modifying Factors: None  Associated Signs/Symptoms: edema    Ulcer Identification:  Ulcer Type: venous  Contributing Factors: edema and venous stasis    Wound: N/A        PAST MEDICAL HISTORY        Diagnosis Date    Breast disorder 2009    MAMMOGRAM -VE. S/P BREAST SURGERY EVAL    Obesity     PPD positive     Venous stasis ulcer of left lower extremity ([address] 10/25/2016       PAST SURGICAL HISTORY    History reviewed. No pertinent surgical history. FAMILY HISTORY    Family History   Problem Relation Age of Onset    Heart Disease Mother     Diabetes Maternal Uncle     Diabetes Paternal Uncle     Cancer Paternal Uncle         ?  pancreatic       SOCIAL HISTORY    Social History   Substance Use Topics    Smoking status: Never Smoker    Smokeless tobacco: Never Used    Alcohol use No       ALLERGIES    No Known Allergies    MEDICATIONS    Current Outpatient Prescriptions on File Prior to Encounter   Medication Sig Dispense Refill    Multiple Vitamin (MULTI VITAMIN PO) Take by mouth daily      Cholecalciferol (VITAMIN D) 2000 UNITS CAPS capsule Take 1 capsule by mouth daily 30 capsule 3    Compression Stockings MISC by Does not apply route Diagnosis: I83.333  Location of wound: Left Leg  Gradient IV (30-40 mm Hg)  Style: Knee Length  Extremity: Bilateral 1 each 0    acetaminophen (TYLENOL) 325 MG tablet Take 650 mg by

## 2018-12-21 ENCOUNTER — HOSPITAL ENCOUNTER (OUTPATIENT)
Dept: WOUND CARE | Age: 53
Discharge: HOME | End: 2018-12-21
Payer: COMMERCIAL

## 2018-12-21 PROCEDURE — 29581 APPL MULTLAYER CMPRN SYS LEG: CPT

## 2018-12-27 ENCOUNTER — OFFICE VISIT (OUTPATIENT)
Dept: VASCULAR SURGERY | Age: 53
End: 2018-12-27
Payer: COMMERCIAL

## 2018-12-27 ENCOUNTER — HOSPITAL ENCOUNTER (OUTPATIENT)
Dept: WOUND CARE | Age: 53
Discharge: HOME | End: 2018-12-27
Payer: COMMERCIAL

## 2018-12-27 VITALS
TEMPERATURE: 97.6 F | BODY MASS INDEX: 39.17 KG/M2 | SYSTOLIC BLOOD PRESSURE: 160 MMHG | WEIGHT: 315 LBS | DIASTOLIC BLOOD PRESSURE: 91 MMHG | HEIGHT: 75 IN | HEART RATE: 78 BPM

## 2018-12-27 DIAGNOSIS — L97.212 VARICOSE VEINS OF RIGHT LOWER EXTREMITY WITH INFLAMMATION, WITH ULCER OF CALF WITH FAT LAYER EXPOSED (HCC): ICD-10-CM

## 2018-12-27 DIAGNOSIS — I83.212 VARICOSE VEINS OF RIGHT LOWER EXTREMITY WITH INFLAMMATION, WITH ULCER OF CALF WITH FAT LAYER EXPOSED (HCC): ICD-10-CM

## 2018-12-27 DIAGNOSIS — L97.919 IDIOPATHIC CHRONIC VENOUS HYPERTENSION OF BOTH LOWER EXTREMITIES WITH ULCER AND INFLAMMATION (HCC): Primary | ICD-10-CM

## 2018-12-27 DIAGNOSIS — L97.212 NON-PRESSURE CHRONIC ULCER OF RIGHT CALF WITH FAT LAYER EXPOSED (HCC): ICD-10-CM

## 2018-12-27 DIAGNOSIS — L97.929 IDIOPATHIC CHRONIC VENOUS HYPERTENSION OF BOTH LOWER EXTREMITIES WITH ULCER AND INFLAMMATION (HCC): Primary | ICD-10-CM

## 2018-12-27 DIAGNOSIS — I87.333 IDIOPATHIC CHRONIC VENOUS HYPERTENSION OF BOTH LOWER EXTREMITIES WITH ULCER AND INFLAMMATION (HCC): Primary | ICD-10-CM

## 2018-12-27 PROBLEM — L97.219 VARICOSE VEINS OF RIGHT LOWER EXTREMITY WITH BOTH ULCER OF CALF AND INFLAMMATION (HCC): Status: ACTIVE | Noted: 2018-12-27

## 2018-12-27 PROCEDURE — 11042 DBRDMT SUBQ TIS 1ST 20SQCM/<: CPT

## 2018-12-27 PROCEDURE — 99215 OFFICE O/P EST HI 40 MIN: CPT | Performed by: SURGERY

## 2018-12-27 RX ORDER — LIDOCAINE HYDROCHLORIDE 40 MG/ML
2.5 SOLUTION TOPICAL ONCE
Status: DISCONTINUED | OUTPATIENT
Start: 2018-12-27 | End: 2018-12-28 | Stop reason: HOSPADM

## 2019-01-03 ENCOUNTER — HOSPITAL ENCOUNTER (OUTPATIENT)
Dept: WOUND CARE | Age: 54
Discharge: HOME | End: 2019-01-03
Payer: COMMERCIAL

## 2019-01-03 VITALS
SYSTOLIC BLOOD PRESSURE: 175 MMHG | TEMPERATURE: 98.1 F | DIASTOLIC BLOOD PRESSURE: 77 MMHG | RESPIRATION RATE: 20 BRPM | HEART RATE: 76 BPM

## 2019-01-03 PROCEDURE — 29581 APPL MULTLAYER CMPRN SYS LEG: CPT

## 2019-01-03 PROCEDURE — 6370000000 HC RX 637 (ALT 250 FOR IP): Performed by: PODIATRIST

## 2019-01-03 PROCEDURE — 11042 DBRDMT SUBQ TIS 1ST 20SQCM/<: CPT

## 2019-01-03 RX ORDER — LIDOCAINE HYDROCHLORIDE 40 MG/ML
2.5 SOLUTION TOPICAL ONCE
Status: COMPLETED | OUTPATIENT
Start: 2019-01-03 | End: 2019-01-03

## 2019-01-03 RX ADMIN — LIDOCAINE HYDROCHLORIDE 2.5 ML: 40 SOLUTION TOPICAL at 14:24

## 2019-01-07 ENCOUNTER — TELEPHONE (OUTPATIENT)
Dept: BARIATRICS/WEIGHT MGMT | Age: 54
End: 2019-01-07

## 2019-01-08 ENCOUNTER — HOSPITAL ENCOUNTER (OUTPATIENT)
Dept: WOUND CARE | Age: 54
Discharge: HOME | End: 2019-01-08
Payer: COMMERCIAL

## 2019-01-08 VITALS
RESPIRATION RATE: 18 BRPM | SYSTOLIC BLOOD PRESSURE: 155 MMHG | DIASTOLIC BLOOD PRESSURE: 83 MMHG | HEART RATE: 74 BPM | TEMPERATURE: 98.3 F

## 2019-01-08 PROCEDURE — 11042 DBRDMT SUBQ TIS 1ST 20SQCM/<: CPT

## 2019-01-08 PROCEDURE — 29581 APPL MULTLAYER CMPRN SYS LEG: CPT

## 2019-01-08 PROCEDURE — 6370000000 HC RX 637 (ALT 250 FOR IP): Performed by: PODIATRIST

## 2019-01-08 RX ORDER — LIDOCAINE HYDROCHLORIDE 40 MG/ML
2.5 SOLUTION TOPICAL ONCE
Status: COMPLETED | OUTPATIENT
Start: 2019-01-08 | End: 2019-01-08

## 2019-01-08 RX ADMIN — LIDOCAINE HYDROCHLORIDE 2.5 ML: 40 SOLUTION TOPICAL at 14:50

## 2019-01-11 ENCOUNTER — HOSPITAL ENCOUNTER (OUTPATIENT)
Dept: WOUND CARE | Age: 54
Discharge: HOME | End: 2019-01-11
Payer: COMMERCIAL

## 2019-01-11 VITALS
RESPIRATION RATE: 18 BRPM | TEMPERATURE: 98.3 F | SYSTOLIC BLOOD PRESSURE: 171 MMHG | DIASTOLIC BLOOD PRESSURE: 94 MMHG | HEART RATE: 76 BPM

## 2019-01-11 PROCEDURE — 29581 APPL MULTLAYER CMPRN SYS LEG: CPT

## 2019-01-15 ENCOUNTER — HOSPITAL ENCOUNTER (OUTPATIENT)
Dept: WOUND CARE | Age: 54
Discharge: HOME | End: 2019-01-15
Payer: COMMERCIAL

## 2019-01-15 VITALS
DIASTOLIC BLOOD PRESSURE: 89 MMHG | SYSTOLIC BLOOD PRESSURE: 156 MMHG | TEMPERATURE: 98 F | RESPIRATION RATE: 18 BRPM | HEART RATE: 74 BPM

## 2019-01-15 PROCEDURE — 6370000000 HC RX 637 (ALT 250 FOR IP): Performed by: SURGERY

## 2019-01-15 PROCEDURE — 97597 DBRDMT OPN WND 1ST 20 CM/<: CPT

## 2019-01-15 PROCEDURE — 11042 DBRDMT SUBQ TIS 1ST 20SQCM/<: CPT

## 2019-01-15 PROCEDURE — 29581 APPL MULTLAYER CMPRN SYS LEG: CPT

## 2019-01-15 RX ORDER — LIDOCAINE HYDROCHLORIDE 40 MG/ML
2.5 SOLUTION TOPICAL ONCE
Status: COMPLETED | OUTPATIENT
Start: 2019-01-15 | End: 2019-01-15

## 2019-01-15 RX ADMIN — LIDOCAINE HYDROCHLORIDE 2.5 ML: 40 SOLUTION TOPICAL at 09:20

## 2019-01-18 ENCOUNTER — HOSPITAL ENCOUNTER (OUTPATIENT)
Dept: WOUND CARE | Age: 54
Discharge: HOME | End: 2019-01-18
Payer: COMMERCIAL

## 2019-01-18 PROCEDURE — 29581 APPL MULTLAYER CMPRN SYS LEG: CPT

## 2019-01-22 ENCOUNTER — HOSPITAL ENCOUNTER (OUTPATIENT)
Dept: WOUND CARE | Age: 54
Discharge: HOME | End: 2019-01-22
Payer: COMMERCIAL

## 2019-01-22 ENCOUNTER — TELEPHONE (OUTPATIENT)
Dept: BARIATRICS/WEIGHT MGMT | Age: 54
End: 2019-01-22

## 2019-01-22 VITALS — RESPIRATION RATE: 18 BRPM | SYSTOLIC BLOOD PRESSURE: 139 MMHG | DIASTOLIC BLOOD PRESSURE: 76 MMHG | TEMPERATURE: 98.4 F

## 2019-01-22 PROCEDURE — 6370000000 HC RX 637 (ALT 250 FOR IP): Performed by: PODIATRIST

## 2019-01-22 PROCEDURE — 29581 APPL MULTLAYER CMPRN SYS LEG: CPT

## 2019-01-22 PROCEDURE — 11042 DBRDMT SUBQ TIS 1ST 20SQCM/<: CPT

## 2019-01-22 RX ORDER — LIDOCAINE HYDROCHLORIDE 40 MG/ML
2.5 SOLUTION TOPICAL ONCE
Status: COMPLETED | OUTPATIENT
Start: 2019-01-22 | End: 2019-01-22

## 2019-01-22 RX ADMIN — LIDOCAINE HYDROCHLORIDE 2.5 ML: 40 SOLUTION TOPICAL at 15:11

## 2019-01-25 ENCOUNTER — HOSPITAL ENCOUNTER (OUTPATIENT)
Dept: WOUND CARE | Age: 54
Discharge: HOME | End: 2019-01-25
Payer: COMMERCIAL

## 2019-01-25 PROCEDURE — 29581 APPL MULTLAYER CMPRN SYS LEG: CPT

## 2019-01-29 ENCOUNTER — HOSPITAL ENCOUNTER (OUTPATIENT)
Dept: WOUND CARE | Age: 54
Discharge: HOME | End: 2019-01-29
Payer: COMMERCIAL

## 2019-01-29 VITALS
RESPIRATION RATE: 18 BRPM | SYSTOLIC BLOOD PRESSURE: 152 MMHG | DIASTOLIC BLOOD PRESSURE: 72 MMHG | HEART RATE: 77 BPM | TEMPERATURE: 96.2 F

## 2019-01-29 PROCEDURE — 11042 DBRDMT SUBQ TIS 1ST 20SQCM/<: CPT

## 2019-01-29 PROCEDURE — 29581 APPL MULTLAYER CMPRN SYS LEG: CPT

## 2019-01-29 PROCEDURE — 6370000000 HC RX 637 (ALT 250 FOR IP): Performed by: PODIATRIST

## 2019-01-29 RX ORDER — LIDOCAINE HYDROCHLORIDE 40 MG/ML
2.5 SOLUTION TOPICAL ONCE
Status: COMPLETED | OUTPATIENT
Start: 2019-01-29 | End: 2019-01-29

## 2019-01-29 RX ADMIN — LIDOCAINE HYDROCHLORIDE 2.5 ML: 40 SOLUTION TOPICAL at 15:10

## 2019-01-29 ASSESSMENT — PAIN DESCRIPTION - PAIN TYPE: TYPE: CHRONIC PAIN

## 2019-01-29 ASSESSMENT — PAIN DESCRIPTION - FREQUENCY: FREQUENCY: INTERMITTENT

## 2019-01-29 ASSESSMENT — PAIN SCALES - GENERAL: PAINLEVEL_OUTOF10: 4

## 2019-01-29 ASSESSMENT — PAIN DESCRIPTION - ORIENTATION: ORIENTATION: LEFT

## 2019-01-29 ASSESSMENT — PAIN DESCRIPTION - ONSET: ONSET: ON-GOING

## 2019-01-29 ASSESSMENT — PAIN DESCRIPTION - LOCATION: LOCATION: HIP

## 2019-01-29 ASSESSMENT — PAIN DESCRIPTION - PROGRESSION: CLINICAL_PROGRESSION: GRADUALLY WORSENING

## 2019-02-01 ENCOUNTER — HOSPITAL ENCOUNTER (OUTPATIENT)
Dept: WOUND CARE | Age: 54
Discharge: HOME | End: 2019-02-01
Payer: COMMERCIAL

## 2019-02-01 VITALS
HEART RATE: 83 BPM | SYSTOLIC BLOOD PRESSURE: 160 MMHG | TEMPERATURE: 98 F | RESPIRATION RATE: 18 BRPM | DIASTOLIC BLOOD PRESSURE: 94 MMHG

## 2019-02-01 PROCEDURE — 29580 STRAPPING UNNA BOOT: CPT

## 2019-02-05 ENCOUNTER — HOSPITAL ENCOUNTER (OUTPATIENT)
Dept: WOUND CARE | Age: 54
Discharge: HOME | End: 2019-02-05
Payer: COMMERCIAL

## 2019-02-05 VITALS
SYSTOLIC BLOOD PRESSURE: 164 MMHG | RESPIRATION RATE: 18 BRPM | TEMPERATURE: 98.3 F | HEART RATE: 74 BPM | DIASTOLIC BLOOD PRESSURE: 87 MMHG

## 2019-02-05 PROCEDURE — 29580 STRAPPING UNNA BOOT: CPT

## 2019-02-05 PROCEDURE — 11042 DBRDMT SUBQ TIS 1ST 20SQCM/<: CPT

## 2019-02-05 PROCEDURE — 6370000000 HC RX 637 (ALT 250 FOR IP): Performed by: PODIATRIST

## 2019-02-05 RX ORDER — LIDOCAINE HYDROCHLORIDE 40 MG/ML
2.5 SOLUTION TOPICAL ONCE
Status: COMPLETED | OUTPATIENT
Start: 2019-02-05 | End: 2019-02-05

## 2019-02-05 RX ADMIN — LIDOCAINE HYDROCHLORIDE 2.5 ML: 40 SOLUTION TOPICAL at 14:12

## 2019-02-07 ENCOUNTER — OFFICE VISIT (OUTPATIENT)
Dept: INTERNAL MEDICINE CLINIC | Age: 54
End: 2019-02-07
Payer: COMMERCIAL

## 2019-02-07 VITALS
DIASTOLIC BLOOD PRESSURE: 80 MMHG | TEMPERATURE: 98.2 F | WEIGHT: 315 LBS | OXYGEN SATURATION: 93 % | BODY MASS INDEX: 40.43 KG/M2 | HEART RATE: 90 BPM | HEIGHT: 74 IN | SYSTOLIC BLOOD PRESSURE: 120 MMHG

## 2019-02-07 DIAGNOSIS — E55.9 VITAMIN D DEFICIENCY: ICD-10-CM

## 2019-02-07 DIAGNOSIS — T14.8XXD WOUND HEALING, DELAYED: ICD-10-CM

## 2019-02-07 DIAGNOSIS — E78.5 DYSLIPIDEMIA: ICD-10-CM

## 2019-02-07 DIAGNOSIS — E66.01 MORBID OBESITY WITH BMI OF 50.0-59.9, ADULT (HCC): ICD-10-CM

## 2019-02-07 DIAGNOSIS — R73.03 PREDIABETES: Primary | ICD-10-CM

## 2019-02-07 DIAGNOSIS — Z13.9 SCREENING FOR CONDITION: ICD-10-CM

## 2019-02-07 PROCEDURE — 99214 OFFICE O/P EST MOD 30 MIN: CPT | Performed by: INTERNAL MEDICINE

## 2019-02-07 RX ORDER — MULTIVIT-MIN/IRON/FOLIC ACID/K 18-600-40
1 CAPSULE ORAL DAILY
Qty: 30 CAPSULE | Refills: 0 | Status: SHIPPED | OUTPATIENT
Start: 2019-02-07 | End: 2019-03-22 | Stop reason: SDUPTHER

## 2019-02-07 ASSESSMENT — PATIENT HEALTH QUESTIONNAIRE - PHQ9
SUM OF ALL RESPONSES TO PHQ9 QUESTIONS 1 & 2: 0
2. FEELING DOWN, DEPRESSED OR HOPELESS: 0
SUM OF ALL RESPONSES TO PHQ QUESTIONS 1-9: 0
SUM OF ALL RESPONSES TO PHQ QUESTIONS 1-9: 0
1. LITTLE INTEREST OR PLEASURE IN DOING THINGS: 0

## 2019-02-08 ENCOUNTER — HOSPITAL ENCOUNTER (OUTPATIENT)
Dept: WOUND CARE | Age: 54
Discharge: HOME | End: 2019-02-08
Payer: COMMERCIAL

## 2019-02-08 VITALS
DIASTOLIC BLOOD PRESSURE: 87 MMHG | RESPIRATION RATE: 18 BRPM | HEART RATE: 81 BPM | SYSTOLIC BLOOD PRESSURE: 150 MMHG | TEMPERATURE: 98.4 F

## 2019-02-08 PROCEDURE — 29580 STRAPPING UNNA BOOT: CPT

## 2019-02-12 ENCOUNTER — HOSPITAL ENCOUNTER (OUTPATIENT)
Dept: WOUND CARE | Age: 54
Discharge: HOME | End: 2019-02-12
Payer: COMMERCIAL

## 2019-02-12 VITALS
TEMPERATURE: 98.1 F | HEART RATE: 96 BPM | DIASTOLIC BLOOD PRESSURE: 82 MMHG | SYSTOLIC BLOOD PRESSURE: 126 MMHG | RESPIRATION RATE: 18 BRPM

## 2019-02-12 PROCEDURE — 11042 DBRDMT SUBQ TIS 1ST 20SQCM/<: CPT

## 2019-02-12 PROCEDURE — 29580 STRAPPING UNNA BOOT: CPT

## 2019-02-12 PROCEDURE — 6370000000 HC RX 637 (ALT 250 FOR IP): Performed by: PODIATRIST

## 2019-02-12 RX ORDER — LIDOCAINE HYDROCHLORIDE 40 MG/ML
2.5 SOLUTION TOPICAL ONCE
Status: COMPLETED | OUTPATIENT
Start: 2019-02-12 | End: 2019-02-12

## 2019-02-12 RX ADMIN — LIDOCAINE HYDROCHLORIDE 2.5 ML: 40 SOLUTION TOPICAL at 14:50

## 2019-02-13 DIAGNOSIS — E78.5 DYSLIPIDEMIA: ICD-10-CM

## 2019-02-13 DIAGNOSIS — Z13.9 SCREENING FOR CONDITION: ICD-10-CM

## 2019-02-13 DIAGNOSIS — R73.03 PREDIABETES: ICD-10-CM

## 2019-02-13 DIAGNOSIS — E55.9 VITAMIN D DEFICIENCY: ICD-10-CM

## 2019-02-13 LAB
A/G RATIO: 1.6 (ref 1.1–2.2)
ALBUMIN SERPL-MCNC: 4.4 G/DL (ref 3.4–5)
ALP BLD-CCNC: 75 U/L (ref 40–129)
ALT SERPL-CCNC: 35 U/L (ref 10–40)
ANION GAP SERPL CALCULATED.3IONS-SCNC: 13 MMOL/L (ref 3–16)
AST SERPL-CCNC: 17 U/L (ref 15–37)
BASOPHILS ABSOLUTE: 0 K/UL (ref 0–0.2)
BASOPHILS RELATIVE PERCENT: 0.6 %
BILIRUB SERPL-MCNC: 0.8 MG/DL (ref 0–1)
BUN BLDV-MCNC: 14 MG/DL (ref 7–20)
CALCIUM SERPL-MCNC: 9.5 MG/DL (ref 8.3–10.6)
CHLORIDE BLD-SCNC: 97 MMOL/L (ref 99–110)
CHOLESTEROL, TOTAL: 190 MG/DL (ref 0–199)
CO2: 26 MMOL/L (ref 21–32)
CREAT SERPL-MCNC: 0.8 MG/DL (ref 0.9–1.3)
EOSINOPHILS ABSOLUTE: 0 K/UL (ref 0–0.6)
EOSINOPHILS RELATIVE PERCENT: 0.3 %
GFR AFRICAN AMERICAN: >60
GFR NON-AFRICAN AMERICAN: >60
GLOBULIN: 2.7 G/DL
GLUCOSE BLD-MCNC: 97 MG/DL (ref 70–99)
HCT VFR BLD CALC: 43.2 % (ref 40.5–52.5)
HDLC SERPL-MCNC: 47 MG/DL (ref 40–60)
HEMOGLOBIN: 14 G/DL (ref 13.5–17.5)
LDL CHOLESTEROL CALCULATED: 129 MG/DL
LYMPHOCYTES ABSOLUTE: 1.2 K/UL (ref 1–5.1)
LYMPHOCYTES RELATIVE PERCENT: 15.7 %
MCH RBC QN AUTO: 28.8 PG (ref 26–34)
MCHC RBC AUTO-ENTMCNC: 32.5 G/DL (ref 31–36)
MCV RBC AUTO: 88.7 FL (ref 80–100)
MONOCYTES ABSOLUTE: 0.8 K/UL (ref 0–1.3)
MONOCYTES RELATIVE PERCENT: 10.1 %
NEUTROPHILS ABSOLUTE: 5.5 K/UL (ref 1.7–7.7)
NEUTROPHILS RELATIVE PERCENT: 73.3 %
PDW BLD-RTO: 16.8 % (ref 12.4–15.4)
PLATELET # BLD: 332 K/UL (ref 135–450)
PMV BLD AUTO: 7.8 FL (ref 5–10.5)
POTASSIUM SERPL-SCNC: 4.4 MMOL/L (ref 3.5–5.1)
PROSTATE SPECIFIC ANTIGEN: 1.12 NG/ML (ref 0–4)
RBC # BLD: 4.87 M/UL (ref 4.2–5.9)
SODIUM BLD-SCNC: 136 MMOL/L (ref 136–145)
TOTAL PROTEIN: 7.1 G/DL (ref 6.4–8.2)
TRIGL SERPL-MCNC: 69 MG/DL (ref 0–150)
TSH REFLEX: 0.67 UIU/ML (ref 0.27–4.2)
VLDLC SERPL CALC-MCNC: 14 MG/DL
WBC # BLD: 7.5 K/UL (ref 4–11)

## 2019-02-14 LAB
ESTIMATED AVERAGE GLUCOSE: 139.9 MG/DL
HBA1C MFR BLD: 6.5 %
HEPATITIS C ANTIBODY INTERPRETATION: NORMAL
HIV AG/AB: NORMAL
HIV ANTIGEN: NORMAL
HIV-1 ANTIBODY: NORMAL
HIV-2 AB: NORMAL
VITAMIN D 25-HYDROXY: 36.7 NG/ML

## 2019-02-15 ENCOUNTER — HOSPITAL ENCOUNTER (OUTPATIENT)
Dept: WOUND CARE | Age: 54
Discharge: HOME | End: 2019-02-15
Payer: COMMERCIAL

## 2019-02-15 VITALS
HEART RATE: 74 BPM | TEMPERATURE: 97.3 F | SYSTOLIC BLOOD PRESSURE: 189 MMHG | RESPIRATION RATE: 18 BRPM | DIASTOLIC BLOOD PRESSURE: 83 MMHG

## 2019-02-15 PROCEDURE — 29580 STRAPPING UNNA BOOT: CPT

## 2019-02-19 ENCOUNTER — HOSPITAL ENCOUNTER (OUTPATIENT)
Dept: WOUND CARE | Age: 54
Discharge: HOME | End: 2019-02-19
Payer: COMMERCIAL

## 2019-02-19 VITALS
HEART RATE: 71 BPM | TEMPERATURE: 98.3 F | SYSTOLIC BLOOD PRESSURE: 153 MMHG | DIASTOLIC BLOOD PRESSURE: 80 MMHG | RESPIRATION RATE: 18 BRPM

## 2019-02-19 PROCEDURE — 6370000000 HC RX 637 (ALT 250 FOR IP): Performed by: PODIATRIST

## 2019-02-19 PROCEDURE — 29580 STRAPPING UNNA BOOT: CPT

## 2019-02-19 PROCEDURE — 11042 DBRDMT SUBQ TIS 1ST 20SQCM/<: CPT

## 2019-02-19 RX ORDER — LIDOCAINE HYDROCHLORIDE 40 MG/ML
2.5 SOLUTION TOPICAL ONCE
Status: COMPLETED | OUTPATIENT
Start: 2019-02-19 | End: 2019-02-19

## 2019-02-19 RX ADMIN — LIDOCAINE HYDROCHLORIDE 2.5 ML: 40 SOLUTION TOPICAL at 14:57

## 2019-02-22 ENCOUNTER — HOSPITAL ENCOUNTER (OUTPATIENT)
Dept: WOUND CARE | Age: 54
Discharge: HOME | End: 2019-02-22
Payer: COMMERCIAL

## 2019-02-22 VITALS
TEMPERATURE: 98.7 F | HEART RATE: 71 BPM | DIASTOLIC BLOOD PRESSURE: 89 MMHG | SYSTOLIC BLOOD PRESSURE: 192 MMHG | RESPIRATION RATE: 18 BRPM

## 2019-02-22 PROCEDURE — 29580 STRAPPING UNNA BOOT: CPT

## 2019-02-26 ENCOUNTER — HOSPITAL ENCOUNTER (OUTPATIENT)
Dept: WOUND CARE | Age: 54
Discharge: HOME | End: 2019-02-26
Payer: COMMERCIAL

## 2019-02-26 VITALS
TEMPERATURE: 98.5 F | DIASTOLIC BLOOD PRESSURE: 95 MMHG | RESPIRATION RATE: 18 BRPM | SYSTOLIC BLOOD PRESSURE: 153 MMHG | HEART RATE: 74 BPM

## 2019-02-26 PROCEDURE — 11042 DBRDMT SUBQ TIS 1ST 20SQCM/<: CPT

## 2019-02-26 PROCEDURE — 6370000000 HC RX 637 (ALT 250 FOR IP): Performed by: PODIATRIST

## 2019-02-26 PROCEDURE — 29580 STRAPPING UNNA BOOT: CPT

## 2019-02-26 RX ORDER — LIDOCAINE HYDROCHLORIDE 40 MG/ML
2.5 SOLUTION TOPICAL ONCE
Status: COMPLETED | OUTPATIENT
Start: 2019-02-26 | End: 2019-02-26

## 2019-02-26 RX ADMIN — LIDOCAINE HYDROCHLORIDE 2.5 ML: 40 SOLUTION TOPICAL at 15:42

## 2019-03-05 ENCOUNTER — HOSPITAL ENCOUNTER (OUTPATIENT)
Dept: WOUND CARE | Age: 54
Discharge: HOME | End: 2019-03-05
Payer: COMMERCIAL

## 2019-03-05 VITALS
DIASTOLIC BLOOD PRESSURE: 84 MMHG | RESPIRATION RATE: 16 BRPM | HEART RATE: 74 BPM | TEMPERATURE: 98 F | SYSTOLIC BLOOD PRESSURE: 159 MMHG

## 2019-03-05 PROCEDURE — 29580 STRAPPING UNNA BOOT: CPT

## 2019-03-05 PROCEDURE — 6370000000 HC RX 637 (ALT 250 FOR IP): Performed by: PODIATRIST

## 2019-03-05 PROCEDURE — 11042 DBRDMT SUBQ TIS 1ST 20SQCM/<: CPT

## 2019-03-05 RX ORDER — LIDOCAINE HYDROCHLORIDE 40 MG/ML
2.5 SOLUTION TOPICAL ONCE
Status: COMPLETED | OUTPATIENT
Start: 2019-03-05 | End: 2019-03-05

## 2019-03-05 RX ADMIN — LIDOCAINE HYDROCHLORIDE 2.5 ML: 40 SOLUTION TOPICAL at 15:09

## 2019-03-12 ENCOUNTER — HOSPITAL ENCOUNTER (OUTPATIENT)
Dept: WOUND CARE | Age: 54
Discharge: HOME | End: 2019-03-12
Payer: COMMERCIAL

## 2019-03-12 VITALS
TEMPERATURE: 98.4 F | RESPIRATION RATE: 18 BRPM | DIASTOLIC BLOOD PRESSURE: 91 MMHG | HEART RATE: 73 BPM | SYSTOLIC BLOOD PRESSURE: 158 MMHG

## 2019-03-12 PROCEDURE — 6370000000 HC RX 637 (ALT 250 FOR IP): Performed by: PODIATRIST

## 2019-03-12 PROCEDURE — 11042 DBRDMT SUBQ TIS 1ST 20SQCM/<: CPT

## 2019-03-12 PROCEDURE — 29580 STRAPPING UNNA BOOT: CPT

## 2019-03-12 RX ORDER — LIDOCAINE HYDROCHLORIDE 40 MG/ML
2.5 SOLUTION TOPICAL ONCE
Status: COMPLETED | OUTPATIENT
Start: 2019-03-12 | End: 2019-03-12

## 2019-03-12 RX ADMIN — LIDOCAINE HYDROCHLORIDE 2.5 ML: 40 SOLUTION TOPICAL at 14:37

## 2019-03-19 ENCOUNTER — HOSPITAL ENCOUNTER (OUTPATIENT)
Dept: WOUND CARE | Age: 54
Discharge: HOME | End: 2019-03-19
Payer: COMMERCIAL

## 2019-03-19 VITALS
HEART RATE: 77 BPM | TEMPERATURE: 98.1 F | SYSTOLIC BLOOD PRESSURE: 142 MMHG | RESPIRATION RATE: 18 BRPM | DIASTOLIC BLOOD PRESSURE: 88 MMHG

## 2019-03-19 PROCEDURE — 29581 APPL MULTLAYER CMPRN SYS LEG: CPT

## 2019-03-19 PROCEDURE — 6370000000 HC RX 637 (ALT 250 FOR IP): Performed by: SURGERY

## 2019-03-19 PROCEDURE — 11042 DBRDMT SUBQ TIS 1ST 20SQCM/<: CPT

## 2019-03-19 RX ORDER — LIDOCAINE HYDROCHLORIDE 40 MG/ML
2.5 SOLUTION TOPICAL ONCE
Status: COMPLETED | OUTPATIENT
Start: 2019-03-19 | End: 2019-03-19

## 2019-03-19 RX ADMIN — LIDOCAINE HYDROCHLORIDE 2.5 ML: 40 SOLUTION TOPICAL at 10:03

## 2019-03-20 ENCOUNTER — TELEPHONE (OUTPATIENT)
Dept: INTERNAL MEDICINE CLINIC | Age: 54
End: 2019-03-20

## 2019-03-22 DIAGNOSIS — E55.9 VITAMIN D DEFICIENCY: ICD-10-CM

## 2019-03-22 RX ORDER — MULTIVIT-MIN/IRON/FOLIC ACID/K 18-600-40
1 CAPSULE ORAL DAILY
Qty: 30 CAPSULE | Refills: 0 | Status: SHIPPED | OUTPATIENT
Start: 2019-03-22 | End: 2019-05-20 | Stop reason: SDUPTHER

## 2019-03-26 ENCOUNTER — HOSPITAL ENCOUNTER (OUTPATIENT)
Dept: WOUND CARE | Age: 54
Discharge: HOME | End: 2019-03-26
Payer: COMMERCIAL

## 2019-03-26 VITALS
DIASTOLIC BLOOD PRESSURE: 80 MMHG | HEART RATE: 85 BPM | SYSTOLIC BLOOD PRESSURE: 158 MMHG | RESPIRATION RATE: 18 BRPM | TEMPERATURE: 97.7 F

## 2019-03-26 PROCEDURE — 11042 DBRDMT SUBQ TIS 1ST 20SQCM/<: CPT

## 2019-03-26 PROCEDURE — 29581 APPL MULTLAYER CMPRN SYS LEG: CPT

## 2019-03-26 PROCEDURE — 6370000000 HC RX 637 (ALT 250 FOR IP): Performed by: PODIATRIST

## 2019-03-26 RX ORDER — LIDOCAINE HYDROCHLORIDE 40 MG/ML
2.5 SOLUTION TOPICAL ONCE
Status: COMPLETED | OUTPATIENT
Start: 2019-03-26 | End: 2019-03-26

## 2019-03-26 RX ADMIN — LIDOCAINE HYDROCHLORIDE 2.5 ML: 40 SOLUTION TOPICAL at 14:26

## 2019-04-02 ENCOUNTER — HOSPITAL ENCOUNTER (OUTPATIENT)
Dept: WOUND CARE | Age: 54
Discharge: HOME | End: 2019-04-02
Payer: COMMERCIAL

## 2019-04-02 VITALS
HEART RATE: 74 BPM | DIASTOLIC BLOOD PRESSURE: 98 MMHG | TEMPERATURE: 98.2 F | RESPIRATION RATE: 18 BRPM | SYSTOLIC BLOOD PRESSURE: 174 MMHG

## 2019-04-02 PROCEDURE — 6370000000 HC RX 637 (ALT 250 FOR IP): Performed by: PODIATRIST

## 2019-04-02 PROCEDURE — 29581 APPL MULTLAYER CMPRN SYS LEG: CPT

## 2019-04-02 PROCEDURE — 11042 DBRDMT SUBQ TIS 1ST 20SQCM/<: CPT

## 2019-04-02 RX ORDER — LIDOCAINE HYDROCHLORIDE 40 MG/ML
SOLUTION TOPICAL ONCE
Status: COMPLETED | OUTPATIENT
Start: 2019-04-02 | End: 2019-04-02

## 2019-04-02 RX ADMIN — LIDOCAINE HYDROCHLORIDE: 40 SOLUTION TOPICAL at 14:55

## 2019-04-02 NOTE — PROGRESS NOTES
Multilayer Compression Wrap   (Not Unna) Below the Knee    NAME:  Modesto Segura III  YOB: 1965  MEDICAL RECORD NUMBER:  [id number]  DATE:  4/2/2019       [] Removed old Multilayer wrap if present and washed leg with mild soap/water.  [x] Applied moisturizing agent to dry skin as needed.  [x] Applied primary and secondary dressing as ordered     [x] Applied multilayered dressing below the knee to Bilateral lower leg(s)  (2 Layer compression wrap ) per 's instructions.  [x] Instructed patient/caregiver not to remove dressing and to keep it clean and dry.  [x] Instructed patient/caregiver on complications to report to provider, such as pain, numbness in toes, heavy drainage, and slippage of dressing.  [x] Instructed patient on purpose of compression dressing and on activity and exercise recommendations.        Applied per   Guidelines    Electronically signed by Tianna Haro RN on 4/2/2019 at 3:30 PM

## 2019-04-03 NOTE — PROGRESS NOTES
[facility]   Progress Note and Procedure Note      Theresa Lopez III  MEDICAL RECORD NUMBER:  [id number]  AGE: 48 y.o. GENDER: male  : 1965  EPISODE DATE:  2019    Subjective:     Chief Complaint   Patient presents with    Wound Check     BLE         HISTORY of PRESENT ILLNESS HPI     Theresa Lopez III is a 48 y.o. male who presents today for wound/ulcer evaluation. History of Wound Context: left leg wound. He has kept his dressings clean and intact. Patient relates that his lymphedema pumps have been approved and he is waiting for them to be delivered. He still has received his lymphedema pumps and relates that he has been using them. He states that he usually only uses them once a day, but is trying to do better about using them twice daily. Wound/Ulcer Pain Timing/Severity: constant  Quality of pain: burning  Severity:  3 / 10   Modifying Factors: None  Associated Signs/Symptoms: edema    Ulcer Identification:  Ulcer Type: venous  Contributing Factors: edema and venous stasis    Wound: N/A        PAST MEDICAL HISTORY        Diagnosis Date    Breast disorder 2009    MAMMOGRAM -VE. S/P BREAST SURGERY EVAL    Obesity     PPD positive     Venous stasis ulcer of left lower extremity ([facility] [address] 10/25/2016       PAST SURGICAL HISTORY    History reviewed. No pertinent surgical history. FAMILY HISTORY    Family History   Problem Relation Age of Onset    Heart Disease Mother     Diabetes Maternal Uncle     Diabetes Paternal Uncle     Cancer Paternal Uncle         ?  pancreatic       SOCIAL HISTORY    Social History     Tobacco Use    Smoking status: Never Smoker    Smokeless tobacco: Never Used   Substance Use Topics    Alcohol use: No    Drug use: No       ALLERGIES    No Known Allergies    MEDICATIONS    Current Outpatient Medications on File Prior to Encounter   Medication Sig Dispense Refill    Cholecalciferol (VITAMIN D) 2000 units CAPS capsule TAKE 1 CAPSULE BY MOUTH DAILY 30 capsule 0    acetaminophen (TYLENOL) 325 MG tablet Take 650 mg by mouth every 6 hours as needed for Pain      GLUCOSAMINE HCL PO Take by mouth daily      Multiple Vitamin (MULTI VITAMIN PO) Take by mouth daily      ibuprofen (ADVIL;MOTRIN) 800 MG tablet Take 1 tablet by mouth every 6 hours as needed for Pain 120 tablet 3    Compression Stockings MISC by Does not apply route Diagnosis: I83.333  Location of wound: Left Leg  Gradient IV (30-40 mm Hg)  Style: Knee Length  Extremity: Bilateral 1 each 0     No current facility-administered medications on file prior to encounter. REVIEW OF SYSTEMS    Pertinent items are noted in HPI. Review of Systems: A 12 point review of symptoms is unremarkable with the exception of the chief complaint. Patient specifically denies nausea, fever, vomiting, chills, shortness of breath, chest pain, abdominal pain, constipation or difficulty urinating. Objective:        BP (!) 174/98   Pulse 74   Temp 98.2 °F (36.8 °C) (Oral)   Resp 18     Wt Readings from Last 3 Encounters:   02/07/19 (!) 417 lb 9.6 oz (189.4 kg)   12/27/18 (!) 433 lb (196.4 kg)   09/28/18 (!) 405 lb (183.7 kg)       PHYSICAL EXAM    DP/PT pulses palpable bilaterally. CFT brisk to all digits. Digits are pink and warm to the touch. Hair growth is normal in appearance. No calf pain with palpation noted. Patient has pitting edema noted on the bilateral lower extremities. There are brawny pigmentary changes noted bilaterally at the distal aspect of the leg. There are hypertrophic skin changes noted bilaterally consistent with chronic venous stasis/lymphedema. Epicritic sensation is grossly intact bilaterally. Negative clonus and babinski reflex is down going. Wound note on the right and left lower extremity. Wound has fibrotic and nonviable tissue that extends down through and includes the subcutaneous tissue. After debridement the wound has a granular base.  There is no surrounding erythema, edema, warmth or malodor noted. The wound does not probe or track to bone. Assessment:      Patient Active Problem List   Diagnosis Code    Family history of early CAD Z80.55    PPD positive R76.11    Vitamin D deficiency E55.9    Edema R60.9    Morbid obesity ([address] E66.01    Anemia D64.9    Venous stasis ulcer of left lower extremity ([address] I83.029, L97.929    Venous stasis ulcer of ankle, right ([address] I83.013, L97.319    Idiopathic chronic venous hypertension of both lower extremities with ulcer and inflammation ([address] I87.333, L97.919, L97.929    Non-pressure chronic ulcer of right calf with fat layer exposed ([address] L97.212    Varicose veins of right lower extremity with both ulcer of calf and inflammation ([address] I83.212, L97.219    Prediabetes R73.03        Excisional Debridement Procedure Note  Indications:  Based on my examination of this patient's wound(s)/ulcer(s) today, debridement is required to promote healing and evaluate the wound base. Performed by: Radha Costa DPM    Consent obtained? Yes    Time out taken: Yes    Pain Control: Anesthetic: 4% Lidocaine Liquid Topical     Debridement: Excisional Debridement    Using curette, #15 blade scalpel, scissors and forceps the wound/ulcer was sharply debrided    down through and including the removal of  epidermis, dermis and subcutaneous tissue.         Devitalized Tissue Debrided:  fibrin and slough      Pre Debridement Measurements:  Are located in the [place]  Documentation Flow Sheet   Wound/Ulcer #: 11 and 12     Post  Debridement Measurements:    Wound 10/06/18 #11 right lower lateral leg (since 10/6/18) (Active)   Wound Image   3/19/2019  9:50 AM   Wound Venous 2/15/2019 11:15 AM   Dressing/Treatment Collagen with Ag 4/2/2019  3:16 PM   Wound Cleansed Rinsed/Irrigated with saline 4/2/2019  2:41 PM   Wound Length (cm) 1.1 cm 4/2/2019  2:41 PM   Wound Width (cm) 1.5 cm 4/2/2019  2:41 PM   Wound Depth (cm) 0.1 cm 4/2/2019  2:41 PM   Wound Surface Area (cm^2) 1.65 cm^2 4/2/2019  2:41 PM   Change in Wound Size % (l*w) 81.03 4/2/2019  2:41 PM   Wound Volume (cm^3) 0.16 cm^3 4/2/2019  2:41 PM   Wound Healing % 82 4/2/2019  2:41 PM   Post-Procedure Length (cm) 1.2 cm 4/2/2019  3:16 PM   Post-Procedure Width (cm) 1.6 cm 4/2/2019  3:16 PM   Post-Procedure Depth (cm) 0.3 cm 4/2/2019  3:16 PM   Post-Procedure Surface Area (cm^2) 1.92 cm^2 4/2/2019  3:16 PM   Post-Procedure Volume (cm^3) 0.58 cm^3 4/2/2019  3:16 PM   Distance Tunneling (cm) 0 cm 3/19/2019  9:50 AM   Tunneling Position ___ O'Clock 0 3/26/2019  2:16 PM   Undermining Starts ___ O'Clock 0 3/5/2019  3:02 PM   Undermining Ends___ O'Clock 0 3/26/2019  2:16 PM   Undermining Maxium Distance (cm) 0 3/19/2019  9:50 AM   Wound Assessment Granulation tissue 4/2/2019  3:16 PM   Drainage Amount Small 4/2/2019  2:41 PM   Drainage Description Serosanguinous;Brown 4/2/2019  2:41 PM   Odor None 4/2/2019  2:41 PM   Margins Defined edges 4/2/2019  2:41 PM   Georgina-wound Assessment Dry 4/2/2019  2:41 PM   Non-staged Wound Description Full thickness 4/2/2019  2:41 PM   [place]%Wound Bed 60 4/2/2019  2:41 PM   Red%Wound Bed 30 4/2/2019  2:41 PM   Yellow%Wound Bed 10 3/26/2019  2:16 PM   Black%Wound Bed 0 1/15/2019  9:12 AM   Other%Wound Bed 0 2/15/2019 11:15 AM   Number of days: 179       Wound 02/05/19 #12 Left lower lateral leg (Active)   Wound Image   3/19/2019  9:50 AM   Wound Venous 2/5/2019  1:57 PM   Dressing/Treatment Collagen with Ag 4/2/2019  3:16 PM   Wound Cleansed Rinsed/Irrigated with saline 4/2/2019  2:41 PM   Wound Length (cm) 1.8 cm 4/2/2019  2:41 PM   Wound Width (cm) 0.8 cm 4/2/2019  2:41 PM   Wound Depth (cm) 0.1 cm 4/2/2019  2:41 PM   Wound Surface Area (cm^2) 1.44 cm^2 4/2/2019  2:41 PM   Change in Wound Size % (l*w) 71.43 4/2/2019  2:41 PM   Wound Volume (cm^3) 0.14 cm^3 4/2/2019  2:41 PM   Wound Healing % 72 4/2/2019  2:41 PM   Post-Procedure Length (cm) 1.9 cm 4/2/2019 on the left. Encouraged patient to come in on Friday to have his dressing changed. Treatment Note please see attached Discharge Instructions    In my professional opinion this patient would benefit from HBO Therapy: No    Written patient dismissal instructions given to patient and signed by patient or POA.          RTC 1 week    Electronically signed by SHAHBAZ ROTH on 4/3/2019 at 11:35 AM

## 2019-04-09 ENCOUNTER — HOSPITAL ENCOUNTER (OUTPATIENT)
Dept: WOUND CARE | Age: 54
Discharge: HOME | End: 2019-04-09
Payer: COMMERCIAL

## 2019-04-09 VITALS
SYSTOLIC BLOOD PRESSURE: 164 MMHG | TEMPERATURE: 98.7 F | HEART RATE: 74 BPM | DIASTOLIC BLOOD PRESSURE: 84 MMHG | RESPIRATION RATE: 18 BRPM

## 2019-04-09 PROCEDURE — 29581 APPL MULTLAYER CMPRN SYS LEG: CPT

## 2019-04-09 PROCEDURE — 6370000000 HC RX 637 (ALT 250 FOR IP): Performed by: PODIATRIST

## 2019-04-09 PROCEDURE — 11042 DBRDMT SUBQ TIS 1ST 20SQCM/<: CPT

## 2019-04-09 PROCEDURE — 15271 SKIN SUB GRAFT TRNK/ARM/LEG: CPT

## 2019-04-09 RX ORDER — LIDOCAINE HYDROCHLORIDE 40 MG/ML
SOLUTION TOPICAL ONCE
Status: COMPLETED | OUTPATIENT
Start: 2019-04-09 | End: 2019-04-09

## 2019-04-09 RX ADMIN — LIDOCAINE HYDROCHLORIDE: 40 SOLUTION TOPICAL at 15:00

## 2019-04-09 NOTE — PROGRESS NOTES
Multilayer Compression Wrap   (Not Unna) Below the Knee    NAME:  Byron Hong III  YOB: 1965  MEDICAL RECORD NUMBER:  [id number]  DATE:  4/9/2019       [] Removed old Multilayer wrap if present and washed leg with mild soap/water.  [x] Applied moisturizing agent to dry skin as needed.  [x] Applied primary and secondary dressing as ordered     [x] Applied multilayered dressing below the knee to Bilateral lower leg(s)  (2 Layer Lite compression wrap) per 's instructions.  [x] Instructed patient/caregiver not to remove dressing and to keep it clean and dry.  [x] Instructed patient/caregiver on complications to report to provider, such as pain, numbness in toes, heavy drainage, and slippage of dressing.  [x] Instructed patient on purpose of compression dressing and on activity and exercise recommendations.        Applied per   Guidelines    Electronically signed by Renate Puentes LPN on 5/3/0084 at 5:07 PM

## 2019-04-09 NOTE — PLAN OF CARE
EpiFix/EpiCord Treatment Note      Goal:  Patient will receive safe and proper application of skin substitute. Patient will comply with caring for dressing, offloading and reporting complications. [x] Expiration date checked immediately prior to use. [x] Package intact prior to use and no damage noted. [x]Epifix was removed from protective sterile  packaging by provider and applied to prepared ulcer bed. Embossment lettering  was used as a guide (for Epifix). It was hydrated with sterile normal saline by  provider and then applied to Ulcer #: 11  and  affixed with steri-strips and nonadherent. [x] Secondary dressing applied by nursing as ordered. [x] Patient/caregiver was instructed not to remove dressing and to keep it clean and dry. See AVS for further instructions given to patient/caregiver. EpiFix/Epicord may be applied a total of 10 times per wound over a 12 week period. Additionally EpiFix may only be used every 12 months per wound. Date of first application of Epifix for this current wound is April 9, 2019.        Application # 1           Guidelines followed     Electronically signed by Juliana Gordon RN on 4/9/2019 at 3:22 PM

## 2019-04-11 NOTE — PROGRESS NOTES
[facility]   Progress Note and Procedure Note      Norma Ratliff III  MEDICAL RECORD NUMBER:  [id number]  AGE: 48 y.o. GENDER: male  : 1965  EPISODE DATE:  2019    Subjective:     Chief Complaint   Patient presents with    Wound Check     BLE         HISTORY of PRESENT ILLNESS HPI     Norma Ratliff III is a 48 y.o. male who presents today for wound/ulcer evaluation. History of Wound Context: left leg wound. He has kept his dressings clean and intact. Patient relates that his lymphedema pumps have been approved and he is waiting for them to be delivered. He still has received his lymphedema pumps and relates that he has been using them. He states that he usually only uses them once a day, but is trying to do better about using them twice daily. Wound/Ulcer Pain Timing/Severity: constant  Quality of pain: burning  Severity:  3 / 10   Modifying Factors: None  Associated Signs/Symptoms: edema    Ulcer Identification:  Ulcer Type: venous  Contributing Factors: edema and venous stasis    Wound: N/A        PAST MEDICAL HISTORY        Diagnosis Date    Breast disorder 2009    MAMMOGRAM -VE. S/P BREAST SURGERY EVAL    Obesity     PPD positive     Venous stasis ulcer of left lower extremity ([facility] [address] 10/25/2016       PAST SURGICAL HISTORY    History reviewed. No pertinent surgical history. FAMILY HISTORY    Family History   Problem Relation Age of Onset    Heart Disease Mother     Diabetes Maternal Uncle     Diabetes Paternal Uncle     Cancer Paternal Uncle         ?  pancreatic       SOCIAL HISTORY    Social History     Tobacco Use    Smoking status: Never Smoker    Smokeless tobacco: Never Used   Substance Use Topics    Alcohol use: No    Drug use: No       ALLERGIES    No Known Allergies    MEDICATIONS    Current Outpatient Medications on File Prior to Encounter   Medication Sig Dispense Refill    Cholecalciferol (VITAMIN D) 2000 units CAPS capsule TAKE 1 CAPSULE BY MOUTH surrounding erythema, edema, warmth or malodor noted. The wound does not probe or track to bone. Assessment:      Patient Active Problem List   Diagnosis Code    Family history of early CAD Z80.55    PPD positive R76.11    Vitamin D deficiency E55.9    Edema R60.9    Morbid obesity ([address] E66.01    Anemia D64.9    Venous stasis ulcer of left lower extremity ([address] I83.029, L97.929    Venous stasis ulcer of ankle, right ([address] I83.013, L97.319    Idiopathic chronic venous hypertension of both lower extremities with ulcer and inflammation ([address] I87.333, L97.919, L97.929    Non-pressure chronic ulcer of right calf with fat layer exposed ([address] L97.212    Varicose veins of right lower extremity with both ulcer of calf and inflammation ([address] I83.212, L97.219    Prediabetes R73.03        Excisional Debridement Procedure Note  Indications:  Based on my examination of this patient's wound(s)/ulcer(s) today, debridement is required to promote healing and evaluate the wound base. Performed by: Bulah Jeans, DPM    Consent obtained? Yes    Time out taken: Yes    Pain Control: Anesthetic: 4% Lidocaine Liquid Topical     Debridement: Excisional Debridement    Using curette, #15 blade scalpel, scissors and forceps the wound/ulcer was sharply debrided    down through and including the removal of  epidermis, dermis and subcutaneous tissue. Devitalized Tissue Debrided:  fibrin and slough      Pre Debridement Measurements:  Are located in the [place]  Documentation Flow Sheet   Wound/Ulcer #: 11 and 12     Post  Debridement Measurements:      Wound 10/06/18 #11 right lower lateral leg (since 10/6/18) (Active)   Wound Image   3/19/2019  9:50 AM   Wound Venous 2/15/2019 11:15 AM   Dressing/Treatment Other (comment); Silicone Dressing; Wound gel 4/9/2019  3:24 PM   Wound Cleansed Rinsed/Irrigated with saline 4/9/2019  2:46 PM   Wound Length (cm) 1 cm 4/9/2019  2:46 PM   Wound Width (cm) 1.8 cm 4/9/2019  2:46 PM Length (cm) 2 cm 4/9/2019  3:18 PM   Post-Procedure Width (cm) 1.1 cm 4/9/2019  3:18 PM   Post-Procedure Depth (cm) 0.3 cm 4/9/2019  3:18 PM   Post-Procedure Surface Area (cm^2) 2.2 cm^2 4/9/2019  3:18 PM   Post-Procedure Volume (cm^3) 0.66 cm^3 4/9/2019  3:18 PM   Distance Tunneling (cm) 0 cm 3/19/2019  9:50 AM   Tunneling Position ___ O'Clock 0 3/5/2019  3:02 PM   Undermining Starts ___ O'Clock 0 3/5/2019  3:02 PM   Undermining Maxium Distance (cm) 0 3/19/2019  9:50 AM   Wound Assessment Dry;Red 4/9/2019  2:46 PM   Drainage Amount None 4/9/2019  2:46 PM   Drainage Description ROMERO 4/9/2019  2:46 PM   Odor None 4/9/2019  2:46 PM   Margins Defined edges 4/9/2019  2:46 PM   Georgina-wound Assessment Dry;Pink 4/9/2019  2:46 PM   Non-staged Wound Description Partial thickness 4/9/2019  2:46 PM   [place]%Wound Bed 60 4/2/2019  2:41 PM   Red%Wound Bed 100 4/9/2019  2:46 PM   Yellow%Wound Bed 20 3/26/2019  2:16 PM   Black%Wound Bed 0 3/5/2019  3:02 PM   Number of days: 65     Percent of Wound/Ulcer Debrided: 100%    Total Surface Area Debrided:  4.29 sq cm    Diabetic/Pressure/Non Pressure Ulcers only:  Ulcer: Non-Pressure ulcer, fat layer exposed    Bleeding: Minimal    Hemostasis Achieved: by pressure    Procedural Pain: 0  / 10     Post Procedural Pain: 0 / 10     Response to treatment:  Well tolerated by patient. Procedure:  Skin Substitute Application    Performed by: Black Rodgers DPM    Ulcer Type: venous    Consent obtained: Yes    Time out taken: Yes    Product Utilized:    EpiFix 6 sq/cm     Fenestrated: No    Mesher Utilized: No    Instrument(s) curette, scissors and forceps    Skin Substitute was Applied to Ulcer Number(s):    Ulcer #: 11      Wound 10/06/18 #11 right lower lateral leg (since 10/6/18) (Active)   Wound Image   3/19/2019  9:50 AM   Wound Venous 2/15/2019 11:15 AM   Dressing/Treatment Other (comment); Silicone Dressing; Wound gel 4/9/2019  3:24 PM   Wound Cleansed Rinsed/Irrigated with saline 4/9/2019 2:46 PM   Wound Length (cm) 1 cm 4/9/2019  2:46 PM   Wound Width (cm) 1.8 cm 4/9/2019  2:46 PM   Wound Depth (cm) 0.1 cm 4/9/2019  2:46 PM   Wound Surface Area (cm^2) 1.8 cm^2 4/9/2019  2:46 PM   Change in Wound Size % (l*w) 79.31 4/9/2019  2:46 PM   Wound Volume (cm^3) 0.18 cm^3 4/9/2019  2:46 PM   Wound Healing % 79 4/9/2019  2:46 PM   Post-Procedure Length (cm) 1.1 cm 4/9/2019  3:18 PM   Post-Procedure Width (cm) 1.9 cm 4/9/2019  3:18 PM   Post-Procedure Depth (cm) 0.3 cm 4/9/2019  3:18 PM   Post-Procedure Surface Area (cm^2) 2.09 cm^2 4/9/2019  3:18 PM   Post-Procedure Volume (cm^3) 0.63 cm^3 4/9/2019  3:18 PM   Distance Tunneling (cm) 0 cm 3/19/2019  9:50 AM   Tunneling Position ___ O'Clock 0 3/26/2019  2:16 PM   Undermining Starts ___ O'Clock 0 3/5/2019  3:02 PM   Undermining Ends___ O'Clock 0 3/26/2019  2:16 PM   Undermining Maxium Distance (cm) 0 3/19/2019  9:50 AM   Wound Assessment Granulation tissue; Red 4/9/2019  2:46 PM   Drainage Amount Small 4/9/2019  2:46 PM   Drainage Description Serosanguinous 4/9/2019  2:46 PM   Odor None 4/9/2019  2:46 PM   Margins Defined edges 4/9/2019  2:46 PM   Georgina-wound Assessment Dry;Brown 4/9/2019  2:46 PM   Non-staged Wound Description Full thickness 4/9/2019  2:46 PM   [place]%Wound Bed 60 4/2/2019  2:41 PM   Red%Wound Bed 100 4/9/2019  2:46 PM   Yellow%Wound Bed 10 3/26/2019  2:16 PM   Black%Wound Bed 0 1/15/2019  9:12 AM   Other%Wound Bed 0 2/15/2019 11:15 AM   Number of days: 187       Wound 02/05/19 #12 Left lower lateral leg (Active)   Wound Image   3/19/2019  9:50 AM   Wound Venous 2/5/2019  1:57 PM   Dressing/Treatment Collagen with Ag 4/2/2019  3:16 PM   Wound Cleansed Rinsed/Irrigated with saline 4/9/2019  2:46 PM   Wound Length (cm) 1.9 cm 4/9/2019  2:46 PM   Wound Width (cm) 1 cm 4/9/2019  2:46 PM   Wound Depth (cm) 0.1 cm 4/9/2019  2:46 PM   Wound Surface Area (cm^2) 1.9 cm^2 4/9/2019  2:46 PM   Change in Wound Size % (l*w) 62.3 4/9/2019  2:46 PM   Wound Volume (cm^3) 0.19 cm^3 4/9/2019  2:46 PM   Wound Healing % 62 4/9/2019  2:46 PM   Post-Procedure Length (cm) 2 cm 4/9/2019  3:18 PM   Post-Procedure Width (cm) 1.1 cm 4/9/2019  3:18 PM   Post-Procedure Depth (cm) 0.3 cm 4/9/2019  3:18 PM   Post-Procedure Surface Area (cm^2) 2.2 cm^2 4/9/2019  3:18 PM   Post-Procedure Volume (cm^3) 0.66 cm^3 4/9/2019  3:18 PM   Distance Tunneling (cm) 0 cm 3/19/2019  9:50 AM   Tunneling Position ___ O'Clock 0 3/5/2019  3:02 PM   Undermining Starts ___ O'Clock 0 3/5/2019  3:02 PM   Undermining Maxium Distance (cm) 0 3/19/2019  9:50 AM   Wound Assessment Dry;Red 4/9/2019  2:46 PM   Drainage Amount None 4/9/2019  2:46 PM   Drainage Description ROMERO 4/9/2019  2:46 PM   Odor None 4/9/2019  2:46 PM   Margins Defined edges 4/9/2019  2:46 PM   Georgina-wound Assessment Dry;Pink 4/9/2019  2:46 PM   Non-staged Wound Description Partial thickness 4/9/2019  2:46 PM   [place]%Wound Bed 60 4/2/2019  2:41 PM   Red%Wound Bed 100 4/9/2019  2:46 PM   Yellow%Wound Bed 20 3/26/2019  2:16 PM   Black%Wound Bed 0 3/5/2019  3:02 PM   Number of days: 65       Diabetic/Pressure/Non Pressure Ulcers:  Ulcer:   Non-Pressure ulcer, fat layer exposed      Total Surface Area of Ulcer(s) Covered 2 sq/cm    Was the Product Layered  Yes     Amount of Product Applied 6 sq/cm     Amount of Product Wasted 0 sq/cm     Reason for Waste N/A      Surgically Fixated: Yes    Secured With: Steri Strips and Mepitel     Procedural Pain: 0/10     Post Procedural Pain: 0 / 10    Response to Treatment: Well tolerated by patient. This was the first application of Epifix to the right leg wound. Plan: Will treat with multilayer compression dressing. As patient has re-ulcerated despite compression stockings and with abnormal venous reflux studies will refer patient to Dr. Roddy Jimenez for further evaluation and management to see if he is a candidate for a venous ablation procedure to prevent reulceration.   Appreciate Dr. Selwyn Osler recommendations. Due his weight, he is not a candidate for a venous procedure at this time. Will control edema with multilayer compression dressing on the right and compression stockings on the left. Encouraged patient to come in on Friday to have his dressing changed. Treatment Note please see attached Discharge Instructions    In my professional opinion this patient would benefit from HBO Therapy: No    Written patient dismissal instructions given to patient and signed by patient or POA.          RTC 1 week    Electronically signed by Rachelle Mayes DPM on 4/11/2019 at 3:42 PM

## 2019-04-16 ENCOUNTER — HOSPITAL ENCOUNTER (OUTPATIENT)
Dept: WOUND CARE | Age: 54
Discharge: HOME | End: 2019-04-16
Payer: COMMERCIAL

## 2019-04-16 VITALS
DIASTOLIC BLOOD PRESSURE: 71 MMHG | TEMPERATURE: 98.6 F | RESPIRATION RATE: 18 BRPM | HEART RATE: 97 BPM | SYSTOLIC BLOOD PRESSURE: 144 MMHG

## 2019-04-16 PROCEDURE — 11042 DBRDMT SUBQ TIS 1ST 20SQCM/<: CPT

## 2019-04-16 PROCEDURE — 29581 APPL MULTLAYER CMPRN SYS LEG: CPT

## 2019-04-16 PROCEDURE — 6370000000 HC RX 637 (ALT 250 FOR IP): Performed by: SURGERY

## 2019-04-16 RX ORDER — LIDOCAINE HYDROCHLORIDE 40 MG/ML
SOLUTION TOPICAL ONCE
Status: COMPLETED | OUTPATIENT
Start: 2019-04-16 | End: 2019-04-16

## 2019-04-16 RX ADMIN — LIDOCAINE HYDROCHLORIDE: 40 SOLUTION TOPICAL at 09:42

## 2019-04-16 NOTE — PROGRESS NOTES
[facility]  Progress Note and Procedure Note      John Pichardo III  MEDICAL RECORD NUMBER:  [id number]  AGE: 48 y.o. GENDER: male  : 1965  EPISODE DATE:  2019    Subjective:     Chief Complaint   Patient presents with    Wound Check     bilateral lower leg wounds         HISTORY of PRESENT ILLNESS HPI     John Pichardo III is a 48 y.o. male who presents today for wound/ulcer evaluation. History of Wound Context: left leg wound. He has kept his dressings clean and intact. Patient relates that his lymphedema pumps have been approved and he is waiting for them to be delivered. He still has received his lymphedema pumps and relates that he has been using them. He states that he usually only uses them once a day, but is trying to do better about using them twice daily. Wound/Ulcer Pain Timing/Severity: constant  Quality of pain: burning  Severity:  3 / 10   Modifying Factors: None  Associated Signs/Symptoms: edema     Ulcer Identification:  Ulcer Type: venous  Contributing Factors: edema and venous stasis     Wound: N/A            PAST MEDICAL HISTORY        Diagnosis Date    Breast disorder 2009    MAMMOGRAM -VE. S/P BREAST SURGERY EVAL    Obesity     PPD positive     Venous stasis ulcer of left lower extremity ([facility] [address] 10/25/2016       PAST SURGICAL HISTORY    No past surgical history on file. FAMILY HISTORY    Family History   Problem Relation Age of Onset    Heart Disease Mother     Diabetes Maternal Uncle     Diabetes Paternal Uncle     Cancer Paternal Uncle         ?  pancreatic       SOCIAL HISTORY    Social History     Tobacco Use    Smoking status: Never Smoker    Smokeless tobacco: Never Used   Substance Use Topics    Alcohol use: No    Drug use: No       ALLERGIES    No Known Allergies    MEDICATIONS    Current Outpatient Medications on File Prior to Encounter   Medication Sig Dispense Refill    Cholecalciferol (VITAMIN D) 2000 units CAPS capsule TAKE 1 CAPSULE BY MOUTH DAILY 30 capsule 0    acetaminophen (TYLENOL) 325 MG tablet Take 650 mg by mouth every 6 hours as needed for Pain      GLUCOSAMINE HCL PO Take by mouth daily      ibuprofen (ADVIL;MOTRIN) 800 MG tablet Take 1 tablet by mouth every 6 hours as needed for Pain 120 tablet 3    Compression Stockings MISC by Does not apply route Diagnosis: I83.333  Location of wound: Left Leg  Gradient IV (30-40 mm Hg)  Style: Knee Length  Extremity: Bilateral 1 each 0    Multiple Vitamin (MULTI VITAMIN PO) Take by mouth daily       No current facility-administered medications on file prior to encounter. REVIEW OF SYSTEMS    A comprehensive review of systems was negative. Objective:      BP (!) 144/71   Pulse 97   Temp 98.6 °F (37 °C) (Oral)   Resp 18     Wt Readings from Last 3 Encounters:   02/07/19 (!) 417 lb 9.6 oz (189.4 kg)   12/27/18 (!) 433 lb (196.4 kg)   09/28/18 (!) 405 lb (183.7 kg)       PHYSICAL EXAM    General Appearance: alert and oriented to person, place and time, well developed and well- nourished, morbidly obese,  in no acute distress  Skin: warm and dry, no rash or erythema  Head: normocephalic and atraumatic  Eyes: pupils equal, round, and reactive to light, extraocular eye movements intact, conjunctivae normal  ENT:  external ear and ear canal normal bilaterally, nose without deformity  Neck: supple and non-tender without mass, no thyromegaly or thyroid nodules, no cervical lymphadenopathy  Pulmonary/Chest: clear to auscultation bilaterally- no wheezes, rales or rhonchi, normal air movement, no respiratory distress  Cardiovascular: normal rate, regular rhythm, normal S1 and S2, no murmurs, rubs, clicks, or gallops  Abdomen: soft, non-tender, non-distended, normal bowel sounds, no masses or organomegaly  Extremities: no cyanosis, clubbing. 3 + edema both legs. Bilateral leg ulcers as scribed. No overt infection.   Musculoskeletal: normal range of motion, no joint swelling, deformity 4/16/2019  9:14 AM   Wound Healing % 64 4/16/2019  9:14 AM   Post-Procedure Length (cm) 2.1 cm 4/16/2019  9:49 AM   Post-Procedure Width (cm) 1 cm 4/16/2019  9:49 AM   Post-Procedure Depth (cm) 0.3 cm 4/16/2019  9:49 AM   Post-Procedure Surface Area (cm^2) 2.1 cm^2 4/16/2019  9:49 AM   Post-Procedure Volume (cm^3) 0.63 cm^3 4/16/2019  9:49 AM   Distance Tunneling (cm) 0 cm 3/19/2019  9:50 AM   Tunneling Position ___ O'Clock 0 3/5/2019  3:02 PM   Undermining Starts ___ O'Clock 0 3/5/2019  3:02 PM   Undermining Maxium Distance (cm) 0 3/19/2019  9:50 AM   Wound Assessment Dry;Red 4/16/2019  9:14 AM   Drainage Amount None 4/16/2019  9:14 AM   Drainage Description ROMERO 4/16/2019  9:14 AM   Odor None 4/16/2019  9:14 AM   Margins Defined edges 4/9/2019  2:46 PM   Georgina-wound Assessment Dry;Pink 4/16/2019  9:14 AM   Non-staged Wound Description Partial thickness 4/16/2019  9:14 AM   [place]%Wound Bed 60 4/2/2019  2:41 PM   Red%Wound Bed 100 4/16/2019  9:14 AM   Yellow%Wound Bed 20 3/26/2019  2:16 PM   Black%Wound Bed 0 3/5/2019  3:02 PM   Number of days: 70       Percent of Wound(s)/Ulcer(s) Debrided: 100%    Total Surface Area Debrided:  4.19 sq cm       Diabetic/Pressure/Non Pressure Ulcers only:  Ulcer: Non-Pressure ulcer, fat layer exposed      Estimated Blood Loss:  Minimal    Hemostasis Achieved:  by pressure    Procedural Pain:  2  / 10     Post Procedural Pain:  0 / 10     Response to treatment:  Well tolerated by patient. Plan:       Treatment Note please see attached Discharge Instructions    New Medication(s) at this visit:   New Prescriptions    No medications on file       Smoking Cessation: Counseling given: Not Answered        In my professional opinion this patient would benefit from HBO Therapy: No       Patient is to return to wound care center in:  1 week(s)    Written patient dismissal instructions given to patient and signed by patient or POA.          Discharge [facility] Physician Orders and Discharge Instructions  Karli Adrian [address], [address] [facility]  Telephone: [phone] hands with soap and water prior to and after every dressing change. Wound Cleansing:   · Do not scrub or use excessive force. · With each dressing change, rinse wounds with 0.9% Saline. (May use wound wash or soft contact solution. Both can be purchased at a local drug store). · If unable to obtain saline, may use a gentle soap and water. · Keep wounds dry in the shower unless otherwise instructed by the physician. Topical Treatments:  Lotion to bilateral lower legs     Dressings:           Wound Location: bilateral lower legs     [x] Apply Primary Dressing to wound:       [] Foam/Foam with Border(i.e Mepilex) [] Non-adherent (i.e.Mepitel)   [] Alginate with Silver (i.e. Silvercel)  [] Alginate (i.e. Aquacel)   [] Collagen (i.e. Puracol)   [x] Collagen with Silver(i.e. Beverly)     [] Hydrocolloid    [] Hydrafera Blue moistened with saline   [] MediHoney Paste/Gel   [] Hydrogel    [] Endoform      [] Santyl covered with gauze moisten with saline    [] Bactroban/Mupirocin   [] Polysporin/Neosporin  [] Other:      Pack wound loosely with: [] Iodoform   [] Plain Packing  [] Saline \"wet to dry\"      [x] Cover and Secure with:     [x] Dry Gauze  [] ABD [] Cast padding [] Kerlix or Karina rolled gauze   [] Coban  [] Ace Wrap [] Ace Wrap from forefoot to just below the knee [] Paper Tape [] Medipore Tape [] Other:    Avoid contact of tape with skin if possible. [x] Change dressing: [] Daily    [] Every Other Day  [] Three times per week:  [] Monday, Wednesday, Friday  [] Tuesday, Thursday, Saturday   [] Once a week  [x] Do Not Change Dressing [] Other:     Lymphedema Therapy:   [x] Wear Lymphedema pumps twice a day at settings prescribed by your physician.        [x]Elevate leg(s) above the level of the heart for 30 minutes 4-5 times a day and/or when sitting. [x] Avoid prolonged standing in one place. Multilayer Compression Wrap:  Type: 2 Layer compression wrap      Applied in Clinic to the :  Bilateral lower leg(s)     · Do not get leg(s) with wrap wet. · If wraps become too tight call the center or completely remove the wrap. · Elevate leg(s) above the level of the heart when sitting. · Avoid prolonged standing  periods. · Do not get cast wet. · Contact center or go to emergency room if there is a foul odor or becomes uncomfortable due to feeling tight or swelling. · Do not use objects inside of cast to scratch. Dietary:  Important dietary reminders:  1. Increase Protein intake (i.e. Lean meats, fish, eggs, legumes, and yogurt)  2. No added salt  3. If diabetic, follow a diabetic diet and check glucose prior to meals or as instructed by your physician. Return Appointment:   DME/Wound Dressing Supplies Provided by:   (Please call them directly to reorder supplies when you run out)   ECF or Home Healthcare:   Wound reassessment visit with Nurse :    [facility] Return Appointment: With Dr. Sarbjit Hinton or Dr. Blair Schultz   in  [address] [place] [address]        Your nurse  is:  Paulie Pride     Electronically signed by Sheridan West RN on 4/16/2019 at 9:52 [address] Information: Should you experience any significant changes in your wound(s) or have questions about your wound care, please contact the [address] [place] [address] at [phone] Monday-Friday from 8:00 am - 4:30 pm except for Wednesdays which hours are from 8:00 am - 2:00 pm.   If you need help with your wound outside these hours and cannot wait until we are again available, contact your PCP or go to the hospital emergency room. PLEASE NOTE: IF YOU ARE UNABLE TO OBTAIN WOUND SUPPLIES, CONTINUE TO USE THE SUPPLIES YOU HAVE AVAILABLE UNTIL YOU ARE ABLE TO REACH US. IT IS MOST IMPORTANT TO KEEP THE WOUND COVERED AT ALL TIMES.       [] Patient unable to sign Discharge Instructions given to ECF/Transportation/POA            Electronically signed by Hanna Rangel MD on 4/16/2019 at 10:47 AM

## 2019-04-16 NOTE — PROGRESS NOTES
Multilayer Compression Wrap   (Not Unna) Below the Knee    NAME:  Forrest Baker III  YOB: 1965  MEDICAL RECORD NUMBER:  [id number]  DATE:  4/16/2019       [x] Removed old Multilayer wrap if present and washed leg with mild soap/water.  [x] Applied moisturizing agent to dry skin as needed.  [x] Applied primary and secondary dressing as ordered     [x] Applied multilayered dressing below the knee to Bilateral lower leg(s)  (2 Layer compression wrap ) per 's instructions.  [x] Instructed patient/caregiver not to remove dressing and to keep it clean and dry.  [x] Instructed patient/caregiver on complications to report to provider, such as pain, numbness in toes, heavy drainage, and slippage of dressing.  [x] Instructed patient on purpose of compression dressing and on activity and exercise recommendations.        Applied per   Guidelines    Electronically signed by Sukhdev Coburn RN on 4/16/2019 at 11:08 AM

## 2019-04-22 ENCOUNTER — HOSPITAL ENCOUNTER (OUTPATIENT)
Dept: WOUND CARE | Age: 54
Discharge: HOME | End: 2019-04-22
Payer: COMMERCIAL

## 2019-04-22 VITALS
HEART RATE: 89 BPM | DIASTOLIC BLOOD PRESSURE: 67 MMHG | SYSTOLIC BLOOD PRESSURE: 154 MMHG | TEMPERATURE: 97.8 F | RESPIRATION RATE: 18 BRPM

## 2019-04-22 DIAGNOSIS — E66.09 OBESITY DUE TO EXCESS CALORIES WITHOUT SERIOUS COMORBIDITY, UNSPECIFIED CLASSIFICATION: Primary | ICD-10-CM

## 2019-04-22 DIAGNOSIS — I83.212 VARICOSE VEINS OF RIGHT LOWER EXTREMITY WITH INFLAMMATION, WITH ULCER OF CALF WITH FAT LAYER EXPOSED (HCC): ICD-10-CM

## 2019-04-22 DIAGNOSIS — L97.212 VARICOSE VEINS OF RIGHT LOWER EXTREMITY WITH INFLAMMATION, WITH ULCER OF CALF WITH FAT LAYER EXPOSED (HCC): ICD-10-CM

## 2019-04-22 PROCEDURE — 29581 APPL MULTLAYER CMPRN SYS LEG: CPT

## 2019-04-22 PROCEDURE — 6370000000 HC RX 637 (ALT 250 FOR IP): Performed by: SURGERY

## 2019-04-22 PROCEDURE — 99214 OFFICE O/P EST MOD 30 MIN: CPT | Performed by: SURGERY

## 2019-04-22 RX ORDER — LIDOCAINE HYDROCHLORIDE 40 MG/ML
SOLUTION TOPICAL ONCE
Status: COMPLETED | OUTPATIENT
Start: 2019-04-22 | End: 2019-04-22

## 2019-04-22 RX ADMIN — LIDOCAINE HYDROCHLORIDE: 40 SOLUTION TOPICAL at 14:45

## 2019-04-22 NOTE — PROGRESS NOTES
Multilayer Compression Wrap   (Not Unna) Below the Knee    NAME:  Lachelle Lucero III  YOB: 1965  MEDICAL RECORD NUMBER:  [id number]  DATE:  4/22/2019       [x] Removed old Multilayer wrap if present and washed leg with mild soap/water.  [x] Applied moisturizing agent to dry skin as needed.  [x] Applied primary and secondary dressing as ordered     [x] Applied multilayered dressing below the knee to Bilateral lower leg(s)  (4 Layer Compression Wrap) per 's instructions.  [x] Instructed patient/caregiver not to remove dressing and to keep it clean and dry.  [x] Instructed patient/caregiver on complications to report to provider, such as pain, numbness in toes, heavy drainage, and slippage of dressing.  [x] Instructed patient on purpose of compression dressing and on activity and exercise recommendations.        Applied per   Guidelines    Electronically signed by Tye Noble RN on 4/22/2019 at 3:16 PM

## 2019-04-22 NOTE — PROGRESS NOTES
1227 [facility]  Progress Note and Procedure Note      Angelique Kaminski III  MEDICAL RECORD NUMBER:  [id number]  AGE: 48 y.o. GENDER: male  : 1965  EPISODE DATE:  2019    Subjective:     Chief Complaint   Patient presents with    Wound Check     ble   HISTORY of PRESENT ILLNESS HPI   Angelique Kaminski III is a 48 y.o. male who presents today for wound/ulcer evaluation. I have seen him in the past for evaluation of venous stasis and its contribution to wound. At that time, he wanted to pursue conservative management, including compression and weight loss. He has lost approx 40 pounds, but has stalled. He recently obtained lymphedema pumps and has noted improvement in circumference of legs. He has known deep and superficial vein reflux. No significant arterial disease. Ulcer Identification:  Ulcer Type: venous  Contributing Factors: edema, venous stasis, decreased mobility and obesity        PAST MEDICAL HISTORY      Diagnosis Date    Breast disorder     MAMMOGRAM -VE. S/P BREAST SURGERY EVAL    Obesity     PPD positive     Venous stasis ulcer of left lower extremity ([address] 10/25/2016       PAST SURGICAL HISTORY  History reviewed. No pertinent surgical history. FAMILY HISTORY  Family History   Problem Relation Age of Onset    Heart Disease Mother     Diabetes Maternal Uncle     Diabetes Paternal Uncle     Cancer Paternal Uncle         ?  pancreatic       SOCIAL HISTORY  Social History     Tobacco Use    Smoking status: Never Smoker    Smokeless tobacco: Never Used   Substance Use Topics    Alcohol use: No    Drug use: No       ALLERGIES  No Known Allergies    MEDICATIONS  Current Outpatient Medications on File Prior to Encounter   Medication Sig Dispense Refill    Cholecalciferol (VITAMIN D) 2000 units CAPS capsule TAKE 1 CAPSULE BY MOUTH DAILY 30 capsule 0    acetaminophen (TYLENOL) 325 MG tablet Take 650 mg by mouth every 6 hours as needed for Pain      GLUCOSAMINE HCL PO Take by mouth daily      Multiple Vitamin (MULTI VITAMIN PO) Take by mouth daily      ibuprofen (ADVIL;MOTRIN) 800 MG tablet Take 1 tablet by mouth every 6 hours as needed for Pain 120 tablet 3    Compression Stockings MISC by Does not apply route Diagnosis: I83.333  Location of wound: Left Leg  Gradient IV (30-40 mm Hg)  Style: Knee Length  Extremity: Bilateral 1 each 0     No current facility-administered medications on file prior to encounter. REVIEW OF SYSTEMSPertinent items are noted in HPI. Last A4J if applicable:   Hemoglobin A1C   Date Value Ref Range Status   02/13/2019 6.5 See comment % Final     Comment:     Abnormal hemoglobin detected on glycohemoglobin methodology. Suggest hemoglobin electrophoresis if clinically indicated. Comment:  Diagnosis of Diabetes: > or = 6.5%  Increased risk of diabetes (Prediabetes): 5.7-6.4%  Glycemic Control: Nonpregnant Adults: <7.0%                    Pregnant: <6.0%           Objective:      BP (!) 154/67   Pulse 89   Temp 97.8 °F (36.6 °C) (Oral)   Resp 18     Wt Readings from Last 3 Encounters:   02/07/19 (!) 417 lb 9.6 oz (189.4 kg)   12/27/18 (!) 433 lb (196.4 kg)   09/28/18 (!) 405 lb (183.7 kg)       PHYSICAL EXAM  General Appearance: alert and oriented to person, place and time, well-developed and well-nourished, in no acute distress and obese  Extremities: Edema improved since last visit with me--no pitting noted. No significant discoloration. Shallow, clean ulcers as noted. No erythema. No drainage. No exudate. Vascular:  Palpable DP pulses; PT biphasic.       Assessment:      Patient Active Problem List   Diagnosis Code    Family history of early CAD Z80.55    PPD positive R76.11    Vitamin D deficiency E55.9    Edema R60.9    Morbid obesity ([address] E66.01    Anemia D64.9    Venous stasis ulcer of left lower extremity ([address] I83.029, L97.929    Venous stasis ulcer of ankle, right ([facility] [address] I83.013, L97.319    4/22/2019  2:08 PM   Yellow%Wound Bed 10 3/26/2019  2:16 PM   Black%Wound Bed 0 1/15/2019  9:12 AM   Other%Wound Bed 0 2/15/2019 11:15 AM   Number of days: 198       Wound 02/05/19 #12 Left lower lateral leg (Active)   Wound Image   4/22/2019  2:25 PM   Wound Venous 2/5/2019  1:57 PM   Dressing/Treatment Collagen with Ag 4/22/2019  2:25 PM   Wound Cleansed Rinsed/Irrigated with saline 4/22/2019  2:08 PM   Wound Length (cm) 2.5 cm 4/22/2019  2:08 PM   Wound Width (cm) 1 cm 4/22/2019  2:08 PM   Wound Depth (cm) 0.1 cm 4/22/2019  2:08 PM   Wound Surface Area (cm^2) 2.5 cm^2 4/22/2019  2:08 PM   Change in Wound Size % (l*w) 50.4 4/22/2019  2:08 PM   Wound Volume (cm^3) 0.25 cm^3 4/22/2019  2:08 PM   Wound Healing % 50 4/22/2019  2:08 PM   Post-Procedure Length (cm) 2.5 cm 4/22/2019  2:25 PM   Post-Procedure Width (cm) 1 cm 4/22/2019  2:25 PM   Post-Procedure Depth (cm) 0.1 cm 4/22/2019  2:25 PM   Post-Procedure Surface Area (cm^2) 2.5 cm^2 4/22/2019  2:25 PM   Post-Procedure Volume (cm^3) 0.25 cm^3 4/22/2019  2:25 PM   Distance Tunneling (cm) 0 cm 3/19/2019  9:50 AM   Tunneling Position ___ O'Clock 0 3/5/2019  3:02 PM   Undermining Starts ___ O'Clock 0 3/5/2019  3:02 PM   Undermining Maxium Distance (cm) 0 3/19/2019  9:50 AM   Wound Assessment Dry;Red 4/22/2019  2:08 PM   Drainage Amount Small 4/22/2019  2:08 PM   Drainage Description Sanguinous 4/22/2019  2:08 PM   Odor None 4/22/2019  2:08 PM   Margins Defined edges 4/22/2019  2:08 PM   Georgina-wound Assessment Pink;Dry;Brown 4/22/2019  2:08 PM   Non-staged Wound Description Partial thickness 4/22/2019  2:08 PM   [place]%Wound Bed 60 4/2/2019  2:41 PM   Red%Wound Bed 100 4/22/2019  2:08 PM   Yellow%Wound Bed 20 3/26/2019  2:16 PM   Black%Wound Bed 0 3/5/2019  3:02 PM   Number of days: 76        Plan:   He wants to pursue weight loss again. He is asking about referral to Bariatric Surgery, as he feels he has maxed out on the non-surgical methods.   He is still very committed to weight loss for overall health and wants to defer any venous intervention until after this. He states he will be compliant with compression and lymphedema pumps. Referral placed to Dr. Nena Stauffer. Treatment Note please see attached Discharge Instructions    New Medication(s) at this visit:   New Prescriptions    No medications on file       Other orders at this visit:   Orders Placed This Encounter   Procedures   [facility], DO Ig[place], Bariatric Surgery, [facility]    Wound care       Smoking Cessation: Counseling given: Not Answered        In my professional opinion this patient would benefit from HBO Therapy: No       Patient is to return to wound care center in:  1 week(s)    Written patient dismissal instructions given to patient and signed by patient or POA. Discharge 200 [facility] Physician Orders and Discharge Instructions  The Adi Adrian [address] [address]  Telephone: [phone] hands with soap and water prior to and after every dressing change. Wound Cleansing:   · Do not scrub or use excessive force. · With each dressing change, rinse wounds with 0.9% Saline. (May use wound wash or soft contact solution. Both can be purchased at a local drug store). · If unable to obtain saline, may use a gentle soap and water. · Keep wounds dry in the shower unless otherwise instructed by the physician.     Topical Treatments:   LOTION TO BILATERAL LOWER LEGS     Dressings:           Wound Location: bilateral lower leg wounds      [x] Apply Primary Dressing to wound:       [] Foam/Foam with Border(i.e Mepilex) [] Non-adherent (i.e.Mepitel)   [] Alginate with Silver (i.e. Silvercel)  [] Alginate (i.e. Aquacel)   [] Collagen (i.e. Puracol)   [x] Collagen with Silver(i.e. Beverly): moisten     [] Hydrocolloid    [] Hydrafera Blue moistened with saline   [] MediHoney Paste/Gel   [] Hydrogel    [] Endoform      [] Santyl covered with gauze moisten with saline    [] Bactroban/Mupirocin   [] Polysporin/Neosporin  [] Other:      Pack wound loosely with: [] Iodoform   [] Plain Packing  [] Saline \"wet to dry\"      [x] Cover and Secure with:     [x] Dry Gauze  [] ABD [] Cast padding [] Kerlix or Karina rolled gauze   [] Coban  [] Ace Wrap [] Ace Wrap from forefoot to just below the knee [] Paper Tape [] Medipore Tape [] Other:    Avoid contact of tape with skin if possible. [x] Change dressing: [] Daily    [] Every Other Day  [] Three times per week:  [] Monday, Wednesday, Friday  [] Tuesday, Thursday, Saturday   [] Once a week  [x] Do Not Change Dressing [] Other:    Lymphedema Therapy:   [x] Wear Lymphedema pumps twice a day at settings prescribed by your physician. [x]Elevate leg(s) above the level of the heart for 30 minutes 4-5 times a day and/or when sitting. [x] Avoid prolonged standing in one place. Multilayer Compression Wrap:  Type: 4 Layer Compression Wrap      Applied in Clinic to the :  Bilateral lower leg(s)     · Do not get leg(s) with wrap wet. · If wraps become too tight call the center or completely remove the wrap. · Elevate leg(s) above the level of the heart when sitting. · Avoid prolonged standing in one place. [] [facility] remove wrap, cleanse affected leg(s) with soap and water, apply wound dressings as ordered above and reapply compression wraps on:      Dietary:  Important dietary reminders:  1. Increase Protein intake (i.e. Lean meats, fish, eggs, legumes, and yogurt)  2. No added salt  3. If diabetic, follow a diabetic diet and check glucose prior to meals or as instructed by your physician.         Return Appointment:   DME/Wound Dressing Supplies Provided by:   (Please call them directly to reorder supplies when you run out)   ECF or Home Healthcare:   Wound reassessment visit with Nurse :    [place] Return Appointment: With Dr. Cj Alexander  in  1 Week(s). Your nurse  is:  Fouzia Campos     Electronically signed by Brian Cantrell RN on 4/22/2019 at 2:57 PM     [address] Information: Should you experience any significant changes in your wound(s) or have questions about your wound care, please contact the [address] [place] [address] at [phone] Monday-Friday from 8:00 am - 4:30 pm except for Wednesdays which hours are from 8:00 am - 2:00 pm.   If you need help with your wound outside these hours and cannot wait until we are again available, contact your PCP or go to the hospital emergency room. PLEASE NOTE: IF YOU ARE UNABLE TO OBTAIN WOUND SUPPLIES, CONTINUE TO USE THE SUPPLIES YOU HAVE AVAILABLE UNTIL YOU ARE ABLE TO REACH US. IT IS MOST IMPORTANT TO KEEP THE WOUND COVERED AT ALL TIMES.       [] Patient unable to sign Discharge Instructions given to ECF/Transportation/POA            Electronically signed by Josefina Medrano MD on 4/22/2019 at 4:32 PM

## 2019-04-30 ENCOUNTER — HOSPITAL ENCOUNTER (OUTPATIENT)
Dept: WOUND CARE | Age: 54
Discharge: HOME | End: 2019-04-30
Payer: COMMERCIAL

## 2019-04-30 VITALS
SYSTOLIC BLOOD PRESSURE: 145 MMHG | DIASTOLIC BLOOD PRESSURE: 77 MMHG | TEMPERATURE: 97.8 F | HEART RATE: 81 BPM | RESPIRATION RATE: 18 BRPM

## 2019-04-30 DIAGNOSIS — E66.01 CLASS 3 SEVERE OBESITY DUE TO EXCESS CALORIES WITHOUT SERIOUS COMORBIDITY IN ADULT, UNSPECIFIED BMI (HCC): Primary | ICD-10-CM

## 2019-04-30 PROCEDURE — 29581 APPL MULTLAYER CMPRN SYS LEG: CPT

## 2019-04-30 PROCEDURE — 11042 DBRDMT SUBQ TIS 1ST 20SQCM/<: CPT

## 2019-04-30 PROCEDURE — 6370000000 HC RX 637 (ALT 250 FOR IP): Performed by: PODIATRIST

## 2019-04-30 RX ORDER — LIDOCAINE HYDROCHLORIDE 40 MG/ML
SOLUTION TOPICAL ONCE
Status: COMPLETED | OUTPATIENT
Start: 2019-04-30 | End: 2019-04-30

## 2019-04-30 RX ADMIN — LIDOCAINE HYDROCHLORIDE: 40 SOLUTION TOPICAL at 17:40

## 2019-04-30 NOTE — PROGRESS NOTES
Multilayer Compression Wrap   (Not Unna) Below the Knee    NAME:  Verito Esquivel III  YOB: 1965  MEDICAL RECORD NUMBER:  [id number]  DATE:  4/30/2019       [x] Removed old Multilayer wrap if present and washed leg with mild soap/water.  [x] Applied moisturizing agent to dry skin as needed.  [x] Applied primary and secondary dressing as ordered     [x] Applied multilayered dressing below the knee to Bilateral lower leg(s)  (4 Layer Compression Wrap) per 's instructions.  [x] Instructed patient/caregiver not to remove dressing and to keep it clean and dry.  [x] Instructed patient/caregiver on complications to report to provider, such as pain, numbness in toes, heavy drainage, and slippage of dressing.  [x] Instructed patient on purpose of compression dressing and on activity and exercise recommendations.        Applied per   Guidelines    Electronically signed by Jenny Perdomo RN on 4/30/2019 at 6:24 PM

## 2019-05-01 NOTE — PROGRESS NOTES
[facility]   Progress Note and Procedure Note      Jami Hunt III  MEDICAL RECORD NUMBER:  [id number]  AGE: 48 y.o. GENDER: male  : 1965  EPISODE DATE:  2019    Subjective:     Chief Complaint   Patient presents with    Wound Check     BLE         HISTORY of PRESENT ILLNESS HPI     Jami Hunt III is a 48 y.o. male who presents today for wound/ulcer evaluation. History of Wound Context: left leg wound. He has kept his dressings clean and intact. Patient relates that his lymphedema pumps have been approved and he is waiting for them to be delivered. He still has received his lymphedema pumps and relates that he has been using them. He states that he usually only uses them once a day, but is trying to do better about using them twice daily. Wound/Ulcer Pain Timing/Severity: constant  Quality of pain: burning  Severity:  3 / 10   Modifying Factors: None  Associated Signs/Symptoms: edema    Ulcer Identification:  Ulcer Type: venous  Contributing Factors: edema and venous stasis    Wound: N/A        PAST MEDICAL HISTORY        Diagnosis Date    Breast disorder 2009    MAMMOGRAM -VE. S/P BREAST SURGERY EVAL    Obesity     PPD positive     Venous stasis ulcer of left lower extremity ([facility] [address] 10/25/2016       PAST SURGICAL HISTORY    History reviewed. No pertinent surgical history. FAMILY HISTORY    Family History   Problem Relation Age of Onset    Heart Disease Mother     Diabetes Maternal Uncle     Diabetes Paternal Uncle     Cancer Paternal Uncle         ?  pancreatic       SOCIAL HISTORY    Social History     Tobacco Use    Smoking status: Never Smoker    Smokeless tobacco: Never Used   Substance Use Topics    Alcohol use: No    Drug use: No       ALLERGIES    No Known Allergies    MEDICATIONS    Current Outpatient Medications on File Prior to Encounter   Medication Sig Dispense Refill    Cholecalciferol (VITAMIN D) 2000 units CAPS capsule TAKE 1 CAPSULE BY MOUTH DAILY 30 capsule 0    acetaminophen (TYLENOL) 325 MG tablet Take 650 mg by mouth every 6 hours as needed for Pain      GLUCOSAMINE HCL PO Take by mouth daily      Multiple Vitamin (MULTI VITAMIN PO) Take by mouth daily      ibuprofen (ADVIL;MOTRIN) 800 MG tablet Take 1 tablet by mouth every 6 hours as needed for Pain 120 tablet 3    Compression Stockings MISC by Does not apply route Diagnosis: I83.333  Location of wound: Left Leg  Gradient IV (30-40 mm Hg)  Style: Knee Length  Extremity: Bilateral 1 each 0     No current facility-administered medications on file prior to encounter. REVIEW OF SYSTEMS    Pertinent items are noted in HPI. Review of Systems: A 12 point review of symptoms is unremarkable with the exception of the chief complaint. Patient specifically denies nausea, fever, vomiting, chills, shortness of breath, chest pain, abdominal pain, constipation or difficulty urinating. Objective:        BP (!) 145/77   Pulse 81   Temp 97.8 °F (36.6 °C) (Oral)   Resp 18     Wt Readings from Last 3 Encounters:   02/07/19 (!) 417 lb 9.6 oz (189.4 kg)   12/27/18 (!) 433 lb (196.4 kg)   09/28/18 (!) 405 lb (183.7 kg)       PHYSICAL EXAM    DP/PT pulses palpable bilaterally. CFT brisk to all digits. Digits are pink and warm to the touch. Hair growth is normal in appearance. No calf pain with palpation noted. Patient has pitting edema noted on the bilateral lower extremities. There are brawny pigmentary changes noted bilaterally at the distal aspect of the leg. There are hypertrophic skin changes noted bilaterally consistent with chronic venous stasis/lymphedema. Epicritic sensation is grossly intact bilaterally. Negative clonus and babinski reflex is down going. Wound note on the right and left lower extremity. Wound has fibrotic and nonviable tissue that extends down through and includes the subcutaneous tissue. After debridement the wound has a granular base.  There is no surrounding erythema, edema, warmth or malodor noted. The wound does not probe or track to bone. Assessment:      Patient Active Problem List   Diagnosis Code    Family history of early CAD Z80.55    PPD positive R76.11    Vitamin D deficiency E55.9    Edema R60.9    Morbid obesity ([address] E66.01    Anemia D64.9    Venous stasis ulcer of left lower extremity ([address] I83.029, L97.929    Venous stasis ulcer of ankle, right ([address] I83.013, L97.319    Idiopathic chronic venous hypertension of both lower extremities with ulcer and inflammation ([address] I87.333, L97.919, L97.929    Non-pressure chronic ulcer of right calf with fat layer exposed ([address] L97.212    Varicose veins of right lower extremity with both ulcer of calf and inflammation ([address] I83.212, L97.219    Prediabetes R73.03    Obesity due to excess calories without serious comorbidity E66.09        Excisional Debridement Procedure Note  Indications:  Based on my examination of this patient's wound(s)/ulcer(s) today, debridement is required to promote healing and evaluate the wound base. Performed by: Manish Falk DPM    Consent obtained? Yes    Time out taken: Yes    Pain Control: Anesthetic: 4% Lidocaine Liquid Topical     Debridement: Excisional Debridement    Using curette, #15 blade scalpel, scissors and forceps the wound/ulcer was sharply debrided    down through and including the removal of  epidermis, dermis and subcutaneous tissue.         Devitalized Tissue Debrided:  fibrin and slough      Pre Debridement Measurements:  Are located in the [place]  Documentation Flow Sheet   Wound/Ulcer #: 11 and 12     Post  Debridement Measurements:    Wound 10/06/18 #11 right lower lateral leg (since 10/6/18) (Active)   Wound Image   4/22/2019  2:25 PM   Wound Venous 2/15/2019 11:15 AM   Dressing/Treatment Collagen with Ag 4/22/2019  2:25 PM   Wound Cleansed Rinsed/Irrigated with saline 4/22/2019  2:08 PM   Wound Length (cm) 1.5 cm 4/30/2019  5:33 PM Wound Healing % 24 4/30/2019  5:33 PM   Post-Procedure Length (cm) 2.6 cm 4/30/2019  5:41 PM   Post-Procedure Width (cm) 1.1 cm 4/30/2019  5:41 PM   Post-Procedure Depth (cm) 0.3 cm 4/30/2019  5:41 PM   Post-Procedure Surface Area (cm^2) 2.86 cm^2 4/30/2019  5:41 PM   Post-Procedure Volume (cm^3) 0.86 cm^3 4/30/2019  5:41 PM   Distance Tunneling (cm) 0 cm 3/19/2019  9:50 AM   Tunneling Position ___ O'Clock 0 3/5/2019  3:02 PM   Undermining Starts ___ O'Clock 0 3/5/2019  3:02 PM   Undermining Maxium Distance (cm) 0 3/19/2019  9:50 AM   Wound Assessment Dry;Red 4/30/2019  5:33 PM   Drainage Amount Small 4/30/2019  5:33 PM   Drainage Description Serosanguinous 4/30/2019  5:33 PM   Odor None 4/30/2019  5:33 PM   Margins Defined edges 4/30/2019  5:33 PM   Georgina-wound Assessment Dry;Pink;Brown 4/30/2019  5:33 PM   Non-staged Wound Description Partial thickness 4/30/2019  5:33 PM   [place]%Wound Bed 60 4/2/2019  2:41 PM   Red%Wound Bed 100 4/30/2019  5:33 PM   Yellow%Wound Bed 20 3/26/2019  2:16 PM   Black%Wound Bed 0 3/5/2019  3:02 PM   Number of days: 85     Percent of Wound/Ulcer Debrided: 100%    Total Surface Area Debrided:  6.54 sq cm    Diabetic/Pressure/Non Pressure Ulcers only:  Ulcer: Non-Pressure ulcer, fat layer exposed    Bleeding: Minimal    Hemostasis Achieved: by pressure    Procedural Pain: 0  / 10     Post Procedural Pain: 0 / 10     Response to treatment:  Well tolerated by patient. Plan: Will treat with multilayer compression dressing. As patient has re-ulcerated despite compression stockings and with abnormal venous reflux studies will refer patient to Dr. Nelia Jackman for further evaluation and management to see if he is a candidate for a venous ablation procedure to prevent reulceration. Appreciate Dr. Patricia kennedy. Due his weight, he is not a candidate for a venous procedure at this time.    Patient inquired about bariatric surgery as he has struggled with his weight and has

## 2019-05-06 ENCOUNTER — HOSPITAL ENCOUNTER (OUTPATIENT)
Dept: WOUND CARE | Age: 54
Discharge: HOME | End: 2019-05-06
Payer: COMMERCIAL

## 2019-05-06 VITALS
SYSTOLIC BLOOD PRESSURE: 172 MMHG | RESPIRATION RATE: 18 BRPM | HEART RATE: 90 BPM | TEMPERATURE: 98.2 F | DIASTOLIC BLOOD PRESSURE: 91 MMHG

## 2019-05-06 DIAGNOSIS — L97.212 NON-PRESSURE CHRONIC ULCER OF RIGHT CALF WITH FAT LAYER EXPOSED (HCC): ICD-10-CM

## 2019-05-06 DIAGNOSIS — L97.212 VARICOSE VEINS OF RIGHT LOWER EXTREMITY WITH INFLAMMATION, WITH ULCER OF CALF WITH FAT LAYER EXPOSED (HCC): Primary | ICD-10-CM

## 2019-05-06 DIAGNOSIS — I83.212 VARICOSE VEINS OF RIGHT LOWER EXTREMITY WITH INFLAMMATION, WITH ULCER OF CALF WITH FAT LAYER EXPOSED (HCC): Primary | ICD-10-CM

## 2019-05-06 PROCEDURE — 6370000000 HC RX 637 (ALT 250 FOR IP): Performed by: SURGERY

## 2019-05-06 PROCEDURE — 29581 APPL MULTLAYER CMPRN SYS LEG: CPT

## 2019-05-06 RX ORDER — LIDOCAINE HYDROCHLORIDE 40 MG/ML
SOLUTION TOPICAL ONCE
Status: COMPLETED | OUTPATIENT
Start: 2019-05-06 | End: 2019-05-06

## 2019-05-06 RX ADMIN — LIDOCAINE HYDROCHLORIDE: 40 SOLUTION TOPICAL at 15:10

## 2019-05-06 NOTE — PROGRESS NOTES
Multilayer Compression Wrap   (Not Unna) Below the Knee    NAME:  Angelique Kaminski III  YOB: 1965  MEDICAL RECORD NUMBER:  [id number]  DATE:  5/6/2019    Multilayer compression wrap: Removed old Multilayer wrap if indicated and wash leg with mild soap/water. Applied moisturizing agent to dry skin as needed. Applied primary and secondary dressing as ordered. Applied multilayered dressing below the knee to right lower leg  Applied multilayered dressing below the knee to left lower leg  Instructed patient/caregiver not to remove dressing and to keep it clean and dry. Instructed patient/caregiver on complications to report to provider, such as pain, numbness in toes, heavy drainage, and slippage of dressing. Instructed patient on purpose of compression dressing and on activity and exercise recommendations.     4 layer compression wrap    Electronically signed by Brian Cantrell RN on 5/6/2019 at 3:30 PM

## 2019-05-06 NOTE — PROGRESS NOTES
[facility]  Progress Note and Procedure Note      Angelique Kaminski III  MEDICAL RECORD NUMBER:  [id number]  AGE: 48 y.o. GENDER: male  : 1965  EPISODE DATE:  2019    Subjective:     Chief Complaint   Patient presents with    Wound Check     bilat legs         HISTORY of PRESENT ILLNESS HPI   Angelique Kaminski III is a 48 y.o. male who presents today for wound/ulcer evaluation. He just recently received his lymphedema pumps. Denies pain. Apparently, he has been discharged from [facility] for 3 no-shows, which he denies. PAST MEDICAL HISTORY      Diagnosis Date    Breast disorder     MAMMOGRAM -VE. S/P BREAST SURGERY EVAL    Obesity     PPD positive     Venous stasis ulcer of left lower extremity ([facility] [address] 10/25/2016       PAST SURGICAL HISTORY  History reviewed. No pertinent surgical history. ALLERGIES  No Known Allergies    MEDICATIONS  Current Outpatient Medications on File Prior to Encounter   Medication Sig Dispense Refill    Cholecalciferol (VITAMIN D) 2000 units CAPS capsule TAKE 1 CAPSULE BY MOUTH DAILY 30 capsule 0    acetaminophen (TYLENOL) 325 MG tablet Take 650 mg by mouth every 6 hours as needed for Pain      GLUCOSAMINE HCL PO Take by mouth daily      Multiple Vitamin (MULTI VITAMIN PO) Take by mouth daily      ibuprofen (ADVIL;MOTRIN) 800 MG tablet Take 1 tablet by mouth every 6 hours as needed for Pain 120 tablet 3    Compression Stockings MISC by Does not apply route Diagnosis: I83.333  Location of wound: Left Leg  Gradient IV (30-40 mm Hg)  Style: Knee Length  Extremity: Bilateral 1 each 0     No current facility-administered medications on file prior to encounter. REVIEW OF SYSTEMS:  Pertinent items are noted in HPI. Last B1G if applicable:   Hemoglobin A1C   Date Value Ref Range Status   2019 6.5 See comment % Final     Comment:     Abnormal hemoglobin detected on glycohemoglobin methodology.   Suggest hemoglobin electrophoresis if clinically indicated. Comment:  Diagnosis of Diabetes: > or = 6.5%  Increased risk of diabetes (Prediabetes): 5.7-6.4%  Glycemic Control: Nonpregnant Adults: <7.0%                    Pregnant: <6.0%           Objective:      BP (!) 172/91   Pulse 90   Temp 98.2 °F (36.8 °C) (Oral)   Resp 18     Wt Readings from Last 3 Encounters:   02/07/19 (!) 417 lb 9.6 oz (189.4 kg)   12/27/18 (!) 433 lb (196.4 kg)   09/28/18 (!) 405 lb (183.7 kg)       PHYSICAL EXAM:  BLE:  Wounds stable, clean with granulation and minimal exudate. No debridement necessary. Assessment:   1. BLE stasis ulcers  2. Lymphedema  3. Morbid obesity.       Wound Measurements: Assessment of the wounds are pre debridement:  Wound 10/06/18 #11 right lower lateral leg (since 10/6/18) (Active)   Wound Image   4/22/2019  2:25 PM   Wound Venous 2/15/2019 11:15 AM   Dressing/Treatment Collagen with Ag 4/22/2019  2:25 PM   Wound Cleansed Rinsed/Irrigated with saline 5/6/2019  2:44 PM   Wound Length (cm) 2 cm 5/6/2019  2:44 PM   Wound Width (cm) 1.5 cm 5/6/2019  2:44 PM   Wound Depth (cm) 0.1 cm 5/6/2019  2:44 PM   Wound Surface Area (cm^2) 3 cm^2 5/6/2019  2:44 PM   Change in Wound Size % (l*w) 65.52 5/6/2019  2:44 PM   Wound Volume (cm^3) 0.3 cm^3 5/6/2019  2:44 PM   Wound Healing % 66 5/6/2019  2:44 PM   Post-Procedure Length (cm) 2 cm 5/6/2019  3:24 PM   Post-Procedure Width (cm) 1.5 cm 5/6/2019  3:24 PM   Post-Procedure Depth (cm) 0.1 cm 5/6/2019  3:24 PM   Post-Procedure Surface Area (cm^2) 3 cm^2 5/6/2019  3:24 PM   Post-Procedure Volume (cm^3) 0.3 cm^3 5/6/2019  3:24 PM   Distance Tunneling (cm) 0 cm 5/6/2019  2:44 PM   Tunneling Position ___ O'Clock 0 3/26/2019  2:16 PM   Undermining Starts ___ O'Clock 0 3/5/2019  3:02 PM   Undermining Ends___ O'Clock 0 3/26/2019  2:16 PM   Undermining Maxium Distance (cm) 0 5/6/2019  2:44 PM   Wound Assessment Red 5/6/2019  2:44 PM   Drainage Amount Small 5/6/2019  2:44 PM   Drainage Description Serosanguinous 5/6/2019  2:44 PM   Odor None 5/6/2019  2:44 PM   Margins Defined edges 5/6/2019  2:44 PM   Georgina-wound Assessment Dry;Brown 5/6/2019  2:44 PM   Non-staged Wound Description Full thickness 5/6/2019  2:44 PM   [place]%Wound Bed 60 4/2/2019  2:41 PM   Red%Wound Bed 100 5/6/2019  2:44 PM   Yellow%Wound Bed 40 4/30/2019  5:33 PM   Black%Wound Bed 0 1/15/2019  9:12 AM   Other%Wound Bed 0 2/15/2019 11:15 AM   Number of days: 212       Wound 02/05/19 #12 Left lower lateral leg (Active)   Wound Image   4/22/2019  2:25 PM   Wound Venous 2/5/2019  1:57 PM   Dressing/Treatment Collagen with Ag 4/22/2019  2:25 PM   Wound Cleansed Rinsed/Irrigated with saline 5/6/2019  2:44 PM   Wound Length (cm) 4.2 cm 5/6/2019  2:44 PM   Wound Width (cm) 2 cm 5/6/2019  2:44 PM   Wound Depth (cm) 0.1 cm 5/6/2019  2:44 PM   Wound Surface Area (cm^2) 8.4 cm^2 5/6/2019  2:44 PM   Change in Wound Size % (l*w) -66.67 5/6/2019  2:44 PM   Wound Volume (cm^3) 0.84 cm^3 5/6/2019  2:44 PM   Wound Healing % -68 5/6/2019  2:44 PM   Post-Procedure Length (cm) 4.2 cm 5/6/2019  3:24 PM   Post-Procedure Width (cm) 2 cm 5/6/2019  3:24 PM   Post-Procedure Depth (cm) 0.1 cm 5/6/2019  3:24 PM   Post-Procedure Surface Area (cm^2) 8.4 cm^2 5/6/2019  3:24 PM   Post-Procedure Volume (cm^3) 0.84 cm^3 5/6/2019  3:24 PM   Distance Tunneling (cm) 0 cm 5/6/2019  2:44 PM   Tunneling Position ___ O'Clock 0 3/5/2019  3:02 PM   Undermining Starts ___ O'Clock 0 3/5/2019  3:02 PM   Undermining Maxium Distance (cm) 0 5/6/2019  2:44 PM   Wound Assessment Red 5/6/2019  2:44 PM   Drainage Amount Moderate 5/6/2019  2:44 PM   Drainage Description Serosanguinous 5/6/2019  2:44 PM   Odor None 5/6/2019  2:44 PM   Margins Defined edges 5/6/2019  2:44 PM   Georgina-wound Assessment Dry;Pink;Brown 5/6/2019  2:44 PM   Non-staged Wound Description Partial thickness 5/6/2019  2:44 PM   [place]%Wound Bed 60 4/2/2019  2:41 PM   Red%Wound Bed 100 5/6/2019  2:44 PM   Yellow%Wound wounds      [x] Apply Primary Dressing to wound:       [] Foam/Foam with Border(i.e Mepilex) [] Non-adherent (i.e.Mepitel)   [] Alginate with Silver (i.e. Silvercel)  [] Alginate (i.e. Aquacel)   [] Collagen (i.e. Puracol)   [] Collagen with Silver(i.e. [place])     [] Hydrocolloid    [] Hydrafera Blue moistened with saline   [] MediHoney Paste/Gel   [] Hydrogel    [x] Endoform      [] Santyl covered with gauze moisten with saline      [] Betadine   [] Bactroban/Mupirocin   [] Polysporin/Neosporin  [] Other:    [] Saline \"wet to dry\" gauze     Pack wound loosely with: [] Iodoform   [] Plain Packing  [] Saline \"wet to dry\"      [x] Cover and Secure with:     [x] Dry Gauze  [] ABD  [] Cast padding  [] Kerlix or Karina rolled gauze   [] Coban  [] Ace Wrap [] Ace Wrap from forefoot to just below the knee  [] Paper Tape [] Medipore Tape [] Other:    Avoid contact of tape with skin if possible. [x] Change dressing: [] Daily    [] Every Other Day  [] Three times per week:  [] Monday, Wednesday, Friday  [] Tuesday, Thursday, Saturday   [] Once a week  [x] Do Not Change Dressing [] Other:     Lymphedema Therapy:   [x] Wear Lymphedema pumps twice a day at settings prescribed by your physician. [x]Elevate leg(s) above the level of the heart for 30 minutes 4-5 times a day and/or when sitting. [x] Avoid prolonged standing in one place. Multilayer Compression Wrap:  Type: 4 Layer Compression Wrap      Applied in Clinic to the :  Bilateral lower leg(s)     · Do not get leg(s) with wrap wet. · If wraps become too tight call the center or completely remove the wrap. · Elevate leg(s) above the level of the heart when sitting. · Avoid prolonged standing in one place.       [] [facility] remove wrap, cleanse affected leg(s) with soap and water, apply wound dressings as ordered above and reapply compression wraps on:     Off-Loading:   [] Off-loading (keeping weight off as much as you can) go to the hospital emergency room. PLEASE NOTE: IF YOU ARE UNABLE TO OBTAIN WOUND SUPPLIES, CONTINUE TO USE THE SUPPLIES YOU HAVE AVAILABLE UNTIL YOU ARE ABLE TO REACH US. IT IS MOST IMPORTANT TO KEEP THE WOUND COVERED AT ALL TIMES.       [] Patient unable to sign Discharge Instructions given to ECF/Transportation/POA            Electronically signed by Viji Frederick MD on 5/6/2019 at 3:30 PM

## 2019-05-14 ENCOUNTER — HOSPITAL ENCOUNTER (OUTPATIENT)
Dept: WOUND CARE | Age: 54
Discharge: HOME | End: 2019-05-14
Payer: COMMERCIAL

## 2019-05-14 VITALS — WEIGHT: 315 LBS | BODY MASS INDEX: 54.29 KG/M2

## 2019-05-14 PROCEDURE — 29581 APPL MULTLAYER CMPRN SYS LEG: CPT

## 2019-05-14 PROCEDURE — 11042 DBRDMT SUBQ TIS 1ST 20SQCM/<: CPT

## 2019-05-14 PROCEDURE — 6370000000 HC RX 637 (ALT 250 FOR IP): Performed by: PODIATRIST

## 2019-05-14 RX ORDER — LIDOCAINE HYDROCHLORIDE 40 MG/ML
2.5 SOLUTION TOPICAL ONCE
Status: COMPLETED | OUTPATIENT
Start: 2019-05-14 | End: 2019-05-14

## 2019-05-14 RX ADMIN — LIDOCAINE HYDROCHLORIDE 2.5 ML: 40 SOLUTION TOPICAL at 15:00

## 2019-05-14 NOTE — PROGRESS NOTES
Multilayer Compression Wrap   (Not Unna) Below the Knee    NAME:  Raeann Rubalcava III  YOB: 1965  MEDICAL RECORD NUMBER:  [id number]  DATE:  5/14/2019       [] Removed old Multilayer wrap if present and washed leg with mild soap/water.  [x] Applied moisturizing agent to dry skin as needed.  [x] Applied primary and secondary dressing as ordered     [x] Applied multilayered dressing below the knee to Bilateral lower leg(s)  (4 Layer Compression Wrap) per 's instructions.  [x] Instructed patient/caregiver not to remove dressing and to keep it clean and dry.  [x] Instructed patient/caregiver on complications to report to provider, such as pain, numbness in toes, heavy drainage, and slippage of dressing.  [x] Instructed patient on purpose of compression dressing and on activity and exercise recommendations.        Applied per   Guidelines    Electronically signed by Isi Sheets LPN on 1/06/1299 at 8:49 PM

## 2019-05-15 NOTE — PROGRESS NOTES
[facility]   Progress Note and Procedure Note      Roxanna Curry III  MEDICAL RECORD NUMBER:  [id number]  AGE: 48 y.o. GENDER: male  : 1965  EPISODE DATE:  2019    Subjective:     Chief Complaint   Patient presents with    Wound Check     Bilateral lower Legs         HISTORY of PRESENT ILLNESS HPI     Roxanna Curry III is a 48 y.o. male who presents today for wound/ulcer evaluation. History of Wound Context: left leg wound. He has kept his dressings clean and intact. Patient relates that his lymphedema pumps have been approved and he is waiting for them to be delivered. He still has received his lymphedema pumps and relates that he has been using them. He states that he usually only uses them once a day, but is trying to do better about using them twice daily. Wound/Ulcer Pain Timing/Severity: constant  Quality of pain: burning  Severity:  3 / 10   Modifying Factors: None  Associated Signs/Symptoms: edema    Ulcer Identification:  Ulcer Type: venous  Contributing Factors: edema and venous stasis    Wound: N/A        PAST MEDICAL HISTORY        Diagnosis Date    Breast disorder 2009    MAMMOGRAM -VE. S/P BREAST SURGERY EVAL    Obesity     PPD positive     Venous stasis ulcer of left lower extremity ([facility] [address] 10/25/2016       PAST SURGICAL HISTORY    History reviewed. No pertinent surgical history. FAMILY HISTORY    Family History   Problem Relation Age of Onset    Heart Disease Mother     Diabetes Maternal Uncle     Diabetes Paternal Uncle     Cancer Paternal Uncle         ?  pancreatic       SOCIAL HISTORY    Social History     Tobacco Use    Smoking status: Never Smoker    Smokeless tobacco: Never Used   Substance Use Topics    Alcohol use: No    Drug use: No       ALLERGIES    No Known Allergies    MEDICATIONS    Current Outpatient Medications on File Prior to Encounter   Medication Sig Dispense Refill    Cholecalciferol (VITAMIN D) 2000 units CAPS capsule TAKE 1 CAPSULE BY MOUTH DAILY 30 capsule 0    acetaminophen (TYLENOL) 325 MG tablet Take 650 mg by mouth every 6 hours as needed for Pain      GLUCOSAMINE HCL PO Take by mouth daily      Multiple Vitamin (MULTI VITAMIN PO) Take by mouth daily      ibuprofen (ADVIL;MOTRIN) 800 MG tablet Take 1 tablet by mouth every 6 hours as needed for Pain 120 tablet 3    Compression Stockings MISC by Does not apply route Diagnosis: I83.333  Location of wound: Left Leg  Gradient IV (30-40 mm Hg)  Style: Knee Length  Extremity: Bilateral 1 each 0     No current facility-administered medications on file prior to encounter. REVIEW OF SYSTEMS    Pertinent items are noted in HPI. Review of Systems: A 12 point review of symptoms is unremarkable with the exception of the chief complaint. Patient specifically denies nausea, fever, vomiting, chills, shortness of breath, chest pain, abdominal pain, constipation or difficulty urinating. Objective:        Wt (!) 422 lb 13.5 oz (191.8 kg)   BMI 54.29 kg/m²     Wt Readings from Last 3 Encounters:   05/14/19 (!) 422 lb 13.5 oz (191.8 kg)   02/07/19 (!) 417 lb 9.6 oz (189.4 kg)   12/27/18 (!) 433 lb (196.4 kg)       PHYSICAL EXAM    DP/PT pulses palpable bilaterally. CFT brisk to all digits. Digits are pink and warm to the touch. Hair growth is normal in appearance. No calf pain with palpation noted. Patient has pitting edema noted on the bilateral lower extremities. There are brawny pigmentary changes noted bilaterally at the distal aspect of the leg. There are hypertrophic skin changes noted bilaterally consistent with chronic venous stasis/lymphedema. Epicritic sensation is grossly intact bilaterally. Negative clonus and babinski reflex is down going. Wound note on the right and left lower extremity. Wound has fibrotic and nonviable tissue that extends down through and includes the subcutaneous tissue. After debridement the wound has a granular base.  There is no surrounding erythema, edema, warmth or malodor noted. The wound does not probe or track to bone. Assessment:      Patient Active Problem List   Diagnosis Code    Family history of early CAD Z80.55    PPD positive R76.11    Vitamin D deficiency E55.9    Edema R60.9    Morbid obesity ([address] E66.01    Anemia D64.9    Venous stasis ulcer of left lower extremity ([address] I83.029, L97.929    Venous stasis ulcer of ankle, right ([address] I83.013, L97.319    Idiopathic chronic venous hypertension of both lower extremities with ulcer and inflammation ([address] I87.333, L97.919, L97.929    Non-pressure chronic ulcer of right calf with fat layer exposed ([address] L97.212    Varicose veins of right lower extremity with both ulcer of calf and inflammation ([address] I83.212, L97.219    Prediabetes R73.03    Obesity due to excess calories without serious comorbidity E66.09        Excisional Debridement Procedure Note  Indications:  Based on my examination of this patient's wound(s)/ulcer(s) today, debridement is required to promote healing and evaluate the wound base. Performed by: Tristan Gonzales DPM    Consent obtained? Yes    Time out taken: Yes    Pain Control: Anesthetic: 4% Lidocaine Liquid Topical     Debridement: Excisional Debridement    Using curette, #15 blade scalpel, scissors and forceps the wound/ulcer was sharply debrided    down through and including the removal of  epidermis, dermis and subcutaneous tissue.         Devitalized Tissue Debrided:  fibrin and slough      Pre Debridement Measurements:  Are located in the [place]  Documentation Flow Sheet   Wound/Ulcer #: 11 and 12     Post  Debridement Measurements:    Wound 10/06/18 #11 right lower lateral leg (since 10/6/18) (Active)   Wound Image   5/14/2019  2:46 PM   Wound Venous 2/15/2019 11:15 AM   Dressing/Treatment Other (comment) 5/6/2019  3:24 PM   Wound Cleansed Rinsed/Irrigated with saline 5/14/2019  2:46 PM   Wound Length (cm) 2 cm 5/14/2019  2:46 PM   Wound Wound Healing % -52 5/14/2019  2:46 PM   Post-Procedure Length (cm) 4.1 cm 5/14/2019  3:25 PM   Post-Procedure Width (cm) 2 cm 5/14/2019  3:25 PM   Post-Procedure Depth (cm) 0.3 cm 5/14/2019  3:25 PM   Post-Procedure Surface Area (cm^2) 8.2 cm^2 5/14/2019  3:25 PM   Post-Procedure Volume (cm^3) 2.46 cm^3 5/14/2019  3:25 PM   Distance Tunneling (cm) 0 cm 5/14/2019  2:46 PM   Tunneling Position ___ O'Clock 0 3/5/2019  3:02 PM   Undermining Starts ___ O'Clock 0 3/5/2019  3:02 PM   Undermining Maxium Distance (cm) 0 5/14/2019  2:46 PM   Wound Assessment Red;Black 5/14/2019  2:46 PM   Drainage Amount Moderate 5/14/2019  2:46 PM   Drainage Description Eppie Sessions 5/14/2019  2:46 PM   Odor None 5/14/2019  2:46 PM   Margins Defined edges 5/14/2019  2:46 PM   Georgina-wound Assessment Dry;Pink;Brown 5/14/2019  2:46 PM   Non-staged Wound Description Full thickness 5/14/2019  2:46 PM   [place]%Wound Bed 60 4/2/2019  2:41 PM   Red%Wound Bed 60 5/14/2019  2:46 PM   Yellow%Wound Bed 20 3/26/2019  2:16 PM   Black%Wound Bed 40 5/14/2019  2:46 PM   Number of days: 99     Percent of Wound/Ulcer Debrided: 100%    Total Surface Area Debrided:  11.77 sq cm    Diabetic/Pressure/Non Pressure Ulcers only:  Ulcer: Non-Pressure ulcer, fat layer exposed    Bleeding: Minimal    Hemostasis Achieved: by pressure    Procedural Pain: 0  / 10     Post Procedural Pain: 0 / 10     Response to treatment:  Well tolerated by patient. Plan: Will treat with multilayer compression dressing. As wounds are not responding to treatment will return on Friday to have dressings changed to insure there is adequate compression. As patient has re-ulcerated despite compression stockings and with abnormal venous reflux studies will refer patient to Dr. Myrla Boas for further evaluation and management to see if he is a candidate for a venous ablation procedure to prevent reulceration. Appreciate Dr. Josselyn Markham recommendations.   Due his weight, he is not a candidate for a venous procedure at this time. Patient inquired about bariatric surgery as he has struggled with his weight and has difficluty exercising due to OA of his hips. He is currently not a candidate for hip replacement due to his weight. Patient relates that he has tried and failed many different diets including a medically supervised fast.  Referral placed to Dr. Geovanna Pratt for further evaluation and management. Treatment Note please see attached Discharge Instructions    In my professional opinion this patient would benefit from HBO Therapy: No    Written patient dismissal instructions given to patient and signed by patient or POA.          RTC 1 week    Electronically signed by Jenny Banda DPM on 5/15/2019 at 8:31 AM

## 2019-05-17 ENCOUNTER — TELEPHONE (OUTPATIENT)
Dept: BARIATRICS/WEIGHT MGMT | Age: 54
End: 2019-05-17

## 2019-05-17 NOTE — TELEPHONE ENCOUNTER
Patient called and left message on voicemail with questions about dismissal from practice with Dr. Alvin Crockett and now being referred to Dr. Spencer Walker. I called and left message for patient to return my call at his convenience and explained I would be happy to talk with him. Please forward call to me when patient calls back.  AW

## 2019-05-20 ENCOUNTER — OFFICE VISIT (OUTPATIENT)
Dept: INTERNAL MEDICINE CLINIC | Age: 54
End: 2019-05-20
Payer: COMMERCIAL

## 2019-05-20 VITALS
SYSTOLIC BLOOD PRESSURE: 130 MMHG | HEART RATE: 87 BPM | BODY MASS INDEX: 40.43 KG/M2 | WEIGHT: 315 LBS | OXYGEN SATURATION: 93 % | TEMPERATURE: 98.3 F | HEIGHT: 74 IN | DIASTOLIC BLOOD PRESSURE: 80 MMHG

## 2019-05-20 DIAGNOSIS — E78.5 DYSLIPIDEMIA: ICD-10-CM

## 2019-05-20 DIAGNOSIS — E55.9 VITAMIN D DEFICIENCY: ICD-10-CM

## 2019-05-20 DIAGNOSIS — E66.01 MORBID OBESITY WITH BMI OF 50.0-59.9, ADULT (HCC): ICD-10-CM

## 2019-05-20 DIAGNOSIS — E11.9 DIABETES MELLITUS, NEW ONSET (HCC): Primary | ICD-10-CM

## 2019-05-20 DIAGNOSIS — T14.8XXD WOUND HEALING, DELAYED: ICD-10-CM

## 2019-05-20 PROCEDURE — 99214 OFFICE O/P EST MOD 30 MIN: CPT | Performed by: INTERNAL MEDICINE

## 2019-05-20 PROCEDURE — 83036 HEMOGLOBIN GLYCOSYLATED A1C: CPT | Performed by: INTERNAL MEDICINE

## 2019-05-20 PROCEDURE — 82962 GLUCOSE BLOOD TEST: CPT | Performed by: INTERNAL MEDICINE

## 2019-05-20 RX ORDER — MULTIVIT-MIN/IRON/FOLIC ACID/K 18-600-40
1 CAPSULE ORAL DAILY
Qty: 30 CAPSULE | Refills: 0 | Status: SHIPPED | OUTPATIENT
Start: 2019-05-20 | End: 2019-05-24 | Stop reason: ALTCHOICE

## 2019-05-20 NOTE — PROGRESS NOTES
SUBJECTIVE:  Alexandria Collins is a 48 y.o. male HERE FOR   Chief Complaint   Patient presents with    Follow-up        PT HERE FOR EVAL    DM - NEW ONSET. RECENT LAB D/W PT. DIET / EXERCISE REVIEWED. LAB D/W PT  DYSLIPIDEMIA -  ? EXERCISE / DIET COMPLIANCE . LABS D/W PT  VIT D DEF - TAKING MED. STABLE ON RECENT LABS  - D/W PT  MORBID OBESITY - DIET / EXERCISE REVIEWED. WT NOTED. STATES HAS APPT PENDING WITH WT MGT AT   WOUND - CHRONIC.  BLE. FOLLOWING WITH WOUND CARE      DENIES CP, No SOB, No PALPITATIONS, No COUGH  No ABD PAIN, No N/V, No DIARRHEA, No CONSTIPATION, No MELENA, No HEMATOCHEZIA. No DYSURIA, No FREQ, No URGENCY, No HEMATURIA    PMH: REVIEWED AND UPDATED TODAY    PSH: REVIEWED AND UPDATED TODAY    SOCIAL HX: REVIEWED AND UPDATED TODAY    FAMILY HX: REVIEWED AND UPDATED TODAY    ALLERGY:  Patient has no known allergies. MEDS: REVIEWED  Prior to Visit Medications    Medication Sig Taking?  Authorizing Provider   Cholecalciferol (VITAMIN D) 2000 units CAPS capsule TAKE 1 CAPSULE BY MOUTH DAILY Yes Tiff Martinez MD   Compression Stockings MISC by Does not apply route Diagnosis: I83.333  Location of wound: Left Leg  Gradient IV (30-40 mm Hg)  Style: Knee Length  Extremity: Bilateral Yes Ashlee Kelley DPM   acetaminophen (TYLENOL) 325 MG tablet Take 650 mg by mouth every 6 hours as needed for Pain Yes Historical Provider, MD   GLUCOSAMINE HCL PO Take by mouth daily Yes Historical Provider, MD   Multiple Vitamin (MULTI VITAMIN PO) Take by mouth daily Yes Historical Provider, MD   ibuprofen (ADVIL;MOTRIN) 800 MG tablet Take 1 tablet by mouth every 6 hours as needed for Pain Yes Ashlee Kelley DPM       ROS: COMPREHENSIVE ROS AS IN HX, REST -VE  History obtained from chart review and the patient    OBJECTIVE:   NURSING NOTE AND VITALS REVIEWED  BP (!) 142/90 (Site: Right Upper Arm, Position: Sitting, Cuff Size: Thigh)   Pulse 87   Temp 98.3 °F (36.8 °C)   Ht 6' 2\" (1.88 m)   Wt (!) 418 lb (189.6 kg) SpO2 93%   BMI 53.67 kg/m²     NO ACUTE DISTRESS    REPEAT BP:  130/80 (LT), NO ORTHOSTASIS     Body mass index is 53.67 kg/m². HEENT: NO PALLOR, ANICTERIC, PERRLA, EOMI, NO CONJUNCTIVAL ERYTHEMA,                 NO SINUS TENDERNESS  NECK:  SUPPLE, TRACHEA MIDLINE, NT, NO JVD, NO CB, NO LA, NO TM, NO STIFFNESS  CHEST: RESPY EFFORT NL, GOOD AE, NO W/R/C  HEART: S1S2+ REG, NO M/G/R  ABD: OBESE, SOFT, NT, NO HSM, BS+  EXT: CLEAN DRESSING NOTED, TRACE EDEMA, NT, PULSES +. AMANUEL'S -VE  NEURO: ALERT AND ORIENTED X 3, NO MENINGEAL SIGNS, NO TREMORS, AMBULATING WITH A LIMP, NO FOCAL DEFICITS  PSYCH: FAIRLY GOOD AFFECT  BACK: NT, NO ROM, NO CVA TENDERNESS    PREVIOUS LABS  REVIEWED AND D/W PT     ACCUCHECK:  113    POC HBA1C : 6.3        ASSESSMENT / PLAN:     Diagnosis Orders   1. Diabetes mellitus, new onset ([facility]  COUNSELLED. LAB D/W PT IN DETAIL. IMPROVING  ADVISED START ON METFORMIN 500 MG DAILY  MED COMPLIANCE ADVISED  DIET/ EXERCISE ADVISED. MONITOR. GOAL FBS 80- 120, HBA1C < 7  ADVISED ON HOME BLOOD SUGAR MONITORING  F/U LABS  READDRESS EYE EXAM  MAKE CHANGES AS NEEDED. 2. Dyslipidemia  COUNSELLED. DEFERRED MED. ADVISED LOW FAT / CHOL DIET/ EXERCISE.  MONITOR. GOALS D/W PT.  MAKE CHANGES AS NEEDED. 3. Vitamin D deficiency  COUNSELLED. MONITOR ON VIT D SUPPLEMENT. MAKE CHANGES AS NEEDED. 4. Morbid obesity with BMI of 50.0-59.9, adult ([facility]  COUNSELLED. DIET/ EXERCISE.  LIFESTYLE MODIFICATION. WT LOSS ADVISED. ADVISED F/U WT MANAGEMENT  MONITOR AND MAKE CHANGES AS NEEDED. 5. Wound healing, delayed  COUNSELLED. F/U WOUND CLINIC. MONITOR  MAKE CHANGES AS NEEDED.                 Ryan received counseling on the following healthy behaviors: nutrition, exercise and medication adherence    Patient given educational materials on Diabetes, Hyperlipidemia, Nutrition and Exercise    I have instructed Ryan to complete a self tracking handout on Blood Sugars  and Weights and instructed them to bring

## 2019-05-20 NOTE — PATIENT INSTRUCTIONS
TAKE MED AS ADVISED    DIET/ EXERCISE.     FOLLOW UP WITHIN 3 MONTHS / AS NEEDED    FOLLOW UP FOR  LABS, WEIGHT MANAGEMENT

## 2019-05-21 ENCOUNTER — HOSPITAL ENCOUNTER (OUTPATIENT)
Dept: WOUND CARE | Age: 54
Discharge: HOME | End: 2019-05-21
Payer: COMMERCIAL

## 2019-05-21 VITALS
SYSTOLIC BLOOD PRESSURE: 164 MMHG | HEART RATE: 82 BPM | DIASTOLIC BLOOD PRESSURE: 82 MMHG | TEMPERATURE: 98.2 F | RESPIRATION RATE: 18 BRPM

## 2019-05-21 LAB
CHP ED QC CHECK: NORMAL
GLUCOSE BLD-MCNC: 113 MG/DL
HBA1C MFR BLD: 6.3 %

## 2019-05-21 PROCEDURE — 11042 DBRDMT SUBQ TIS 1ST 20SQCM/<: CPT

## 2019-05-21 PROCEDURE — 6370000000 HC RX 637 (ALT 250 FOR IP): Performed by: PODIATRIST

## 2019-05-21 PROCEDURE — 29581 APPL MULTLAYER CMPRN SYS LEG: CPT

## 2019-05-21 RX ORDER — LIDOCAINE HYDROCHLORIDE 40 MG/ML
SOLUTION TOPICAL ONCE
Status: COMPLETED | OUTPATIENT
Start: 2019-05-21 | End: 2019-05-21

## 2019-05-21 RX ADMIN — LIDOCAINE HYDROCHLORIDE: 40 SOLUTION TOPICAL at 14:33

## 2019-05-21 NOTE — PROGRESS NOTES
Multilayer Compression Wrap   (Not Unna) Below the Knee    NAME:  Dory Gandara III  YOB: 1965  MEDICAL RECORD NUMBER:  [id number]  DATE:  5/21/2019       [x] Removed old Multilayer wrap if present and washed leg with mild soap/water.  [x] Applied moisturizing agent to dry skin as needed.  [x] Applied primary and secondary dressing as ordered     [x] Applied multilayered dressing below the knee to Bilateral lower leg(s)  (4 Layer Compression Wrap) per 's instructions.  [x] Instructed patient/caregiver not to remove dressing and to keep it clean and dry.  [x] Instructed patient/caregiver on complications to report to provider, such as pain, numbness in toes, heavy drainage, and slippage of dressing.  [x] Instructed patient on purpose of compression dressing and on activity and exercise recommendations.        Applied per   Guidelines    Electronically signed by Abi Caraballo RN on 5/21/2019 at 3:12 PM

## 2019-05-22 NOTE — PROGRESS NOTES
[facility]   Progress Note and Procedure Note      Mihir Sullivan III  MEDICAL RECORD NUMBER:  [id number]  AGE: 48 y.o. GENDER: male  : 1965  EPISODE DATE:  2019    Subjective:     Chief Complaint   Patient presents with    Wound Check     bilateral lower legs         HISTORY of PRESENT ILLNESS HPI     Mihir Sullivan III is a 48 y.o. male who presents today for wound/ulcer evaluation. History of Wound Context: left leg wound. He has kept his dressings clean and intact. Patient relates that his lymphedema pumps have been approved and he is waiting for them to be delivered. He still has received his lymphedema pumps and relates that he has been using them. Patient relates that he has been diagnosed as \"pre-diabetic\" and started on metformin. His HbA1c was 6.2   Wound/Ulcer Pain Timing/Severity: constant  Quality of pain: burning  Severity:  3  10   Modifying Factors: None  Associated Signs/Symptoms: edema    Ulcer Identification:  Ulcer Type: venous  Contributing Factors: edema and venous stasis    Wound: N/A        PAST MEDICAL HISTORY        Diagnosis Date    Breast disorder 2009    MAMMOGRAM -VE. S/P BREAST SURGERY EVAL    Obesity     PPD positive     Venous stasis ulcer of left lower extremity ([facility] [address] 10/25/2016       PAST SURGICAL HISTORY    History reviewed. No pertinent surgical history. FAMILY HISTORY    Family History   Problem Relation Age of Onset    Heart Disease Mother     Diabetes Maternal Uncle     Diabetes Paternal Uncle     Cancer Paternal Uncle         ?  pancreatic       SOCIAL HISTORY    Social History     Tobacco Use    Smoking status: Never Smoker    Smokeless tobacco: Never Used   Substance Use Topics    Alcohol use: No    Drug use: No       ALLERGIES    No Known Allergies    MEDICATIONS    Current Outpatient Medications on File Prior to Encounter   Medication Sig Dispense Refill    metFORMIN (GLUCOPHAGE) 500 MG tablet Take 1 tablet by mouth down through and includes the subcutaneous tissue. After debridement the wound has a granular base. There is no surrounding erythema, edema, warmth or malodor noted. The wound does not probe or track to bone. Assessment:      Patient Active Problem List   Diagnosis Code    Family history of early CAD Z80.55    PPD positive R76.11    Vitamin D deficiency E55.9    Edema R60.9    Morbid obesity ([address] E66.01    Anemia D64.9    Venous stasis ulcer of left lower extremity ([address] I83.029, L97.929    Venous stasis ulcer of ankle, right ([address] I83.013, L97.319    Idiopathic chronic venous hypertension of both lower extremities with ulcer and inflammation ([address] I87.333, L97.919, L97.929    Non-pressure chronic ulcer of right calf with fat layer exposed ([address] L97.212    Varicose veins of right lower extremity with both ulcer of calf and inflammation ([address] I83.212, L97.219    Prediabetes R73.03    Obesity due to excess calories without serious comorbidity E66.09        Excisional Debridement Procedure Note  Indications:  Based on my examination of this patient's wound(s)/ulcer(s) today, debridement is required to promote healing and evaluate the wound base. Performed by: John Roman DPM    Consent obtained? Yes    Time out taken: Yes    Pain Control: Anesthetic: 4% Lidocaine Liquid Topical     Debridement: Excisional Debridement    Using curette, #15 blade scalpel, scissors and forceps the wound/ulcer was sharply debrided    down through and including the removal of  epidermis, dermis and subcutaneous tissue.         Devitalized Tissue Debrided:  fibrin and slough      Pre Debridement Measurements:  Are located in the [place]  Documentation Flow Sheet   Wound/Ulcer #: 11 and 12     Post  Debridement Measurements:  Wound 10/06/18 #11 right lower lateral leg (since 10/6/18) (Active)   Wound Image   5/14/2019  2:46 PM   Wound Venous 2/15/2019 11:15 AM   Dressing/Treatment Other (comment) 5/21/2019  3:12 PM Wound Cleansed Rinsed/Irrigated with saline 5/21/2019  2:10 PM   Wound Length (cm) 3 cm 5/21/2019  2:10 PM   Wound Width (cm) 1.5 cm 5/21/2019  2:10 PM   Wound Depth (cm) 0.1 cm 5/21/2019  2:10 PM   Wound Surface Area (cm^2) 4.5 cm^2 5/21/2019  2:10 PM   Change in Wound Size % (l*w) 48.28 5/21/2019  2:10 PM   Wound Volume (cm^3) 0.45 cm^3 5/21/2019  2:10 PM   Wound Healing % 48 5/21/2019  2:10 PM   Post-Procedure Length (cm) 3.1 cm 5/21/2019  2:53 PM   Post-Procedure Width (cm) 1.6 cm 5/21/2019  2:53 PM   Post-Procedure Depth (cm) 0.3 cm 5/21/2019  2:53 PM   Post-Procedure Surface Area (cm^2) 4.96 cm^2 5/21/2019  2:53 PM   Post-Procedure Volume (cm^3) 1.49 cm^3 5/21/2019  2:53 PM   Distance Tunneling (cm) 0 cm 5/14/2019  2:46 PM   Tunneling Position ___ O'Clock 0 3/26/2019  2:16 PM   Undermining Starts ___ O'Clock 0 3/5/2019  3:02 PM   Undermining Ends___ O'Clock 0 3/26/2019  2:16 PM   Undermining Maxium Distance (cm) 0 5/14/2019  2:46 PM   Wound Assessment Red 5/21/2019  2:10 PM   Drainage Amount Moderate 5/21/2019  2:10 PM   Drainage Description Thony Johnson 5/21/2019  2:10 PM   Odor Mild 5/21/2019  2:10 PM   Margins Unattached edges 5/21/2019  2:10 PM   Georgina-wound Assessment Brown;Dry 5/21/2019  2:10 PM   Non-staged Wound Description Full thickness 5/21/2019  2:10 PM   [place]%Wound Bed 60 4/2/2019  2:41 PM   Red%Wound Bed 100 5/21/2019  2:10 PM   Yellow%Wound Bed 15 5/14/2019  2:46 PM   Black%Wound Bed 5 5/14/2019  2:46 PM   Other%Wound Bed 0 2/15/2019 11:15 AM   Number of days: 228       Wound 02/05/19 #12 Left lower lateral leg (Active)   Wound Image   5/14/2019  2:46 PM   Wound Venous 2/5/2019  1:57 PM   Dressing/Treatment Other (comment) 5/21/2019  3:12 PM   Wound Cleansed Rinsed/Irrigated with saline 5/21/2019  2:10 PM   Wound Length (cm) 2.5 cm 5/21/2019  2:10 PM   Wound Width (cm) 1 cm 5/21/2019  2:10 PM   Wound Depth (cm) 0.1 cm 5/21/2019  2:10 PM   Wound Surface Area (cm^2) 2.5 cm^2 5/21/2019  2:10 PM   Change in Wound Size % (l*w) 50.4 5/21/2019  2:10 PM   Wound Volume (cm^3) 0.25 cm^3 5/21/2019  2:10 PM   Wound Healing % 50 5/21/2019  2:10 PM   Post-Procedure Length (cm) 2.6 cm 5/21/2019  2:53 PM   Post-Procedure Width (cm) 1.1 cm 5/21/2019  2:53 PM   Post-Procedure Depth (cm) 0.3 cm 5/21/2019  2:53 PM   Post-Procedure Surface Area (cm^2) 2.86 cm^2 5/21/2019  2:53 PM   Post-Procedure Volume (cm^3) 0.86 cm^3 5/21/2019  2:53 PM   Distance Tunneling (cm) 0 cm 5/14/2019  2:46 PM   Tunneling Position ___ O'Clock 0 3/5/2019  3:02 PM   Undermining Starts ___ O'Clock 0 3/5/2019  3:02 PM   Undermining Maxium Distance (cm) 0 5/14/2019  2:46 PM   Wound Assessment Red 5/21/2019  2:10 PM   Drainage Amount Small 5/21/2019  2:10 PM   Drainage Description Brown 5/21/2019  2:10 PM   Odor None 5/21/2019  2:10 PM   Margins Defined edges 5/21/2019  2:10 PM   Georgina-wound Assessment Dry;Pink 5/21/2019  2:10 PM   Non-staged Wound Description Full thickness 5/14/2019  2:46 PM   [place]%Wound Bed 60 4/2/2019  2:41 PM   Red%Wound Bed 100 5/21/2019  2:10 PM   Yellow%Wound Bed 20 3/26/2019  2:16 PM   Black%Wound Bed 40 5/14/2019  2:46 PM   Number of days: 106     Percent of Wound/Ulcer Debrided: 100%    Total Surface Area Debrided:  7.82 sq cm    Diabetic/Pressure/Non Pressure Ulcers only:  Ulcer: Non-Pressure ulcer, fat layer exposed    Bleeding: Minimal    Hemostasis Achieved: by pressure    Procedural Pain: 0  / 10     Post Procedural Pain: 0 / 10     Response to treatment:  Well tolerated by patient. Plan: Will treat with multilayer compression dressing. Patient will return on Friday to have dressings changed to insure there is adequate compression. Wounds have improved with more frequent dressing changes.     As patient has re-ulcerated despite compression stockings and with abnormal venous reflux studies will refer patient to Dr. Case Boston for further evaluation and management to see if he is a candidate for a venous ablation procedure to prevent reulceration. Appreciate Dr. Michele Moreland recommendations. Due his weight, he is not a candidate for a venous procedure at this time. Patient inquired about bariatric surgery as he has struggled with his weight and has difficluty exercising due to OA of his hips. He is currently not a candidate for hip replacement due to his weight. Patient relates that he has tried and failed many different diets including a medically supervised fast.  Referral placed to Dr. Donavan Frazier for further evaluation and management. Treatment Note please see attached Discharge Instructions    In my professional opinion this patient would benefit from HBO Therapy: No    Written patient dismissal instructions given to patient and signed by patient or POA.          RTC 1 week    Electronically signed by Marie Kirkland DPM on 5/22/2019 at 7:38 AM

## 2019-05-24 ENCOUNTER — HOSPITAL ENCOUNTER (OUTPATIENT)
Dept: WOUND CARE | Age: 54
Discharge: HOME | End: 2019-05-24
Payer: COMMERCIAL

## 2019-05-24 VITALS
RESPIRATION RATE: 18 BRPM | TEMPERATURE: 98.1 F | DIASTOLIC BLOOD PRESSURE: 76 MMHG | HEART RATE: 71 BPM | SYSTOLIC BLOOD PRESSURE: 139 MMHG

## 2019-05-24 PROCEDURE — 29581 APPL MULTLAYER CMPRN SYS LEG: CPT

## 2019-05-24 NOTE — PROGRESS NOTES
Multilayer Compression Wrap   (Not Unna) Below the Knee    NAME:  Jamaal Villalba III  YOB: 1965  MEDICAL RECORD NUMBER:  [id number]  DATE:  5/24/2019       [x] Removed old Multilayer wrap if present and washed leg with mild soap/water.  [x] Applied moisturizing agent to dry skin as needed.  [x] Applied primary and secondary dressing as ordered     [x] Applied multilayered dressing below the knee to Bilateral lower leg(s)  (4 Layer Compression Wrap) per 's instructions.  [x] Instructed patient/caregiver not to remove dressing and to keep it clean and dry.  [x] Instructed patient/caregiver on complications to report to provider, such as pain, numbness in toes, heavy drainage, and slippage of dressing.  [x] Instructed patient on purpose of compression dressing and on activity and exercise recommendations.        Applied per   Guidelines    Electronically signed by David Marrero RN on 5/24/2019 at 11:39 AM

## 2019-05-28 ENCOUNTER — HOSPITAL ENCOUNTER (OUTPATIENT)
Dept: WOUND CARE | Age: 54
Discharge: HOME | End: 2019-05-28
Payer: COMMERCIAL

## 2019-05-28 VITALS
DIASTOLIC BLOOD PRESSURE: 88 MMHG | HEART RATE: 71 BPM | TEMPERATURE: 98.2 F | SYSTOLIC BLOOD PRESSURE: 146 MMHG | RESPIRATION RATE: 20 BRPM

## 2019-05-28 PROCEDURE — 29581 APPL MULTLAYER CMPRN SYS LEG: CPT

## 2019-05-28 PROCEDURE — 11042 DBRDMT SUBQ TIS 1ST 20SQCM/<: CPT

## 2019-05-28 RX ORDER — LIDOCAINE HYDROCHLORIDE 40 MG/ML
2.5 SOLUTION TOPICAL ONCE
Status: DISCONTINUED | OUTPATIENT
Start: 2019-05-28 | End: 2019-05-29 | Stop reason: HOSPADM

## 2019-05-28 NOTE — PLAN OF CARE
Multilayer Compression Wrap   (Not Unna) Below the Knee    NAME:  Roxanna Class III  YOB: 1965  MEDICAL RECORD NUMBER:  [id number]  DATE:  5/28/2019       [x] Removed old Multilayer wrap if present and washed leg with mild soap/water.  [x] Applied moisturizing agent to dry skin as needed.  [x] Applied primary and secondary dressing as ordered     [x] Applied multilayered dressing below the knee to Bilateral lower leg(s)  (4 Layer Compression Wrap) per 's instructions.  [x] Instructed patient/caregiver not to remove dressing and to keep it clean and dry.  [x] Instructed patient/caregiver on complications to report to provider, such as pain, numbness in toes, heavy drainage, and slippage of dressing.  [x] Instructed patient on purpose of compression dressing and on activity and exercise recommendations.        Applied per   Guidelines    Electronically signed by Chastity Justice RN on 5/28/2019 at 10:42 AM

## 2019-05-28 NOTE — PROGRESS NOTES
[facility]  Progress Note and Procedure Note      Julian Wilkinson III  MEDICAL RECORD NUMBER:  [id number]  AGE: 48 y.o. GENDER: male  : 1965  EPISODE DATE:  2019    Subjective:     Chief Complaint   Patient presents with    Wound Check     BLE         HISTORY of PRESENT ILLNESS HPI      Julian Wilkinson III is a 48 y.o. male who presents today for wound/ulcer evaluation. Morbidly obese. Bilateral leg edema with ulceration. He just recently received his lymphedema pumps. Denies pain. Apparently, he has been discharged from [facility] for 3 no-shows, which he denies. Seeking wt management at [facility]. PAST MEDICAL HISTORY        Diagnosis Date    Breast disorder     MAMMOGRAM -VE. S/P BREAST SURGERY EVAL    Obesity     PPD positive     Venous stasis ulcer of left lower extremity ([address] 10/25/2016       PAST SURGICAL HISTORY    History reviewed. No pertinent surgical history. FAMILY HISTORY    Family History   Problem Relation Age of Onset    Heart Disease Mother     Diabetes Maternal Uncle     Diabetes Paternal Uncle     Cancer Paternal Uncle         ?  pancreatic       SOCIAL HISTORY    Social History     Tobacco Use    Smoking status: Never Smoker    Smokeless tobacco: Never Used   Substance Use Topics    Alcohol use: No    Drug use: No       ALLERGIES    No Known Allergies    MEDICATIONS    Current Outpatient Medications on File Prior to Encounter   Medication Sig Dispense Refill    metFORMIN (GLUCOPHAGE) 500 MG tablet Take 1 tablet by mouth daily (with breakfast) 90 tablet 0    acetaminophen (TYLENOL) 325 MG tablet Take 650 mg by mouth every 6 hours as needed for Pain      GLUCOSAMINE HCL PO Take by mouth daily      Multiple Vitamin (MULTI VITAMIN PO) Take by mouth daily      ibuprofen (ADVIL;MOTRIN) 800 MG tablet Take 1 tablet by mouth every 6 hours as needed for Pain 120 tablet 3    Compression Stockings MISC by Does not apply route Diagnosis: F34.081  Location of wound: Left Leg  Gradient IV (30-40 mm Hg)  Style: Knee Length  Extremity: Bilateral 1 each 0     No current facility-administered medications on file prior to encounter. REVIEW OF SYSTEMS    Pertinent items are noted in HPI. Objective:      BP (!) 146/88   Pulse 71   Temp 98.2 °F (36.8 °C) (Oral)   Resp 20     Wt Readings from Last 3 Encounters:   05/20/19 (!) 418 lb (189.6 kg)   05/14/19 (!) 422 lb 13.5 oz (191.8 kg)   02/07/19 (!) 417 lb 9.6 oz (189.4 kg)       PHYSICAL EXAM    General Appearance: alert and oriented to person, place and time, well developed and well- nourished, morbidly obese,  in no acute distress  Skin: warm and dry, no rash or erythema  Head: normocephalic and atraumatic  Eyes: pupils equal, round, and reactive to light, extraocular eye movements intact, conjunctivae normal  ENT:  external ear and ear canal normal bilaterally, nose without deformity  Neck: supple and non-tender without mass, no thyromegaly or thyroid nodules, no cervical lymphadenopathy  Pulmonary/Chest: clear to auscultation bilaterally- no wheezes, rales or rhonchi, normal air movement, no respiratory distress  Cardiovascular: normal rate, regular rhythm, normal S1 and S2, no murmurs, rubs, clicks, or gallops, distal pulses intact  Abdomen: soft, non-tender, non-distended, normal bowel sounds, no masses or organomegaly  Extremities: no cyanosis, clubbing. 3 + edema both legs. Bilateral leg ulcers as scribed. Getting smaller. No overt infection.   Musculoskeletal: normal range of motion, no joint swelling, deformity or tenderness  Neurologic:  no cranial nerve deficit, gait, coordination and speech normal      Assessment:      Patient Active Problem List   Diagnosis Code    Family history of early CAD Z80.55    PPD positive R76.11    Vitamin D deficiency E55.9    Edema R60.9    Morbid obesity ([address] E66.01    Anemia D64.9    Venous stasis ulcer of left lower extremity ([address] I83.029, 5/28/2019  9:38 AM   Post-Procedure Length (cm) 0.8 cm 5/28/2019 10:11 AM   Post-Procedure Width (cm) 0.7 cm 5/28/2019 10:11 AM   Post-Procedure Depth (cm) 0.3 cm 5/28/2019 10:11 AM   Post-Procedure Surface Area (cm^2) 0.56 cm^2 5/28/2019 10:11 AM   Post-Procedure Volume (cm^3) 0.17 cm^3 5/28/2019 10:11 AM   Distance Tunneling (cm) 0 cm 5/28/2019  9:38 AM   Tunneling Position ___ O'Clock 0 3/26/2019  2:16 PM   Undermining Starts ___ O'Clock 0 3/5/2019  3:02 PM   Undermining Ends___ O'Clock 0 3/26/2019  2:16 PM   Undermining Maxium Distance (cm) 0 5/28/2019  9:38 AM   Wound Assessment Red 5/28/2019  9:38 AM   Drainage Amount Small 5/28/2019  9:38 AM   Drainage Description Brown;Serosanguinous 5/28/2019  9:38 AM   Odor None 5/28/2019  9:38 AM   Margins Defined edges 5/28/2019  9:38 AM   Georgina-wound Assessment Brown;Dry 5/28/2019  9:38 AM   Non-staged Wound Description Full thickness 5/28/2019  9:38 AM   Olla%Wound Bed 60 4/2/2019  2:41 PM   Red%Wound Bed 100 5/28/2019  9:38 AM   Yellow%Wound Bed 20 5/24/2019 11:46 AM   Black%Wound Bed 5 5/14/2019  2:46 PM   Other%Wound Bed 0 2/15/2019 11:15 AM   Number of days: 234       Wound 02/05/19 #12 Left lower lateral leg (Active)   Wound Image   5/14/2019  2:46 PM   Wound Venous 2/5/2019  1:57 PM   Dressing/Treatment Other (comment) 5/21/2019  3:12 PM   Wound Cleansed Rinsed/Irrigated with saline 5/28/2019  9:38 AM   Wound Length (cm) 2.1 cm 5/28/2019  9:38 AM   Wound Width (cm) 1 cm 5/28/2019  9:38 AM   Wound Depth (cm) 0.1 cm 5/28/2019  9:38 AM   Wound Surface Area (cm^2) 2.1 cm^2 5/28/2019  9:38 AM   Change in Wound Size % (l*w) 58.33 5/28/2019  9:38 AM   Wound Volume (cm^3) 0.21 cm^3 5/28/2019  9:38 AM   Wound Healing % 58 5/28/2019  9:38 AM   Post-Procedure Length (cm) 2.2 cm 5/28/2019 10:11 AM   Post-Procedure Width (cm) 1.1 cm 5/28/2019 10:11 AM   Post-Procedure Depth (cm) 0.3 cm 5/28/2019 10:11 AM   Post-Procedure Surface Area (cm^2) 2.42 cm^2 5/28/2019 10:11 AM Post-Procedure Volume (cm^3) 0.73 cm^3 5/28/2019 10:11 AM   Distance Tunneling (cm) 0 cm 5/28/2019  9:38 AM   Tunneling Position ___ O'Clock 0 3/5/2019  3:02 PM   Undermining Starts ___ O'Clock 0 3/5/2019  3:02 PM   Undermining Maxium Distance (cm) 0 5/28/2019  9:38 AM   Wound Assessment Red;Yellow;Pink;Pale 5/28/2019  9:38 AM   Drainage Amount Small 5/28/2019  9:38 AM   Drainage Description Serosanguinous 5/28/2019  9:38 AM   Odor None 5/28/2019  9:38 AM   Margins Defined edges 5/28/2019  9:38 AM   Georgina-wound Assessment Dark edges;Dry 5/28/2019  9:38 AM   Non-staged Wound Description Full thickness 5/28/2019  9:38 AM   Branford%Wound Bed 50 5/28/2019  9:38 AM   Red%Wound Bed 20 5/28/2019  9:38 AM   Yellow%Wound Bed 30 5/28/2019  9:38 AM   Black%Wound Bed 40 5/14/2019  2:46 PM   Number of days: 112       Percent of Wound(s)/Ulcer(s) Debrided: 100%    Total Surface Area Debrided:  2.98 sq cm       Diabetic/Pressure/Non Pressure Ulcers only:  Ulcer: Non-Pressure ulcer, fat layer exposed      Estimated Blood Loss:  Minimal    Hemostasis Achieved:  by pressure    Procedural Pain:  2  / 10     Post Procedural Pain:  0 / 10     Response to treatment:  Well tolerated by patient. Plan:       Treatment Note please see attached Discharge Instructions    New Medication(s) at this visit:   New Prescriptions    No medications on file       Smoking Cessation: Counseling given: Not Answered        In my professional opinion this patient would benefit from HBO Therapy: No       Patient is to return to wound care center in:  1 week(s)    Written patient dismissal instructions given to patient and signed by patient or POA. Discharge [facility] Physician Orders and Discharge Instructions  [address]  [place] [address]  Telephone: [phone] hands with soap and water prior to and after every dressing change. Wound Cleansing:   · Do not scrub or use excessive force. · With each dressing change, rinse wounds with 0.9% Saline. (May use wound wash or soft contact solution. Both can be purchased at a local drug store). · If unable to obtain saline, may use a gentle soap and water. · Keep wounds dry in the shower unless otherwise instructed by the physician. Topical Treatments:  Do not apply lotions, creams, or ointments to wound bed unless directed as followed:     [x] Apply moisturizing lotion to skin surrounding the wound prior to dressing change  [] Apply antifungal ointment to skin surrounding the wound prior to dressing change  [] Apply thin film of moisture barrier ointment to skin immediately around wound. [] Other:     Dressings:           Wound Location: bilateral leg wounds   [x] Apply Primary Dressing to wound:       [] Foam/Foam with Border(i.e Mepilex) [] Non-adherent (i.e.Mepitel)   [] Alginate with Silver (i.e. Silvercel)  [] Alginate (i.e. Aquacel)   [] Collagen (i.e. Puracol)   [] Collagen with Silver(i.e. Beverly)     [] Hydrocolloid    [x] Hydrafera Blue moistened with saline   [] MediHoney Paste/Gel   [] Hydrogel    [] Endoform      [] Santyl covered with gauze moisten with saline     [] Betadine   [] Bactroban/Mupirocin   [] Polysporin/Neosporin  [] Other:    [] Saline \"wet to dry\" gauze     Pack wound loosely with: [] Iodoform   [] Plain Packing  [] Other:      [x] Cover and Secure with:     [x] Dry Gauze  [] ABD [] Cast padding [] Kerlix or Karina rolled gauze   [] Coban  [] Ace Wrap [] Ace Wrap from forefoot to just below the knee [] Paper Tape [] Medipore Tape [] Other:    Avoid contact of tape with skin if possible.     [x] Change dressing: [] Daily    [] Every Other Day  [] Three times per week:  [] Monday, Wednesday, Friday  [] Tuesday, Thursday, Saturday   [x] Once a week  [x] Do Not Change Dressing [] Other:        Lymphedema Therapy:   [x] Wear Lymphedema pumps twice a day at settings prescribed by your physician. [x]Elevate leg(s) above the level of the heart for 30 minutes 4-5 times a day and/or when sitting. [x] Avoid prolonged standing in one place. Multilayer Compression Wrap:  Type: 4 Layer Compression Wrap      Applied in Clinic to the :  Bilateral lower leg(s)     · Do not get leg(s) with wrap wet. · If wraps become too tight call the center or completely remove the wrap. · Elevate leg(s) above the level of the heart when sitting. · Avoid prolonged standing in one place. Dietary:  Important dietary reminders:  1. Increase Protein intake (i.e. Lean meats, fish, eggs, legumes, and yogurt)  2. No added salt  3. If diabetic, follow a diabetic diet and check glucose prior to meals or as instructed by your physician. Return Appointment:   DME/Wound Dressing Supplies Provided by:   (Please call them directly to reorder supplies when you run out)   ECF or Home Healthcare:   Wound reassessment visit with Nurse : Friday May 31, 2019     Return Appointment: With Dr. Mitseh Carl  in [address] [place] [address]). [x] Orders placed during your visit: No orders of the defined types were placed in this encounter. Your nurse  is: Rinku Rangel     Electronically signed by Chris Herrera RN on 5/28/2019 at 10:04 AM     [address] [place] [address] Information: Should you experience any significant changes in your wound(s) or have questions about your wound care, please contact the [address] [place] [address] at [phone] Monday-Friday from 8:00 am - 4:30 pm except for Wednesdays which hours are from 8:00 am - 2:00 pm.   If you need help with your wound outside these hours and cannot wait until we are again available, contact your PCP or go to the hospital emergency room. PLEASE NOTE: IF YOU ARE UNABLE TO OBTAIN WOUND SUPPLIES, CONTINUE TO USE THE SUPPLIES YOU HAVE AVAILABLE UNTIL YOU ARE ABLE TO REACH US.  IT IS MOST IMPORTANT TO KEEP THE WOUND COVERED AT ALL TIMES.       [] Patient unable to sign Discharge Instructions given to ECF/Transportation/POA            Electronically signed by Terri Sinclair MD on 5/28/2019 at 10:32 AM

## 2019-05-31 ENCOUNTER — HOSPITAL ENCOUNTER (OUTPATIENT)
Dept: WOUND CARE | Age: 54
Discharge: HOME | End: 2019-05-31
Payer: COMMERCIAL

## 2019-05-31 VITALS
SYSTOLIC BLOOD PRESSURE: 165 MMHG | RESPIRATION RATE: 16 BRPM | DIASTOLIC BLOOD PRESSURE: 93 MMHG | TEMPERATURE: 98 F | HEART RATE: 71 BPM

## 2019-05-31 PROCEDURE — 29581 APPL MULTLAYER CMPRN SYS LEG: CPT

## 2019-05-31 NOTE — PLAN OF CARE
Multilayer Compression Wrap   (Not Unna) Below the Knee    NAME:  Alicia Franco III  YOB: 1965  MEDICAL RECORD NUMBER:  [id number]  DATE:  5/31/2019       [x] Removed old Multilayer wrap if present and washed leg with mild soap/water.  [x] Applied moisturizing agent to dry skin as needed.  [x] Applied primary and secondary dressing as ordered     [x] Applied multilayered dressing below the knee to Bilateral lower leg(s)  (4 Layer Compression Wrap) per 's instructions.  [x] Instructed patient/caregiver not to remove dressing and to keep it clean and dry.  [x] Instructed patient/caregiver on complications to report to provider, such as pain, numbness in toes, heavy drainage, and slippage of dressing.  [x] Instructed patient on purpose of compression dressing and on activity and exercise recommendations.        Applied per   Guidelines    Electronically signed by Yissel Moy RN on 5/31/2019 at 3:01 PM

## 2019-06-04 ENCOUNTER — HOSPITAL ENCOUNTER (OUTPATIENT)
Dept: WOUND CARE | Age: 54
Discharge: HOME | End: 2019-06-04
Payer: COMMERCIAL

## 2019-06-04 VITALS — TEMPERATURE: 98 F | RESPIRATION RATE: 18 BRPM | DIASTOLIC BLOOD PRESSURE: 86 MMHG | SYSTOLIC BLOOD PRESSURE: 150 MMHG

## 2019-06-04 PROCEDURE — 29581 APPL MULTLAYER CMPRN SYS LEG: CPT

## 2019-06-04 PROCEDURE — 11042 DBRDMT SUBQ TIS 1ST 20SQCM/<: CPT

## 2019-06-04 PROCEDURE — 6370000000 HC RX 637 (ALT 250 FOR IP): Performed by: PODIATRIST

## 2019-06-04 RX ORDER — LIDOCAINE HYDROCHLORIDE 40 MG/ML
SOLUTION TOPICAL ONCE
Status: COMPLETED | OUTPATIENT
Start: 2019-06-04 | End: 2019-06-04

## 2019-06-04 RX ADMIN — LIDOCAINE HYDROCHLORIDE: 40 SOLUTION TOPICAL at 14:42

## 2019-06-04 NOTE — TELEPHONE ENCOUNTER
Patient called office to schedule appt with Dr. Louann Wise. After speaking with RENNY Virk. Informed patient Dr. Dafne Walker did speak with Dr. Louann Wise. Dr. Louann Wise agreed to see patient for surgical weight loss. He must attend information seminar prior to scheduling appt.  Patient verbalized understanding, seminar scheduled for 6/19/19 at [facility]

## 2019-06-04 NOTE — PROGRESS NOTES
0    acetaminophen (TYLENOL) 325 MG tablet Take 650 mg by mouth every 6 hours as needed for Pain      GLUCOSAMINE HCL PO Take by mouth daily      Multiple Vitamin (MULTI VITAMIN PO) Take by mouth daily      ibuprofen (ADVIL;MOTRIN) 800 MG tablet Take 1 tablet by mouth every 6 hours as needed for Pain 120 tablet 3    Compression Stockings MISC by Does not apply route Diagnosis: I83.333  Location of wound: Left Leg  Gradient IV (30-40 mm Hg)  Style: Knee Length  Extremity: Bilateral 1 each 0     No current facility-administered medications on file prior to encounter. REVIEW OF SYSTEMS    Pertinent items are noted in HPI. Review of Systems: A 12 point review of symptoms is unremarkable with the exception of the chief complaint. Patient specifically denies nausea, fever, vomiting, chills, shortness of breath, chest pain, abdominal pain, constipation or difficulty urinating. Objective:        BP (!) 150/86   Temp 98 °F (36.7 °C) (Oral)   Resp 18     Wt Readings from Last 3 Encounters:   05/20/19 (!) 418 lb (189.6 kg)   05/14/19 (!) 422 lb 13.5 oz (191.8 kg)   02/07/19 (!) 417 lb 9.6 oz (189.4 kg)       PHYSICAL EXAM    DP/PT pulses palpable bilaterally. CFT brisk to all digits. Digits are pink and warm to the touch. Hair growth is normal in appearance. No calf pain with palpation noted. Patient has pitting edema noted on the bilateral lower extremities. This is controlled with multilayer compression dressings below the knee. There are brawny pigmentary changes noted bilaterally at the distal aspect of the leg. There are hypertrophic skin changes noted bilaterally consistent with chronic venous stasis/lymphedema. Epicritic sensation is grossly intact bilaterally. Negative clonus and babinski reflex is down going. Wound note on the right and left lower extremity. Wound has fibrotic and nonviable tissue that extends down through and includes the subcutaneous tissue.   After debridement the wound has a granular base. There is no surrounding erythema, edema, warmth or malodor noted. The wound does not probe or track to bone. Assessment:      Patient Active Problem List   Diagnosis Code    Family history of early CAD Z80.55    PPD positive R76.11    Vitamin D deficiency E55.9    Edema R60.9    Morbid obesity ([address] E66.01    Anemia D64.9    Venous stasis ulcer of left lower extremity ([address] I83.029, L97.929    Venous stasis ulcer of ankle, right ([address] I83.013, L97.319    Idiopathic chronic venous hypertension of both lower extremities with ulcer and inflammation ([address] I87.333, L97.919, L97.929    Non-pressure chronic ulcer of right calf with fat layer exposed ([address] L97.212    Varicose veins of right lower extremity with both ulcer of calf and inflammation ([address] I83.212, L97.219    Prediabetes R73.03    Obesity due to excess calories without serious comorbidity E66.09        Excisional Debridement Procedure Note  Indications:  Based on my examination of this patient's wound(s)/ulcer(s) today, debridement is required to promote healing and evaluate the wound base. Performed by: Merna Vera DPM    Consent obtained? Yes    Time out taken: Yes    Pain Control: Anesthetic: 4% Lidocaine Liquid Topical     Debridement: Excisional Debridement    Using curette, #15 blade scalpel, scissors and forceps the wound/ulcer was sharply debrided    down through and including the removal of  epidermis, dermis and subcutaneous tissue.         Devitalized Tissue Debrided:  fibrin and slough      Pre Debridement Measurements:  Are located in the [facility]  Documentation Flow Sheet   Wound/Ulcer #: 11 and 12     Post  Debridement Measurements:    Wound 10/06/18 #11 right lower lateral leg (since 10/6/18) (Active)   Wound Image   5/14/2019  2:46 PM   Wound Venous 2/15/2019 11:15 AM   Dressing Status Old drainage 5/28/2019  9:38 AM   Dressing/Treatment Hydrofera blue 6/4/2019  3:13 PM   Wound Cleansed Rinsed/Irrigated with saline 6/4/2019  2:32 PM   Wound Length (cm) 1.5 cm 6/4/2019  2:32 PM   Wound Width (cm) 1.5 cm 6/4/2019  2:32 PM   Wound Depth (cm) 0.1 cm 6/4/2019  2:32 PM   Wound Surface Area (cm^2) 2.25 cm^2 6/4/2019  2:32 PM   Change in Wound Size % (l*w) 74.14 6/4/2019  2:32 PM   Wound Volume (cm^3) 0.22 cm^3 6/4/2019  2:32 PM   Wound Healing % 75 6/4/2019  2:32 PM   Post-Procedure Length (cm) 1.6 cm 6/4/2019  3:01 PM   Post-Procedure Width (cm) 1.6 cm 6/4/2019  3:01 PM   Post-Procedure Depth (cm) 0.3 cm 6/4/2019  3:01 PM   Post-Procedure Surface Area (cm^2) 2.56 cm^2 6/4/2019  3:01 PM   Post-Procedure Volume (cm^3) 0.77 cm^3 6/4/2019  3:01 PM   Distance Tunneling (cm) 0 cm 5/31/2019  2:58 PM   Tunneling Position ___ O'Clock 0 3/26/2019  2:16 PM   Undermining Starts ___ O'Clock 0 3/5/2019  3:02 PM   Undermining Ends___ O'Clock 0 3/26/2019  2:16 PM   Undermining Maxium Distance (cm) 0 5/31/2019  2:58 PM   Wound Assessment Dry;Red 6/4/2019  2:32 PM   Drainage Amount Small 6/4/2019  2:32 PM   Drainage Description Brown;Sanguinous 6/4/2019  2:32 PM   Odor None 6/4/2019  2:32 PM   Margins Defined edges 5/31/2019  2:58 PM   Georgina-wound Assessment Dry;Brown 6/4/2019  2:32 PM   Non-staged Wound Description Full thickness 6/4/2019  2:32 PM   [place]onda%Wound Bed 0 5/31/2019  2:58 PM   Red%Wound Bed 100 6/4/2019  2:32 PM   Yellow%Wound Bed 0 5/31/2019  2:58 PM   Black%Wound Bed 0 5/31/2019  2:58 PM   Other%Wound Bed 0 2/15/2019 11:15 AM   Number of days: 241       Wound 02/05/19 #12 Left lower lateral leg (Active)   Wound Image   5/14/2019  2:46 PM   Wound Venous 2/5/2019  1:57 PM   Dressing/Treatment Hydrofera blue 6/4/2019  3:13 PM   Wound Cleansed Rinsed/Irrigated with saline 5/31/2019  2:58 PM   Wound Length (cm) 1.9 cm 6/4/2019  2:32 PM   Wound Width (cm) 1 cm 6/4/2019  2:32 PM   Wound Depth (cm) 0.1 cm 6/4/2019  2:32 PM   Wound Surface Area (cm^2) 1.9 cm^2 6/4/2019  2:32 PM   Change in Wound Size % (l*w) 62.3 6/4/2019  2:32 PM   Wound Volume (cm^3) 0.19 cm^3 6/4/2019  2:32 PM   Wound Healing % 62 6/4/2019  2:32 PM   Post-Procedure Length (cm) 2 cm 6/4/2019  3:01 PM   Post-Procedure Width (cm) 1.1 cm 6/4/2019  3:01 PM   Post-Procedure Depth (cm) 0.3 cm 6/4/2019  3:01 PM   Post-Procedure Surface Area (cm^2) 2.2 cm^2 6/4/2019  3:01 PM   Post-Procedure Volume (cm^3) 0.66 cm^3 6/4/2019  3:01 PM   Distance Tunneling (cm) 0 cm 5/31/2019  2:58 PM   Tunneling Position ___ O'Clock 0 3/5/2019  3:02 PM   Undermining Starts ___ O'Clock 0 3/5/2019  3:02 PM   Undermining Maxium Distance (cm) 0 5/31/2019  2:58 PM   Wound Assessment Red;Dry 6/4/2019  2:32 PM   Drainage Amount Scant 6/4/2019  2:32 PM   Drainage Description Sanguinous 6/4/2019  2:32 PM   Odor None 6/4/2019  2:32 PM   Margins Defined edges 6/4/2019  2:32 PM   Georgina-wound Assessment Dry;Brown 6/4/2019  2:32 PM   Non-staged Wound Description Full thickness 6/4/2019  2:32 PM   [place]%Wound Bed 50 5/28/2019  9:38 AM   Red%Wound Bed 100 6/4/2019  2:32 PM   Yellow%Wound Bed 30 5/28/2019  9:38 AM   Black%Wound Bed 40 5/14/2019  2:46 PM   Number of days: 119     Percent of Wound/Ulcer Debrided: 100%    Total Surface Area Debrided:  4.76 sq cm    Diabetic/Pressure/Non Pressure Ulcers only:  Ulcer: Non-Pressure ulcer, fat layer exposed    Bleeding: Minimal    Hemostasis Achieved: by pressure    Procedural Pain: 0  / 10     Post Procedural Pain: 0 / 10     Response to treatment:  Well tolerated by patient. Plan: Will treat with multilayer compression dressing. Patient will return on Friday to have dressings changed to insure there is adequate compression. Wounds have improved with more frequent dressing changes.     Encouraged patient to follow up with weight management     Treatment Note please see attached Discharge Instructions    In my professional opinion this patient would benefit from HBO Therapy: No    Written patient dismissal instructions given to patient and signed by patient or POA.          RTC 1 week    Electronically signed by Manish Falk DPM on 6/4/2019 at 3:11 PM

## 2019-06-07 ENCOUNTER — HOSPITAL ENCOUNTER (OUTPATIENT)
Dept: WOUND CARE | Age: 54
Discharge: HOME | End: 2019-06-07
Payer: COMMERCIAL

## 2019-06-07 VITALS
HEART RATE: 83 BPM | TEMPERATURE: 98.2 F | DIASTOLIC BLOOD PRESSURE: 76 MMHG | SYSTOLIC BLOOD PRESSURE: 156 MMHG | RESPIRATION RATE: 20 BRPM

## 2019-06-07 PROCEDURE — 29581 APPL MULTLAYER CMPRN SYS LEG: CPT

## 2019-06-07 ASSESSMENT — PAIN DESCRIPTION - LOCATION: LOCATION: HIP

## 2019-06-07 ASSESSMENT — PAIN DESCRIPTION - ORIENTATION: ORIENTATION: LEFT

## 2019-06-07 ASSESSMENT — PAIN DESCRIPTION - PROGRESSION: CLINICAL_PROGRESSION: NOT CHANGED

## 2019-06-07 ASSESSMENT — PAIN DESCRIPTION - DESCRIPTORS: DESCRIPTORS: ACHING

## 2019-06-07 ASSESSMENT — PAIN DESCRIPTION - ONSET: ONSET: ON-GOING

## 2019-06-07 ASSESSMENT — PAIN DESCRIPTION - PAIN TYPE: TYPE: CHRONIC PAIN

## 2019-06-07 ASSESSMENT — PAIN DESCRIPTION - FREQUENCY: FREQUENCY: INTERMITTENT

## 2019-06-07 NOTE — PROGRESS NOTES
Multilayer Compression Wrap   (Not Unna) Below the Knee    NAME:  Theresa Lopez III  YOB: 1965  MEDICAL RECORD NUMBER:  [id number]  DATE:  6/7/2019        Removed old Multilayer wrap if present and washed leg with mild soap/water.   Applied moisturizing agent to dry skin as needed.   Applied primary and secondary dressing as ordered     Applied multilayered dressing below the knee to Bilateral lower leg(s)  (4 Layer Compression Wrap) per 's instructions.   Instructed patient/caregiver not to remove dressing and to keep it clean and dry.   Instructed patient/caregiver on complications to report to provider, such as pain, numbness in toes, heavy drainage, and slippage of dressing.   Instructed patient on purpose of compression dressing and on activity and exercise recommendations.     Applied per   Guidelines    Electronically signed by Hillary Villasenor RN on 6/7/2019 at 11:10 AM

## 2019-06-11 ENCOUNTER — HOSPITAL ENCOUNTER (OUTPATIENT)
Dept: WOUND CARE | Age: 54
Discharge: HOME | End: 2019-06-11
Payer: COMMERCIAL

## 2019-06-11 VITALS
SYSTOLIC BLOOD PRESSURE: 170 MMHG | DIASTOLIC BLOOD PRESSURE: 90 MMHG | TEMPERATURE: 97.9 F | RESPIRATION RATE: 20 BRPM | HEART RATE: 71 BPM

## 2019-06-11 PROCEDURE — 6370000000 HC RX 637 (ALT 250 FOR IP): Performed by: PODIATRIST

## 2019-06-11 PROCEDURE — 29581 APPL MULTLAYER CMPRN SYS LEG: CPT

## 2019-06-11 PROCEDURE — 11042 DBRDMT SUBQ TIS 1ST 20SQCM/<: CPT

## 2019-06-11 RX ORDER — LIDOCAINE HYDROCHLORIDE 40 MG/ML
2.5 SOLUTION TOPICAL ONCE
Status: COMPLETED | OUTPATIENT
Start: 2019-06-11 | End: 2019-06-11

## 2019-06-11 RX ADMIN — LIDOCAINE HYDROCHLORIDE 2.5 ML: 40 SOLUTION TOPICAL at 14:50

## 2019-06-11 NOTE — PROGRESS NOTES
Multilayer Compression Wrap   (Not Unna) Below the Knee    NAME:  Ra Escobar III  YOB: 1965  MEDICAL RECORD NUMBER:  [id number]  DATE:  6/11/2019        Removed old Multilayer wrap if present and washed leg with mild soap/water.   Applied moisturizing agent to dry skin as needed.   Applied primary and secondary dressing as ordered     Applied multilayered dressing below the knee to Bilateral lower leg(s)  (4 Layer Compression Wrap) per 's instructions.   Instructed patient/caregiver not to remove dressing and to keep it clean and dry.   Instructed patient/caregiver on complications to report to provider, such as pain, numbness in toes, heavy drainage, and slippage of dressing.   Instructed patient on purpose of compression dressing and on activity and exercise recommendations.     Applied per   Guidelines    Electronically signed by Avtar Aleman LPN on 5/41/8957 at 6:88 PM

## 2019-06-15 NOTE — PROGRESS NOTES
[facility]   Progress Note and Procedure Note      Dory Gandara III  MEDICAL RECORD NUMBER:  [id number]  AGE: 48 y.o. GENDER: male  : 1965  EPISODE DATE:  2019    Subjective:     Chief Complaint   Patient presents with    Wound Check     Bilateral lower legs         HISTORY of PRESENT ILLNESS HPI     Dory Gandara III is a 48 y.o. male who presents today for wound/ulcer evaluation. History of Wound Context: left leg wound. He has kept his dressings clean and intact. He had his dressings changed last Friday. He has received his lymphedema pumps and relates that he has been using them. Patient relates that he has been diagnosed as \"pre-diabetic\" and started on metformin. His HbA1c was 6.2  Patient denies any constitutional symptoms. Wound/Ulcer Pain Timing/Severity: constant  Quality of pain: burning  Severity:  3 / 10   Modifying Factors: None  Associated Signs/Symptoms: edema    Ulcer Identification:  Ulcer Type: venous  Contributing Factors: edema and venous stasis    Wound: N/A        PAST MEDICAL HISTORY        Diagnosis Date    Breast disorder 2009    MAMMOGRAM -VE. S/P BREAST SURGERY EVAL    Obesity     PPD positive     Venous stasis ulcer of left lower extremity ([facility] [address] 10/25/2016       PAST SURGICAL HISTORY    History reviewed. No pertinent surgical history. FAMILY HISTORY    Family History   Problem Relation Age of Onset    Heart Disease Mother     Diabetes Maternal Uncle     Diabetes Paternal Uncle     Cancer Paternal Uncle         ?  pancreatic       SOCIAL HISTORY    Social History     Tobacco Use    Smoking status: Never Smoker    Smokeless tobacco: Never Used   Substance Use Topics    Alcohol use: No    Drug use: No       ALLERGIES    No Known Allergies    MEDICATIONS    Current Outpatient Medications on File Prior to Encounter   Medication Sig Dispense Refill    metFORMIN (GLUCOPHAGE) 500 MG tablet Take 1 tablet by mouth daily (with breakfast) 90 tablet 0    acetaminophen (TYLENOL) 325 MG tablet Take 650 mg by mouth every 6 hours as needed for Pain      GLUCOSAMINE HCL PO Take by mouth daily      Multiple Vitamin (MULTI VITAMIN PO) Take by mouth daily      ibuprofen (ADVIL;MOTRIN) 800 MG tablet Take 1 tablet by mouth every 6 hours as needed for Pain 120 tablet 3    Compression Stockings MISC by Does not apply route Diagnosis: I83.333  Location of wound: Left Leg  Gradient IV (30-40 mm Hg)  Style: Knee Length  Extremity: Bilateral 1 each 0     No current facility-administered medications on file prior to encounter. REVIEW OF SYSTEMS    Pertinent items are noted in HPI. Review of Systems: A 12 point review of symptoms is unremarkable with the exception of the chief complaint. Patient specifically denies nausea, fever, vomiting, chills, shortness of breath, chest pain, abdominal pain, constipation or difficulty urinating. Objective:        BP (!) 170/90   Pulse 71   Temp 97.9 °F (36.6 °C) (Oral)   Resp 20     Wt Readings from Last 3 Encounters:   05/20/19 (!) 418 lb (189.6 kg)   05/14/19 (!) 422 lb 13.5 oz (191.8 kg)   02/07/19 (!) 417 lb 9.6 oz (189.4 kg)       PHYSICAL EXAM    DP/PT pulses palpable bilaterally. CFT brisk to all digits. Digits are pink and warm to the touch. Hair growth is normal in appearance. No calf pain with palpation noted. Patient has pitting edema noted on the bilateral lower extremities. This is controlled with multilayer compression dressings below the knee. There are brawny pigmentary changes noted bilaterally at the distal aspect of the leg. There are hypertrophic skin changes noted bilaterally consistent with chronic venous stasis/lymphedema. Epicritic sensation is grossly intact bilaterally. Negative clonus and babinski reflex is down going. Wound note on the right and left lower extremity. Wound has fibrotic and nonviable tissue that extends down through and includes the subcutaneous tissue. After debridement the wound has a granular base. There is no surrounding erythema, edema, warmth or malodor noted. The wound does not probe or track to bone. Assessment:      Patient Active Problem List   Diagnosis Code    Family history of early CAD Z80.55    PPD positive R76.11    Vitamin D deficiency E55.9    Edema R60.9    Morbid obesity ([address] E66.01    Anemia D64.9    Venous stasis ulcer of left lower extremity ([address] I83.029, L97.929    Venous stasis ulcer of ankle, right ([address] I83.013, L97.319    Idiopathic chronic venous hypertension of both lower extremities with ulcer and inflammation ([address] I87.333, L97.919, L97.929    Non-pressure chronic ulcer of right calf with fat layer exposed ([address] L97.212    Varicose veins of right lower extremity with both ulcer of calf and inflammation ([address] I83.212, L97.219    Prediabetes R73.03    Obesity due to excess calories without serious comorbidity E66.09        Excisional Debridement Procedure Note  Indications:  Based on my examination of this patient's wound(s)/ulcer(s) today, debridement is required to promote healing and evaluate the wound base. Performed by: Andrew Cuevas DPM    Consent obtained? Yes    Time out taken: Yes    Pain Control: Anesthetic: 4% Lidocaine Liquid Topical     Debridement: Excisional Debridement    Using curette, #15 blade scalpel, scissors and forceps the wound/ulcer was sharply debrided    down through and including the removal of  epidermis, dermis and subcutaneous tissue.         Devitalized Tissue Debrided:  fibrin and slough      Pre Debridement Measurements:  Are located in the Reader  Documentation Flow Sheet   Wound/Ulcer #: 11 and 12     Post  Debridement Measurements:  Wound 10/06/18 #11 right lower lateral leg (since 10/6/18) (Active)   Wound Image   5/14/2019  2:46 PM   Wound Venous 2/15/2019 11:15 AM   Dressing Status Old drainage 6/7/2019 11:10 AM   Dressing/Treatment Hydrofera blue 6/7/2019 11:10 AM   Wound Cleansed Rinsed/Irrigated with saline 6/11/2019  2:57 PM   Wound Length (cm) 1.1 cm 6/11/2019  2:49 PM   Wound Width (cm) 1 cm 6/11/2019  2:49 PM   Wound Depth (cm) 0.1 cm 6/11/2019  2:49 PM   Wound Surface Area (cm^2) 1.1 cm^2 6/11/2019  2:49 PM   Change in Wound Size % (l*w) 87.36 6/11/2019  2:49 PM   Wound Volume (cm^3) 0.11 cm^3 6/11/2019  2:49 PM   Wound Healing % 87 6/11/2019  2:49 PM   Post-Procedure Length (cm) 1.2 cm 6/11/2019  2:57 PM   Post-Procedure Width (cm) 1.1 cm 6/11/2019  2:57 PM   Post-Procedure Depth (cm) 0.3 cm 6/11/2019  2:57 PM   Post-Procedure Surface Area (cm^2) 1.32 cm^2 6/11/2019  2:57 PM   Post-Procedure Volume (cm^3) 0.4 cm^3 6/11/2019  2:57 PM   Distance Tunneling (cm) 0 cm 6/7/2019 11:10 AM   Tunneling Position ___ O'Clock 0 3/26/2019  2:16 PM   Undermining Starts ___ O'Clock 0 3/5/2019  3:02 PM   Undermining Ends___ O'Clock 0 3/26/2019  2:16 PM   Undermining Maxium Distance (cm) 0 6/7/2019 11:10 AM   Wound Assessment Red;Dry 6/11/2019  2:49 PM   Drainage Amount Small 6/11/2019  2:49 PM   Drainage Description Serosanguinous;Brown 6/11/2019  2:49 PM   Odor None 6/11/2019  2:49 PM   Margins Undefined edges 6/11/2019  2:49 PM   Georgina-wound Assessment Dry;Brown 6/11/2019  2:49 PM   Non-staged Wound Description Full thickness 6/11/2019  2:49 PM   [place]%Wound Bed 0 5/31/2019  2:58 PM   Red%Wound Bed 100 6/11/2019  2:49 PM   Yellow%Wound Bed 0 5/31/2019  2:58 PM   Black%Wound Bed 0 5/31/2019  2:58 PM   Other%Wound Bed 0 2/15/2019 11:15 AM   Number of days: 252       Wound 02/05/19 #12 Left lower lateral leg (Active)   Wound Image   5/14/2019  2:46 PM   Wound Venous 2/5/2019  1:57 PM   Dressing/Treatment Hydrofera blue; Other (comment) 6/11/2019  3:55 PM   Wound Cleansed Rinsed/Irrigated with saline 6/11/2019  2:49 PM   Wound Length (cm) 1 cm 6/11/2019  2:49 PM   Wound Width (cm) 1 cm 6/11/2019  2:49 PM   Wound Depth (cm) 0.1 cm 6/11/2019  2:49 PM   Wound Surface Area (cm^2) 1 cm^2 6/11/2019 2:49 PM   Change in Wound Size % (l*w) 80.16 6/11/2019  2:49 PM   Wound Volume (cm^3) 0.1 cm^3 6/11/2019  2:49 PM   Wound Healing % 80 6/11/2019  2:49 PM   Post-Procedure Length (cm) 1.1 cm 6/11/2019  2:57 PM   Post-Procedure Width (cm) 1.1 cm 6/11/2019  2:57 PM   Post-Procedure Depth (cm) 0.3 cm 6/11/2019  2:57 PM   Post-Procedure Surface Area (cm^2) 1.21 cm^2 6/11/2019  2:57 PM   Post-Procedure Volume (cm^3) 0.36 cm^3 6/11/2019  2:57 PM   Distance Tunneling (cm) 0 cm 6/7/2019 11:10 AM   Tunneling Position ___ O'Clock 0 3/5/2019  3:02 PM   Undermining Starts ___ O'Clock 0 3/5/2019  3:02 PM   Undermining Maxium Distance (cm) 0 6/7/2019 11:10 AM   Wound Assessment Red 6/11/2019  2:49 PM   Drainage Amount Small 6/11/2019  2:49 PM   Drainage Description Serosanguinous 6/11/2019  2:49 PM   Odor None 6/11/2019  2:49 PM   Margins Defined edges 6/11/2019  2:49 PM   Georgina-wound Assessment Brown;Dry 6/11/2019  2:49 PM   Non-staged Wound Description Full thickness 6/11/2019  2:49 PM   [place]%Wound Bed 50 5/28/2019  9:38 AM   Red%Wound Bed 100 6/11/2019  2:49 PM   Yellow%Wound Bed 30 5/28/2019  9:38 AM   Black%Wound Bed 40 5/14/2019  2:46 PM   Number of days: 130     Percent of Wound/Ulcer Debrided: 100%    Total Surface Area Debrided:  2.53 sq cm    Diabetic/Pressure/Non Pressure Ulcers only:  Ulcer: Non-Pressure ulcer, fat layer exposed    Bleeding: Minimal    Hemostasis Achieved: by pressure    Procedural Pain: 0  / 10     Post Procedural Pain: 0 / 10     Response to treatment:  Well tolerated by patient. Plan: Will treat with multilayer compression dressing. Patient will return on Friday to have dressings changed to insure there is adequate compression. Wounds have improved with more frequent dressing changes.     Encouraged patient to follow up with weight management     Treatment Note please see attached Discharge Instructions    In my professional opinion this patient would benefit from HBO Therapy: No    Written patient dismissal instructions given to patient and signed by patient or POA.          RTC 1 week    Electronically signed by Jennifer Grayson DPM on 6/15/2019 at 3:35 PM

## 2019-06-18 ENCOUNTER — HOSPITAL ENCOUNTER (OUTPATIENT)
Dept: WOUND CARE | Age: 54
Discharge: HOME | End: 2019-06-18
Payer: COMMERCIAL

## 2019-06-18 VITALS
SYSTOLIC BLOOD PRESSURE: 154 MMHG | HEART RATE: 76 BPM | DIASTOLIC BLOOD PRESSURE: 88 MMHG | RESPIRATION RATE: 20 BRPM | TEMPERATURE: 98.4 F

## 2019-06-18 PROCEDURE — 11042 DBRDMT SUBQ TIS 1ST 20SQCM/<: CPT

## 2019-06-18 PROCEDURE — 29581 APPL MULTLAYER CMPRN SYS LEG: CPT

## 2019-06-18 RX ORDER — LIDOCAINE HYDROCHLORIDE 40 MG/ML
2.5 SOLUTION TOPICAL ONCE
Status: DISCONTINUED | OUTPATIENT
Start: 2019-06-18 | End: 2019-06-19 | Stop reason: HOSPADM

## 2019-06-18 NOTE — PROGRESS NOTES
Multilayer Compression Wrap   (Not Unna) Below the Knee    NAME:  Jamaal Villalba III  YOB: 1965  MEDICAL RECORD NUMBER:  [id number]  DATE:  6/18/2019        Removed old Multilayer wrap if present and washed leg with mild soap/water.   Applied moisturizing agent to dry skin as needed.   Applied primary and secondary dressing as ordered     Applied multilayered dressing below the knee to Bilateral lower leg(s)  (4 Layer Compression Wrap) per 's instructions.   Instructed patient/caregiver not to remove dressing and to keep it clean and dry.   Instructed patient/caregiver on complications to report to provider, such as pain, numbness in toes, heavy drainage, and slippage of dressing.   Instructed patient on purpose of compression dressing and on activity and exercise recommendations.     Applied per   Guidelines    Electronically signed by Parul De La Cruz RN on 6/18/2019 at 3:18 PM

## 2019-06-20 NOTE — PROGRESS NOTES
[facility]   Progress Note and Procedure Note      America Canales III  MEDICAL RECORD NUMBER:  [id number]  AGE: 48 y.o. GENDER: male  : 1965  EPISODE DATE:  2019    Subjective:     Chief Complaint   Patient presents with    Wound Check     BLE         HISTORY of PRESENT ILLNESS HPI     America Canales III is a 48 y.o. male who presents today for wound/ulcer evaluation. History of Wound Context: left leg wound. He has kept his dressings clean and intact. He did not have his dressings changed last Friday. He has received his lymphedema pumps and relates that he has been using them. Wound/Ulcer Pain Timing/Severity: constant  Quality of pain: burning  Severity:  3 / 10   Modifying Factors: None  Associated Signs/Symptoms: edema    Ulcer Identification:  Ulcer Type: venous  Contributing Factors: edema and venous stasis    Wound: N/A        PAST MEDICAL HISTORY        Diagnosis Date    Breast disorder 2009    MAMMOGRAM -VE. S/P BREAST SURGERY EVAL    Obesity     PPD positive     Venous stasis ulcer of left lower extremity ([address] 10/25/2016       PAST SURGICAL HISTORY    History reviewed. No pertinent surgical history. FAMILY HISTORY    Family History   Problem Relation Age of Onset    Heart Disease Mother     Diabetes Maternal Uncle     Diabetes Paternal Uncle     Cancer Paternal Uncle         ?  pancreatic       SOCIAL HISTORY    Social History     Tobacco Use    Smoking status: Never Smoker    Smokeless tobacco: Never Used   Substance Use Topics    Alcohol use: No    Drug use: No       ALLERGIES    No Known Allergies    MEDICATIONS    Current Outpatient Medications on File Prior to Encounter   Medication Sig Dispense Refill    metFORMIN (GLUCOPHAGE) 500 MG tablet Take 1 tablet by mouth daily (with breakfast) 90 tablet 0    acetaminophen (TYLENOL) 325 MG tablet Take 650 mg by mouth every 6 hours as needed for Pain      GLUCOSAMINE HCL PO Take by mouth daily      Multiple Vitamin (MULTI VITAMIN PO) Take by mouth daily      ibuprofen (ADVIL;MOTRIN) 800 MG tablet Take 1 tablet by mouth every 6 hours as needed for Pain 120 tablet 3    Compression Stockings MISC by Does not apply route Diagnosis: I83.333  Location of wound: Left Leg  Gradient IV (30-40 mm Hg)  Style: Knee Length  Extremity: Bilateral 1 each 0     No current facility-administered medications on file prior to encounter. REVIEW OF SYSTEMS    Pertinent items are noted in HPI. Review of Systems: A 12 point review of symptoms is unremarkable with the exception of the chief complaint. Patient specifically denies nausea, fever, vomiting, chills, shortness of breath, chest pain, abdominal pain, constipation or difficulty urinating. Objective:        BP (!) 154/88   Pulse 76   Temp 98.4 °F (36.9 °C) (Oral)   Resp 20     Wt Readings from Last 3 Encounters:   05/20/19 (!) 418 lb (189.6 kg)   05/14/19 (!) 422 lb 13.5 oz (191.8 kg)   02/07/19 (!) 417 lb 9.6 oz (189.4 kg)       PHYSICAL EXAM    DP/PT pulses palpable bilaterally. CFT brisk to all digits. Digits are pink and warm to the touch. Hair growth is normal in appearance. No calf pain with palpation noted. Patient has pitting edema noted on the bilateral lower extremities. This is controlled with multilayer compression dressings below the knee. There are brawny pigmentary changes noted bilaterally at the distal aspect of the leg. There are hypertrophic skin changes noted bilaterally consistent with chronic venous stasis/lymphedema. Epicritic sensation is grossly intact bilaterally. Negative clonus and babinski reflex is down going. Wound note on the right and left lower extremity. Wound has fibrotic and nonviable tissue that extends down through and includes the subcutaneous tissue. After debridement the wound has a granular base. There is no surrounding erythema, edema, warmth or malodor noted.   The wound does not probe or track to bone.    Assessment:      Patient Active Problem List   Diagnosis Code    Family history of early CAD Z80.55    PPD positive R76.11    Vitamin D deficiency E55.9    Edema R60.9    Morbid obesity ([address] E66.01    Anemia D64.9    Venous stasis ulcer of left lower extremity ([address] I83.029, L97.929    Venous stasis ulcer of ankle, right ([address] I83.013, L97.319    Idiopathic chronic venous hypertension of both lower extremities with ulcer and inflammation ([address] I87.333, L97.919, L97.929    Non-pressure chronic ulcer of right calf with fat layer exposed ([address] L97.212    Varicose veins of right lower extremity with both ulcer of calf and inflammation ([address] I83.212, L97.219    Prediabetes R73.03    Obesity due to excess calories without serious comorbidity E66.09        Excisional Debridement Procedure Note  Indications:  Based on my examination of this patient's wound(s)/ulcer(s) today, debridement is required to promote healing and evaluate the wound base. Performed by: Brigid Cornell DPM    Consent obtained? Yes    Time out taken: Yes    Pain Control:       Debridement: Excisional Debridement    Using curette, #15 blade scalpel, scissors and forceps the wound/ulcer was sharply debrided    down through and including the removal of  epidermis, dermis and subcutaneous tissue.         Devitalized Tissue Debrided:  fibrin and slough      Pre Debridement Measurements:  Are located in the [place]  Documentation Flow Sheet   Wound/Ulcer #: 11 and 12     Post  Debridement Measurements:    Wound 10/06/18 #11 right lower lateral leg (since 10/6/18) (Active)   Wound Image   5/14/2019  2:46 PM   Wound Venous 2/15/2019 11:15 AM   Dressing Status Old drainage 6/7/2019 11:10 AM   Dressing/Treatment Hydrofera blue 6/18/2019  3:04 PM   Wound Cleansed Rinsed/Irrigated with saline 6/18/2019  2:29 PM   Wound Length (cm) 0.8 cm 6/18/2019  2:29 PM   Wound Width (cm) 0.9 cm 6/18/2019  2:29 PM   Wound Depth (cm) 0.1 cm 6/18/2019  2:29 PM   Wound Surface Area (cm^2) 0.72 cm^2 6/18/2019  2:29 PM   Change in Wound Size % (l*w) 91.72 6/18/2019  2:29 PM   Wound Volume (cm^3) 0.07 cm^3 6/18/2019  2:29 PM   Wound Healing % 92 6/18/2019  2:29 PM   Post-Procedure Length (cm) 0.9 cm 6/18/2019  3:04 PM   Post-Procedure Width (cm) 1 cm 6/18/2019  3:04 PM   Post-Procedure Depth (cm) 0.3 cm 6/18/2019  3:04 PM   Post-Procedure Surface Area (cm^2) 0.9 cm^2 6/18/2019  3:04 PM   Post-Procedure Volume (cm^3) 0.27 cm^3 6/18/2019  3:04 PM   Distance Tunneling (cm) 0 cm 6/18/2019  2:29 PM   Tunneling Position ___ O'Clock 0 3/26/2019  2:16 PM   Undermining Starts ___ O'Clock 0 3/5/2019  3:02 PM   Undermining Ends___ O'Clock 0 3/26/2019  2:16 PM   Undermining Maxium Distance (cm) 0 6/18/2019  2:29 PM   Wound Assessment Red;Granulation tissue 6/18/2019  2:29 PM   Drainage Amount Scant 6/18/2019  2:29 PM   Drainage Description Serosanguinous 6/18/2019  2:29 PM   Odor None 6/18/2019  2:29 PM   Margins Defined edges 6/18/2019  2:29 PM   Georgina-wound Assessment Dry;Brown;Pink 6/18/2019  2:29 PM   Non-staged Wound Description Full thickness 6/18/2019  2:29 PM   [place]%Wound Bed 0 5/31/2019  2:58 PM   Red%Wound Bed 100 6/18/2019  2:29 PM   Yellow%Wound Bed 0 5/31/2019  2:58 PM   Black%Wound Bed 0 5/31/2019  2:58 PM   Other%Wound Bed 0 2/15/2019 11:15 AM   Number of days: 257       Wound 02/05/19 #12 Left lower lateral leg (Active)   Wound Image   5/14/2019  2:46 PM   Wound Venous 2/5/2019  1:57 PM   Dressing/Treatment Hydrofera blue; Other (comment) 6/18/2019  3:04 PM   Wound Cleansed Rinsed/Irrigated with saline 6/18/2019  2:29 PM   Wound Length (cm) 1.2 cm 6/18/2019  2:29 PM   Wound Width (cm) 1 cm 6/18/2019  2:29 PM   Wound Depth (cm) 0.1 cm 6/18/2019  2:29 PM   Wound Surface Area (cm^2) 1.2 cm^2 6/18/2019  2:29 PM   Change in Wound Size % (l*w) 76.19 6/18/2019  2:29 PM   Wound Volume (cm^3) 0.12 cm^3 6/18/2019  2:29 PM   Wound Healing % 76 6/18/2019  2:29 PM   Post-Procedure Length (cm) 1.3 cm 6/18/2019  3:04 PM   Post-Procedure Width (cm) 1.1 cm 6/18/2019  3:04 PM   Post-Procedure Depth (cm) 0.3 cm 6/18/2019  3:04 PM   Post-Procedure Surface Area (cm^2) 1.43 cm^2 6/18/2019  3:04 PM   Post-Procedure Volume (cm^3) 0.43 cm^3 6/18/2019  3:04 PM   Distance Tunneling (cm) 0 cm 6/18/2019  2:29 PM   Tunneling Position ___ O'Clock 0 3/5/2019  3:02 PM   Undermining Starts ___ O'Clock 0 3/5/2019  3:02 PM   Undermining Maxium Distance (cm) 0 6/18/2019  2:29 PM   Wound Assessment Yellow;Red 6/18/2019  2:29 PM   Drainage Amount Scant 6/18/2019  2:29 PM   Drainage Description Serosanguinous 6/18/2019  2:29 PM   Odor None 6/18/2019  2:29 PM   Margins Defined edges 6/18/2019  2:29 PM   Georgina-wound Assessment Brown;Dry 6/18/2019  2:29 PM   Non-staged Wound Description Full thickness 6/18/2019  2:29 PM   [place]%Wound Bed 50 5/28/2019  9:38 AM   Red%Wound Bed 70 6/18/2019  2:29 PM   Yellow%Wound Bed 30 6/18/2019  2:29 PM   Black%Wound Bed 40 5/14/2019  2:46 PM   Number of days: 135     Percent of Wound/Ulcer Debrided: 100%    Total Surface Area Debrided:  2.33 sq cm    Diabetic/Pressure/Non Pressure Ulcers only:  Ulcer: Non-Pressure ulcer, fat layer exposed    Bleeding: Minimal    Hemostasis Achieved: by pressure    Procedural Pain: 0  / 10     Post Procedural Pain: 0 / 10     Response to treatment:  Well tolerated by patient. Plan: Will treat with multilayer compression dressing. Patient will return on Friday to have dressings changed to insure there is adequate compression. Wounds have improved with more frequent dressing changes. Encouraged patient to follow up with weight management     Treatment Note please see attached Discharge Instructions    In my professional opinion this patient would benefit from HBO Therapy: No    Written patient dismissal instructions given to patient and signed by patient or POA.          RTC 1 week    Electronically signed by Rachelle Mayes DPM on 6/20/2019 at 4:04 PM

## 2019-06-21 ENCOUNTER — HOSPITAL ENCOUNTER (OUTPATIENT)
Dept: WOUND CARE | Age: 54
Discharge: HOME | End: 2019-06-21
Payer: COMMERCIAL

## 2019-06-21 VITALS
TEMPERATURE: 98 F | SYSTOLIC BLOOD PRESSURE: 145 MMHG | RESPIRATION RATE: 20 BRPM | DIASTOLIC BLOOD PRESSURE: 82 MMHG | HEART RATE: 78 BPM

## 2019-06-21 PROCEDURE — 29581 APPL MULTLAYER CMPRN SYS LEG: CPT

## 2019-06-25 ENCOUNTER — HOSPITAL ENCOUNTER (OUTPATIENT)
Dept: WOUND CARE | Age: 54
Discharge: HOME | End: 2019-06-25
Payer: COMMERCIAL

## 2019-06-25 VITALS
TEMPERATURE: 98.5 F | DIASTOLIC BLOOD PRESSURE: 91 MMHG | RESPIRATION RATE: 20 BRPM | HEART RATE: 80 BPM | SYSTOLIC BLOOD PRESSURE: 159 MMHG

## 2019-06-25 PROCEDURE — 11042 DBRDMT SUBQ TIS 1ST 20SQCM/<: CPT

## 2019-06-25 PROCEDURE — 6370000000 HC RX 637 (ALT 250 FOR IP): Performed by: PODIATRIST

## 2019-06-25 PROCEDURE — 29581 APPL MULTLAYER CMPRN SYS LEG: CPT

## 2019-06-25 RX ORDER — LIDOCAINE HYDROCHLORIDE 40 MG/ML
SOLUTION TOPICAL ONCE
Status: COMPLETED | OUTPATIENT
Start: 2019-06-25 | End: 2019-06-25

## 2019-06-25 RX ADMIN — LIDOCAINE HYDROCHLORIDE: 40 SOLUTION TOPICAL at 14:33

## 2019-06-25 NOTE — PROGRESS NOTES
Multilayer Compression Wrap   (Not Unna) Below the Knee    NAME:  Verito Esquivel III  YOB: 1965  MEDICAL RECORD NUMBER:  [id number]  DATE:  6/25/2019    Multilayer compression wrap: Removed old Multilayer wrap if indicated and wash leg with mild soap/water. Applied moisturizing agent to dry skin as needed. Applied multilayered dressing below the knee to right lower leg  Applied multilayered dressing below the knee to left lower leg  Instructed patient/caregiver not to remove dressing and to keep it clean and dry. Instructed patient/caregiver on complications to report to provider, such as pain, numbness in toes, heavy drainage, and slippage of dressing. Instructed patient on purpose of compression dressing and on activity and exercise recommendations.       Electronically signed by Samara Yap LPN on 3/63/8888 at 6:18 PM

## 2019-06-27 NOTE — PROGRESS NOTES
[facility]   Progress Note and Procedure Note      Verito Esquivel III  MEDICAL RECORD NUMBER:  [id number]  AGE: 47 y.o. GENDER: male  : 1965  EPISODE DATE:  2019    Subjective:     Chief Complaint   Patient presents with    Wound Check     BLE         HISTORY of PRESENT ILLNESS HPI     Verito Esquivel III is a 47 y.o. male who presents today for wound/ulcer evaluation. History of Wound Context: left leg wound. He has kept his dressings clean and intact. Wound/Ulcer Pain Timing/Severity: constant  Quality of pain: burning  Severity:  3 / 10   Modifying Factors: None  Associated Signs/Symptoms: edema    Ulcer Identification:  Ulcer Type: venous  Contributing Factors: edema and venous stasis    Wound: N/A        PAST MEDICAL HISTORY        Diagnosis Date    Breast disorder 2009    MAMMOGRAM -VE. S/P BREAST SURGERY EVAL    Obesity     PPD positive     Venous stasis ulcer of left lower extremity ([address] 10/25/2016       PAST SURGICAL HISTORY    History reviewed. No pertinent surgical history. FAMILY HISTORY    Family History   Problem Relation Age of Onset    Heart Disease Mother     Diabetes Maternal Uncle     Diabetes Paternal Uncle     Cancer Paternal Uncle         ?  pancreatic       SOCIAL HISTORY    Social History     Tobacco Use    Smoking status: Never Smoker    Smokeless tobacco: Never Used   Substance Use Topics    Alcohol use: No    Drug use: No       ALLERGIES    No Known Allergies    MEDICATIONS    Current Outpatient Medications on File Prior to Encounter   Medication Sig Dispense Refill    metFORMIN (GLUCOPHAGE) 500 MG tablet Take 1 tablet by mouth daily (with breakfast) 90 tablet 0    acetaminophen (TYLENOL) 325 MG tablet Take 650 mg by mouth every 6 hours as needed for Pain      GLUCOSAMINE HCL PO Take by mouth daily      Multiple Vitamin (MULTI VITAMIN PO) Take by mouth daily      ibuprofen (ADVIL;MOTRIN) 800 MG tablet Take 1 tablet by mouth every 6 hours as needed for Pain 120 tablet 3    Compression Stockings MISC by Does not apply route Diagnosis: I83.333  Location of wound: Left Leg  Gradient IV (30-40 mm Hg)  Style: Knee Length  Extremity: Bilateral 1 each 0     No current facility-administered medications on file prior to encounter. REVIEW OF SYSTEMS    Pertinent items are noted in HPI. Review of Systems: A 12 point review of symptoms is unremarkable with the exception of the chief complaint. Patient specifically denies nausea, fever, vomiting, chills, shortness of breath, chest pain, abdominal pain, constipation or difficulty urinating. Objective:        BP (!) 159/91   Pulse 80   Temp 98.5 °F (36.9 °C) (Oral)   Resp 20     Wt Readings from Last 3 Encounters:   05/20/19 (!) 418 lb (189.6 kg)   05/14/19 (!) 422 lb 13.5 oz (191.8 kg)   02/07/19 (!) 417 lb 9.6 oz (189.4 kg)       PHYSICAL EXAM    DP/PT pulses palpable bilaterally. CFT brisk to all digits. Digits are pink and warm to the touch. Hair growth is normal in appearance. No calf pain with palpation noted. Patient has pitting edema noted on the bilateral lower extremities. This is controlled with multilayer compression dressings below the knee. There are brawny pigmentary changes noted bilaterally at the distal aspect of the leg. There are hypertrophic skin changes noted bilaterally consistent with chronic venous stasis/lymphedema. Epicritic sensation is grossly intact bilaterally. Negative clonus and babinski reflex is down going. Wound note on the right lower extremity. Wound has fibrotic and nonviable tissue that extends down through and includes the subcutaneous tissue. After debridement the wound has a granular base. There is no surrounding erythema, edema, warmth or malodor noted. The wound does not probe or track to bone. Wound previously noted on the left lower extremity has epithelialized.      Assessment:      Patient Active Problem List   Diagnosis Code cm^2 6/25/2019  2:17 PM   Change in Wound Size % (l*w) 90.69 6/25/2019  2:17 PM   Wound Volume (cm^3) 0.08 cm^3 6/25/2019  2:17 PM   Wound Healing % 91 6/25/2019  2:17 PM   Post-Procedure Length (cm) 1 cm 6/25/2019  3:02 PM   Post-Procedure Width (cm) 1 cm 6/25/2019  3:02 PM   Post-Procedure Depth (cm) 0.3 cm 6/25/2019  3:02 PM   Post-Procedure Surface Area (cm^2) 1 cm^2 6/25/2019  3:02 PM   Post-Procedure Volume (cm^3) 0.3 cm^3 6/25/2019  3:02 PM   Distance Tunneling (cm) 0 cm 6/18/2019  2:29 PM   Tunneling Position ___ O'Clock 0 3/26/2019  2:16 PM   Undermining Starts ___ O'Clock 0 3/5/2019  3:02 PM   Undermining Ends___ O'Clock 0 3/26/2019  2:16 PM   Undermining Maxium Distance (cm) 0 6/18/2019  2:29 PM   Wound Assessment Red;Granulation tissue 6/25/2019  2:17 PM   Drainage Amount Scant 6/25/2019  2:17 PM   Drainage Description Serosanguinous 6/25/2019  2:17 PM   Odor None 6/25/2019  2:17 PM   Margins Defined edges 6/25/2019  2:17 PM   Georgina-wound Assessment Dry;Brown 6/25/2019  2:17 PM   Non-staged Wound Description Full thickness 6/25/2019  2:17 PM   [place]%Wound Bed 0 5/31/2019  2:58 PM   Red%Wound Bed 100 6/25/2019  2:17 PM   Yellow%Wound Bed 0 5/31/2019  2:58 PM   Black%Wound Bed 0 5/31/2019  2:58 PM   Other%Wound Bed 0 2/15/2019 11:15 AM   Number of days: 264     Percent of Wound/Ulcer Debrided: 100%    Total Surface Area Debrided:  1 sq cm    Diabetic/Pressure/Non Pressure Ulcers only:  Ulcer: Non-Pressure ulcer, fat layer exposed    Bleeding: Minimal    Hemostasis Achieved: by pressure    Procedural Pain: 0  / 10     Post Procedural Pain: 0 / 10     Response to treatment:  Well tolerated by patient. Plan: Will treat with multilayer compression dressing. Patient will return on Friday to have dressing changed to insure there is adequate compression. Wounds have improved with more frequent dressing changes. Encouraged patient to follow up with weight management.  Discussed with patient that although his weight did not necessarily cause these wounds, his weight makes them more likely to reulcerate due to edema. Treatment Note please see attached Discharge Instructions    In my professional opinion this patient would benefit from HBO Therapy: No    Written patient dismissal instructions given to patient and signed by patient or POA.          RTC 1 week    Electronically signed by Alejandra Glez DPM on 6/27/2019 at 10:46 AM

## 2019-06-28 ENCOUNTER — HOSPITAL ENCOUNTER (OUTPATIENT)
Dept: WOUND CARE | Age: 54
Discharge: HOME | End: 2019-06-28
Payer: COMMERCIAL

## 2019-06-28 VITALS
RESPIRATION RATE: 20 BRPM | HEART RATE: 84 BPM | SYSTOLIC BLOOD PRESSURE: 167 MMHG | TEMPERATURE: 97.8 F | DIASTOLIC BLOOD PRESSURE: 99 MMHG

## 2019-06-28 PROCEDURE — 29581 APPL MULTLAYER CMPRN SYS LEG: CPT

## 2019-06-28 NOTE — PROGRESS NOTES
Multilayer Compression Wrap   (Not Unna) Below the Knee    NAME:  Theresa Lopez III  YOB: 1965  MEDICAL RECORD NUMBER:  [id number]  DATE:  6/28/2019        Removed old Multilayer wrap if present and washed leg with mild soap/water.   Applied moisturizing agent to dry skin as needed.   Applied primary and secondary dressing as ordered     Applied multilayered dressing below the knee to Right lower leg(s)  (4 Layer Compression Wrap) per 's instructions.   Instructed patient/caregiver not to remove dressing and to keep it clean and dry.   Instructed patient/caregiver on complications to report to provider, such as pain, numbness in toes, heavy drainage, and slippage of dressing.   Instructed patient on purpose of compression dressing and on activity and exercise recommendations.     Applied per   Guidelines    Electronically signed by Agnieszka Hutson RN on 6/28/2019 at 2:29 PM

## 2019-07-01 ENCOUNTER — TELEPHONE (OUTPATIENT)
Dept: BARIATRICS/WEIGHT MGMT | Age: 54
End: 2019-07-01

## 2019-07-02 ENCOUNTER — HOSPITAL ENCOUNTER (OUTPATIENT)
Dept: WOUND CARE | Age: 54
Discharge: HOME | End: 2019-07-02
Payer: COMMERCIAL

## 2019-07-02 VITALS
HEART RATE: 88 BPM | SYSTOLIC BLOOD PRESSURE: 172 MMHG | TEMPERATURE: 98.2 F | DIASTOLIC BLOOD PRESSURE: 97 MMHG | RESPIRATION RATE: 18 BRPM

## 2019-07-02 DIAGNOSIS — L97.929 IDIOPATHIC CHRONIC VENOUS HYPERTENSION OF BOTH LOWER EXTREMITIES WITH ULCER AND INFLAMMATION (HCC): Primary | ICD-10-CM

## 2019-07-02 DIAGNOSIS — I87.333 IDIOPATHIC CHRONIC VENOUS HYPERTENSION OF BOTH LOWER EXTREMITIES WITH ULCER AND INFLAMMATION (HCC): Primary | ICD-10-CM

## 2019-07-02 DIAGNOSIS — L97.919 IDIOPATHIC CHRONIC VENOUS HYPERTENSION OF BOTH LOWER EXTREMITIES WITH ULCER AND INFLAMMATION (HCC): Primary | ICD-10-CM

## 2019-07-02 PROCEDURE — 11042 DBRDMT SUBQ TIS 1ST 20SQCM/<: CPT

## 2019-07-02 PROCEDURE — 29581 APPL MULTLAYER CMPRN SYS LEG: CPT

## 2019-07-03 NOTE — PROGRESS NOTES
each 0    acetaminophen (TYLENOL) 325 MG tablet Take 650 mg by mouth every 6 hours as needed for Pain      ibuprofen (ADVIL;MOTRIN) 800 MG tablet Take 1 tablet by mouth every 6 hours as needed for Pain 120 tablet 3     No current facility-administered medications on file prior to encounter. REVIEW OF SYSTEMS    Pertinent items are noted in HPI. Review of Systems: A 12 point review of symptoms is unremarkable with the exception of the chief complaint. Patient specifically denies nausea, fever, vomiting, chills, shortness of breath, chest pain, abdominal pain, constipation or difficulty urinating. Objective:        BP (!) 172/97   Pulse 88   Temp 98.2 °F (36.8 °C) (Oral)   Resp 18     Wt Readings from Last 3 Encounters:   05/20/19 (!) 418 lb (189.6 kg)   05/14/19 (!) 422 lb 13.5 oz (191.8 kg)   02/07/19 (!) 417 lb 9.6 oz (189.4 kg)       PHYSICAL EXAM    DP/PT pulses palpable bilaterally. CFT brisk to all digits. Digits are pink and warm to the touch. Hair growth is normal in appearance. No calf pain with palpation noted. Patient has pitting edema noted on the bilateral lower extremities. This is controlled with multilayer compression dressings below the knee. There are brawny pigmentary changes noted bilaterally at the distal aspect of the leg. There are hypertrophic skin changes noted bilaterally consistent with chronic venous stasis/lymphedema. Epicritic sensation is grossly intact bilaterally. Negative clonus and babinski reflex is down going. Wound note on the left lower extremity. Wound has fibrotic and nonviable tissue that extends down through and includes the subcutaneous tissue. After debridement the wound has a granular base. There is no surrounding erythema, edema, warmth or malodor noted. The wound does not probe or track to bone. Wound previously noted on the right lower extremity has epithelialized.      Assessment:      Patient Active Problem List   Diagnosis Code  Family history of early CAD Z80.55    PPD positive R76.11    Vitamin D deficiency E55.9    Edema R60.9    Morbid obesity ([address] E66.01    Anemia D64.9    Venous stasis ulcer of left lower extremity ([address] I83.029, L97.929    Venous stasis ulcer of ankle, right ([address] I83.013, L97.319    Idiopathic chronic venous hypertension of both lower extremities with ulcer and inflammation (HCC) I87.333, L97.919, L97.929    Non-pressure chronic ulcer of right calf with fat layer exposed ([address] L97.212    Varicose veins of right lower extremity with both ulcer of calf and inflammation (HCC) I83.212, L97.219    Prediabetes R73.03    Obesity due to excess calories without serious comorbidity E66.09        Excisional Debridement Procedure Note  Indications:  Based on my examination of this patient's wound(s)/ulcer(s) today, debridement is required to promote healing and evaluate the wound base. Performed by: Shayy Chavez DPM    Consent obtained? Yes    Time out taken: Yes    Pain Control: Anesthetic: None     Debridement: Excisional Debridement    Using curette, #15 blade scalpel, scissors and forceps the wound/ulcer was sharply debrided    down through and including the removal of  epidermis, dermis and subcutaneous tissue.         Devitalized Tissue Debrided:  fibrin and slough      Pre Debridement Measurements:  Are located in the [facility]  Documentation Flow Sheet   Wound/Ulcer #: 12     Post  Debridement Measurements:    Wound 10/06/18 #11 right lower lateral leg (since 10/6/18) (Active)   Wound Image   5/14/2019  2:46 PM   Wound Venous 2/15/2019 11:15 AM   Dressing Status Old drainage 6/7/2019 11:10 AM   Dressing/Treatment Other (comment) 7/2/2019  2:06 PM   Wound Cleansed Rinsed/Irrigated with saline 6/28/2019  2:09 PM   Wound Length (cm) 0 cm 7/2/2019  1:16 PM   Wound Width (cm) 0 cm 7/2/2019  1:16 PM   Wound Depth (cm) 0 cm 7/2/2019  1:16 PM   Wound Surface Area (cm^2) 0 cm^2 7/2/2019  1:16 PM   Change in Wound Size % (l*w) 100 7/2/2019  1:16 PM   Wound Volume (cm^3) 0 cm^3 7/2/2019  1:16 PM   Wound Healing % 100 7/2/2019  1:16 PM   Post-Procedure Length (cm) 0 cm 7/2/2019  2:06 PM   Post-Procedure Width (cm) 0 cm 7/2/2019  2:06 PM   Post-Procedure Depth (cm) 0 cm 7/2/2019  2:06 PM   Post-Procedure Surface Area (cm^2) 0 cm^2 7/2/2019  2:06 PM   Post-Procedure Volume (cm^3) 0 cm^3 7/2/2019  2:06 PM   Distance Tunneling (cm) 0 cm 6/18/2019  2:29 PM   Tunneling Position ___ O'Clock 0 3/26/2019  2:16 PM   Undermining Starts ___ O'Clock 0 3/5/2019  3:02 PM   Undermining Ends___ O'Clock 0 3/26/2019  2:16 PM   Undermining Maxium Distance (cm) 0 6/18/2019  2:29 PM   Wound Assessment Epithelialization 7/2/2019  2:06 PM   Drainage Amount Scant 6/28/2019  2:09 PM   Drainage Description Serosanguinous 6/28/2019  2:09 PM   Odor None 6/28/2019  2:09 PM   Margins Defined edges 6/28/2019  2:09 PM   Georgina-wound Assessment Dry 6/28/2019  2:09 PM   Non-staged Wound Description Full thickness 6/28/2019  2:09 PM   [place]%Wound Bed 0 5/31/2019  2:58 PM   Red%Wound Bed 100 6/28/2019  2:09 PM   Yellow%Wound Bed 0 5/31/2019  2:58 PM   Black%Wound Bed 0 5/31/2019  2:58 PM   Other%Wound Bed 0 2/15/2019 11:15 AM   Number of days: 270       Wound 02/05/19 #12 Left lower lateral leg (Active)   Wound Image   5/14/2019  2:46 PM   Wound Venous 2/5/2019  1:57 PM   Dressing/Treatment Hydrofera blue 7/2/2019  2:06 PM   Wound Cleansed Rinsed/Irrigated with saline 7/2/2019  1:16 PM   Wound Length (cm) 0.8 cm 7/2/2019  1:16 PM   Wound Width (cm) 0.5 cm 7/2/2019  1:16 PM   Wound Depth (cm) 0.1 cm 7/2/2019  1:16 PM   Wound Surface Area (cm^2) 0.4 cm^2 7/2/2019  1:16 PM   Change in Wound Size % (l*w) 92.06 7/2/2019  1:16 PM   Wound Volume (cm^3) 0.04 cm^3 7/2/2019  1:16 PM   Wound Healing % 92 7/2/2019  1:16 PM   Post-Procedure Length (cm) 0.9 cm 7/2/2019  2:06 PM   Post-Procedure Width (cm) 0.6 cm 7/2/2019  2:06 PM   Post-Procedure Depth (cm) 0.3 cm 7/2/2019

## 2019-07-09 ENCOUNTER — HOSPITAL ENCOUNTER (OUTPATIENT)
Dept: WOUND CARE | Age: 54
Discharge: HOME | End: 2019-07-09
Payer: COMMERCIAL

## 2019-07-09 VITALS
SYSTOLIC BLOOD PRESSURE: 179 MMHG | DIASTOLIC BLOOD PRESSURE: 97 MMHG | HEART RATE: 95 BPM | RESPIRATION RATE: 18 BRPM | TEMPERATURE: 98.1 F

## 2019-07-09 PROCEDURE — 6370000000 HC RX 637 (ALT 250 FOR IP): Performed by: PODIATRIST

## 2019-07-09 PROCEDURE — 29581 APPL MULTLAYER CMPRN SYS LEG: CPT

## 2019-07-09 PROCEDURE — 11042 DBRDMT SUBQ TIS 1ST 20SQCM/<: CPT

## 2019-07-09 RX ORDER — LIDOCAINE HYDROCHLORIDE 40 MG/ML
2.5 SOLUTION TOPICAL ONCE
Status: COMPLETED | OUTPATIENT
Start: 2019-07-09 | End: 2019-07-09

## 2019-07-09 RX ADMIN — LIDOCAINE HYDROCHLORIDE 2.5 ML: 40 SOLUTION TOPICAL at 14:20

## 2019-07-12 NOTE — PROGRESS NOTES
previously noted on the right lower extremity has epithelialized. Assessment:      Patient Active Problem List   Diagnosis Code    Family history of early CAD Z80.55    PPD positive R76.11    Vitamin D deficiency E55.9    Edema R60.9    Morbid obesity ([address] E66.01    Anemia D64.9    Venous stasis ulcer of left lower extremity ([address] I83.029, L97.929    Venous stasis ulcer of ankle, right ([address] I83.013, L97.319    Idiopathic chronic venous hypertension of both lower extremities with ulcer and inflammation ([address] I87.333, L97.919, L97.929    Non-pressure chronic ulcer of right calf with fat layer exposed ([address] L97.212    Varicose veins of right lower extremity with both ulcer of calf and inflammation ([address] I83.212, L97.219    Prediabetes R73.03    Obesity due to excess calories without serious comorbidity E66.09        Excisional Debridement Procedure Note  Indications:  Based on my examination of this patient's wound(s)/ulcer(s) today, debridement is required to promote healing and evaluate the wound base. Performed by: Olivier Munoz DPM    Consent obtained? Yes    Time out taken: Yes    Pain Control: Anesthetic: 4% Lidocaine Liquid Topical     Debridement: Excisional Debridement    Using curette, #15 blade scalpel, scissors and forceps the wound/ulcer was sharply debrided    down through and including the removal of  epidermis, dermis and subcutaneous tissue.         Devitalized Tissue Debrided:  fibrin and slough      Pre Debridement Measurements:  Are located in the [facility]  Documentation Flow Sheet   Wound/Ulcer #: 12     Post  Debridement Measurements:    Wound 02/05/19 #12 Left lower lateral leg (Active)   Wound Image   5/14/2019  2:46 PM   Wound Venous 7/2/2019  1:16 PM   Dressing/Treatment Hydrofera blue 7/9/2019  3:03 PM   Wound Cleansed Rinsed/Irrigated with saline 7/9/2019  2:03 PM   Wound Length (cm) 0.7 cm 7/9/2019  2:03 PM   Wound Width (cm) 0.5 cm 7/9/2019  2:03 PM   Wound Depth (cm)

## 2019-07-16 ENCOUNTER — HOSPITAL ENCOUNTER (OUTPATIENT)
Dept: WOUND CARE | Age: 54
Discharge: HOME | End: 2019-07-16
Payer: COMMERCIAL

## 2019-07-16 VITALS
SYSTOLIC BLOOD PRESSURE: 173 MMHG | TEMPERATURE: 99.3 F | DIASTOLIC BLOOD PRESSURE: 93 MMHG | RESPIRATION RATE: 20 BRPM | HEART RATE: 108 BPM

## 2019-07-16 DIAGNOSIS — L97.929 VENOUS STASIS ULCER OF LEFT LOWER EXTREMITY (HCC): Primary | ICD-10-CM

## 2019-07-16 DIAGNOSIS — L02.416 CELLULITIS AND ABSCESS OF LEFT LOWER EXTREMITY: ICD-10-CM

## 2019-07-16 DIAGNOSIS — I83.029 VENOUS STASIS ULCER OF LEFT LOWER EXTREMITY (HCC): Primary | ICD-10-CM

## 2019-07-16 DIAGNOSIS — L03.116 CELLULITIS AND ABSCESS OF LEFT LOWER EXTREMITY: ICD-10-CM

## 2019-07-16 PROCEDURE — 6370000000 HC RX 637 (ALT 250 FOR IP): Performed by: PODIATRIST

## 2019-07-16 PROCEDURE — 87077 CULTURE AEROBIC IDENTIFY: CPT

## 2019-07-16 PROCEDURE — 87205 SMEAR GRAM STAIN: CPT

## 2019-07-16 PROCEDURE — 87186 SC STD MICRODIL/AGAR DIL: CPT

## 2019-07-16 PROCEDURE — 29581 APPL MULTLAYER CMPRN SYS LEG: CPT

## 2019-07-16 PROCEDURE — 87070 CULTURE OTHR SPECIMN AEROBIC: CPT

## 2019-07-16 PROCEDURE — 11042 DBRDMT SUBQ TIS 1ST 20SQCM/<: CPT

## 2019-07-16 RX ORDER — AMOXICILLIN AND CLAVULANATE POTASSIUM 875; 125 MG/1; MG/1
1 TABLET, FILM COATED ORAL 2 TIMES DAILY
Qty: 20 TABLET | Refills: 0 | Status: SHIPPED | OUTPATIENT
Start: 2019-07-16 | End: 2019-07-26

## 2019-07-16 RX ORDER — LIDOCAINE HYDROCHLORIDE 40 MG/ML
2.5 SOLUTION TOPICAL ONCE
Status: COMPLETED | OUTPATIENT
Start: 2019-07-16 | End: 2019-07-16

## 2019-07-16 RX ADMIN — LIDOCAINE HYDROCHLORIDE 2.5 ML: 40 SOLUTION TOPICAL at 14:33

## 2019-07-17 ENCOUNTER — TELEPHONE (OUTPATIENT)
Dept: BARIATRICS/WEIGHT MGMT | Age: 54
End: 2019-07-17

## 2019-07-19 ENCOUNTER — HOSPITAL ENCOUNTER (OUTPATIENT)
Dept: WOUND CARE | Age: 54
Discharge: HOME | End: 2019-07-19
Payer: COMMERCIAL

## 2019-07-19 DIAGNOSIS — L03.116 CELLULITIS AND ABSCESS OF LEFT LOWER EXTREMITY: ICD-10-CM

## 2019-07-19 DIAGNOSIS — L02.416 CELLULITIS AND ABSCESS OF LEFT LOWER EXTREMITY: ICD-10-CM

## 2019-07-19 PROCEDURE — 29581 APPL MULTLAYER CMPRN SYS LEG: CPT

## 2019-07-20 LAB
GRAM STAIN RESULT: ABNORMAL
ORGANISM: ABNORMAL
WOUND/ABSCESS: ABNORMAL
WOUND/ABSCESS: ABNORMAL

## 2019-07-23 ENCOUNTER — HOSPITAL ENCOUNTER (OUTPATIENT)
Dept: WOUND CARE | Age: 54
Discharge: HOME | End: 2019-07-23
Payer: COMMERCIAL

## 2019-07-23 VITALS
TEMPERATURE: 99.5 F | RESPIRATION RATE: 22 BRPM | HEART RATE: 88 BPM | SYSTOLIC BLOOD PRESSURE: 182 MMHG | DIASTOLIC BLOOD PRESSURE: 91 MMHG

## 2019-07-23 DIAGNOSIS — L03.116 CELLULITIS AND ABSCESS OF LEFT LOWER EXTREMITY: ICD-10-CM

## 2019-07-23 DIAGNOSIS — L02.416 CELLULITIS AND ABSCESS OF LEFT LOWER EXTREMITY: ICD-10-CM

## 2019-07-23 PROCEDURE — 11042 DBRDMT SUBQ TIS 1ST 20SQCM/<: CPT

## 2019-07-23 PROCEDURE — 6370000000 HC RX 637 (ALT 250 FOR IP): Performed by: PODIATRIST

## 2019-07-23 PROCEDURE — 29581 APPL MULTLAYER CMPRN SYS LEG: CPT

## 2019-07-23 RX ORDER — LIDOCAINE HYDROCHLORIDE 40 MG/ML
2.5 SOLUTION TOPICAL ONCE
Status: COMPLETED | OUTPATIENT
Start: 2019-07-23 | End: 2019-07-23

## 2019-07-23 RX ADMIN — LIDOCAINE HYDROCHLORIDE 2.5 ML: 40 SOLUTION TOPICAL at 14:41

## 2019-07-23 NOTE — PROGRESS NOTES
Multilayer Compression Wrap   (Not Unna) Below the Knee    NAME:  Addison Mcnulty III  YOB: 1965  MEDICAL RECORD NUMBER:  [id number]  DATE:  7/23/2019        Removed old Multilayer wrap if present and washed leg with mild soap/water.   Applied moisturizing agent to dry skin as needed.   Applied primary and secondary dressing as ordered     Applied multilayered dressing below the knee to Left lower leg(s)  (4 Layer Compression Wrap) per 's instructions.   Instructed patient/caregiver not to remove dressing and to keep it clean and dry.   Instructed patient/caregiver on complications to report to provider, such as pain, numbness in toes, heavy drainage, and slippage of dressing.   Instructed patient on purpose of compression dressing and on activity and exercise recommendations.     Applied per   Guidelines    Electronically signed by Josselyn Oliver RN on 7/23/2019 at 3:31 PM

## 2019-07-24 ENCOUNTER — OFFICE VISIT (OUTPATIENT)
Dept: BARIATRICS/WEIGHT MGMT | Age: 54
End: 2019-07-24
Payer: COMMERCIAL

## 2019-07-24 VITALS
WEIGHT: 315 LBS | BODY MASS INDEX: 40.43 KG/M2 | HEIGHT: 74 IN | SYSTOLIC BLOOD PRESSURE: 138 MMHG | HEART RATE: 68 BPM | DIASTOLIC BLOOD PRESSURE: 86 MMHG

## 2019-07-24 DIAGNOSIS — L97.919 IDIOPATHIC CHRONIC VENOUS HYPERTENSION OF BOTH LOWER EXTREMITIES WITH ULCER AND INFLAMMATION (HCC): ICD-10-CM

## 2019-07-24 DIAGNOSIS — L97.929 IDIOPATHIC CHRONIC VENOUS HYPERTENSION OF BOTH LOWER EXTREMITIES WITH ULCER AND INFLAMMATION (HCC): ICD-10-CM

## 2019-07-24 DIAGNOSIS — E66.01 MORBID OBESITY WITH BMI OF 50.0-59.9, ADULT (HCC): Primary | ICD-10-CM

## 2019-07-24 DIAGNOSIS — I87.333 IDIOPATHIC CHRONIC VENOUS HYPERTENSION OF BOTH LOWER EXTREMITIES WITH ULCER AND INFLAMMATION (HCC): ICD-10-CM

## 2019-07-24 DIAGNOSIS — R73.03 PREDIABETES: ICD-10-CM

## 2019-07-24 PROCEDURE — 99205 OFFICE O/P NEW HI 60 MIN: CPT | Performed by: SURGERY

## 2019-07-24 NOTE — PROGRESS NOTES
Chastity Calix is a 47 y.o. male with a date of birth of 1965. Vitals:    07/24/19 1501   BP: 138/86   Pulse: 68    BMI: Body mass index is 56.49 kg/m². Obesity Classification: Class III    Weight History: Wt Readings from Last 3 Encounters:   07/24/19 (!) 440 lb (199.6 kg)   05/20/19 (!) 418 lb (189.6 kg)   05/14/19 (!) 422 lb 13.5 oz (191.8 kg)       Patient's lowest adult weight was 275 lbs at age 55. Patient's highest adult weight was 440 lbs at age, current. Patient has participated in the following weight loss programs: physician supervised. Patient has participated in meal replacement/liquid diets. - Optifast    Patient has not participated in weight loss medications. Patient is not lactose intolerant. Patient does not have Mandaeism/cultural food concerns. Patient does not have food allergies. Patient dines out to a sit down restaurant 3-4 times per month. Patient dines out to a fast food restaurant 3-4 times per month. 24 hour recall/food frequency chart:  Breakfast: yes. Optifast shake  Snack: no.   Lunch: yes. [place] [facility] [employer]  Snack: no.   Dinner: yes. spaghetti  Snack: no.   Drinks throughout the day: water, lemonade  Do you drink alcohol? Yes. How often/how much alcohol do you drink: 2 Beers per week. Patient does not meet the criteria for binge eating disorder. Patient does not have grazing. Patient does have night eating - right before bed . Patient does have a history of emotional eating or eating out of boredom. It does not take an unusually large amount of food for the patient to feel comfortably full. Most days the patient eats until he is full. Patient describes level of activity as sedentary. Pt has wound on leg. Surgery  Patient's greatest concern about having surgery is: None besides keeping it off. Patient describes the motivation for weight loss surgery to be: ready to be lighter. Patient does feel confident in his ability to make these changes.
No foreign body present in the right eye. Left eye exhibits no discharge. No foreign body present in the left eye. No scleral icterus. Neck: Trachea normal and normal range of motion. Neck supple. No JVD present. No tracheal tenderness present. Carotid bruit is not present. No rigidity. No tracheal deviation and no edema present. No thyromegaly present. Cardiovascular: Normal rate, regular rhythm, normal heart sounds, intact distal pulses and normal pulses. Pulmonary/Chest: Effort normal and breath sounds normal. No stridor. No respiratory distress. Patient  has no wheezes. Patient has no rales. Patient exhibits no tenderness and no crepitus. Abdominal: Soft. Normal appearance and bowel sounds are normal. Patient exhibits no distension, no abdominal bruit, no ascites and no mass. There is no hepatosplenomegaly. There is no tenderness. There is no rigidity, no rebound, no guarding and no CVA tenderness. No hernia. Hernia confirmed negative in the ventral area. Musculoskeletal: Normal range of motion. Patient exhibits no edema or tenderness. Lymphadenopathy:        Head (right side): No submental, no submandibular, no preauricular, no posterior auricular and no occipital adenopathy present. Head (left side): No submental, no submandibular, no preauricular, no posterior auricular and no occipital adenopathy present. Patient  has no cervical adenopathy. Right: No supraclavicular adenopathy present. Left: No supraclavicular adenopathy present. Neurological: Patient is alert and oriented to person, place, and time. Patient has normal strength. Coordination and gait normal. GCS eye subscore is 4. GCS verbal subscore is 5. GCS motor subscore is 6. Skin: Skin is warm and dry. No abrasion and no rash noted. Patient  is not diaphoretic. No cyanosis or erythema. Psychiatric: Patient has a normal mood and affect.   speech is normal and behavior is normal. Cognition and memory are normal.

## 2019-07-25 NOTE — PROGRESS NOTES
every 6 hours as needed for Pain      GLUCOSAMINE HCL PO Take by mouth daily      Multiple Vitamin (MULTI VITAMIN PO) Take by mouth daily      ibuprofen (ADVIL;MOTRIN) 800 MG tablet Take 1 tablet by mouth every 6 hours as needed for Pain 120 tablet 3    Compression Stockings MISC Gradient: 40-50 mm Hg  Size: Knee Length  Extremity: bilateral  Type: Circaid 1 each 2     No current facility-administered medications on file prior to encounter. REVIEW OF SYSTEMS    Pertinent items are noted in HPI. Review of Systems: A 12 point review of symptoms is unremarkable with the exception of the chief complaint. Patient specifically denies nausea, fever, vomiting, chills, shortness of breath, chest pain, abdominal pain, constipation or difficulty urinating. Objective:        BP (!) 182/91   Pulse 88   Temp 99.5 °F (37.5 °C) (Oral)   Resp 22     Wt Readings from Last 3 Encounters:   07/24/19 (!) 440 lb (199.6 kg)   05/20/19 (!) 418 lb (189.6 kg)   05/14/19 (!) 422 lb 13.5 oz (191.8 kg)       PHYSICAL EXAM    DP/PT pulses palpable bilaterally. CFT brisk to all digits. Digits are pink and warm to the touch. Hair growth is normal in appearance. No calf pain with palpation noted. Patient has pitting edema noted on the bilateral lower extremities. This is controlled with multilayer compression dressings below the knee. There are brawny pigmentary changes noted bilaterally at the distal aspect of the leg. There are hypertrophic skin changes noted bilaterally consistent with chronic venous stasis/lymphedema. Epicritic sensation is grossly intact bilaterally. Negative clonus and babinski reflex is down going. Wound note on the left lower extremity. Wound has fibrotic and nonviable tissue that extends down through and includes the subcutaneous tissue. After debridement the wound has a granular base. There is no surrounding erythema, edema, warmth and no purulent drainage or malodor noted.   The wound PM   Wound Length (cm) 3.2 cm 7/23/2019  2:40 PM   Wound Width (cm) 1.7 cm 7/23/2019  2:40 PM   Wound Depth (cm) 0.1 cm 7/23/2019  2:40 PM   Wound Surface Area (cm^2) 5.44 cm^2 7/23/2019  2:40 PM   Change in Wound Size % (l*w) -7.94 7/23/2019  2:40 PM   Wound Volume (cm^3) 0.54 cm^3 7/23/2019  2:40 PM   Wound Healing % -8 7/23/2019  2:40 PM   Post-Procedure Length (cm) 3.3 cm 7/23/2019  3:08 PM   Post-Procedure Width (cm) 1.8 cm 7/23/2019  3:08 PM   Post-Procedure Depth (cm) 0.3 cm 7/23/2019  3:08 PM   Post-Procedure Surface Area (cm^2)  5.94 cm^2 7/23/2019  3:08 PM   Post-Procedure Volume (cm^3) 7.72 cm^3 7/23/2019  3:08 PM   Distance Tunneling (cm) 0 cm 7/19/2019 12:37 PM   Tunneling Position ___ O'Clock 0 7/16/2019  2:33 PM   Undermining Starts ___ O'Clock 0 7/16/2019  2:33 PM   Undermining Ends___ O'Clock 0 6/21/2019 10:53 AM   Undermining Maxium Distance (cm) 0 7/19/2019 12:37 PM   Wound Assessment Red 7/23/2019  2:40 PM   Drainage Amount Small 7/23/2019  2:40 PM   Drainage Description Purulent;Serosanguinous 7/23/2019  2:40 PM   Odor None 7/23/2019  2:40 PM   Margins Defined edges 7/23/2019  2:40 PM   Georgina-wound Assessment Dry;Maceration 7/23/2019  2:40 PM   Non-staged Wound Description Full thickness 7/23/2019  2:40 PM   [place]%Wound Bed 0 7/19/2019 12:37 PM   Red%Wound Bed 100 7/23/2019  2:40 PM   Yellow%Wound Bed 0 7/19/2019 12:37 PM   Black%Wound Bed 0 7/19/2019 12:37 PM   Number of days: 170     Percent of Wound/Ulcer Debrided: 100%    Total Surface Area Debrided:  5.94 sq cm    Diabetic/Pressure/Non Pressure Ulcers only:  Ulcer: Non-Pressure ulcer, fat layer exposed    Bleeding: Minimal    Hemostasis Achieved: by pressure    Procedural Pain: 0  / 10     Post Procedural Pain: 0 / 10     Response to treatment:  Well tolerated by patient. Plan:   E/M x 30 minutes of a new problem of cellulitis left lower extremity. Continue abx until gone    Will treat with multilayer compression dressing bilaterally.

## 2019-07-26 ENCOUNTER — HOSPITAL ENCOUNTER (OUTPATIENT)
Dept: WOUND CARE | Age: 54
Discharge: HOME | End: 2019-07-26
Payer: COMMERCIAL

## 2019-07-26 PROCEDURE — 29581 APPL MULTLAYER CMPRN SYS LEG: CPT

## 2019-07-30 ENCOUNTER — HOSPITAL ENCOUNTER (OUTPATIENT)
Dept: WOUND CARE | Age: 54
Discharge: HOME | End: 2019-07-30
Payer: COMMERCIAL

## 2019-07-30 VITALS
TEMPERATURE: 98.2 F | HEART RATE: 74 BPM | SYSTOLIC BLOOD PRESSURE: 163 MMHG | RESPIRATION RATE: 16 BRPM | DIASTOLIC BLOOD PRESSURE: 77 MMHG

## 2019-07-30 DIAGNOSIS — L03.116 CELLULITIS AND ABSCESS OF LEFT LOWER EXTREMITY: ICD-10-CM

## 2019-07-30 DIAGNOSIS — L02.416 CELLULITIS AND ABSCESS OF LEFT LOWER EXTREMITY: ICD-10-CM

## 2019-07-30 PROCEDURE — 11042 DBRDMT SUBQ TIS 1ST 20SQCM/<: CPT

## 2019-07-30 PROCEDURE — 6370000000 HC RX 637 (ALT 250 FOR IP): Performed by: PODIATRIST

## 2019-07-30 PROCEDURE — 29581 APPL MULTLAYER CMPRN SYS LEG: CPT

## 2019-07-30 RX ORDER — LIDOCAINE HYDROCHLORIDE 40 MG/ML
SOLUTION TOPICAL ONCE
Status: COMPLETED | OUTPATIENT
Start: 2019-07-30 | End: 2019-07-30

## 2019-07-30 RX ADMIN — LIDOCAINE HYDROCHLORIDE: 40 SOLUTION TOPICAL at 16:06

## 2019-07-30 NOTE — PROGRESS NOTES
VITAMIN PO) Take by mouth daily      Compression Stockings MISC Gradient: 40-50 mm Hg  Size: Knee Length  Extremity: bilateral  Type: Circaid 1 each 2    Compression Stockings MISC by Does not apply route Diagnosis: I83.333  Location of wound: Left Leg  Gradient IV (30-40 mm Hg)  Style: Knee Length  Extremity: Bilateral 1 each 0    acetaminophen (TYLENOL) 325 MG tablet Take 650 mg by mouth every 6 hours as needed for Pain      ibuprofen (ADVIL;MOTRIN) 800 MG tablet Take 1 tablet by mouth every 6 hours as needed for Pain 120 tablet 3     No current facility-administered medications on file prior to encounter. REVIEW OF SYSTEMS    Pertinent items are noted in HPI. Review of Systems: A 12 point review of symptoms is unremarkable with the exception of the chief complaint. Patient specifically denies nausea, fever, vomiting, chills, shortness of breath, chest pain, abdominal pain, constipation or difficulty urinating. Objective:        BP (!) 163/77   Pulse 74   Temp 98.2 °F (36.8 °C) (Oral)   Resp 16     Wt Readings from Last 3 Encounters:   07/24/19 (!) 440 lb (199.6 kg)   05/20/19 (!) 418 lb (189.6 kg)   05/14/19 (!) 422 lb 13.5 oz (191.8 kg)       PHYSICAL EXAM    DP/PT pulses palpable bilaterally. CFT brisk to all digits. Digits are pink and warm to the touch. Hair growth is normal in appearance. No calf pain with palpation noted. Patient has pitting edema noted on the bilateral lower extremities. This is controlled with multilayer compression dressings below the knee. There are brawny pigmentary changes noted bilaterally at the distal aspect of the leg. There are hypertrophic skin changes noted bilaterally consistent with chronic venous stasis/lymphedema. Epicritic sensation is grossly intact bilaterally. Negative clonus and babinski reflex is down going. Wound note on the left lower extremity.   Wound has fibrotic and nonviable tissue that extends down through and includes the 5/14/2019  2:46 PM   Wound Venous 7/19/2019 12:37 PM   Dressing/Treatment Hydrofera blue 7/30/2019  4:31 PM   Wound Cleansed Rinsed/Irrigated with saline 7/30/2019  3:51 PM   Wound Length (cm) 3 cm 7/30/2019  3:51 PM   Wound Width (cm) 2 cm 7/30/2019  3:51 PM   Wound Depth (cm) 0.1 cm 7/30/2019  3:51 PM   Wound Surface Area (cm^2) 6 cm^2 7/30/2019  3:51 PM   Change in Wound Size % (l*w) -19.05 7/30/2019  3:51 PM   Wound Volume (cm^3) 0.6 cm^3 7/30/2019  3:51 PM   Wound Healing % -20 7/30/2019  3:51 PM   Post-Procedure Length (cm) 3 cm 7/30/2019  4:21 PM   Post-Procedure Width (cm) 2 cm 7/30/2019  4:21 PM   Post-Procedure Depth (cm) 0.1 cm 7/30/2019  4:21 PM   Post-Procedure Surface Area (cm^2) 6 cm^2 7/30/2019  4:21 PM   Post-Procedure Volume (cm^3) 0.6 cm^3 7/30/2019  4:21 PM   Distance Tunneling (cm) 0 cm 7/30/2019  3:51 PM   Tunneling Position ___ O'Clock 0 7/16/2019  2:33 PM   Undermining Starts ___ O'Clock 0 7/16/2019  2:33 PM   Undermining Ends___ O'Clock 0 6/21/2019 10:53 AM   Undermining Maxium Distance (cm) 0 7/30/2019  3:51 PM   Wound Assessment Red 7/30/2019  3:51 PM   Drainage Amount Small 7/30/2019  3:51 PM   Drainage Description Purulent 7/30/2019  3:51 PM   Odor None 7/30/2019  3:51 PM   Margins Defined edges 7/30/2019  3:51 PM   Georgina-wound Assessment Dry 7/30/2019  3:51 PM   Non-staged Wound Description Full thickness 7/23/2019  2:40 PM   [place]%Wound Bed 0 7/19/2019 12:37 PM   Red%Wound Bed 100 7/30/2019  3:51 PM   Yellow%Wound Bed 0 7/19/2019 12:37 PM   Black%Wound Bed 0 7/19/2019 12:37 PM   Number of days: 175     Percent of Wound/Ulcer Debrided: 100%    Total Surface Area Debrided:  6 sq cm    Diabetic/Pressure/Non Pressure Ulcers only:  Ulcer: Non-Pressure ulcer, fat layer exposed    Bleeding: Minimal    Hemostasis Achieved: by pressure    Procedural Pain: 0  / 10     Post Procedural Pain: 0 / 10     Response to treatment:  Well tolerated by patient.        Plan:   E/M x 30 minutes     Will treat

## 2019-08-06 ENCOUNTER — HOSPITAL ENCOUNTER (OUTPATIENT)
Dept: WOUND CARE | Age: 54
Discharge: HOME | End: 2019-08-06
Payer: COMMERCIAL

## 2019-08-06 VITALS
DIASTOLIC BLOOD PRESSURE: 98 MMHG | RESPIRATION RATE: 16 BRPM | SYSTOLIC BLOOD PRESSURE: 159 MMHG | HEART RATE: 85 BPM | TEMPERATURE: 98.3 F

## 2019-08-06 DIAGNOSIS — L02.416 CELLULITIS AND ABSCESS OF LEFT LOWER EXTREMITY: ICD-10-CM

## 2019-08-06 DIAGNOSIS — L03.116 CELLULITIS AND ABSCESS OF LEFT LOWER EXTREMITY: ICD-10-CM

## 2019-08-06 PROCEDURE — 6370000000 HC RX 637 (ALT 250 FOR IP): Performed by: PODIATRIST

## 2019-08-06 RX ORDER — LIDOCAINE HYDROCHLORIDE 40 MG/ML
2.5 SOLUTION TOPICAL ONCE
Status: COMPLETED | OUTPATIENT
Start: 2019-08-06 | End: 2019-08-06

## 2019-08-06 RX ADMIN — LIDOCAINE HYDROCHLORIDE 2.5 ML: 40 SOLUTION TOPICAL at 15:34

## 2019-08-09 NOTE — PROGRESS NOTES
Multilayer Compression Wrap   (Not Unna) Below the Knee    NAME:  Katelin Silverman III  YOB: 1965  MEDICAL RECORD NUMBER:  [id number]  DATE:  8/6/2019        Removed old Multilayer wrap if present and washed leg with mild soap/water.   Applied moisturizing agent to dry skin as needed.   Applied primary and secondary dressing as ordered     Applied multilayered dressing below the knee to Bilateral lower leg(s)  (4 Layer Compression Wrap) per 's instructions.   Instructed patient/caregiver not to remove dressing and to keep it clean and dry.   Instructed patient/caregiver on complications to report to provider, such as pain, numbness in toes, heavy drainage, and slippage of dressing.   Instructed patient on purpose of compression dressing and on activity and exercise recommendations.     Applied per   Guidelines    Electronically signed by Madhav Huang RN on 8/6/2019 at 3:56 PM
No    Written patient dismissal instructions given to patient and signed by patient or POA.          RTC 1 week    Electronically signed by Jazzmine Stoll DPM on 8/8/2019 at 10:30 PM

## 2019-08-12 ENCOUNTER — OFFICE VISIT (OUTPATIENT)
Dept: SLEEP MEDICINE | Age: 54
End: 2019-08-12
Payer: COMMERCIAL

## 2019-08-12 VITALS
DIASTOLIC BLOOD PRESSURE: 88 MMHG | HEIGHT: 74 IN | SYSTOLIC BLOOD PRESSURE: 138 MMHG | OXYGEN SATURATION: 93 % | BODY MASS INDEX: 40.43 KG/M2 | WEIGHT: 315 LBS | HEART RATE: 100 BPM | TEMPERATURE: 100.5 F | RESPIRATION RATE: 16 BRPM

## 2019-08-12 DIAGNOSIS — G47.33 OBSTRUCTIVE SLEEP APNEA: Primary | ICD-10-CM

## 2019-08-12 DIAGNOSIS — E66.01 CLASS 3 SEVERE OBESITY DUE TO EXCESS CALORIES WITHOUT SERIOUS COMORBIDITY WITH BODY MASS INDEX (BMI) OF 50.0 TO 59.9 IN ADULT (HCC): ICD-10-CM

## 2019-08-12 PROCEDURE — 99204 OFFICE O/P NEW MOD 45 MIN: CPT | Performed by: PSYCHIATRY & NEUROLOGY

## 2019-08-12 ASSESSMENT — SLEEP AND FATIGUE QUESTIONNAIRES
NECK CIRCUMFERENCE (INCHES): 19
HOW LIKELY ARE YOU TO NOD OFF OR FALL ASLEEP WHILE SITTING QUIETLY AFTER LUNCH WITHOUT ALCOHOL: 1
HOW LIKELY ARE YOU TO NOD OFF OR FALL ASLEEP WHILE LYING DOWN TO REST IN THE AFTERNOON WHEN CIRCUMSTANCES PERMIT: 1
HOW LIKELY ARE YOU TO NOD OFF OR FALL ASLEEP WHEN YOU ARE A PASSENGER IN A CAR FOR AN HOUR WITHOUT A BREAK: 1
HOW LIKELY ARE YOU TO NOD OFF OR FALL ASLEEP IN A CAR, WHILE STOPPED FOR A FEW MINUTES IN TRAFFIC: 0
HOW LIKELY ARE YOU TO NOD OFF OR FALL ASLEEP WHILE WATCHING TV: 2
HOW LIKELY ARE YOU TO NOD OFF OR FALL ASLEEP WHILE SITTING INACTIVE IN A PUBLIC PLACE: 1
HOW LIKELY ARE YOU TO NOD OFF OR FALL ASLEEP WHILE SITTING AND READING: 2
HOW LIKELY ARE YOU TO NOD OFF OR FALL ASLEEP WHILE SITTING AND TALKING TO SOMEONE: 0
ESS TOTAL SCORE: 8

## 2019-08-12 ASSESSMENT — ENCOUNTER SYMPTOMS
EYES NEGATIVE: 1
APNEA: 0
ALLERGIC/IMMUNOLOGIC NEGATIVE: 1
CHOKING: 0

## 2019-08-12 NOTE — PROGRESS NOTES
excess calories without serious comorbidity with body mass index (BMI) of 50.0 to 59.9 in adult [facility])  Sleep Study with PAP Titration     Plan:     Patient was counseled about the pathophysiology of obstructive sleep apnea syndrome and the methods for evaluating its presence and severity. Patient was counseled to avoid driving and other potentially hazardous circumstances if the patient is experiencing excessive sleepiness. Treatment considerations include the use of nasal CPAP, oral dental appliance or a surgical intervention, which should be based on otolarygologic findings, In the meantime, the patient should be cautioned to avoid the use of alcohol or other depressant medications because of potential for increasing the duration and severity of apnea and cautioned regarding driving or operating and dangerous equipment if the patient is experiencing daytime sleepiness. .  Most likely the patient will benefit from the gastric sleeve surgery in treating of the obstructive sleep apnea. In 2013, in a study published in Obesity Surgery Journal  A total of 69 studies with 13,900 patients were included and revealed that all the procedures achieved profound effects on RUBÉN, as over 75 % of patients saw at least an improvement in their sleep apnea, bariatric surgery is a definitive treatment for obstructive sleep apnea, regardless of the specific type of surgery. We discussed the proportionality between weight and AHI. With 10% weight change, the AHI has a 27% proportionate change. With 20% weight change, the AHI has a 45-50% proportionate change. Orders Placed This Encounter   Procedures    Sleep Study with PAP Titration       Return in about 3 months (around 11/12/2019) for to review the PSG and CPAP usage, Reveiwing CPAP usage and compliance report and tro.     Kristen Uribe MD  Medical Director - [facility]

## 2019-08-13 ENCOUNTER — HOSPITAL ENCOUNTER (OUTPATIENT)
Dept: WOUND CARE | Age: 54
Discharge: HOME | End: 2019-08-13
Payer: COMMERCIAL

## 2019-08-13 VITALS
TEMPERATURE: 98.4 F | SYSTOLIC BLOOD PRESSURE: 177 MMHG | RESPIRATION RATE: 18 BRPM | DIASTOLIC BLOOD PRESSURE: 99 MMHG | HEART RATE: 84 BPM

## 2019-08-13 DIAGNOSIS — L02.416 CELLULITIS AND ABSCESS OF LEFT LOWER EXTREMITY: ICD-10-CM

## 2019-08-13 DIAGNOSIS — L03.116 CELLULITIS AND ABSCESS OF LEFT LOWER EXTREMITY: ICD-10-CM

## 2019-08-13 PROCEDURE — 29581 APPL MULTLAYER CMPRN SYS LEG: CPT

## 2019-08-13 PROCEDURE — 6370000000 HC RX 637 (ALT 250 FOR IP): Performed by: PODIATRIST

## 2019-08-13 PROCEDURE — 11042 DBRDMT SUBQ TIS 1ST 20SQCM/<: CPT

## 2019-08-13 RX ORDER — LIDOCAINE HYDROCHLORIDE 40 MG/ML
2.5 SOLUTION TOPICAL ONCE
Status: COMPLETED | OUTPATIENT
Start: 2019-08-13 | End: 2019-08-13

## 2019-08-13 RX ADMIN — LIDOCAINE HYDROCHLORIDE 2.5 ML: 40 SOLUTION TOPICAL at 14:54

## 2019-08-14 ENCOUNTER — OFFICE VISIT (OUTPATIENT)
Dept: BARIATRICS/WEIGHT MGMT | Age: 54
End: 2019-08-14
Payer: COMMERCIAL

## 2019-08-14 VITALS
HEIGHT: 74 IN | DIASTOLIC BLOOD PRESSURE: 76 MMHG | BODY MASS INDEX: 40.43 KG/M2 | WEIGHT: 315 LBS | SYSTOLIC BLOOD PRESSURE: 124 MMHG | HEART RATE: 68 BPM

## 2019-08-14 DIAGNOSIS — E66.01 MORBID OBESITY WITH BMI OF 50.0-59.9, ADULT (HCC): Primary | ICD-10-CM

## 2019-08-14 PROCEDURE — 99213 OFFICE O/P EST LOW 20 MIN: CPT | Performed by: SURGERY

## 2019-08-14 NOTE — PROGRESS NOTES
Disp: , Rfl:     ibuprofen (ADVIL;MOTRIN) 800 MG tablet, Take 1 tablet by mouth every 6 hours as needed for Pain, Disp: 120 tablet, Rfl: 3      Review of Systems - History obtained from the patient  General ROS: negative  Psychological ROS: negative  Endocrine ROS: negative  Respiratory ROS: negative  Cardiovascular ROS: negative  Gastrointestinal ROS:negative  Genito-Urinary ROS: negative  Musculoskeletal ROS: negative   Skin ROS: negative    Physical Exam   Vitals Reviewed   Constitutional: Patient is oriented to person, place, and time. Patient appears well-developed and well-nourished. Patient is active and cooperative. Non-toxic appearance. No distress. Neck: Trachea normal and normal range of motion. No JVD present. Pulmonary/Chest: Effort normal. No accessory muscle usage or stridor. No apnea. No respiratory distress. Cardiovascular: Normal rate and no JVD. Abdominal: Normal appearance. Patient exhibits no distension. Abdomen is soft, obese, non tender. Musculoskeletal: Normal range of motion. Patient exhibits no edema. Neurological: Patient is alert and oriented to person, place, and time. Patient has normal strength. GCS eye subscore is 4. GCS verbal subscore is 5. GCS motor subscore is 6. Skin: Skin is warm and dry. No abrasion and no rash noted. Patient is not diaphoretic. No cyanosis or erythema. Psychiatric: Patient has a normal mood and affect. Speech is normal and behavior is normal. Cognition and memory are normal.       A/P    Brianne Corral III is 47 y.o. male, Body mass index is 53.62 kg/m². pre surgery, has lost 22# since last visit. The patient underwent dietary counseling with registered dietician. I have reviewed, discussed and agree with the dietary plan. Patient is trying hard to keep good dietary and behavior modifications. Patient is monitoring portion sizes, food choices and liquid calories. Patient is trying to exercise regularly as much as possible.   We discussed how his weight affects his overall health including:  Yelena Wilkerson was seen today for obesity. Diagnoses and all orders for this visit:    Morbid obesity with BMI of 50.0-59.9, adult ([address]       and importance of weight loss to alleviate those co morbid conditions. I encouraged the patient to continue exercise and keeping healthy eating habits. Discussed pre-op labs and work up till now. Also counseled the patient extensively on Surgery. I spent 15 minutes face to face with patient with more than 50% of the time counseling and/or coordinating care for weight loss surgery. RTC in 4 weeks  Obtain rest of pre-op work up / clearances  Diet and Exercise      Patient advised that its their responsibility to follow up for studies and/or labs ordered today.      Sean Landaverde

## 2019-08-14 NOTE — LETTER
[facility] [address] [place] [address] [phone]  f [phone]  Vitaliy Carnes DO                                     SURGERY ORDER   -- Time of order -- 19    3:56 PM    Facility:   [address] [place] [address]. # _________________                                  Scheduled by: ____________    Surgery Date & Time: 19 @ 8:00 am                                     Pt arrival: 6:00 am    Patient Name:  Inés Fajardo III     :  1965 PCP:  Isaak Dickson MD       Home Ph:    [phone] (home)                                                     PROCEDURE:  Esophagogastroduodenoscopy 58413    DIAGNOSIS:  GERD K21.9  Anesthesia: Mac  Time Needed:  15 minutes   Pt Position:  Supine         Outpatient __X__ Admit ______  Assist._____  Pre-Op H & P to be done by: ___         Medications to be stopped 7 days before surgery: Ibuprofen    Additional / Special Orders:                                              __________________________________________________________________________  Vitaliy Carnes D.O.    Date:     Time:

## 2019-08-16 NOTE — PROGRESS NOTES
[facility]   Progress Note and Procedure Note      Bhavna Rosenthal III  MEDICAL RECORD NUMBER:  [id number]  AGE: 47 y.o. GENDER: male  : 1965  EPISODE DATE:  2019    Subjective:     Chief Complaint   Patient presents with    Wound Check     bilateral lower legs         HISTORY of PRESENT ILLNESS HPI     Bhavna Rosenthal III is a 47 y.o. male who presents today for wound/ulcer evaluation. History of Wound Context: left leg wound. He has kept his dressings clean and intact. Patient relates that he has lost 20 lbs in preparation for bariatric surgery. Wound/Ulcer Pain Timing/Severity: constant  Quality of pain: burning  Severity:  3 / 10   Modifying Factors: None  Associated Signs/Symptoms: edema    Ulcer Identification:  Ulcer Type: venous  Contributing Factors: edema and venous stasis    Wound: N/A        PAST MEDICAL HISTORY        Diagnosis Date    Arthritis     Breast disorder 2009    MAMMOGRAM -VE. S/P BREAST SURGERY EVAL    Obesity     PPD positive     Venous stasis ulcer of left lower extremity ([address] 10/25/2016       PAST SURGICAL HISTORY    History reviewed. No pertinent surgical history. FAMILY HISTORY    Family History   Problem Relation Age of Onset    Heart Disease Mother     Diabetes Maternal Uncle     Diabetes Paternal Uncle     Cancer Paternal Uncle         ? pancreatic    Cancer Brother         stomach       SOCIAL HISTORY    Social History     Tobacco Use    Smoking status: Never Smoker    Smokeless tobacco: Never Used   Substance Use Topics    Alcohol use: Yes     Comment: occ    Drug use: Yes     Types: Marijuana     Comment: monthly       ALLERGIES    No Known Allergies   Review of Systems: A 12 point review of symptoms is unremarkable with the exception of the chief complaint.   Patient specifically denies nausea, fever, vomiting, chills, shortness of breath, chest pain, abdominal pain, constipation or difficulty

## 2019-08-20 ENCOUNTER — HOSPITAL ENCOUNTER (OUTPATIENT)
Dept: WOUND CARE | Age: 54
Discharge: HOME | End: 2019-08-20
Payer: COMMERCIAL

## 2019-08-20 DIAGNOSIS — L02.416 CELLULITIS AND ABSCESS OF LEFT LOWER EXTREMITY: ICD-10-CM

## 2019-08-20 DIAGNOSIS — L03.116 CELLULITIS AND ABSCESS OF LEFT LOWER EXTREMITY: ICD-10-CM

## 2019-08-20 PROCEDURE — 6370000000 HC RX 637 (ALT 250 FOR IP): Performed by: PODIATRIST

## 2019-08-20 PROCEDURE — 29581 APPL MULTLAYER CMPRN SYS LEG: CPT

## 2019-08-20 PROCEDURE — 11042 DBRDMT SUBQ TIS 1ST 20SQCM/<: CPT

## 2019-08-20 RX ORDER — LIDOCAINE HYDROCHLORIDE 40 MG/ML
2.5 SOLUTION TOPICAL ONCE
Status: COMPLETED | OUTPATIENT
Start: 2019-08-20 | End: 2019-08-20

## 2019-08-20 RX ADMIN — LIDOCAINE HYDROCHLORIDE 2.5 ML: 40 SOLUTION TOPICAL at 14:36

## 2019-08-22 NOTE — PROGRESS NOTES
on the right lower extremity. Wound has fibrotic and nonviable tissue that extends down through and includes the subcutaneous tissue. After debridement the wound has a granular base. There is no surrounding erythema, edema, warmth and no purulent drainage or malodor noted. The wound does not probe or track to bone. Wound previously noted on the left lower extremity has epithelialized. Assessment:      Patient Active Problem List   Diagnosis Code    Family history of early CAD Z80.55    PPD positive R76.11    Vitamin D deficiency E55.9    Edema R60.9    Morbid obesity ([address] E66.01    Anemia D64.9    Venous stasis ulcer of left lower extremity ([address] I83.029, L97.929    Venous stasis ulcer of ankle, right ([address] I83.013, L97.319    Idiopathic chronic venous hypertension of both lower extremities with ulcer and inflammation ([address] I87.333, L97.919, L97.929    Non-pressure chronic ulcer of right calf with fat layer exposed ([address] L97.212    Varicose veins of right lower extremity with both ulcer of calf and inflammation ([address] I83.212, L97.219    Prediabetes R73.03    Obesity due to excess calories without serious comorbidity E66.09    Cellulitis and abscess of left lower extremity L03.116, L02.416        Excisional Debridement Procedure Note  Indications:  Based on my examination of this patient's wound(s)/ulcer(s) today, debridement is required to promote healing and evaluate the wound base. Performed by: Domingo Billings DPM    Consent obtained? Yes    Time out taken: Yes    Pain Control: Anesthetic: 4% Lidocaine Liquid Topical     Debridement: Excisional Debridement    Using curette, #15 blade scalpel, scissors and forceps the wound/ulcer was sharply debrided    down through and including the removal of  epidermis, dermis and subcutaneous tissue.         Devitalized Tissue Debrided:  fibrin and slough      Pre Debridement Measurements:  Are located in the [place]  Documentation Flow Sheet will return on Friday to have dressing changed to insure there is adequate compression. Wounds have improved with more frequent dressing changes. Encouraged patient to follow up with weight management. Discussed with patient that although his weight did not necessarily cause these wounds, his weight makes them more likely to reulcerate due to edema. Treatment Note please see attached Discharge Instructions    In my professional opinion this patient would benefit from HBO Therapy: No    Written patient dismissal instructions given to patient and signed by patient or POA.          RTC 1 week    Electronically signed by Nicanor Diaz DPM on 8/22/2019 at 9:08 AM

## 2019-08-26 ENCOUNTER — OFFICE VISIT (OUTPATIENT)
Dept: INTERNAL MEDICINE CLINIC | Age: 54
End: 2019-08-26
Payer: COMMERCIAL

## 2019-08-26 VITALS
BODY MASS INDEX: 40.43 KG/M2 | SYSTOLIC BLOOD PRESSURE: 120 MMHG | HEIGHT: 74 IN | HEART RATE: 86 BPM | OXYGEN SATURATION: 96 % | DIASTOLIC BLOOD PRESSURE: 80 MMHG | TEMPERATURE: 98.9 F | WEIGHT: 315 LBS

## 2019-08-26 DIAGNOSIS — E11.9 DIABETES MELLITUS, NEW ONSET (HCC): Primary | ICD-10-CM

## 2019-08-26 DIAGNOSIS — E66.01 MORBID OBESITY WITH BMI OF 50.0-59.9, ADULT (HCC): ICD-10-CM

## 2019-08-26 DIAGNOSIS — E78.5 DYSLIPIDEMIA: ICD-10-CM

## 2019-08-26 DIAGNOSIS — E55.9 VITAMIN D DEFICIENCY: ICD-10-CM

## 2019-08-26 DIAGNOSIS — T14.8XXD WOUND HEALING, DELAYED: ICD-10-CM

## 2019-08-26 LAB
CHP ED QC CHECK: NORMAL
GLUCOSE BLD-MCNC: 105 MG/DL
HBA1C MFR BLD: 6.1 %

## 2019-08-26 PROCEDURE — 99214 OFFICE O/P EST MOD 30 MIN: CPT | Performed by: INTERNAL MEDICINE

## 2019-08-26 PROCEDURE — 82962 GLUCOSE BLOOD TEST: CPT | Performed by: INTERNAL MEDICINE

## 2019-08-26 PROCEDURE — 83036 HEMOGLOBIN GLYCOSYLATED A1C: CPT | Performed by: INTERNAL MEDICINE

## 2019-08-26 ASSESSMENT — PATIENT HEALTH QUESTIONNAIRE - PHQ9
SUM OF ALL RESPONSES TO PHQ QUESTIONS 1-9: 0
SUM OF ALL RESPONSES TO PHQ9 QUESTIONS 1 & 2: 0
2. FEELING DOWN, DEPRESSED OR HOPELESS: 0
1. LITTLE INTEREST OR PLEASURE IN DOING THINGS: 0
SUM OF ALL RESPONSES TO PHQ QUESTIONS 1-9: 0

## 2019-08-26 NOTE — PATIENT INSTRUCTIONS
TAKE MED AS ADVISED    DIET/ EXERCISE.     FOLLOW UP WITHIN 3 MONTHS / AS NEEDED     FOLLOW UP FOR BARIATRIC SURGERY

## 2019-08-26 NOTE — PROGRESS NOTES
Ryan to complete a self tracking handout on Blood Sugars  and Weights and instructed them to bring it with them to his next appointment. Discussed use, benefit, and side effects of prescribed medications. Barriers to medication compliance addressed. All patient questions answered. Pt voiced understanding.        MORE THAN HALF THE TIME SPENT ON HISTORY, PHYSICAL, ASSESSMENT AND PLAN, DISCUSSION AND COUNSELLING     MEDICATION SIDE EFFECTS D/W PATIENT    PT STABLE AT TIME OF D/C.    RETURN TO CLINIC WITHIN 3 MONTHS / PRN    FOLLOW UP FOR BARIATRIC SURGERY

## 2019-08-27 ENCOUNTER — HOSPITAL ENCOUNTER (OUTPATIENT)
Dept: WOUND CARE | Age: 54
Discharge: HOME | End: 2019-08-27
Payer: COMMERCIAL

## 2019-08-27 VITALS
DIASTOLIC BLOOD PRESSURE: 77 MMHG | RESPIRATION RATE: 18 BRPM | TEMPERATURE: 97.9 F | SYSTOLIC BLOOD PRESSURE: 157 MMHG | HEART RATE: 76 BPM

## 2019-08-27 DIAGNOSIS — L03.116 CELLULITIS AND ABSCESS OF LEFT LOWER EXTREMITY: ICD-10-CM

## 2019-08-27 DIAGNOSIS — L02.416 CELLULITIS AND ABSCESS OF LEFT LOWER EXTREMITY: ICD-10-CM

## 2019-08-27 PROCEDURE — 6370000000 HC RX 637 (ALT 250 FOR IP): Performed by: PODIATRIST

## 2019-08-27 PROCEDURE — 29581 APPL MULTLAYER CMPRN SYS LEG: CPT

## 2019-08-27 RX ORDER — LIDOCAINE HYDROCHLORIDE 40 MG/ML
2.5 SOLUTION TOPICAL ONCE
Status: COMPLETED | OUTPATIENT
Start: 2019-08-27 | End: 2019-08-27

## 2019-08-27 RX ADMIN — LIDOCAINE HYDROCHLORIDE 2.5 ML: 40 SOLUTION TOPICAL at 15:06

## 2019-08-27 NOTE — PROGRESS NOTES
[facility]   Progress Note and Procedure Note      Bill Serrano III  MEDICAL RECORD NUMBER:  [id number]  AGE: 47 y.o. GENDER: male  : 1965  EPISODE DATE:  2019    Subjective:     Chief Complaint   Patient presents with    Wound Check     LEFT LOWER LEG         HISTORY of PRESENT ILLNESS HPI     Bill Serrano III is a 47 y.o. male who presents today for wound/ulcer evaluation. History of Wound Context: left leg wound. He has kept his dressings clean and intact. Patient relates that he has lost 20 lbs in preparation for bariatric surgery. Wound/Ulcer Pain Timing/Severity: constant  Quality of pain: burning  Severity:  3 / 10   Modifying Factors: None  Associated Signs/Symptoms: edema    Ulcer Identification:  Ulcer Type: venous  Contributing Factors: edema and venous stasis    Wound: N/A        PAST MEDICAL HISTORY        Diagnosis Date    Arthritis     Breast disorder 2009    MAMMOGRAM -VE. S/P BREAST SURGERY EVAL    Obesity     PPD positive     Venous stasis ulcer of left lower extremity ([address] 10/25/2016       PAST SURGICAL HISTORY    History reviewed. No pertinent surgical history. FAMILY HISTORY    Family History   Problem Relation Age of Onset    Heart Disease Mother     Diabetes Maternal Uncle     Diabetes Paternal Uncle     Cancer Paternal Uncle         ? pancreatic    Cancer Brother         stomach       SOCIAL HISTORY    Social History     Tobacco Use    Smoking status: Never Smoker    Smokeless tobacco: Never Used   Substance Use Topics    Alcohol use: Yes     Comment: occ    Drug use: Yes     Types: Marijuana     Comment: monthly       ALLERGIES    No Known Allergies   Review of Systems: A 12 point review of symptoms is unremarkable with the exception of the chief complaint. Patient specifically denies nausea, fever, vomiting, chills, shortness of breath, chest pain, abdominal pain, constipation or difficulty urinating.       MEDICATIONS    Current babinski reflex is down going. Wound previously noted on the left and right lower extremity has epithelialized. Assessment:      Patient Active Problem List   Diagnosis Code    Family history of early CAD Z80.55    PPD positive R76.11    Vitamin D deficiency E55.9    Edema R60.9    Morbid obesity ([address] E66.01    Anemia D64.9    Venous stasis ulcer of left lower extremity ([address] I83.029, L97.929    Venous stasis ulcer of ankle, right ([address] I83.013, L97.319    Idiopathic chronic venous hypertension of both lower extremities with ulcer and inflammation ([address] I87.333, L97.919, L97.929    Non-pressure chronic ulcer of right calf with fat layer exposed ([address] L97.212    Varicose veins of right lower extremity with both ulcer of calf and inflammation ([address] I83.212, L97.219    Prediabetes R73.03    Obesity due to excess calories without serious comorbidity E66.09    Cellulitis and abscess of left lower extremity L03.116, L02.416          Plan:   E/M x 30 minutes     Will treat with multilayer compression dressing bilaterally to prevent left and right leg from reulcerating. Patient will return on Friday to have dressing changed to insure there is adequate compression. If at this time there has been no further skin breakdown will convert to compression garments. Encouraged patient to follow up with weight management. Discussed with patient that although his weight did not necessarily cause these wounds, his weight makes them more likely to reulcerate due to edema. Treatment Note please see attached Discharge Instructions    In my professional opinion this patient would benefit from HBO Therapy: No    Written patient dismissal instructions given to patient and signed by patient or POA.          RTC 1 week    Electronically signed by Nasir Samano DPM on 8/27/2019 at 5:43 PM

## 2019-08-30 ENCOUNTER — OFFICE VISIT (OUTPATIENT)
Dept: CARDIOLOGY CLINIC | Age: 54
End: 2019-08-30
Payer: COMMERCIAL

## 2019-08-30 ENCOUNTER — HOSPITAL ENCOUNTER (OUTPATIENT)
Dept: WOUND CARE | Age: 54
Discharge: HOME | End: 2019-08-30
Payer: COMMERCIAL

## 2019-08-30 ENCOUNTER — TELEPHONE (OUTPATIENT)
Dept: CARDIOLOGY CLINIC | Age: 54
End: 2019-08-30

## 2019-08-30 VITALS
HEART RATE: 72 BPM | WEIGHT: 315 LBS | BODY MASS INDEX: 53.67 KG/M2 | DIASTOLIC BLOOD PRESSURE: 70 MMHG | SYSTOLIC BLOOD PRESSURE: 150 MMHG

## 2019-08-30 VITALS
HEART RATE: 70 BPM | TEMPERATURE: 98.1 F | DIASTOLIC BLOOD PRESSURE: 88 MMHG | RESPIRATION RATE: 20 BRPM | SYSTOLIC BLOOD PRESSURE: 149 MMHG

## 2019-08-30 DIAGNOSIS — Z01.818 PRE-OP EVALUATION: Primary | ICD-10-CM

## 2019-08-30 DIAGNOSIS — R73.03 PREDIABETES: ICD-10-CM

## 2019-08-30 DIAGNOSIS — I10 ESSENTIAL HYPERTENSION: ICD-10-CM

## 2019-08-30 DIAGNOSIS — E78.2 MIXED HYPERLIPIDEMIA: ICD-10-CM

## 2019-08-30 DIAGNOSIS — Z01.810 PREOPERATIVE CARDIOVASCULAR EXAMINATION: ICD-10-CM

## 2019-08-30 DIAGNOSIS — E66.01 MORBID OBESITY (HCC): ICD-10-CM

## 2019-08-30 DIAGNOSIS — R06.09 EXERTIONAL DYSPNEA: ICD-10-CM

## 2019-08-30 PROCEDURE — 99211 OFF/OP EST MAY X REQ PHY/QHP: CPT

## 2019-08-30 PROCEDURE — 99243 OFF/OP CNSLTJ NEW/EST LOW 30: CPT | Performed by: INTERNAL MEDICINE

## 2019-08-30 PROCEDURE — 93000 ELECTROCARDIOGRAM COMPLETE: CPT | Performed by: INTERNAL MEDICINE

## 2019-08-30 NOTE — PROGRESS NOTES
Cc: preoperative CV evaluation for bariatric surgery    HPI:     Mr Jose C Ortega is a 47years old man with prior history of morbid obesity (BMI 54), hypertension, hyperlipidemia, borderline diabetes mellitus type 2. Patient was referred to me by Dr. Mehul Ellison for pre-operative cardiovascular evaluation in anticipation of bariatric surgery. Patient also has significant bilateral hip pains and anticipates hip surgery in the near future. Patient denies any prior cardiac history. His level of activity is severely limited due to hip pains, overweight and deconditioning. He denies any nelida chest pains and has only mild dyspnea with exertion. Fasting lipid panel from 02/2019: Total cholesterol 190, HDL 47, , triglycerides 69. Hemoglobin A1c 6.1 in 08/2019. His mother had heart failure in her 46s. ECG 8/30/2019: Normal sinus rhythm, nonspecific T wave abnormalities. No prior echo    Exercise nuclear stress test 2015: LVEF 45%, some diaphragmatic attenuation (artifact) inferiorly, no perfusion defects to suggest prior MI or ischemia. Histories     Past Medical History:   has a past medical history of Arthritis, Breast disorder, Obesity, PPD positive, and Venous stasis ulcer of left lower extremity ([facility] Surgical History:   has no past surgical history on file. Social History:   reports that he has never smoked. He has never used smokeless tobacco. He reports that he drinks alcohol. He reports that he has current or past drug history. Drug: Marijuana. Family History:  No evidence for sudden cardiac death or premature CAD      Medications:     Home medications were reviewed and are listed below    Prior to Admission medications    Medication Sig Start Date End Date Taking?  Authorizing Provider   Compression Stockings MISC Gradient: 40-50 mm Hg  Size: Knee Length  Extremity: bilateral  Type: Circaid 7/2/19  Yes Ashlee Kelley DPM   Compression Stockings MISC by Does not apply route Diagnosis:

## 2019-09-03 ENCOUNTER — HOSPITAL ENCOUNTER (OUTPATIENT)
Dept: WOUND CARE | Age: 54
Discharge: HOME | End: 2019-09-03
Payer: COMMERCIAL

## 2019-09-03 VITALS
DIASTOLIC BLOOD PRESSURE: 99 MMHG | SYSTOLIC BLOOD PRESSURE: 176 MMHG | HEART RATE: 79 BPM | RESPIRATION RATE: 20 BRPM | TEMPERATURE: 100 F

## 2019-09-03 DIAGNOSIS — L02.416 CELLULITIS AND ABSCESS OF LEFT LOWER EXTREMITY: ICD-10-CM

## 2019-09-03 DIAGNOSIS — L03.116 CELLULITIS AND ABSCESS OF LEFT LOWER EXTREMITY: ICD-10-CM

## 2019-09-03 PROCEDURE — 99211 OFF/OP EST MAY X REQ PHY/QHP: CPT

## 2019-09-13 ENCOUNTER — TELEPHONE (OUTPATIENT)
Dept: BARIATRICS/WEIGHT MGMT | Age: 54
End: 2019-09-13

## 2019-09-16 ENCOUNTER — ANESTHESIA (OUTPATIENT)
Dept: ENDOSCOPY | Age: 54
End: 2019-09-16
Payer: COMMERCIAL

## 2019-09-16 ENCOUNTER — ANESTHESIA EVENT (OUTPATIENT)
Dept: ENDOSCOPY | Age: 54
End: 2019-09-16
Payer: COMMERCIAL

## 2019-09-16 ENCOUNTER — HOSPITAL ENCOUNTER (OUTPATIENT)
Age: 54
Setting detail: OUTPATIENT SURGERY
Discharge: HOME | End: 2019-09-16
Attending: SURGERY | Admitting: SURGERY
Payer: COMMERCIAL

## 2019-09-16 VITALS
DIASTOLIC BLOOD PRESSURE: 80 MMHG | SYSTOLIC BLOOD PRESSURE: 133 MMHG | BODY MASS INDEX: 40.43 KG/M2 | HEART RATE: 76 BPM | HEIGHT: 74 IN | WEIGHT: 315 LBS | OXYGEN SATURATION: 96 % | TEMPERATURE: 98.4 F | RESPIRATION RATE: 18 BRPM

## 2019-09-16 VITALS — OXYGEN SATURATION: 99 % | DIASTOLIC BLOOD PRESSURE: 75 MMHG | SYSTOLIC BLOOD PRESSURE: 125 MMHG

## 2019-09-16 PROCEDURE — 7100000011 HC PHASE II RECOVERY - ADDTL 15 MIN: Performed by: SURGERY

## 2019-09-16 PROCEDURE — 88305 TISSUE EXAM BY PATHOLOGIST: CPT

## 2019-09-16 PROCEDURE — 2709999900 HC NON-CHARGEABLE SUPPLY: Performed by: SURGERY

## 2019-09-16 PROCEDURE — 3700000001 HC ADD 15 MINUTES (ANESTHESIA): Performed by: SURGERY

## 2019-09-16 PROCEDURE — 6360000002 HC RX W HCPCS: Performed by: STUDENT IN AN ORGANIZED HEALTH CARE EDUCATION/TRAINING PROGRAM

## 2019-09-16 PROCEDURE — 3609012400 HC EGD TRANSORAL BIOPSY SINGLE/MULTIPLE: Performed by: SURGERY

## 2019-09-16 PROCEDURE — 3700000000 HC ANESTHESIA ATTENDED CARE: Performed by: SURGERY

## 2019-09-16 PROCEDURE — 43239 EGD BIOPSY SINGLE/MULTIPLE: CPT | Performed by: SURGERY

## 2019-09-16 PROCEDURE — 2580000003 HC RX 258: Performed by: STUDENT IN AN ORGANIZED HEALTH CARE EDUCATION/TRAINING PROGRAM

## 2019-09-16 PROCEDURE — 7100000010 HC PHASE II RECOVERY - FIRST 15 MIN: Performed by: SURGERY

## 2019-09-16 PROCEDURE — 2500000003 HC RX 250 WO HCPCS: Performed by: STUDENT IN AN ORGANIZED HEALTH CARE EDUCATION/TRAINING PROGRAM

## 2019-09-16 RX ORDER — PROPOFOL 10 MG/ML
INJECTION, EMULSION INTRAVENOUS PRN
Status: DISCONTINUED | OUTPATIENT
Start: 2019-09-16 | End: 2019-09-16 | Stop reason: SDUPTHER

## 2019-09-16 RX ORDER — SODIUM CHLORIDE, SODIUM LACTATE, POTASSIUM CHLORIDE, CALCIUM CHLORIDE 600; 310; 30; 20 MG/100ML; MG/100ML; MG/100ML; MG/100ML
INJECTION, SOLUTION INTRAVENOUS CONTINUOUS PRN
Status: DISCONTINUED | OUTPATIENT
Start: 2019-09-16 | End: 2019-09-16 | Stop reason: SDUPTHER

## 2019-09-16 RX ORDER — LIDOCAINE HYDROCHLORIDE 20 MG/ML
INJECTION, SOLUTION INFILTRATION; PERINEURAL PRN
Status: DISCONTINUED | OUTPATIENT
Start: 2019-09-16 | End: 2019-09-16 | Stop reason: SDUPTHER

## 2019-09-16 RX ADMIN — SODIUM CHLORIDE, SODIUM LACTATE, POTASSIUM CHLORIDE, AND CALCIUM CHLORIDE: 600; 310; 30; 20 INJECTION, SOLUTION INTRAVENOUS at 07:44

## 2019-09-16 RX ADMIN — LIDOCAINE HYDROCHLORIDE 100 MG: 20 INJECTION, SOLUTION INFILTRATION; PERINEURAL at 07:50

## 2019-09-16 RX ADMIN — PROPOFOL 80 MG: 10 INJECTION, EMULSION INTRAVENOUS at 07:50

## 2019-09-16 RX ADMIN — PROPOFOL 40 MG: 10 INJECTION, EMULSION INTRAVENOUS at 07:57

## 2019-09-16 RX ADMIN — PROPOFOL 30 MG: 10 INJECTION, EMULSION INTRAVENOUS at 07:53

## 2019-09-16 RX ADMIN — PROPOFOL 50 MG: 10 INJECTION, EMULSION INTRAVENOUS at 07:55

## 2019-09-16 ASSESSMENT — PULMONARY FUNCTION TESTS
PIF_VALUE: 0

## 2019-09-16 ASSESSMENT — PAIN SCALES - GENERAL: PAINLEVEL_OUTOF10: 0

## 2019-09-16 ASSESSMENT — ENCOUNTER SYMPTOMS: SHORTNESS OF BREATH: 1

## 2019-09-16 ASSESSMENT — PAIN - FUNCTIONAL ASSESSMENT
PAIN_FUNCTIONAL_ASSESSMENT: 0-10
PAIN_FUNCTIONAL_ASSESSMENT: PREVENTS OR INTERFERES SOME ACTIVE ACTIVITIES AND ADLS

## 2019-09-16 NOTE — OP NOTE
Antrum    Complications:  None; patient tolerated the procedure well. Disposition: PACU - hemodynamically stable. Condition: stable    Attending Attestation: I was present and scrubbed for the entire procedure.

## 2019-09-16 NOTE — ANESTHESIA PRE PROCEDURE
Department of Anesthesiology  Preprocedure Note       Name:  David Stein   Age:  47 y.o.  :  1965                                          MRN:  [id number]         Date:  2019      Surgeon: Elyse Chiu): Carole James DO    Procedure: ESOPHAGOGASTRODUODENOSCOPY WITH MAC (N/A )    Medications prior to admission:   Prior to Admission medications    Medication Sig Start Date End Date Taking? Authorizing Provider   Compression Stockings MISC Gradient: 40-50 mm Hg  Size: Knee Length  Extremity: bilateral  Type: Circaid 19  Yes Ashlee L Warren, DPM   Compression Stockings MISC by Does not apply route Diagnosis: I83.333  Location of wound: Left Leg  Gradient IV (30-40 mm Hg)  Style: Knee Length  Extremity: Bilateral 18  Yes Ashlee L Warren, DPM   acetaminophen (TYLENOL) 325 MG tablet Take 650 mg by mouth every 6 hours as needed for Pain   Yes Historical Provider, MD   GLUCOSAMINE HCL PO Take by mouth daily   Yes Historical Provider, MD   Multiple Vitamin (MULTI VITAMIN PO) Take by mouth daily   Yes Historical Provider, MD   ibuprofen (ADVIL;MOTRIN) 800 MG tablet Take 1 tablet by mouth every 6 hours as needed for Pain 17  Yes Ashlee L Warren, DPM       Current medications:    No current facility-administered medications for this encounter.         Allergies:  No Known Allergies    Problem List:    Patient Active Problem List   Diagnosis Code    Family history of early CAD Z80.55    PPD positive R76.11    Preoperative cardiovascular examination Z01.810    Vitamin D deficiency E55.9    Edema R60.9    Morbid obesity ([address] E66.01    Anemia D64.9    Venous stasis ulcer of left lower extremity ([address] I83.029, L97.929    Venous stasis ulcer of ankle, right ([address] I83.013, L97.319    Idiopathic chronic venous hypertension of both lower extremities with ulcer and inflammation ([address] I87.333, L97.919, L97.929    Non-pressure chronic ulcer of right calf with fat layer exposed ([address] I00.731    Varicose veins of 05/20/2019    CREATININE 0.9 05/20/2019    GFRAA >60 05/20/2019    GFRAA 125 04/25/2013    AGRATIO 1.6 02/13/2019    LABGLOM >60 05/20/2019    GLUCOSE 105 08/26/2019    PROT 7.1 02/13/2019    PROT 7.7 03/11/2013    CALCIUM 9.4 05/20/2019    BILITOT 0.8 02/13/2019    ALKPHOS 75 02/13/2019    AST 17 02/13/2019    ALT 35 02/13/2019       POC Tests: No results for input(s): POCGLU, POCNA, POCK, POCCL, POCBUN, POCHEMO, POCHCT in the last 72 hours. Coags: No results found for: PROTIME, INR, APTT    HCG (If Applicable): No results found for: PREGTESTUR, PREGSERUM, HCG, HCGQUANT     ABGs: No results found for: PHART, PO2ART, CZZ1OIX, EUD8NDU, BEART, C8BIVCTJ     Type & Screen (If Applicable):  No results found for: [facility]    Anesthesia Evaluation  Patient summary reviewed no history of anesthetic complications:   Airway: Mallampati: III  TM distance: >3 FB   Neck ROM: full  Mouth opening: > = 3 FB Dental: normal exam         Pulmonary:   (+) shortness of breath:                             Cardiovascular:    (+) hypertension:,                   Neuro/Psych:   Negative Neuro/Psych ROS              GI/Hepatic/Renal:             Endo/Other: Negative Endo/Other ROS                    Abdominal:           Vascular: negative vascular ROS. Anesthesia Plan      MAC     ASA 3       Induction: intravenous. Anesthetic plan and risks discussed with patient. Plan discussed with CRNA.     Attending anesthesiologist reviewed and agrees with Maria G Medina MD   9/16/2019

## 2019-09-17 ENCOUNTER — TELEPHONE (OUTPATIENT)
Dept: SLEEP CENTER | Age: 54
End: 2019-09-17

## 2019-09-17 RX ORDER — OMEPRAZOLE 20 MG/1
20 CAPSULE, DELAYED RELEASE ORAL DAILY
Qty: 30 CAPSULE | Refills: 1 | Status: SHIPPED | OUTPATIENT
Start: 2019-09-17 | End: 2019-09-17 | Stop reason: SDUPTHER

## 2019-09-18 ENCOUNTER — OFFICE VISIT (OUTPATIENT)
Dept: BARIATRICS/WEIGHT MGMT | Age: 54
End: 2019-09-18
Payer: COMMERCIAL

## 2019-09-18 VITALS
HEART RATE: 60 BPM | WEIGHT: 315 LBS | BODY MASS INDEX: 40.43 KG/M2 | DIASTOLIC BLOOD PRESSURE: 78 MMHG | HEIGHT: 74 IN | SYSTOLIC BLOOD PRESSURE: 132 MMHG

## 2019-09-18 DIAGNOSIS — E66.01 MORBID OBESITY WITH BMI OF 50.0-59.9, ADULT (HCC): Primary | ICD-10-CM

## 2019-09-18 PROCEDURE — 99213 OFFICE O/P EST LOW 20 MIN: CPT | Performed by: SURGERY

## 2019-09-23 ENCOUNTER — TELEPHONE (OUTPATIENT)
Dept: SLEEP MEDICINE | Age: 54
End: 2019-09-23

## 2019-10-02 ENCOUNTER — TELEPHONE (OUTPATIENT)
Dept: CARDIOLOGY CLINIC | Age: 54
End: 2019-10-02

## 2019-10-08 ENCOUNTER — HOSPITAL ENCOUNTER (OUTPATIENT)
Dept: WOUND CARE | Age: 54
Discharge: HOME | End: 2019-10-08
Payer: COMMERCIAL

## 2019-10-08 VITALS
DIASTOLIC BLOOD PRESSURE: 93 MMHG | HEART RATE: 72 BPM | RESPIRATION RATE: 16 BRPM | TEMPERATURE: 98.6 F | SYSTOLIC BLOOD PRESSURE: 152 MMHG

## 2019-10-08 DIAGNOSIS — L02.416 CELLULITIS AND ABSCESS OF LEFT LOWER EXTREMITY: ICD-10-CM

## 2019-10-08 DIAGNOSIS — L03.116 CELLULITIS AND ABSCESS OF LEFT LOWER EXTREMITY: ICD-10-CM

## 2019-10-08 PROCEDURE — 29581 APPL MULTLAYER CMPRN SYS LEG: CPT

## 2019-10-08 PROCEDURE — 99213 OFFICE O/P EST LOW 20 MIN: CPT

## 2019-10-08 PROCEDURE — 97597 DBRDMT OPN WND 1ST 20 CM/<: CPT

## 2019-10-08 PROCEDURE — 6370000000 HC RX 637 (ALT 250 FOR IP): Performed by: PODIATRIST

## 2019-10-08 RX ORDER — LIDOCAINE HYDROCHLORIDE 40 MG/ML
SOLUTION TOPICAL ONCE
Status: COMPLETED | OUTPATIENT
Start: 2019-10-08 | End: 2019-10-08

## 2019-10-08 RX ADMIN — LIDOCAINE HYDROCHLORIDE: 40 SOLUTION TOPICAL at 14:59

## 2019-10-08 ASSESSMENT — PAIN DESCRIPTION - ORIENTATION: ORIENTATION: RIGHT;LEFT

## 2019-10-08 ASSESSMENT — PAIN DESCRIPTION - PAIN TYPE: TYPE: CHRONIC PAIN

## 2019-10-08 ASSESSMENT — PAIN DESCRIPTION - FREQUENCY: FREQUENCY: CONTINUOUS

## 2019-10-08 ASSESSMENT — PAIN DESCRIPTION - LOCATION: LOCATION: HIP

## 2019-10-08 ASSESSMENT — PAIN DESCRIPTION - DESCRIPTORS: DESCRIPTORS: ACHING

## 2019-10-08 ASSESSMENT — PAIN SCALES - GENERAL: PAINLEVEL_OUTOF10: 8

## 2019-10-11 ENCOUNTER — TELEPHONE (OUTPATIENT)
Dept: CARDIOLOGY CLINIC | Age: 54
End: 2019-10-11

## 2019-10-11 ENCOUNTER — HOSPITAL ENCOUNTER (OUTPATIENT)
Dept: WOUND CARE | Age: 54
Discharge: HOME | End: 2019-10-11
Payer: COMMERCIAL

## 2019-10-11 VITALS
DIASTOLIC BLOOD PRESSURE: 92 MMHG | RESPIRATION RATE: 16 BRPM | TEMPERATURE: 97.9 F | HEART RATE: 74 BPM | SYSTOLIC BLOOD PRESSURE: 144 MMHG

## 2019-10-11 PROCEDURE — 29581 APPL MULTLAYER CMPRN SYS LEG: CPT

## 2019-10-15 ENCOUNTER — HOSPITAL ENCOUNTER (OUTPATIENT)
Dept: WOUND CARE | Age: 54
Discharge: HOME | End: 2019-10-15
Payer: COMMERCIAL

## 2019-10-15 VITALS
TEMPERATURE: 98.2 F | RESPIRATION RATE: 16 BRPM | DIASTOLIC BLOOD PRESSURE: 89 MMHG | HEART RATE: 73 BPM | SYSTOLIC BLOOD PRESSURE: 181 MMHG

## 2019-10-15 DIAGNOSIS — L03.116 CELLULITIS AND ABSCESS OF LEFT LOWER EXTREMITY: ICD-10-CM

## 2019-10-15 DIAGNOSIS — L02.416 CELLULITIS AND ABSCESS OF LEFT LOWER EXTREMITY: ICD-10-CM

## 2019-10-15 PROCEDURE — 6370000000 HC RX 637 (ALT 250 FOR IP): Performed by: PODIATRIST

## 2019-10-15 PROCEDURE — 29581 APPL MULTLAYER CMPRN SYS LEG: CPT

## 2019-10-15 PROCEDURE — 11042 DBRDMT SUBQ TIS 1ST 20SQCM/<: CPT

## 2019-10-15 RX ORDER — LIDOCAINE HYDROCHLORIDE 40 MG/ML
SOLUTION TOPICAL ONCE
Status: COMPLETED | OUTPATIENT
Start: 2019-10-15 | End: 2019-10-15

## 2019-10-15 RX ADMIN — LIDOCAINE HYDROCHLORIDE: 40 SOLUTION TOPICAL at 14:42

## 2019-10-22 ENCOUNTER — HOSPITAL ENCOUNTER (OUTPATIENT)
Dept: WOUND CARE | Age: 54
Discharge: HOME | End: 2019-10-22
Payer: COMMERCIAL

## 2019-10-22 VITALS
DIASTOLIC BLOOD PRESSURE: 82 MMHG | SYSTOLIC BLOOD PRESSURE: 144 MMHG | HEART RATE: 78 BPM | RESPIRATION RATE: 20 BRPM | TEMPERATURE: 98.3 F

## 2019-10-22 DIAGNOSIS — I87.333 IDIOPATHIC CHRONIC VENOUS HYPERTENSION OF BOTH LOWER EXTREMITIES WITH ULCER AND INFLAMMATION (HCC): Primary | ICD-10-CM

## 2019-10-22 DIAGNOSIS — L97.929 IDIOPATHIC CHRONIC VENOUS HYPERTENSION OF BOTH LOWER EXTREMITIES WITH ULCER AND INFLAMMATION (HCC): Primary | ICD-10-CM

## 2019-10-22 DIAGNOSIS — L97.919 IDIOPATHIC CHRONIC VENOUS HYPERTENSION OF BOTH LOWER EXTREMITIES WITH ULCER AND INFLAMMATION (HCC): Primary | ICD-10-CM

## 2019-10-22 DIAGNOSIS — L03.116 CELLULITIS AND ABSCESS OF LEFT LOWER EXTREMITY: ICD-10-CM

## 2019-10-22 DIAGNOSIS — L02.416 CELLULITIS AND ABSCESS OF LEFT LOWER EXTREMITY: ICD-10-CM

## 2019-10-22 PROCEDURE — 11042 DBRDMT SUBQ TIS 1ST 20SQCM/<: CPT

## 2019-10-22 PROCEDURE — 11042 DBRDMT SUBQ TIS 1ST 20SQCM/<: CPT | Performed by: SPECIALIST

## 2019-10-22 PROCEDURE — 6370000000 HC RX 637 (ALT 250 FOR IP): Performed by: SPECIALIST

## 2019-10-22 PROCEDURE — 29581 APPL MULTLAYER CMPRN SYS LEG: CPT

## 2019-10-22 RX ORDER — LIDOCAINE HYDROCHLORIDE 40 MG/ML
2.5 SOLUTION TOPICAL ONCE
Status: COMPLETED | OUTPATIENT
Start: 2019-10-22 | End: 2019-10-22

## 2019-10-22 RX ADMIN — LIDOCAINE HYDROCHLORIDE 2.5 ML: 40 SOLUTION TOPICAL at 14:51

## 2019-10-23 ENCOUNTER — TELEPHONE (OUTPATIENT)
Dept: CARDIOLOGY CLINIC | Age: 54
End: 2019-10-23

## 2019-10-23 ENCOUNTER — OFFICE VISIT (OUTPATIENT)
Dept: BARIATRICS/WEIGHT MGMT | Age: 54
End: 2019-10-23
Payer: COMMERCIAL

## 2019-10-23 VITALS
SYSTOLIC BLOOD PRESSURE: 124 MMHG | DIASTOLIC BLOOD PRESSURE: 86 MMHG | HEART RATE: 76 BPM | WEIGHT: 315 LBS | HEIGHT: 74 IN | BODY MASS INDEX: 40.43 KG/M2

## 2019-10-23 DIAGNOSIS — I10 HTN (HYPERTENSION), BENIGN: ICD-10-CM

## 2019-10-23 DIAGNOSIS — E66.01 MORBID OBESITY WITH BMI OF 50.0-59.9, ADULT (HCC): Primary | ICD-10-CM

## 2019-10-23 PROCEDURE — 99213 OFFICE O/P EST LOW 20 MIN: CPT | Performed by: SURGERY

## 2019-10-24 DIAGNOSIS — Z01.818 PRE-OP EVALUATION: Primary | ICD-10-CM

## 2019-10-25 ENCOUNTER — HOSPITAL ENCOUNTER (OUTPATIENT)
Dept: WOUND CARE | Age: 54
Discharge: HOME | End: 2019-10-25
Payer: COMMERCIAL

## 2019-10-25 PROCEDURE — 29581 APPL MULTLAYER CMPRN SYS LEG: CPT

## 2019-10-29 ENCOUNTER — HOSPITAL ENCOUNTER (OUTPATIENT)
Dept: WOUND CARE | Age: 54
Discharge: HOME | End: 2019-10-29
Payer: COMMERCIAL

## 2019-10-29 VITALS — RESPIRATION RATE: 20 BRPM | HEART RATE: 74 BPM | SYSTOLIC BLOOD PRESSURE: 150 MMHG | DIASTOLIC BLOOD PRESSURE: 85 MMHG

## 2019-10-29 DIAGNOSIS — L02.416 CELLULITIS AND ABSCESS OF LEFT LOWER EXTREMITY: ICD-10-CM

## 2019-10-29 DIAGNOSIS — L03.116 CELLULITIS AND ABSCESS OF LEFT LOWER EXTREMITY: ICD-10-CM

## 2019-10-29 PROCEDURE — 6370000000 HC RX 637 (ALT 250 FOR IP): Performed by: PODIATRIST

## 2019-10-29 PROCEDURE — 11042 DBRDMT SUBQ TIS 1ST 20SQCM/<: CPT

## 2019-10-29 PROCEDURE — 29581 APPL MULTLAYER CMPRN SYS LEG: CPT

## 2019-10-29 RX ORDER — LIDOCAINE HYDROCHLORIDE 40 MG/ML
2.5 SOLUTION TOPICAL ONCE
Status: COMPLETED | OUTPATIENT
Start: 2019-10-29 | End: 2019-10-29

## 2019-10-29 RX ADMIN — LIDOCAINE HYDROCHLORIDE 2.5 ML: 40 SOLUTION TOPICAL at 15:08

## 2019-10-29 ASSESSMENT — PAIN DESCRIPTION - FREQUENCY: FREQUENCY: INTERMITTENT

## 2019-10-29 ASSESSMENT — PAIN DESCRIPTION - PAIN TYPE: TYPE: CHRONIC PAIN

## 2019-10-29 ASSESSMENT — PAIN DESCRIPTION - LOCATION: LOCATION: HIP

## 2019-10-29 ASSESSMENT — PAIN DESCRIPTION - ORIENTATION: ORIENTATION: LEFT

## 2019-10-29 ASSESSMENT — PAIN DESCRIPTION - DESCRIPTORS: DESCRIPTORS: SHARP

## 2019-10-29 ASSESSMENT — PAIN SCALES - GENERAL: PAINLEVEL_OUTOF10: 5

## 2019-10-31 ENCOUNTER — TELEPHONE (OUTPATIENT)
Dept: SLEEP CENTER | Age: 54
End: 2019-10-31

## 2019-11-01 ENCOUNTER — HOSPITAL ENCOUNTER (OUTPATIENT)
Dept: CARDIAC CATH/INVASIVE PROCEDURES | Age: 54
Discharge: HOME | End: 2019-11-01
Attending: INTERNAL MEDICINE | Admitting: INTERNAL MEDICINE
Payer: COMMERCIAL

## 2019-11-01 VITALS — BODY MASS INDEX: 40.43 KG/M2 | WEIGHT: 315 LBS | HEIGHT: 74 IN

## 2019-11-01 DIAGNOSIS — R94.39 ABNORMAL STRESS TEST: Primary | ICD-10-CM

## 2019-11-01 LAB
ANION GAP SERPL CALCULATED.3IONS-SCNC: 12 MMOL/L (ref 3–16)
BUN BLDV-MCNC: 15 MG/DL (ref 7–20)
CALCIUM SERPL-MCNC: 9.3 MG/DL (ref 8.3–10.6)
CHLORIDE BLD-SCNC: 105 MMOL/L (ref 99–110)
CO2: 24 MMOL/L (ref 21–32)
CREAT SERPL-MCNC: 0.9 MG/DL (ref 0.9–1.3)
EKG ATRIAL RATE: 73 BPM
EKG DIAGNOSIS: NORMAL
EKG P AXIS: 66 DEGREES
EKG P-R INTERVAL: 164 MS
EKG Q-T INTERVAL: 386 MS
EKG QRS DURATION: 122 MS
EKG QTC CALCULATION (BAZETT): 425 MS
EKG R AXIS: 1 DEGREES
EKG T AXIS: 16 DEGREES
EKG VENTRICULAR RATE: 73 BPM
GFR AFRICAN AMERICAN: >60
GFR NON-AFRICAN AMERICAN: >60
GLUCOSE BLD-MCNC: 121 MG/DL (ref 70–99)
HCT VFR BLD CALC: 39.6 % (ref 40.5–52.5)
HEMOGLOBIN: 12.8 G/DL (ref 13.5–17.5)
INR BLD: 1.06 (ref 0.86–1.14)
LEFT VENTRICULAR EJECTION FRACTION HIGH VALUE: 55 %
LEFT VENTRICULAR EJECTION FRACTION MODE: NORMAL
LV EF: 50 %
MCH RBC QN AUTO: 28.5 PG (ref 26–34)
MCHC RBC AUTO-ENTMCNC: 32.4 G/DL (ref 31–36)
MCV RBC AUTO: 88.2 FL (ref 80–100)
PDW BLD-RTO: 16.3 % (ref 12.4–15.4)
PLATELET # BLD: 246 K/UL (ref 135–450)
PMV BLD AUTO: 7.9 FL (ref 5–10.5)
POTASSIUM SERPL-SCNC: 4.3 MMOL/L (ref 3.5–5.1)
PROTHROMBIN TIME: 12.1 SEC (ref 9.8–13)
RBC # BLD: 4.49 M/UL (ref 4.2–5.9)
SODIUM BLD-SCNC: 141 MMOL/L (ref 136–145)
WBC # BLD: 3.9 K/UL (ref 4–11)

## 2019-11-01 PROCEDURE — 85610 PROTHROMBIN TIME: CPT

## 2019-11-01 PROCEDURE — 6360000002 HC RX W HCPCS

## 2019-11-01 PROCEDURE — 93005 ELECTROCARDIOGRAM TRACING: CPT | Performed by: INTERNAL MEDICINE

## 2019-11-01 PROCEDURE — 2500000003 HC RX 250 WO HCPCS: Performed by: NURSE PRACTITIONER

## 2019-11-01 PROCEDURE — 2709999900 HC NON-CHARGEABLE SUPPLY

## 2019-11-01 PROCEDURE — 80048 BASIC METABOLIC PNL TOTAL CA: CPT

## 2019-11-01 PROCEDURE — 85027 COMPLETE CBC AUTOMATED: CPT

## 2019-11-01 PROCEDURE — 99217 PR OBSERVATION CARE DISCHARGE MANAGEMENT: CPT | Performed by: NURSE PRACTITIONER

## 2019-11-01 PROCEDURE — 99152 MOD SED SAME PHYS/QHP 5/>YRS: CPT | Performed by: INTERNAL MEDICINE

## 2019-11-01 PROCEDURE — 6370000000 HC RX 637 (ALT 250 FOR IP): Performed by: NURSE PRACTITIONER

## 2019-11-01 PROCEDURE — 99024 POSTOP FOLLOW-UP VISIT: CPT | Performed by: INTERNAL MEDICINE

## 2019-11-01 PROCEDURE — C1894 INTRO/SHEATH, NON-LASER: HCPCS

## 2019-11-01 PROCEDURE — 2500000003 HC RX 250 WO HCPCS

## 2019-11-01 PROCEDURE — 6360000004 HC RX CONTRAST MEDICATION: Performed by: INTERNAL MEDICINE

## 2019-11-01 PROCEDURE — C1769 GUIDE WIRE: HCPCS

## 2019-11-01 PROCEDURE — 93010 ELECTROCARDIOGRAM REPORT: CPT | Performed by: INTERNAL MEDICINE

## 2019-11-01 PROCEDURE — 6360000002 HC RX W HCPCS: Performed by: NURSE PRACTITIONER

## 2019-11-01 PROCEDURE — 93458 L HRT ARTERY/VENTRICLE ANGIO: CPT | Performed by: INTERNAL MEDICINE

## 2019-11-01 PROCEDURE — 93458 L HRT ARTERY/VENTRICLE ANGIO: CPT

## 2019-11-01 PROCEDURE — 99152 MOD SED SAME PHYS/QHP 5/>YRS: CPT

## 2019-11-01 RX ORDER — CHLORTHALIDONE 25 MG/1
25 TABLET ORAL DAILY
Qty: 30 TABLET | Refills: 3 | Status: SHIPPED | OUTPATIENT
Start: 2019-11-01 | End: 2020-01-24 | Stop reason: SDUPTHER

## 2019-11-01 RX ORDER — HYDRALAZINE HYDROCHLORIDE 20 MG/ML
10 INJECTION INTRAMUSCULAR; INTRAVENOUS ONCE
Status: COMPLETED | OUTPATIENT
Start: 2019-11-01 | End: 2019-11-01

## 2019-11-01 RX ORDER — SODIUM CHLORIDE 0.9 % (FLUSH) 0.9 %
10 SYRINGE (ML) INJECTION EVERY 12 HOURS SCHEDULED
Status: DISCONTINUED | OUTPATIENT
Start: 2019-11-01 | End: 2019-11-01 | Stop reason: HOSPADM

## 2019-11-01 RX ORDER — SODIUM CHLORIDE 0.9 % (FLUSH) 0.9 %
10 SYRINGE (ML) INJECTION PRN
Status: DISCONTINUED | OUTPATIENT
Start: 2019-11-01 | End: 2019-11-01 | Stop reason: HOSPADM

## 2019-11-01 RX ORDER — LABETALOL 20 MG/4 ML (5 MG/ML) INTRAVENOUS SYRINGE
10 ONCE
Status: COMPLETED | OUTPATIENT
Start: 2019-11-01 | End: 2019-11-01

## 2019-11-01 RX ORDER — ACETAMINOPHEN 325 MG/1
650 TABLET ORAL EVERY 4 HOURS PRN
Status: DISCONTINUED | OUTPATIENT
Start: 2019-11-01 | End: 2019-11-01 | Stop reason: HOSPADM

## 2019-11-01 RX ORDER — HYDROCHLOROTHIAZIDE 25 MG/1
25 TABLET ORAL DAILY
Status: DISCONTINUED | OUTPATIENT
Start: 2019-11-01 | End: 2019-11-01 | Stop reason: HOSPADM

## 2019-11-01 RX ADMIN — IOHEXOL 80 ML: 350 INJECTION, SOLUTION INTRAVENOUS at 10:40

## 2019-11-01 RX ADMIN — HYDROCHLOROTHIAZIDE 25 MG: 25 TABLET ORAL at 15:58

## 2019-11-01 RX ADMIN — HYDRALAZINE HYDROCHLORIDE 10 MG: 20 INJECTION INTRAMUSCULAR; INTRAVENOUS at 15:01

## 2019-11-01 RX ADMIN — HYDRALAZINE HYDROCHLORIDE 10 MG: 20 INJECTION INTRAMUSCULAR; INTRAVENOUS at 13:19

## 2019-11-01 RX ADMIN — LABETALOL 20 MG/4 ML (5 MG/ML) INTRAVENOUS SYRINGE 10 MG: at 14:35

## 2019-11-05 ENCOUNTER — HOSPITAL ENCOUNTER (OUTPATIENT)
Dept: WOUND CARE | Age: 54
Discharge: HOME | End: 2019-11-05
Payer: COMMERCIAL

## 2019-11-05 VITALS
DIASTOLIC BLOOD PRESSURE: 97 MMHG | TEMPERATURE: 97.9 F | SYSTOLIC BLOOD PRESSURE: 162 MMHG | RESPIRATION RATE: 20 BRPM | HEART RATE: 80 BPM

## 2019-11-05 DIAGNOSIS — L03.116 CELLULITIS AND ABSCESS OF LEFT LOWER EXTREMITY: ICD-10-CM

## 2019-11-05 DIAGNOSIS — L02.416 CELLULITIS AND ABSCESS OF LEFT LOWER EXTREMITY: ICD-10-CM

## 2019-11-05 PROCEDURE — 6370000000 HC RX 637 (ALT 250 FOR IP): Performed by: PODIATRIST

## 2019-11-05 PROCEDURE — 29581 APPL MULTLAYER CMPRN SYS LEG: CPT

## 2019-11-05 PROCEDURE — 11042 DBRDMT SUBQ TIS 1ST 20SQCM/<: CPT

## 2019-11-05 RX ORDER — LIDOCAINE HYDROCHLORIDE 40 MG/ML
2.5 SOLUTION TOPICAL ONCE
Status: COMPLETED | OUTPATIENT
Start: 2019-11-05 | End: 2019-11-05

## 2019-11-05 RX ADMIN — LIDOCAINE HYDROCHLORIDE 2.5 ML: 40 SOLUTION TOPICAL at 15:23

## 2019-11-08 ENCOUNTER — HOSPITAL ENCOUNTER (OUTPATIENT)
Dept: WOUND CARE | Age: 54
Discharge: HOME | End: 2019-11-08
Payer: COMMERCIAL

## 2019-11-08 PROCEDURE — 29581 APPL MULTLAYER CMPRN SYS LEG: CPT

## 2019-11-12 ENCOUNTER — HOSPITAL ENCOUNTER (OUTPATIENT)
Dept: WOUND CARE | Age: 54
Discharge: HOME | End: 2019-11-12
Payer: COMMERCIAL

## 2019-11-12 VITALS
DIASTOLIC BLOOD PRESSURE: 74 MMHG | TEMPERATURE: 98.1 F | SYSTOLIC BLOOD PRESSURE: 140 MMHG | HEART RATE: 78 BPM | RESPIRATION RATE: 20 BRPM

## 2019-11-12 DIAGNOSIS — R60.0 LOCALIZED EDEMA: ICD-10-CM

## 2019-11-12 DIAGNOSIS — L03.116 CELLULITIS AND ABSCESS OF LEFT LOWER EXTREMITY: ICD-10-CM

## 2019-11-12 DIAGNOSIS — L97.212 NON-PRESSURE CHRONIC ULCER OF RIGHT CALF WITH FAT LAYER EXPOSED (HCC): Primary | ICD-10-CM

## 2019-11-12 DIAGNOSIS — L02.416 CELLULITIS AND ABSCESS OF LEFT LOWER EXTREMITY: ICD-10-CM

## 2019-11-12 DIAGNOSIS — I87.2 VENOUS (PERIPHERAL) INSUFFICIENCY: ICD-10-CM

## 2019-11-12 PROCEDURE — 11042 DBRDMT SUBQ TIS 1ST 20SQCM/<: CPT

## 2019-11-12 PROCEDURE — 6370000000 HC RX 637 (ALT 250 FOR IP): Performed by: SURGERY

## 2019-11-12 PROCEDURE — 29581 APPL MULTLAYER CMPRN SYS LEG: CPT

## 2019-11-12 RX ORDER — LIDOCAINE HYDROCHLORIDE 40 MG/ML
2.5 SOLUTION TOPICAL ONCE
Status: COMPLETED | OUTPATIENT
Start: 2019-11-12 | End: 2019-11-12

## 2019-11-12 RX ADMIN — LIDOCAINE HYDROCHLORIDE 2.5 ML: 40 SOLUTION TOPICAL at 09:43

## 2019-11-15 ENCOUNTER — HOSPITAL ENCOUNTER (OUTPATIENT)
Dept: WOUND CARE | Age: 54
Discharge: HOME | End: 2019-11-15
Payer: COMMERCIAL

## 2019-11-15 DIAGNOSIS — L02.416 CELLULITIS AND ABSCESS OF LEFT LOWER EXTREMITY: ICD-10-CM

## 2019-11-15 DIAGNOSIS — L03.116 CELLULITIS AND ABSCESS OF LEFT LOWER EXTREMITY: ICD-10-CM

## 2019-11-15 PROCEDURE — 29581 APPL MULTLAYER CMPRN SYS LEG: CPT

## 2019-11-19 ENCOUNTER — HOSPITAL ENCOUNTER (OUTPATIENT)
Dept: WOUND CARE | Age: 54
Discharge: HOME | End: 2019-11-19
Payer: COMMERCIAL

## 2019-11-19 VITALS
DIASTOLIC BLOOD PRESSURE: 90 MMHG | HEART RATE: 76 BPM | RESPIRATION RATE: 16 BRPM | TEMPERATURE: 97.7 F | SYSTOLIC BLOOD PRESSURE: 155 MMHG

## 2019-11-19 DIAGNOSIS — L03.116 CELLULITIS AND ABSCESS OF LEFT LOWER EXTREMITY: ICD-10-CM

## 2019-11-19 DIAGNOSIS — L02.416 CELLULITIS AND ABSCESS OF LEFT LOWER EXTREMITY: ICD-10-CM

## 2019-11-19 PROCEDURE — 11042 DBRDMT SUBQ TIS 1ST 20SQCM/<: CPT

## 2019-11-19 PROCEDURE — 29581 APPL MULTLAYER CMPRN SYS LEG: CPT

## 2019-11-19 PROCEDURE — 6370000000 HC RX 637 (ALT 250 FOR IP): Performed by: PODIATRIST

## 2019-11-19 RX ORDER — LIDOCAINE HYDROCHLORIDE 40 MG/ML
SOLUTION TOPICAL ONCE
Status: COMPLETED | OUTPATIENT
Start: 2019-11-19 | End: 2019-11-19

## 2019-11-19 RX ADMIN — LIDOCAINE HYDROCHLORIDE: 40 SOLUTION TOPICAL at 16:00

## 2019-11-22 ENCOUNTER — HOSPITAL ENCOUNTER (OUTPATIENT)
Dept: WOUND CARE | Age: 54
Discharge: HOME | End: 2019-11-22
Payer: COMMERCIAL

## 2019-11-22 DIAGNOSIS — L03.116 CELLULITIS AND ABSCESS OF LEFT LOWER EXTREMITY: ICD-10-CM

## 2019-11-22 DIAGNOSIS — L02.416 CELLULITIS AND ABSCESS OF LEFT LOWER EXTREMITY: ICD-10-CM

## 2019-11-22 PROCEDURE — 29581 APPL MULTLAYER CMPRN SYS LEG: CPT

## 2019-11-26 ENCOUNTER — HOSPITAL ENCOUNTER (OUTPATIENT)
Dept: WOUND CARE | Age: 54
Discharge: HOME | End: 2019-11-26
Payer: COMMERCIAL

## 2019-11-26 VITALS
RESPIRATION RATE: 16 BRPM | TEMPERATURE: 97 F | SYSTOLIC BLOOD PRESSURE: 128 MMHG | DIASTOLIC BLOOD PRESSURE: 75 MMHG | HEART RATE: 75 BPM

## 2019-11-26 DIAGNOSIS — L02.416 CELLULITIS AND ABSCESS OF LEFT LOWER EXTREMITY: ICD-10-CM

## 2019-11-26 DIAGNOSIS — L03.116 CELLULITIS AND ABSCESS OF LEFT LOWER EXTREMITY: ICD-10-CM

## 2019-11-26 PROCEDURE — 29581 APPL MULTLAYER CMPRN SYS LEG: CPT

## 2019-11-27 ENCOUNTER — OFFICE VISIT (OUTPATIENT)
Dept: BARIATRICS/WEIGHT MGMT | Age: 54
End: 2019-11-27
Payer: COMMERCIAL

## 2019-11-27 VITALS
BODY MASS INDEX: 40.43 KG/M2 | HEART RATE: 72 BPM | DIASTOLIC BLOOD PRESSURE: 70 MMHG | WEIGHT: 315 LBS | SYSTOLIC BLOOD PRESSURE: 128 MMHG | HEIGHT: 74 IN

## 2019-11-27 DIAGNOSIS — I10 HTN (HYPERTENSION), BENIGN: ICD-10-CM

## 2019-11-27 DIAGNOSIS — E66.01 MORBID OBESITY WITH BMI OF 50.0-59.9, ADULT (HCC): Primary | ICD-10-CM

## 2019-11-27 PROCEDURE — 99213 OFFICE O/P EST LOW 20 MIN: CPT | Performed by: SURGERY

## 2019-12-03 ENCOUNTER — HOSPITAL ENCOUNTER (OUTPATIENT)
Dept: WOUND CARE | Age: 54
Discharge: HOME | End: 2019-12-03
Payer: COMMERCIAL

## 2019-12-03 VITALS
TEMPERATURE: 98.4 F | RESPIRATION RATE: 20 BRPM | DIASTOLIC BLOOD PRESSURE: 80 MMHG | HEART RATE: 77 BPM | SYSTOLIC BLOOD PRESSURE: 142 MMHG

## 2019-12-03 DIAGNOSIS — L02.416 CELLULITIS AND ABSCESS OF LEFT LOWER EXTREMITY: ICD-10-CM

## 2019-12-03 DIAGNOSIS — L03.116 CELLULITIS AND ABSCESS OF LEFT LOWER EXTREMITY: ICD-10-CM

## 2019-12-03 PROCEDURE — 99211 OFF/OP EST MAY X REQ PHY/QHP: CPT

## 2019-12-03 ASSESSMENT — PAIN DESCRIPTION - ORIENTATION: ORIENTATION: RIGHT

## 2019-12-03 ASSESSMENT — PAIN DESCRIPTION - FREQUENCY: FREQUENCY: INTERMITTENT

## 2019-12-03 ASSESSMENT — PAIN DESCRIPTION - LOCATION: LOCATION: HIP

## 2019-12-03 ASSESSMENT — PAIN DESCRIPTION - DESCRIPTORS: DESCRIPTORS: SHARP

## 2019-12-03 ASSESSMENT — PAIN DESCRIPTION - ONSET: ONSET: ON-GOING

## 2019-12-03 ASSESSMENT — PAIN DESCRIPTION - PAIN TYPE: TYPE: CHRONIC PAIN

## 2019-12-10 ENCOUNTER — TELEPHONE (OUTPATIENT)
Dept: CARDIOLOGY CLINIC | Age: 54
End: 2019-12-10

## 2019-12-11 ENCOUNTER — HOSPITAL ENCOUNTER (OUTPATIENT)
Dept: SLEEP CENTER | Age: 54
Discharge: HOME | End: 2019-12-11
Payer: COMMERCIAL

## 2019-12-11 DIAGNOSIS — E66.01 CLASS 3 SEVERE OBESITY DUE TO EXCESS CALORIES WITHOUT SERIOUS COMORBIDITY WITH BODY MASS INDEX (BMI) OF 50.0 TO 59.9 IN ADULT (HCC): ICD-10-CM

## 2019-12-11 DIAGNOSIS — G47.33 OBSTRUCTIVE SLEEP APNEA: ICD-10-CM

## 2019-12-11 PROCEDURE — 95806 SLEEP STUDY UNATT&RESP EFFT: CPT | Performed by: PSYCHIATRY & NEUROLOGY

## 2019-12-11 PROCEDURE — 95806 SLEEP STUDY UNATT&RESP EFFT: CPT

## 2019-12-18 ENCOUNTER — OFFICE VISIT (OUTPATIENT)
Dept: BARIATRICS/WEIGHT MGMT | Age: 54
End: 2019-12-18
Payer: COMMERCIAL

## 2019-12-18 ENCOUNTER — TELEPHONE (OUTPATIENT)
Dept: PULMONOLOGY | Age: 54
End: 2019-12-18

## 2019-12-18 VITALS
SYSTOLIC BLOOD PRESSURE: 110 MMHG | HEART RATE: 72 BPM | HEIGHT: 74 IN | BODY MASS INDEX: 40.43 KG/M2 | DIASTOLIC BLOOD PRESSURE: 76 MMHG | WEIGHT: 315 LBS

## 2019-12-18 DIAGNOSIS — E78.2 MIXED HYPERLIPIDEMIA: ICD-10-CM

## 2019-12-18 DIAGNOSIS — I10 ESSENTIAL HYPERTENSION: ICD-10-CM

## 2019-12-18 DIAGNOSIS — E66.01 MORBID OBESITY WITH BMI OF 50.0-59.9, ADULT (HCC): Primary | ICD-10-CM

## 2019-12-18 DIAGNOSIS — G47.33 OBSTRUCTIVE SLEEP APNEA: ICD-10-CM

## 2019-12-18 DIAGNOSIS — G47.33 OSA (OBSTRUCTIVE SLEEP APNEA): Primary | ICD-10-CM

## 2019-12-18 PROCEDURE — 99213 OFFICE O/P EST LOW 20 MIN: CPT | Performed by: SURGERY

## 2019-12-31 ENCOUNTER — TELEPHONE (OUTPATIENT)
Dept: SLEEP MEDICINE | Age: 54
End: 2019-12-31

## 2020-01-10 ENCOUNTER — TELEPHONE (OUTPATIENT)
Dept: PULMONOLOGY | Age: 55
End: 2020-01-10

## 2020-01-10 NOTE — TELEPHONE ENCOUNTER
[place] [facility] from [facility] called and states they needed an order sent for the patient. They need signed order, copy of the base line sleep study and patients F2F.     Thank you

## 2020-01-22 ENCOUNTER — OFFICE VISIT (OUTPATIENT)
Dept: BARIATRICS/WEIGHT MGMT | Age: 55
End: 2020-01-22
Payer: COMMERCIAL

## 2020-01-22 VITALS
DIASTOLIC BLOOD PRESSURE: 82 MMHG | HEIGHT: 74 IN | BODY MASS INDEX: 40.43 KG/M2 | WEIGHT: 315 LBS | HEART RATE: 72 BPM | SYSTOLIC BLOOD PRESSURE: 120 MMHG

## 2020-01-22 PROCEDURE — 99213 OFFICE O/P EST LOW 20 MIN: CPT | Performed by: SURGERY

## 2020-01-22 NOTE — PATIENT INSTRUCTIONS
Patient received dietary handouts and education.     Plan/Recommendations:   - Prep Lunch and snacks  - Attend Support Group  - Aim for 2 active days/week

## 2020-01-24 ENCOUNTER — OFFICE VISIT (OUTPATIENT)
Dept: INTERNAL MEDICINE CLINIC | Age: 55
End: 2020-01-24
Payer: COMMERCIAL

## 2020-01-24 VITALS
HEART RATE: 84 BPM | WEIGHT: 315 LBS | BODY MASS INDEX: 40.43 KG/M2 | OXYGEN SATURATION: 95 % | HEIGHT: 74 IN | SYSTOLIC BLOOD PRESSURE: 122 MMHG | TEMPERATURE: 98.5 F | DIASTOLIC BLOOD PRESSURE: 78 MMHG

## 2020-01-24 LAB
CHP ED QC CHECK: NORMAL
GLUCOSE BLD-MCNC: 105 MG/DL
HBA1C MFR BLD: 6.4 %

## 2020-01-24 PROCEDURE — 90732 PPSV23 VACC 2 YRS+ SUBQ/IM: CPT | Performed by: INTERNAL MEDICINE

## 2020-01-24 PROCEDURE — 82962 GLUCOSE BLOOD TEST: CPT | Performed by: INTERNAL MEDICINE

## 2020-01-24 PROCEDURE — 90471 IMMUNIZATION ADMIN: CPT | Performed by: INTERNAL MEDICINE

## 2020-01-24 PROCEDURE — 99214 OFFICE O/P EST MOD 30 MIN: CPT | Performed by: INTERNAL MEDICINE

## 2020-01-24 PROCEDURE — 83036 HEMOGLOBIN GLYCOSYLATED A1C: CPT | Performed by: INTERNAL MEDICINE

## 2020-01-24 RX ORDER — MULTIVIT-MIN/IRON/FOLIC ACID/K 18-600-40
1 CAPSULE ORAL DAILY
Qty: 30 CAPSULE | Refills: 2 | Status: SHIPPED | OUTPATIENT
Start: 2020-01-24 | End: 2020-05-18

## 2020-01-24 RX ORDER — CHLORTHALIDONE 25 MG/1
25 TABLET ORAL DAILY
Qty: 90 TABLET | Refills: 0 | Status: SHIPPED | OUTPATIENT
Start: 2020-01-24 | End: 2020-04-27

## 2020-01-24 ASSESSMENT — PATIENT HEALTH QUESTIONNAIRE - PHQ9
1. LITTLE INTEREST OR PLEASURE IN DOING THINGS: 0
SUM OF ALL RESPONSES TO PHQ QUESTIONS 1-9: 0
SUM OF ALL RESPONSES TO PHQ QUESTIONS 1-9: 0
2. FEELING DOWN, DEPRESSED OR HOPELESS: 0
SUM OF ALL RESPONSES TO PHQ9 QUESTIONS 1 & 2: 0

## 2020-01-24 NOTE — PROGRESS NOTES
SUBJECTIVE:  Hilton Mena is a 47 y.o. male HERE FOR   Chief Complaint   Patient presents with    Follow-up    Diabetes      PT HERE FOR EVAL    HTN- STATES NEW ONSET. STARTED ON MED PER CARDIOLOGY. ? DIET / EXERCISE COMPLIANCE. DENIES HA, NO DIZZINESS  DM - NEW ONSET. NOT ON MED .   ? DIET / EXERCISE COMPLIANCE. HBS - NOT CHECKING. EYE EXAM - > 1 YR  DYSLIPIDEMIA -  ? EXERCISE / DIET COMPLIANCE . LABS D/W PT  VIT D DEF - STATES AKING MED. PREVIOUS LAB D/W PT   C/O FLAVIO HIP PAIN - RT> LT. ? DURATION. ? Melba Felty. ? PAIN SCALE. DENIES TRAUMA. FOLLOWING WITH ORTHO  MORBID OBESITY - DIET / EXERCISE REVIEWED. WT NOTED. PT FOLLOWING WITH WT MGT. NEEDS PNEUMOVAX  WOUND - CHRONIC.  RESOLVED      DENIES CP, No SOB, No PALPITATIONS, No COUGH  No ABD PAIN, No N/V, No DIARRHEA, No CONSTIPATION, No MELENA, No HEMATOCHEZIA. No DYSURIA, No FREQ, No URGENCY, No HEMATURIA    PMH: REVIEWED AND UPDATED TODAY    PSH: REVIEWED AND UPDATED TODAY    SOCIAL HX: REVIEWED AND UPDATED TODAY    FAMILY HX: REVIEWED AND UPDATED TODAY    ALLERGY:  Patient has no known allergies. MEDS: REVIEWED  Prior to Visit Medications    Medication Sig Taking?  Authorizing Provider   chlorthalidone (HYGROTON) 25 MG tablet Take 1 tablet by mouth daily Yes HAIM Isaac CNP   Compression Stockings MISC Gradient: 40-50 mm Hg  Size: Knee Length  Extremity: bilateral  Type: Circaid Yes Ashlee Kelley, DPM   Compression Stockings MISC by Does not apply route Diagnosis: I83.333  Location of wound: Left Leg  Gradient IV (30-40 mm Hg)  Style: Knee Length  Extremity: Bilateral Yes Ashlee Kelley DPM   acetaminophen (TYLENOL) 325 MG tablet Take 650 mg by mouth every 6 hours as needed for Pain Yes Historical Provider, MD   GLUCOSAMINE HCL PO Take by mouth daily Yes Historical Provider, MD   Multiple Vitamin (MULTI VITAMIN PO) Take by mouth daily Yes Historical Provider, MD   ibuprofen (ADVIL;MOTRIN) 800 MG tablet Take 1 tablet by mouth every 6 hours as needed ADVISED. LIFESTYLE MODIFICATION. WT LOSS ADVISED. WILL BENEFIT FROM WT LOSS  MONITOR AND MAKE CHANGES AS NEEDED. 7. Need for vaccination for pneumococcus  COUNSELLED. S/E D/W PT. PNEUMOVAX GIVEN. PT TOLERATED. PT DECLINED INFLUENZA VACCINE     Ryan received counseling on the following healthy behaviors: nutrition, exercise and medication adherence    Patient given educational materials on Diabetes, Hyperlipidemia, Nutrition, Exercise and Hypertension    I have instructed Ryan to complete a self tracking handout on Blood Sugars , Blood Pressures  and Weights and instructed them to bring it with them to his next appointment. Discussed use, benefit, and side effects of prescribed medications. Barriers to medication compliance addressed. All patient questions answered. Pt voiced understanding.                  MEDICATION SIDE EFFECTS D/W PATIENT    PT STABLE AT TIME OF D/C.    RETURN TO CLINIC WITHIN 2-3 MONTHS / PRN    FOLLOW UP FOR FASTING LABS, EYE EXAM

## 2020-01-24 NOTE — PATIENT INSTRUCTIONS
TAKE MED AS ADVISED    DIET/ EXERCISE.     FOLLOW UP WITHIN 2-3 MONTHS / AS NEEDED    FOLLOW UP FOR FASTING LABS, EYE EXAM

## 2020-01-25 NOTE — PROGRESS NOTES
[facility] ([facility]) Physicians   General & Laparoscopic Surgery  [employer]       HPI:     Duane Jackson III is a very pleasant 47 y.o. male with Body mass index is 52.31 kg/m². , Pre-Surgery. Pre-operative clearance and work up pending. Working hard to keep good dietary habits as well level of activity. Patient denies any nausea, vomiting, fevers, chills, shortness of breath, chest pain, cough, constipation or difficulty urinating. Past Medical History:   Diagnosis Date    Arthritis     Obesity     PPD positive     Venous stasis ulcer of left lower extremity ([address] 10/25/2016     Past Surgical History:   Procedure Laterality Date    UPPER GASTROINTESTINAL ENDOSCOPY N/A 9/16/2019    EGD BIOPSY performed by Faisal Ayala DO at [facility] ENDOSCOPY     Family History   Problem Relation Age of Onset    Heart Disease Mother     Diabetes Maternal Uncle     Diabetes Paternal Uncle     Cancer Paternal Uncle         ? pancreatic    Cancer Brother         stomach     Social History     Tobacco Use    Smoking status: Never Smoker    Smokeless tobacco: Never Used   Substance Use Topics    Alcohol use: Yes     Comment: occ     I counseled the patient on the importance of not smoking and risks of ETOH. No Known Allergies  Vitals:    01/22/20 1007   BP: 120/82   Pulse: 72   Weight: (!) 407 lb 6.4 oz (184.8 kg)   Height: 6' 2\" (1.88 m)       Body mass index is 52.31 kg/m².       Current Outpatient Medications:     Compression Stockings MISC, Gradient: 40-50 mm Hg Size: Knee Length Extremity: bilateral Type: Circaid, Disp: 1 each, Rfl: 2    Compression Stockings MISC, by Does not apply route Diagnosis: I83.333 Location of wound: Left Leg Gradient IV (30-40 mm Hg) Style: Knee Length Extremity: Bilateral, Disp: 1 each, Rfl: 0    acetaminophen (TYLENOL) 325 MG tablet, Take 650 mg by mouth every 6 hours as needed for Pain, Disp: , Rfl:     GLUCOSAMINE HCL PO, Take by mouth daily, Disp: , Rfl:     Multiple Vitamin (MULTI VITAMIN PO), Take by mouth daily, Disp: , Rfl:     ibuprofen (ADVIL;MOTRIN) 800 MG tablet, Take 1 tablet by mouth every 6 hours as needed for Pain, Disp: 120 tablet, Rfl: 3    Cholecalciferol (VITAMIN D) 50 MCG (2000 UT) CAPS capsule, Take 1 capsule by mouth daily, Disp: 30 capsule, Rfl: 2    chlorthalidone (HYGROTON) 25 MG tablet, Take 1 tablet by mouth daily, Disp: 90 tablet, Rfl: 0      Review of Systems - History obtained from the patient  General ROS: negative  Psychological ROS: negative  Endocrine ROS: negative  Respiratory ROS: negative  Cardiovascular ROS: negative  Gastrointestinal ROS:negative  Genito-Urinary ROS: negative  Musculoskeletal ROS: negative   Skin ROS: negative    Physical Exam   Vitals Reviewed   Constitutional: Patient is oriented to person, place, and time. Patient appears well-developed and well-nourished. Patient is active and cooperative. Non-toxic appearance. No distress. Neck: Trachea normal and normal range of motion. No JVD present. Pulmonary/Chest: Effort normal. No accessory muscle usage or stridor. No apnea. No respiratory distress. Cardiovascular: Normal rate and no JVD. Abdominal: Normal appearance. Patient exhibits no distension. Abdomen is soft, obese, non tender. Musculoskeletal: Normal range of motion. Patient exhibits no edema. Neurological: Patient is alert and oriented to person, place, and time. Patient has normal strength. GCS eye subscore is 4. GCS verbal subscore is 5. GCS motor subscore is 6. Skin: Skin is warm and dry. No abrasion and no rash noted. Patient is not diaphoretic. No cyanosis or erythema. Psychiatric: Patient has a normal mood and affect. Speech is normal and behavior is normal. Cognition and memory are normal.       A/P    Tino Marquis III is 47 y.o. male, Body mass index is 52.31 kg/m². pre surgery, has lost 3# since last visit. The patient underwent dietary counseling with registered dietician.   I have reviewed, discussed and agree with the dietary plan. Patient is trying hard to keep good dietary and behavior modifications. Patient is monitoring portion sizes, food choices and liquid calories. Patient is trying to exercise regularly as much as possible. We discussed how his weight affects his overall health including:  Kaitlin Martinez was seen today for obesity. Diagnoses and all orders for this visit:    Morbid obesity with BMI of 50.0-59.9, adult ([address]    Obstructive sleep apnea    Mixed hyperlipidemia    Essential hypertension       and importance of weight loss to alleviate those co morbid conditions. I encouraged the patient to continue exercise and keeping healthy eating habits. Discussed pre-op labs and work up till now. Also counseled the patient extensively on Surgery. I spent 15 minutes face to face with patient with more than 50% of the time counseling and/or coordinating care for weight loss surgery. RTC in 4 weeks  Obtain rest of pre-op work up / clearances  Diet and Exercise      Patient advised that its their responsibility to follow up for studies and/or labs ordered today.      Vinicio Anguiano

## 2020-02-17 PROBLEM — E66.01 MORBID OBESITY WITH BMI OF 50.0-59.9, ADULT (HCC): Status: ACTIVE | Noted: 2020-02-17

## 2020-02-17 ASSESSMENT — ENCOUNTER SYMPTOMS
GASTROINTESTINAL NEGATIVE: 1
RESPIRATORY NEGATIVE: 1
EYES NEGATIVE: 1
ALLERGIC/IMMUNOLOGIC NEGATIVE: 1

## 2020-02-17 NOTE — PROGRESS NOTES
[facility]) Physicians   [employer]    Subjective:      Patient ID: Janet Barlow III is a 47 y.o. male    HPI    The patient is here for their bariatric surgery preoperative education group visit. He has made several attempts at weight loss in the past without success and now has been scheduled to have bariatric surgery on March 31, 2020 for future weight loss. He is working to change his dietary behaviors and lose weight to improve comorbid conditions such as hypertension, prediabetes, hyperlipidemia and obstructive sleep apnea. Janet Barlow III is a very pleasant 47 y.o. male with Body mass index is 53.98 kg/m². Past Medical History:   Diagnosis Date    Arthritis     Obesity     PPD positive     Venous stasis ulcer of left lower extremity ([address] 10/25/2016     Past Surgical History:   Procedure Laterality Date    UPPER GASTROINTESTINAL ENDOSCOPY N/A 9/16/2019    EGD BIOPSY performed by Susan De Jesus DO at [facility] ENDOSCOPY     Family History   Problem Relation Age of Onset    Heart Disease Mother     Diabetes Maternal Uncle     Diabetes Paternal Uncle     Cancer Paternal Uncle         ? pancreatic    Cancer Brother         stomach     Social History     Tobacco Use    Smoking status: Never Smoker    Smokeless tobacco: Never Used   Substance Use Topics    Alcohol use: Yes     Comment: occ     I counseled the patient on the importance of not smoking and risks of ETOH. No Known Allergies  Vitals:    02/25/20 1354   BP: 134/75   Pulse: 86   SpO2: 97%   Weight: (!) 420 lb 6.4 oz (190.7 kg)   Height: 6' 2\" (1.88 m)     Body mass index is 53.98 kg/m².     Current Outpatient Medications:     Cholecalciferol (VITAMIN D) 50 MCG (2000 UT) CAPS capsule, Take 1 capsule by mouth daily, Disp: 30 capsule, Rfl: 2    chlorthalidone (HYGROTON) 25 MG tablet, Take 1 tablet by mouth daily, Disp: 90 tablet, Rfl: 0    Compression Stockings MISC, Gradient: 40-50 mm Hg Size: Knee Length Extremity: bilateral Type: Circaid, Disp: 1 each, Rfl: 2    Compression Stockings MISC, by Does not apply route Diagnosis: I83.333 Location of wound: Left Leg Gradient IV (30-40 mm Hg) Style: Knee Length Extremity: Bilateral, Disp: 1 each, Rfl: 0    acetaminophen (TYLENOL) 325 MG tablet, Take 650 mg by mouth every 6 hours as needed for Pain, Disp: , Rfl:     GLUCOSAMINE HCL PO, Take by mouth daily, Disp: , Rfl:     Multiple Vitamin (MULTI VITAMIN PO), Take by mouth daily, Disp: , Rfl:     ibuprofen (ADVIL;MOTRIN) 800 MG tablet, Take 1 tablet by mouth every 6 hours as needed for Pain, Disp: 120 tablet, Rfl: 3    Lab Results   Component Value Date    WBC 3.9 11/01/2019    RBC 4.49 11/01/2019    HGB 12.8 11/01/2019    HCT 39.6 11/01/2019    MCV 88.2 11/01/2019    MCH 28.5 11/01/2019    MCHC 32.4 11/01/2019    MPV 7.9 11/01/2019    NEUTOPHILPCT 73.3 02/13/2019    LYMPHOPCT 15.7 02/13/2019    MONOPCT 10.1 02/13/2019    EOSRELPCT 0.3 02/13/2019    BASOPCT 0.6 02/13/2019    NEUTROABS 5.5 02/13/2019    LYMPHSABS 1.2 02/13/2019    MONOSABS 0.8 02/13/2019    EOSABS 0.0 02/13/2019     Lab Results   Component Value Date     11/01/2019    K 4.3 11/01/2019     11/01/2019    CO2 24 11/01/2019    ANIONGAP 12 11/01/2019    GLUCOSE 105 01/24/2020    BUN 15 11/01/2019    CREATININE 0.9 11/01/2019    LABGLOM >60 11/01/2019    GFRAA >60 11/01/2019    GFRAA 125 04/25/2013    CALCIUM 9.3 11/01/2019    PROT 7.1 02/13/2019    PROT 7.7 03/11/2013    LABALBU 4.4 02/13/2019    AGRATIO 1.6 02/13/2019    BILITOT 0.8 02/13/2019    ALKPHOS 75 02/13/2019    ALT 35 02/13/2019    AST 17 02/13/2019    GLOB 2.7 02/13/2019     Lab Results   Component Value Date    CHOL 190 02/13/2019    TRIG 69 02/13/2019    HDL 47 02/13/2019    HDL 46 06/06/2011    LDLCALC 129 02/13/2019    LABVLDL 14 02/13/2019     Lab Results   Component Value Date    TSHREFLEX 0.67 02/13/2019     Lab Results   Component Value Date    IRON 55 02/21/2018    TIBC 223 02/21/2018    LABIRON 25 02/21/2018     Lab Results   Component Value Date    CVHEBILV37 514 09/14/2018    FOLATE >20.00 09/14/2018     Lab Results   Component Value Date    VITD25 32.1 05/20/2019     Lab Results   Component Value Date    LABA1C 6.4 01/24/2020    .9 02/13/2019     Review of Systems   Constitutional: Negative. Negative for chills and fever. HENT: Negative. Eyes: Negative. Respiratory: Negative. Negative for cough and shortness of breath. Cardiovascular: Positive for leg swelling (Compression sleeves and braces in place). Gastrointestinal: Negative. Negative for abdominal pain, constipation, diarrhea and nausea. Endocrine: Negative. Genitourinary: Negative. Musculoskeletal: Positive for arthralgias (Hip). Skin: Negative. Allergic/Immunologic: Negative. Neurological: Negative. Hematological: Negative. Psychiatric/Behavioral: Negative. Objective:     Physical Exam  Vitals signs reviewed. Constitutional:       Appearance: He is well-developed. HENT:      Head: Normocephalic and atraumatic. Eyes:      Conjunctiva/sclera: Conjunctivae normal.      Pupils: Pupils are equal, round, and reactive to light. Neck:      Musculoskeletal: Normal range of motion and neck supple. Cardiovascular:      Rate and Rhythm: Normal rate. Heart sounds: Normal heart sounds. Pulmonary:      Effort: Pulmonary effort is normal. No respiratory distress. Breath sounds: Normal breath sounds. No wheezing or rhonchi. Abdominal:      General: Bowel sounds are normal. There is no distension. Palpations: Abdomen is soft. Tenderness: There is no abdominal tenderness. Hernia: No hernia is present. Musculoskeletal: Normal range of motion. Right lower leg: Edema present. Left lower leg: Edema present. Comments: Rolling walker   Skin:     General: Skin is warm and dry. Neurological:      Mental Status: He is alert and oriented to person, place, and time. Psychiatric:         Behavior: Behavior normal.         Thought Content: Thought content normal.         Judgment: Judgment normal.       Assessment and Plan:   Patient is here for their preoperative education group visit for sleeve gastrectomy. The patient is up 13 lbs today. Weight check added to next weeks appointment. The patient's current Body mass index is 53.98 kg/m². (2/25/20). He is making good dietary and behavior modifications and is considered to be a good surgical candidate. Patient has a diagnosis of hypertension. Reviewed the importance of checking blood pressure preoperatively and postoperatively. In addition, following up with their PCP for medication adjustments as needed. Discussed the fact that they will be required to crush or open their medications for the first two weeks after surgery and reviewed those medications that can not be crushed. Patient has a diagnosis of prediabetes. Reviewed the importance of complying with post op diet. Patient has a diagnosis of hyperlipidemia. Discussed the benefits of weight loss and dietary changes on lipids and cholesterol. Discussed the fact that they will be required to crush or open their medications for the first two weeks after surgery and reviewed those medications that can not be crushed. Patient has a diagnosis of obstructive sleep apnea and uses a CPAP for sleep. Discussed that weight loss can assist with improving sleep apnea symptoms. Explained to patient to make sure they bring their CPAP with them to the hospital for their surgery. Patient received instructions from the registered dietitian in reference to the two week preoperative diet and the four phases of their postoperative diet. In addition I reviewed these instructions and stressed the importance of following these recommendations for their safety.      Patient completed the preoperative class where they were provided with education related to their bariatric surgery,

## 2020-02-21 NOTE — PROGRESS NOTES
Patient Name:   Kenisha Rodriguez       Type of Surgery:  Sleeve         Date of Surgery:  March 31, 2020      Start Pre-Op Diet On:  March 18, 2020      Start Clear Liquids On:  March 30, 2020      If you are having a gastric bypass, start Bowel Prep between 2-4pm the day prior to surgery. NPO after midnight the night before your surgery! Take morning medications with a small amount of water.     NOTES:_______________________________________  ______________________________________________

## 2020-02-25 ENCOUNTER — OFFICE VISIT (OUTPATIENT)
Dept: BARIATRICS/WEIGHT MGMT | Age: 55
End: 2020-02-25
Payer: COMMERCIAL

## 2020-02-25 VITALS
HEART RATE: 86 BPM | SYSTOLIC BLOOD PRESSURE: 134 MMHG | WEIGHT: 315 LBS | DIASTOLIC BLOOD PRESSURE: 75 MMHG | BODY MASS INDEX: 40.43 KG/M2 | HEIGHT: 74 IN | OXYGEN SATURATION: 97 %

## 2020-02-25 PROCEDURE — 99214 OFFICE O/P EST MOD 30 MIN: CPT | Performed by: NURSE PRACTITIONER

## 2020-02-25 ASSESSMENT — ENCOUNTER SYMPTOMS
COUGH: 0
SHORTNESS OF BREATH: 0
ABDOMINAL PAIN: 0
DIARRHEA: 0
NAUSEA: 0
CONSTIPATION: 0

## 2020-02-25 NOTE — PROGRESS NOTES
Ryan gained 13 lbs over 1 month. After dietary evaluation and education, patient is considered to be a good surgical candidate from a dietary perspective. Patient was educated on gastric sleeve dietary protocol. Pre-operative and post-operative diet guidelines were discussed and written information was provided. Nectar and Unjury protein supplements were purchased. Vitamin regimen with Bariatric Fusion vitamins was explained, and patient purchased a 1 month supply at this visit. Importance of vitamin compliance was discussed and potential of vitamin deficiencies was reviewed. Encouraged continued physical activity. Patient signed agreement stating they are committed to lifelong follow up, smoking and alcohol cessation, vitamin compliance, and 2 week pre-operative dietary protocol.

## 2020-02-27 ENCOUNTER — TELEPHONE (OUTPATIENT)
Dept: SLEEP MEDICINE | Age: 55
End: 2020-02-27

## 2020-03-04 ENCOUNTER — OFFICE VISIT (OUTPATIENT)
Dept: BARIATRICS/WEIGHT MGMT | Age: 55
End: 2020-03-04
Payer: COMMERCIAL

## 2020-03-04 VITALS
BODY MASS INDEX: 40.43 KG/M2 | HEIGHT: 74 IN | HEART RATE: 82 BPM | DIASTOLIC BLOOD PRESSURE: 83 MMHG | SYSTOLIC BLOOD PRESSURE: 154 MMHG | WEIGHT: 315 LBS

## 2020-03-04 PROCEDURE — 99213 OFFICE O/P EST LOW 20 MIN: CPT | Performed by: SURGERY

## 2020-03-04 NOTE — PROGRESS NOTES
Sincere Corrales III lost 0.4 lbs over past 1.5 weeks. Breakfast: nothing    Snack: eggs and turkey cano OR chicken salad/tuna salad     Lunch: nothing    Snack: nothing    Dinner: chicken salad/tuna salad     Snack: nothing    Fluids: SF koolaid, crystal light, caffeinated black coffee,regular fruit punch, water     Is pt consuming smaller portions? Yes    Is pt consuming at least 64 oz of fluids per day? Yes    Is pt consuming carbonated, caffeinated, or sugary beverages? Yes - fruit punch/coffee     Has pt sampled Unjury and/or Nectar protein?  Yes    Exercise: Limited d/t leg/hip pain - plans to start going to the pool    Plan/Recommendations:   Eat 4 x day - include breakfast and snack   Eliminate liquid calories - fruit punch/switch to decaf coffee     Handouts: none     Leonel Jobs

## 2020-03-05 ENCOUNTER — TELEPHONE (OUTPATIENT)
Dept: BARIATRICS/WEIGHT MGMT | Age: 55
End: 2020-03-05

## 2020-03-18 ENCOUNTER — TELEPHONE (OUTPATIENT)
Dept: BARIATRICS/WEIGHT MGMT | Age: 55
End: 2020-03-18

## 2020-04-04 NOTE — PROGRESS NOTES
(30-40 mm Hg) Style: Knee Length Extremity: Bilateral, Disp: 1 each, Rfl: 0    acetaminophen (TYLENOL) 325 MG tablet, Take 650 mg by mouth every 6 hours as needed for Pain, Disp: , Rfl:     GLUCOSAMINE HCL PO, Take by mouth daily, Disp: , Rfl:     Multiple Vitamin (MULTI VITAMIN PO), Take by mouth daily, Disp: , Rfl:     ibuprofen (ADVIL;MOTRIN) 800 MG tablet, Take 1 tablet by mouth every 6 hours as needed for Pain, Disp: 120 tablet, Rfl: 3      Review of Systems - History obtained from the patient  General ROS: negative  Psychological ROS: negative  Ophthalmic ROS: negative  Neurological ROS: negative  ENT ROS: negative  Allergy and Immunology ROS: negative  Hematological and Lymphatic ROS: negative  Endocrine ROS: negative  Breast ROS: negative  Respiratory ROS: negative  Cardiovascular ROS: negative  Gastrointestinal ROS:negative  Genito-Urinary ROS: negative  Musculoskeletal ROS: negative   Skin ROS: negative    Physical Exam   Vitals Reviewed   Constitutional: Patient is oriented to person, place, and time. Patient appears well-developed and well-nourished. Patient is active and cooperative. Non-toxic appearance. No distress. HENT:   Head: Normocephalic and atraumatic. Head is without abrasion and without laceration. Hair is normal.   Right Ear: External ear normal. No lacerations. No drainage, swelling . Left Ear: External ear normal. No lacerations. No drainage, swelling. Nose: Nose normal. No nose lacerations or nasal deformity. Eyes: Conjunctivae, EOM and lids are normal. Right eye exhibits no discharge. No foreign body present in the right eye. Left eye exhibits no discharge. No foreign body present in the left eye. No scleral icterus. Neck: Trachea normal and normal range of motion. No JVD present. Pulmonary/Chest: Effort normal. No accessory muscle usage or stridor. No apnea. No respiratory distress. Cardiovascular: Normal rate and no JVD. Abdominal: Normal appearance.  Patient exhibits no distension. Abdomen is soft, obese, non tender. Musculoskeletal: Normal range of motion. Patient exhibits no edema. Neurological: Patient is alert and oriented to person, place, and time. Patient has normal strength. GCS eye subscore is 4. GCS verbal subscore is 5. GCS motor subscore is 6. Skin: Skin is warm and dry. No abrasion and no rash noted. Patient is not diaphoretic. No cyanosis or erythema. Psychiatric: Patient has a normal mood and affect. Speech is normal and behavior is normal. Cognition and memory are normal.       A/P    Farhat Meres III is 47 y.o. male, Body mass index is 53.92 kg/m². pre surgery, has lost 1# since last visit. The patient underwent dietary counseling with registered dietician. I have reviewed, discussed and agree with the dietary plan. Patient is trying hard to keep good dietary and behavior modifications. Patient is monitoring portion sizes, food choices and liquid calories. Patient is trying to exercise regularly as much as possible. We discussed how his weight affects his overall health including:  Cassius Spurling was seen today for obesity. Diagnoses and all orders for this visit:    Morbid obesity with BMI of 50.0-59.9, adult ([address]    HTN (hypertension), benign    Mixed hyperlipidemia    Essential hypertension       and importance of weight loss to alleviate those co morbid conditions. I encouraged the patient to continue exercise and keeping healthy eating habits. Discussed pre-op labs and work up till now. Also counseled the patient extensively on Surgery. I spent 25 minutes face to face with patient with more than 50% of the time counseling and/or coordinating care for weight loss surgery.       Consent obtained for sleeve gastrectomy    [facility]

## 2020-04-22 ENCOUNTER — TELEMEDICINE (OUTPATIENT)
Dept: BARIATRICS/WEIGHT MGMT | Age: 55
End: 2020-04-22
Payer: COMMERCIAL

## 2020-04-22 VITALS — BODY MASS INDEX: 53.92 KG/M2 | WEIGHT: 315 LBS

## 2020-04-22 PROCEDURE — 99213 OFFICE O/P EST LOW 20 MIN: CPT | Performed by: SURGERY

## 2020-04-22 NOTE — PROGRESS NOTES
CAPS capsule, Take 1 capsule by mouth daily, Disp: 30 capsule, Rfl: 2    chlorthalidone (HYGROTON) 25 MG tablet, Take 1 tablet by mouth daily, Disp: 90 tablet, Rfl: 0    Compression Stockings MISC, Gradient: 40-50 mm Hg Size: Knee Length Extremity: bilateral Type: Circaid, Disp: 1 each, Rfl: 2    Compression Stockings MISC, by Does not apply route Diagnosis: I83.333 Location of wound: Left Leg Gradient IV (30-40 mm Hg) Style: Knee Length Extremity: Bilateral, Disp: 1 each, Rfl: 0    acetaminophen (TYLENOL) 325 MG tablet, Take 650 mg by mouth every 6 hours as needed for Pain, Disp: , Rfl:     GLUCOSAMINE HCL PO, Take by mouth daily, Disp: , Rfl:     Multiple Vitamin (MULTI VITAMIN PO), Take by mouth daily, Disp: , Rfl:     ibuprofen (ADVIL;MOTRIN) 800 MG tablet, Take 1 tablet by mouth every 6 hours as needed for Pain, Disp: 120 tablet, Rfl: 3    Review of Systems - History obtained from the patient  General ROS: negative  Psychological ROS: negative  Ophthalmic ROS: negative  Neurological ROS: negative  ENT ROS: negative  Allergy and Immunology ROS: negative  Hematological and Lymphatic ROS: negative  Endocrine ROS: negative  Breast ROS: negative  Respiratory ROS: negative  Cardiovascular ROS: negative  Gastrointestinal ROS:negative  Genito-Urinary ROS: negative  Musculoskeletal ROS: negative   Skin ROS: negative       Physical Exam  Deferred       A/P    Xiomy Flavin III is 47 y.o. male, Body mass index is 53.92 kg/m². pre surgery, has stayed weight stable since last visit. The patient underwent dietary counseling with myself / or registered dietitian. I have reviewed, discussed and agree with the dietary plan. Patient is trying hard to keep good dietary and behavior modifications. Patient is monitoring portion sizes, food choices and liquid calories. Patient is trying to exercise regularly as much as possible.   We discussed how his weight affects his overall health including:  Patient Active Problem List Appropriations Act, this Virtual Visit was conducted, with patient's consent, to reduce the patient's risk of exposure to COVID-19 and provide continuity of care for an established patient. Services were provided through a video synchronous discussion virtually to substitute for in-person clinic visit.

## 2020-04-27 RX ORDER — CHLORTHALIDONE 25 MG/1
TABLET ORAL
Qty: 30 TABLET | Refills: 1 | Status: SHIPPED | OUTPATIENT
Start: 2020-04-27 | End: 2020-07-10

## 2020-05-18 ENCOUNTER — OFFICE VISIT (OUTPATIENT)
Dept: FAMILY MEDICINE CLINIC | Age: 55
End: 2020-05-18
Payer: COMMERCIAL

## 2020-05-18 VITALS
OXYGEN SATURATION: 98 % | WEIGHT: 315 LBS | DIASTOLIC BLOOD PRESSURE: 77 MMHG | RESPIRATION RATE: 14 BRPM | TEMPERATURE: 97.2 F | SYSTOLIC BLOOD PRESSURE: 136 MMHG | HEART RATE: 78 BPM | BODY MASS INDEX: 40.43 KG/M2 | HEIGHT: 74 IN

## 2020-05-18 PROBLEM — M16.0 PRIMARY OSTEOARTHRITIS OF BOTH HIPS: Status: ACTIVE | Noted: 2020-05-18

## 2020-05-18 PROBLEM — Z01.818 PREOP EXAMINATION: Status: ACTIVE | Noted: 2020-05-18

## 2020-05-18 PROCEDURE — 93000 ELECTROCARDIOGRAM COMPLETE: CPT | Performed by: NURSE PRACTITIONER

## 2020-05-18 PROCEDURE — 99214 OFFICE O/P EST MOD 30 MIN: CPT | Performed by: NURSE PRACTITIONER

## 2020-05-18 ASSESSMENT — ENCOUNTER SYMPTOMS
ABDOMINAL PAIN: 0
SINUS PAIN: 0
SINUS PRESSURE: 0
WHEEZING: 0
CONSTIPATION: 0
COUGH: 0
DIARRHEA: 0
SHORTNESS OF BREATH: 0
COLOR CHANGE: 0
BACK PAIN: 0

## 2020-05-18 NOTE — ASSESSMENT & PLAN NOTE
Perioperative risk related to the patient's upcoming surgery is considered low. he is cleared for surgery.   Pre-op exam was completed on 5/18/20 11:34 AM.

## 2020-05-18 NOTE — PROGRESS NOTES
Musculoskeletal: Normal range of motion. General: No deformity. Right lower le+ Edema present. Left lower le+ Edema present. Skin:     General: Skin is warm and dry. Capillary Refill: Capillary refill takes less than 2 seconds. Neurological:      Mental Status: He is alert and oriented to person, place, and time. Psychiatric:         Behavior: Behavior normal.         Cardiographics  ECG: normal sinus rhythm, no blocks or conduction defects. Lab Review   Lab Results   Component Value Date     2019    K 4.3 2019     2019    CO2 24 2019    BUN 15 2019    CREATININE 0.9 2019    GLUCOSE 105 2020    CALCIUM 9.3 2019         Medication Review  Current Outpatient Medications on File Prior to Visit   Medication Sig Dispense Refill    chlorthalidone (HYGROTON) 25 MG tablet TAKE 1 TABLET BY MOUTH EVERY DAY 30 tablet 1    Compression Stockings MISC by Does not apply route Diagnosis: I83.333  Location of wound: Left Leg  Gradient IV (30-40 mm Hg)  Style: Knee Length  Extremity: Bilateral 1 each 0    acetaminophen (TYLENOL) 325 MG tablet Take 650 mg by mouth every 6 hours as needed for Pain      Multiple Vitamin (MULTI VITAMIN PO) Take by mouth daily      ibuprofen (ADVIL;MOTRIN) 800 MG tablet Take 1 tablet by mouth every 6 hours as needed for Pain (Patient taking differently: Take 800 mg by mouth every 6 hours as needed for Pain stopping for surgery) 120 tablet 3     No current facility-administered medications on file prior to visit. Assessment:       1. Preop examination    2. Obstructive sleep apnea    3. Primary osteoarthritis of both hips    4. Morbid obesity ([address]    5. Essential hypertension    6. Mixed hyperlipidemia    7.  S/P coronary angiogram           Plan:        Obstructive sleep apnea  Stable, controlled   Continue current regimen   Compliant with CPAP     Primary osteoarthritis of both hips  Stable, controlled   Continue current regimen   Avoid NSAIDs prior to surgery   Ambulates with walker    Morbid obesity ([facility]  Surgery as planned     Essential hypertension  Stable, controlled   Continue current regimen    Mixed hyperlipidemia  Stable, controlled   Continue current regimen    S/P coronary angiogram  Following with cardiology prior to surgery   Denies CP, SOB, palpitations, or dizziness. Preop examination  Perioperative risk related to the patient's upcoming surgery is considered low. he is cleared for surgery.   Pre-op exam was completed on 5/18/20 11:34 AM.          - Preoperative workup as follows ECG, hemoglobin, hematocrit, electrolytes, creatinine  - Change in medication regimen before surgery: none, continue med regimen including morning of surgery, w/sip of water  - Prophylaxisfor cardiac events with perioperative beta-blockers: should be considered, specific regimen per anesthesia

## 2020-05-20 ENCOUNTER — TELEMEDICINE (OUTPATIENT)
Dept: BARIATRICS/WEIGHT MGMT | Age: 55
End: 2020-05-20
Payer: COMMERCIAL

## 2020-05-20 VITALS — WEIGHT: 315 LBS | BODY MASS INDEX: 53.92 KG/M2

## 2020-05-20 PROCEDURE — 99213 OFFICE O/P EST LOW 20 MIN: CPT | Performed by: SURGERY

## 2020-05-20 NOTE — PROGRESS NOTES
Farhat Carr III stable / unchanged. Is pt eating at least 4 times everyday? yes he is    Is pt eating a lean protein source with all meals and snacks? yes including protein shakes    Has pt decreased their portions using the plate method? yes he is    Is pt choosing low fat/sugar free options? yes he is    Is pt drinking at least 64 oz of clear liquids everyday? yes he is    Has pt stopped drinking carbonation, caffeinated, and sugar sweetened beverages? yes he has    Has pt sampled Unjury and/or Nectar protein? yes he has    Participating in intentional exercise? none becasuse of hip pain    Plan/Recommendations: pt to start preop diet on 5/27 and clear liquids on 6/8. Reviewed basics of preop diet with patient and encouraged him to review materials and contact us if he has questions.       Handouts: orders to be emailed to pt    Clara Garcia
TABLET BY MOUTH EVERY DAY, Disp: 30 tablet, Rfl: 1    Compression Stockings MISC, by Does not apply route Diagnosis: I83.333 Location of wound: Left Leg Gradient IV (30-40 mm Hg) Style: Knee Length Extremity: Bilateral, Disp: 1 each, Rfl: 0    acetaminophen (TYLENOL) 325 MG tablet, Take 650 mg by mouth every 6 hours as needed for Pain, Disp: , Rfl:     Multiple Vitamin (MULTI VITAMIN PO), Take by mouth daily, Disp: , Rfl:     ibuprofen (ADVIL;MOTRIN) 800 MG tablet, Take 1 tablet by mouth every 6 hours as needed for Pain (Patient taking differently: Take 800 mg by mouth every 6 hours as needed for Pain stopping for surgery), Disp: 120 tablet, Rfl: 3    Review of Systems - History obtained from the patient  General ROS: negative  Psychological ROS: negative  Ophthalmic ROS: negative  Neurological ROS: negative  ENT ROS: negative  Allergy and Immunology ROS: negative  Hematological and Lymphatic ROS: negative  Endocrine ROS: negative  Breast ROS: negative  Respiratory ROS: negative  Cardiovascular ROS: negative  Gastrointestinal ROS:negative  Genito-Urinary ROS: negative  Musculoskeletal ROS: negative   Skin ROS: negative       Physical Exam  Deferred       A/P    Adron Sames III is 47 y.o. male, Body mass index is 53.92 kg/m². pre surgery, has stayed weight stable since last visit. The patient underwent dietary counseling with myself / or registered dietitian. I have reviewed, discussed and agree with the dietary plan. Patient is trying hard to keep good dietary and behavior modifications. Patient is monitoring portion sizes, food choices and liquid calories. Patient is trying to exercise regularly as much as possible.   We discussed how his weight affects his overall health including:  Patient Active Problem List   Diagnosis    Family history of early CAD    PPD positive    Vitamin D deficiency    Edema    Morbid obesity ([address]    Anemia    Venous stasis ulcer of left lower extremity ([address]    Venous stasis

## 2020-06-01 ENCOUNTER — TELEPHONE (OUTPATIENT)
Dept: BARIATRICS/WEIGHT MGMT | Age: 55
End: 2020-06-01

## 2020-06-03 ENCOUNTER — OFFICE VISIT (OUTPATIENT)
Dept: PRIMARY CARE CLINIC | Age: 55
End: 2020-06-03

## 2020-06-03 ENCOUNTER — TELEPHONE (OUTPATIENT)
Dept: BARIATRICS/WEIGHT MGMT | Age: 55
End: 2020-06-03

## 2020-06-06 LAB
SARS-COV-2: NOT DETECTED
SOURCE: NORMAL

## 2020-06-08 ENCOUNTER — TELEPHONE (OUTPATIENT)
Dept: BARIATRICS/WEIGHT MGMT | Age: 55
End: 2020-06-08

## 2020-06-08 ENCOUNTER — ANESTHESIA EVENT (OUTPATIENT)
Dept: OPERATING ROOM | Age: 55
DRG: 620 | End: 2020-06-08
Payer: COMMERCIAL

## 2020-06-08 NOTE — TELEPHONE ENCOUNTER
Left message to confirm pts 5:30 am arrival for surgery, tomorrow, 6/9/20. Reminded pt he should be doing his clear, liquid diet today, and NPO after midnight. Asked pt to return my call to confirm receipt of this message.

## 2020-06-08 NOTE — TELEPHONE ENCOUNTER
Call to [facility] to inquire why Refugio Collier is classified as outpatient, when it is an inpt surgery. Spoke with Jose Estrella, who apologized and gave a new auth # of [id number]. The clinical nurse reviewer has my contact information and will call if there are any further questions. Left message for [facility] with [facility] Auth.

## 2020-06-09 ENCOUNTER — ANESTHESIA (OUTPATIENT)
Dept: OPERATING ROOM | Age: 55
DRG: 620 | End: 2020-06-09
Payer: COMMERCIAL

## 2020-06-09 ENCOUNTER — HOSPITAL ENCOUNTER (INPATIENT)
Age: 55
LOS: 1 days | Discharge: HOME | DRG: 620 | End: 2020-06-10
Attending: SURGERY | Admitting: SURGERY
Payer: COMMERCIAL

## 2020-06-09 VITALS — SYSTOLIC BLOOD PRESSURE: 122 MMHG | OXYGEN SATURATION: 98 % | TEMPERATURE: 97 F | DIASTOLIC BLOOD PRESSURE: 70 MMHG

## 2020-06-09 LAB
ABO/RH: NORMAL
ANTIBODY SCREEN: NORMAL
GLUCOSE BLD-MCNC: 112 MG/DL (ref 70–99)
PERFORMED ON: ABNORMAL

## 2020-06-09 PROCEDURE — 49652 PR LAP, VENTRAL HERNIA REPAIR,REDUCIBLE: CPT | Performed by: SURGERY

## 2020-06-09 PROCEDURE — 2500000003 HC RX 250 WO HCPCS: Performed by: SURGERY

## 2020-06-09 PROCEDURE — 6360000002 HC RX W HCPCS: Performed by: NURSE ANESTHETIST, CERTIFIED REGISTERED

## 2020-06-09 PROCEDURE — 86850 RBC ANTIBODY SCREEN: CPT

## 2020-06-09 PROCEDURE — 6360000002 HC RX W HCPCS: Performed by: SURGERY

## 2020-06-09 PROCEDURE — 8E0W4CZ ROBOTIC ASSISTED PROCEDURE OF TRUNK REGION, PERCUTANEOUS ENDOSCOPIC APPROACH: ICD-10-PCS | Performed by: SURGERY

## 2020-06-09 PROCEDURE — 7100000000 HC PACU RECOVERY - FIRST 15 MIN: Performed by: SURGERY

## 2020-06-09 PROCEDURE — 94761 N-INVAS EAR/PLS OXIMETRY MLT: CPT

## 2020-06-09 PROCEDURE — C9113 INJ PANTOPRAZOLE SODIUM, VIA: HCPCS | Performed by: SURGERY

## 2020-06-09 PROCEDURE — 3600000019 HC SURGERY ROBOT ADDTL 15MIN: Performed by: SURGERY

## 2020-06-09 PROCEDURE — 2709999900 HC NON-CHARGEABLE SUPPLY: Performed by: SURGERY

## 2020-06-09 PROCEDURE — 86901 BLOOD TYPING SEROLOGIC RH(D): CPT

## 2020-06-09 PROCEDURE — S2900 ROBOTIC SURGICAL SYSTEM: HCPCS | Performed by: SURGERY

## 2020-06-09 PROCEDURE — 0DB64Z3 EXCISION OF STOMACH, PERCUTANEOUS ENDOSCOPIC APPROACH, VERTICAL: ICD-10-PCS | Performed by: SURGERY

## 2020-06-09 PROCEDURE — 2580000003 HC RX 258: Performed by: SURGERY

## 2020-06-09 PROCEDURE — 3700000001 HC ADD 15 MINUTES (ANESTHESIA): Performed by: SURGERY

## 2020-06-09 PROCEDURE — 94770 HC ETCO2 MONITOR DAILY: CPT

## 2020-06-09 PROCEDURE — 6370000000 HC RX 637 (ALT 250 FOR IP): Performed by: SURGERY

## 2020-06-09 PROCEDURE — 88307 TISSUE EXAM BY PATHOLOGIST: CPT

## 2020-06-09 PROCEDURE — 2500000003 HC RX 250 WO HCPCS: Performed by: NURSE ANESTHETIST, CERTIFIED REGISTERED

## 2020-06-09 PROCEDURE — 7100000001 HC PACU RECOVERY - ADDTL 15 MIN: Performed by: SURGERY

## 2020-06-09 PROCEDURE — 3600000009 HC SURGERY ROBOT BASE: Performed by: SURGERY

## 2020-06-09 PROCEDURE — 3700000000 HC ANESTHESIA ATTENDED CARE: Performed by: SURGERY

## 2020-06-09 PROCEDURE — 2720000010 HC SURG SUPPLY STERILE: Performed by: SURGERY

## 2020-06-09 PROCEDURE — 1200000000 HC SEMI PRIVATE

## 2020-06-09 PROCEDURE — 2580000003 HC RX 258: Performed by: ANESTHESIOLOGY

## 2020-06-09 PROCEDURE — 43775 LAP SLEEVE GASTRECTOMY: CPT | Performed by: SURGERY

## 2020-06-09 PROCEDURE — 0WQF4ZZ REPAIR ABDOMINAL WALL, PERCUTANEOUS ENDOSCOPIC APPROACH: ICD-10-PCS | Performed by: SURGERY

## 2020-06-09 PROCEDURE — 86900 BLOOD TYPING SEROLOGIC ABO: CPT

## 2020-06-09 RX ORDER — FENTANYL CITRATE 50 UG/ML
50 INJECTION, SOLUTION INTRAMUSCULAR; INTRAVENOUS EVERY 5 MIN PRN
Status: DISCONTINUED | OUTPATIENT
Start: 2020-06-09 | End: 2020-06-09 | Stop reason: HOSPADM

## 2020-06-09 RX ORDER — SCOLOPAMINE TRANSDERMAL SYSTEM 1 MG/1
1 PATCH, EXTENDED RELEASE TRANSDERMAL ONCE
Status: DISCONTINUED | OUTPATIENT
Start: 2020-06-09 | End: 2020-06-10 | Stop reason: HOSPADM

## 2020-06-09 RX ORDER — METHYLENE BLUE 10 MG/ML
INJECTION INTRAVENOUS PRN
Status: DISCONTINUED | OUTPATIENT
Start: 2020-06-09 | End: 2020-06-09 | Stop reason: HOSPADM

## 2020-06-09 RX ORDER — PROCHLORPERAZINE EDISYLATE 5 MG/ML
10 INJECTION INTRAMUSCULAR; INTRAVENOUS EVERY 6 HOURS PRN
Status: DISCONTINUED | OUTPATIENT
Start: 2020-06-09 | End: 2020-06-10 | Stop reason: HOSPADM

## 2020-06-09 RX ORDER — PROCHLORPERAZINE EDISYLATE 5 MG/ML
5 INJECTION INTRAMUSCULAR; INTRAVENOUS
Status: DISCONTINUED | OUTPATIENT
Start: 2020-06-09 | End: 2020-06-09 | Stop reason: HOSPADM

## 2020-06-09 RX ORDER — MEPERIDINE HYDROCHLORIDE 25 MG/ML
12.5 INJECTION INTRAMUSCULAR; INTRAVENOUS; SUBCUTANEOUS EVERY 5 MIN PRN
Status: DISCONTINUED | OUTPATIENT
Start: 2020-06-09 | End: 2020-06-09 | Stop reason: HOSPADM

## 2020-06-09 RX ORDER — OXYCODONE HYDROCHLORIDE AND ACETAMINOPHEN 5; 325 MG/1; MG/1
1 TABLET ORAL PRN
Status: DISCONTINUED | OUTPATIENT
Start: 2020-06-09 | End: 2020-06-09 | Stop reason: HOSPADM

## 2020-06-09 RX ORDER — METOCLOPRAMIDE HYDROCHLORIDE 5 MG/ML
10 INJECTION INTRAMUSCULAR; INTRAVENOUS EVERY 6 HOURS PRN
Status: DISCONTINUED | OUTPATIENT
Start: 2020-06-09 | End: 2020-06-10 | Stop reason: HOSPADM

## 2020-06-09 RX ORDER — SODIUM CHLORIDE 0.9 % (FLUSH) 0.9 %
10 SYRINGE (ML) INJECTION EVERY 12 HOURS SCHEDULED
Status: DISCONTINUED | OUTPATIENT
Start: 2020-06-09 | End: 2020-06-10 | Stop reason: HOSPADM

## 2020-06-09 RX ORDER — ONDANSETRON 2 MG/ML
4 INJECTION INTRAMUSCULAR; INTRAVENOUS EVERY 6 HOURS PRN
Status: DISCONTINUED | OUTPATIENT
Start: 2020-06-09 | End: 2020-06-10 | Stop reason: HOSPADM

## 2020-06-09 RX ORDER — PANTOPRAZOLE SODIUM 40 MG/10ML
40 INJECTION, POWDER, LYOPHILIZED, FOR SOLUTION INTRAVENOUS DAILY
Status: DISCONTINUED | OUTPATIENT
Start: 2020-06-09 | End: 2020-06-10 | Stop reason: HOSPADM

## 2020-06-09 RX ORDER — MIDAZOLAM HYDROCHLORIDE 1 MG/ML
INJECTION INTRAMUSCULAR; INTRAVENOUS PRN
Status: DISCONTINUED | OUTPATIENT
Start: 2020-06-09 | End: 2020-06-09 | Stop reason: SDUPTHER

## 2020-06-09 RX ORDER — DIPHENHYDRAMINE HYDROCHLORIDE 50 MG/ML
12.5 INJECTION INTRAMUSCULAR; INTRAVENOUS
Status: DISCONTINUED | OUTPATIENT
Start: 2020-06-09 | End: 2020-06-09 | Stop reason: HOSPADM

## 2020-06-09 RX ORDER — SODIUM CHLORIDE 9 MG/ML
10 INJECTION INTRAVENOUS DAILY
Status: DISCONTINUED | OUTPATIENT
Start: 2020-06-09 | End: 2020-06-10 | Stop reason: HOSPADM

## 2020-06-09 RX ORDER — APREPITANT 40 MG/1
80 CAPSULE ORAL ONCE
Status: COMPLETED | OUTPATIENT
Start: 2020-06-09 | End: 2020-06-09

## 2020-06-09 RX ORDER — HYDROMORPHONE HCL 110MG/55ML
PATIENT CONTROLLED ANALGESIA SYRINGE INTRAVENOUS PRN
Status: DISCONTINUED | OUTPATIENT
Start: 2020-06-09 | End: 2020-06-09 | Stop reason: SDUPTHER

## 2020-06-09 RX ORDER — SUCCINYLCHOLINE CHLORIDE 20 MG/ML
INJECTION INTRAMUSCULAR; INTRAVENOUS PRN
Status: DISCONTINUED | OUTPATIENT
Start: 2020-06-09 | End: 2020-06-09 | Stop reason: SDUPTHER

## 2020-06-09 RX ORDER — ONDANSETRON 2 MG/ML
INJECTION INTRAMUSCULAR; INTRAVENOUS PRN
Status: DISCONTINUED | OUTPATIENT
Start: 2020-06-09 | End: 2020-06-09 | Stop reason: SDUPTHER

## 2020-06-09 RX ORDER — BUPIVACAINE HYDROCHLORIDE 5 MG/ML
INJECTION, SOLUTION EPIDURAL; INTRACAUDAL PRN
Status: DISCONTINUED | OUTPATIENT
Start: 2020-06-09 | End: 2020-06-09 | Stop reason: HOSPADM

## 2020-06-09 RX ORDER — LABETALOL 20 MG/4 ML (5 MG/ML) INTRAVENOUS SYRINGE
5 EVERY 10 MIN PRN
Status: DISCONTINUED | OUTPATIENT
Start: 2020-06-09 | End: 2020-06-09 | Stop reason: HOSPADM

## 2020-06-09 RX ORDER — HYDRALAZINE HYDROCHLORIDE 20 MG/ML
5 INJECTION INTRAMUSCULAR; INTRAVENOUS EVERY 10 MIN PRN
Status: DISCONTINUED | OUTPATIENT
Start: 2020-06-09 | End: 2020-06-09 | Stop reason: HOSPADM

## 2020-06-09 RX ORDER — SODIUM CHLORIDE, SODIUM LACTATE, POTASSIUM CHLORIDE, CALCIUM CHLORIDE 600; 310; 30; 20 MG/100ML; MG/100ML; MG/100ML; MG/100ML
INJECTION, SOLUTION INTRAVENOUS CONTINUOUS
Status: DISCONTINUED | OUTPATIENT
Start: 2020-06-09 | End: 2020-06-09

## 2020-06-09 RX ORDER — MAGNESIUM HYDROXIDE 1200 MG/15ML
LIQUID ORAL CONTINUOUS PRN
Status: COMPLETED | OUTPATIENT
Start: 2020-06-09 | End: 2020-06-09

## 2020-06-09 RX ORDER — ONDANSETRON 2 MG/ML
4 INJECTION INTRAMUSCULAR; INTRAVENOUS
Status: DISCONTINUED | OUTPATIENT
Start: 2020-06-09 | End: 2020-06-09 | Stop reason: HOSPADM

## 2020-06-09 RX ORDER — FENTANYL CITRATE 50 UG/ML
25 INJECTION, SOLUTION INTRAMUSCULAR; INTRAVENOUS EVERY 5 MIN PRN
Status: DISCONTINUED | OUTPATIENT
Start: 2020-06-09 | End: 2020-06-09 | Stop reason: HOSPADM

## 2020-06-09 RX ORDER — ACETAMINOPHEN 160 MG/5ML
650 SOLUTION ORAL ONCE
Status: COMPLETED | OUTPATIENT
Start: 2020-06-09 | End: 2020-06-09

## 2020-06-09 RX ORDER — PREGABALIN 150 MG/1
150 CAPSULE ORAL ONCE
Status: COMPLETED | OUTPATIENT
Start: 2020-06-09 | End: 2020-06-09

## 2020-06-09 RX ORDER — ROCURONIUM BROMIDE 10 MG/ML
INJECTION, SOLUTION INTRAVENOUS PRN
Status: DISCONTINUED | OUTPATIENT
Start: 2020-06-09 | End: 2020-06-09 | Stop reason: SDUPTHER

## 2020-06-09 RX ORDER — SCOLOPAMINE TRANSDERMAL SYSTEM 1 MG/1
1 PATCH, EXTENDED RELEASE TRANSDERMAL
Status: DISCONTINUED | OUTPATIENT
Start: 2020-06-09 | End: 2020-06-10 | Stop reason: HOSPADM

## 2020-06-09 RX ORDER — NALOXONE HYDROCHLORIDE 0.4 MG/ML
0.4 INJECTION, SOLUTION INTRAMUSCULAR; INTRAVENOUS; SUBCUTANEOUS PRN
Status: DISCONTINUED | OUTPATIENT
Start: 2020-06-09 | End: 2020-06-10

## 2020-06-09 RX ORDER — PROPOFOL 10 MG/ML
INJECTION, EMULSION INTRAVENOUS PRN
Status: DISCONTINUED | OUTPATIENT
Start: 2020-06-09 | End: 2020-06-09 | Stop reason: SDUPTHER

## 2020-06-09 RX ORDER — LIDOCAINE HYDROCHLORIDE 20 MG/ML
INJECTION, SOLUTION INTRAVENOUS PRN
Status: DISCONTINUED | OUTPATIENT
Start: 2020-06-09 | End: 2020-06-09 | Stop reason: SDUPTHER

## 2020-06-09 RX ORDER — OXYCODONE HYDROCHLORIDE AND ACETAMINOPHEN 5; 325 MG/1; MG/1
2 TABLET ORAL PRN
Status: DISCONTINUED | OUTPATIENT
Start: 2020-06-09 | End: 2020-06-09 | Stop reason: HOSPADM

## 2020-06-09 RX ORDER — SODIUM CHLORIDE 0.9 % (FLUSH) 0.9 %
10 SYRINGE (ML) INJECTION PRN
Status: DISCONTINUED | OUTPATIENT
Start: 2020-06-09 | End: 2020-06-10 | Stop reason: HOSPADM

## 2020-06-09 RX ORDER — SODIUM CHLORIDE 9 MG/ML
INJECTION, SOLUTION INTRAVENOUS CONTINUOUS
Status: DISCONTINUED | OUTPATIENT
Start: 2020-06-09 | End: 2020-06-10 | Stop reason: HOSPADM

## 2020-06-09 RX ORDER — SODIUM CHLORIDE, SODIUM LACTATE, POTASSIUM CHLORIDE, AND CALCIUM CHLORIDE .6; .31; .03; .02 G/100ML; G/100ML; G/100ML; G/100ML
IRRIGANT IRRIGATION PRN
Status: DISCONTINUED | OUTPATIENT
Start: 2020-06-09 | End: 2020-06-09 | Stop reason: HOSPADM

## 2020-06-09 RX ORDER — FENTANYL CITRATE 50 UG/ML
INJECTION, SOLUTION INTRAMUSCULAR; INTRAVENOUS PRN
Status: DISCONTINUED | OUTPATIENT
Start: 2020-06-09 | End: 2020-06-09 | Stop reason: SDUPTHER

## 2020-06-09 RX ADMIN — ROCURONIUM BROMIDE 70 MG: 10 INJECTION, SOLUTION INTRAVENOUS at 07:39

## 2020-06-09 RX ADMIN — ROCURONIUM BROMIDE 10 MG: 10 INJECTION, SOLUTION INTRAVENOUS at 07:25

## 2020-06-09 RX ADMIN — SUGAMMADEX 200 MG: 100 INJECTION, SOLUTION INTRAVENOUS at 09:36

## 2020-06-09 RX ADMIN — HYDROMORPHONE HYDROCHLORIDE: 10 INJECTION, SOLUTION INTRAMUSCULAR; INTRAVENOUS; SUBCUTANEOUS at 10:13

## 2020-06-09 RX ADMIN — PREGABALIN 150 MG: 150 CAPSULE ORAL at 06:46

## 2020-06-09 RX ADMIN — ENOXAPARIN SODIUM 40 MG: 40 INJECTION SUBCUTANEOUS at 06:52

## 2020-06-09 RX ADMIN — SODIUM CHLORIDE: 9 INJECTION, SOLUTION INTRAVENOUS at 14:27

## 2020-06-09 RX ADMIN — FENTANYL CITRATE 50 MCG: 50 INJECTION INTRAMUSCULAR; INTRAVENOUS at 07:45

## 2020-06-09 RX ADMIN — SODIUM CHLORIDE, POTASSIUM CHLORIDE, SODIUM LACTATE AND CALCIUM CHLORIDE: 600; 310; 30; 20 INJECTION, SOLUTION INTRAVENOUS at 06:51

## 2020-06-09 RX ADMIN — LIDOCAINE HYDROCHLORIDE 50 MG: 20 INJECTION, SOLUTION INTRAVENOUS at 07:25

## 2020-06-09 RX ADMIN — PHENYLEPHRINE HYDROCHLORIDE 80 MCG: 10 INJECTION, SOLUTION INTRAMUSCULAR; INTRAVENOUS; SUBCUTANEOUS at 07:58

## 2020-06-09 RX ADMIN — PROPOFOL 300 MG: 10 INJECTION, EMULSION INTRAVENOUS at 07:25

## 2020-06-09 RX ADMIN — FENTANYL CITRATE 50 MCG: 50 INJECTION INTRAMUSCULAR; INTRAVENOUS at 07:25

## 2020-06-09 RX ADMIN — HYDROMORPHONE HYDROCHLORIDE 1 MG: 2 INJECTION, SOLUTION INTRAMUSCULAR; INTRAVENOUS; SUBCUTANEOUS at 09:48

## 2020-06-09 RX ADMIN — SODIUM CHLORIDE 10 ML: 9 INJECTION, SOLUTION INTRAMUSCULAR; INTRAVENOUS; SUBCUTANEOUS at 14:26

## 2020-06-09 RX ADMIN — ACETAMINOPHEN 650 MG: 650 SOLUTION ORAL at 06:52

## 2020-06-09 RX ADMIN — SUCCINYLCHOLINE CHLORIDE 160 MG: 20 INJECTION, SOLUTION INTRAMUSCULAR; INTRAVENOUS; PARENTERAL at 07:26

## 2020-06-09 RX ADMIN — ENOXAPARIN SODIUM 40 MG: 40 INJECTION SUBCUTANEOUS at 17:51

## 2020-06-09 RX ADMIN — APREPITANT 80 MG: 40 CAPSULE ORAL at 06:46

## 2020-06-09 RX ADMIN — SODIUM CHLORIDE, SODIUM LACTATE, POTASSIUM CHLORIDE, AND CALCIUM CHLORIDE: 600; 310; 30; 20 INJECTION, SOLUTION INTRAVENOUS at 07:13

## 2020-06-09 RX ADMIN — ONDANSETRON 4 MG: 2 INJECTION INTRAMUSCULAR; INTRAVENOUS at 09:19

## 2020-06-09 RX ADMIN — SODIUM CHLORIDE: 9 INJECTION, SOLUTION INTRAVENOUS at 09:55

## 2020-06-09 RX ADMIN — CEFAZOLIN 3 G: 1 INJECTION, POWDER, FOR SOLUTION INTRAMUSCULAR; INTRAVENOUS at 07:28

## 2020-06-09 RX ADMIN — PANTOPRAZOLE SODIUM 40 MG: 40 INJECTION, POWDER, FOR SOLUTION INTRAVENOUS at 14:13

## 2020-06-09 RX ADMIN — MIDAZOLAM HYDROCHLORIDE 2 MG: 2 INJECTION, SOLUTION INTRAMUSCULAR; INTRAVENOUS at 07:13

## 2020-06-09 ASSESSMENT — PULMONARY FUNCTION TESTS
PIF_VALUE: 32
PIF_VALUE: 33
PIF_VALUE: 32
PIF_VALUE: 32
PIF_VALUE: 34
PIF_VALUE: 34
PIF_VALUE: 33
PIF_VALUE: 28
PIF_VALUE: 33
PIF_VALUE: 33
PIF_VALUE: 32
PIF_VALUE: 33
PIF_VALUE: 35
PIF_VALUE: 28
PIF_VALUE: 34
PIF_VALUE: 33
PIF_VALUE: 34
PIF_VALUE: 34
PIF_VALUE: 1
PIF_VALUE: 34
PIF_VALUE: 32
PIF_VALUE: 28
PIF_VALUE: 29
PIF_VALUE: 32
PIF_VALUE: 34
PIF_VALUE: 29
PIF_VALUE: 34
PIF_VALUE: 32
PIF_VALUE: 32
PIF_VALUE: 34
PIF_VALUE: 34
PIF_VALUE: 33
PIF_VALUE: 34
PIF_VALUE: 2
PIF_VALUE: 34
PIF_VALUE: 1
PIF_VALUE: 28
PIF_VALUE: 33
PIF_VALUE: 28
PIF_VALUE: 33
PIF_VALUE: 37
PIF_VALUE: 34
PIF_VALUE: 33
PIF_VALUE: 34
PIF_VALUE: 34
PIF_VALUE: 28
PIF_VALUE: 32
PIF_VALUE: 28
PIF_VALUE: 34
PIF_VALUE: 0
PIF_VALUE: 34
PIF_VALUE: 1
PIF_VALUE: 28
PIF_VALUE: 1
PIF_VALUE: 0
PIF_VALUE: 29
PIF_VALUE: 34
PIF_VALUE: 0
PIF_VALUE: 34
PIF_VALUE: 34
PIF_VALUE: 31
PIF_VALUE: 0
PIF_VALUE: 34
PIF_VALUE: 32
PIF_VALUE: 33
PIF_VALUE: 34
PIF_VALUE: 1
PIF_VALUE: 35
PIF_VALUE: 34
PIF_VALUE: 33
PIF_VALUE: 28
PIF_VALUE: 2
PIF_VALUE: 28
PIF_VALUE: 33
PIF_VALUE: 34
PIF_VALUE: 32
PIF_VALUE: 34
PIF_VALUE: 34
PIF_VALUE: 32
PIF_VALUE: 2
PIF_VALUE: 28
PIF_VALUE: 24
PIF_VALUE: 26
PIF_VALUE: 34
PIF_VALUE: 32
PIF_VALUE: 28
PIF_VALUE: 33
PIF_VALUE: 34
PIF_VALUE: 32
PIF_VALUE: 28
PIF_VALUE: 28
PIF_VALUE: 34
PIF_VALUE: 33
PIF_VALUE: 34
PIF_VALUE: 33
PIF_VALUE: 28
PIF_VALUE: 28
PIF_VALUE: 34
PIF_VALUE: 26
PIF_VALUE: 34
PIF_VALUE: 24
PIF_VALUE: 34
PIF_VALUE: 31
PIF_VALUE: 34
PIF_VALUE: 34
PIF_VALUE: 33
PIF_VALUE: 34
PIF_VALUE: 28
PIF_VALUE: 28
PIF_VALUE: 34
PIF_VALUE: 23
PIF_VALUE: 33
PIF_VALUE: 34
PIF_VALUE: 30
PIF_VALUE: 32
PIF_VALUE: 28
PIF_VALUE: 1
PIF_VALUE: 28
PIF_VALUE: 34
PIF_VALUE: 28
PIF_VALUE: 34
PIF_VALUE: 33
PIF_VALUE: 24
PIF_VALUE: 34
PIF_VALUE: 1
PIF_VALUE: 34
PIF_VALUE: 28
PIF_VALUE: 1
PIF_VALUE: 34
PIF_VALUE: 34
PIF_VALUE: 28
PIF_VALUE: 27
PIF_VALUE: 34
PIF_VALUE: 34
PIF_VALUE: 29
PIF_VALUE: 24
PIF_VALUE: 35
PIF_VALUE: 1
PIF_VALUE: 2

## 2020-06-09 ASSESSMENT — PAIN SCALES - GENERAL
PAINLEVEL_OUTOF10: 5
PAINLEVEL_OUTOF10: 7
PAINLEVEL_OUTOF10: 7
PAINLEVEL_OUTOF10: 0
PAINLEVEL_OUTOF10: 7
PAINLEVEL_OUTOF10: 7

## 2020-06-09 ASSESSMENT — PAIN DESCRIPTION - DESCRIPTORS: DESCRIPTORS: ACHING;CRAMPING

## 2020-06-09 ASSESSMENT — PAIN DESCRIPTION - PAIN TYPE: TYPE: SURGICAL PAIN

## 2020-06-09 ASSESSMENT — PAIN DESCRIPTION - LOCATION: LOCATION: ABDOMEN

## 2020-06-09 ASSESSMENT — ENCOUNTER SYMPTOMS: SHORTNESS OF BREATH: 1

## 2020-06-09 ASSESSMENT — PAIN - FUNCTIONAL ASSESSMENT
PAIN_FUNCTIONAL_ASSESSMENT: 0-10
PAIN_FUNCTIONAL_ASSESSMENT: ACTIVITIES ARE NOT PREVENTED

## 2020-06-09 ASSESSMENT — PAIN DESCRIPTION - FREQUENCY: FREQUENCY: CONTINUOUS

## 2020-06-09 ASSESSMENT — PAIN DESCRIPTION - ONSET: ONSET: ON-GOING

## 2020-06-09 ASSESSMENT — PAIN DESCRIPTION - ORIENTATION: ORIENTATION: RIGHT;LEFT;ANTERIOR

## 2020-06-09 ASSESSMENT — PAIN DESCRIPTION - PROGRESSION: CLINICAL_PROGRESSION: NOT CHANGED

## 2020-06-09 NOTE — ANESTHESIA PRE PROCEDURE
Answered      Vital Signs (Current):   Vitals:    06/02/20 0951 06/09/20 0621   BP:  134/86   Pulse:  88   Resp:  18   Temp:  98.7 °F (37.1 °C)   TempSrc:  Oral   SpO2:  95%   Weight: (!) 420 lb (190.5 kg) (!) 390 lb (176.9 kg)   Height: 6' 2\" (1.88 m) 6' 2\" (1.88 m)                                              BP Readings from Last 3 Encounters:   06/09/20 134/86   05/18/20 136/77   03/04/20 (!) 154/83       NPO Status: Time of last liquid consumption: 2300                        Time of last solid consumption: 2100                        Date of last liquid consumption: 06/08/20                        Date of last solid food consumption: 06/07/20    BMI:   Wt Readings from Last 3 Encounters:   06/09/20 (!) 390 lb (176.9 kg)   05/20/20 (!) 420 lb (190.5 kg)   05/18/20 (!) 420 lb (190.5 kg)     Body mass index is 50.07 kg/m². CBC:   Lab Results   Component Value Date    WBC 4.9 05/18/2020    RBC 4.51 05/18/2020    HGB 12.9 05/18/2020    HCT 39.2 05/18/2020    MCV 87.0 05/18/2020    RDW 16.4 05/18/2020     05/18/2020       CMP:   Lab Results   Component Value Date     05/18/2020    K 4.0 05/18/2020    CL 98 05/18/2020    CO2 31 05/18/2020    BUN 16 05/18/2020    CREATININE 0.8 05/18/2020    GFRAA >60 05/18/2020    GFRAA 125 04/25/2013    AGRATIO 1.6 02/13/2019    LABGLOM >60 05/18/2020    GLUCOSE 109 05/18/2020    PROT 7.1 02/13/2019    PROT 7.7 03/11/2013    CALCIUM 9.8 05/18/2020    BILITOT 0.8 02/13/2019    ALKPHOS 75 02/13/2019    AST 17 02/13/2019    ALT 35 02/13/2019       POC Tests: No results for input(s): POCGLU, POCNA, POCK, POCCL, POCBUN, POCHEMO, POCHCT in the last 72 hours.     Coags:   Lab Results   Component Value Date    PROTIME 12.1 11/01/2019    INR 1.06 11/01/2019       HCG (If Applicable): No results found for: PREGTESTUR, PREGSERUM, HCG, HCGQUANT     ABGs: No results found for: PHART, PO2ART, KBK7THU, FWP2XKJ, BEART, T2ARCJIC     Type & Screen (If Applicable):  No results found

## 2020-06-09 NOTE — CARE COORDINATION
CANNOT provide pharmacy voucher for patients co-pays  5. Patients can then  the prescription on their way out of the hospital at discharge, or pharmacy can deliver to the bedside if staff is available. (payment due at time of pick-up or delivery - cash, check, or card accepted)     Able to afford home medications/ co-pay costs: Yes    ADLS  Support Systems: Family Members    PT AM-PAC:   /24  OT AM-PAC:   /24    [place]: home with wife  Steps: n/a    Plans to RETURN to current housing: Yes  Barriers to RETURNING to current housing: none      DISCHARGE PLAN:  Disposition: Home- No Services Needed    Transportation PLAN for discharge: family     Factors facilitating achievement of predicted outcomes: Family support and Friend support    Barriers to discharge: Medical complications    Additional Case Management Notes: CM spoke with pt at bedside. Pt has rolling walker at home. No needs at DC. Will continue to follow till DC. The Plan for Transition of Care is related to the following treatment goals of Morbid obesity ([facility] [E66.01]  Morbid obesity with BMI of 50.0-59.9, adult ([facility] [E66.01, Z68.43]    The Patient and/or patient representative Ryan and his family were provided with a choice of provider and agrees with the discharge plan Yes    Freedom of choice list was provided with basic dialogue that supports the patient's individualized plan of care/goals and shares the quality data associated with the providers.  Yes    Care Transition patient: Yes    Rai Gonzalez RN  [facility]  Case Management Department  Ph: [phone]   Fax: [phone]

## 2020-06-09 NOTE — OP NOTE
uniform shape,  no twist in the sleeve and wide patent incisura. A 2-0 vicryl suture was used to over-sew and imbricate the sleeve staple line. The staple line was completely over-sewn over dilator. The stomach distal to the staple line was clamped and methylene blue saline was injected under pressure confirming no obstruction and no leak. The abdomen was carefully inspected and there was no bleeding or any other abnormality. The stomach was brought out through the RUQ incision. Attention was then paid to a reducible 2 cm ventral hernia around umbilicus and picture taken. This was a potential space for intestine to become incarcerated after the procedure, therefore decision was made to repair this. Local anesthetic was injected into the hernia and a separate stab incision was made into the defect. Suture passer device with 0-vicryl suture was then used to repair the hernia primarily. Closure of fascia done without tension. Hemostasis was confirmed. The 12 port sites was closed using 0.0 Vicryl  suture at the level of the fascia. The trocar site skin wounds were closed using 4.0 Vicryl after copious Irrigation of the wounds. Local Anesthetic used at port sites then Dermabond applied. Instrument, sponge, and needle counts were correct at the conclusion of the case. Findings: Morbid Obesity. Estimated Blood Loss:  Minimal           Drains: none           Specimens: Stomach (Subtotal)           Complications:  None; patient tolerated the procedure well. Disposition: PACU - hemodynamically stable. Condition: stable    Attending Attestation: I was present and scrubbed for the entire procedure.

## 2020-06-10 VITALS
HEIGHT: 74 IN | DIASTOLIC BLOOD PRESSURE: 73 MMHG | HEART RATE: 77 BPM | SYSTOLIC BLOOD PRESSURE: 130 MMHG | TEMPERATURE: 98.7 F | OXYGEN SATURATION: 93 % | RESPIRATION RATE: 16 BRPM | BODY MASS INDEX: 40.43 KG/M2 | WEIGHT: 315 LBS

## 2020-06-10 LAB
ANION GAP SERPL CALCULATED.3IONS-SCNC: 13 MMOL/L (ref 3–16)
BUN BLDV-MCNC: 9 MG/DL (ref 7–20)
CALCIUM SERPL-MCNC: 9.1 MG/DL (ref 8.3–10.6)
CHLORIDE BLD-SCNC: 96 MMOL/L (ref 99–110)
CO2: 30 MMOL/L (ref 21–32)
CREAT SERPL-MCNC: 0.7 MG/DL (ref 0.9–1.3)
GFR AFRICAN AMERICAN: >60
GFR NON-AFRICAN AMERICAN: >60
GLUCOSE BLD-MCNC: 130 MG/DL (ref 70–99)
HCT VFR BLD CALC: 37.8 % (ref 40.5–52.5)
HEMOGLOBIN: 12.5 G/DL (ref 13.5–17.5)
MCH RBC QN AUTO: 28.7 PG (ref 26–34)
MCHC RBC AUTO-ENTMCNC: 33.1 G/DL (ref 31–36)
MCV RBC AUTO: 86.7 FL (ref 80–100)
PDW BLD-RTO: 16.3 % (ref 12.4–15.4)
PLATELET # BLD: 255 K/UL (ref 135–450)
PMV BLD AUTO: 7.3 FL (ref 5–10.5)
POTASSIUM SERPL-SCNC: 3.1 MMOL/L (ref 3.5–5.1)
RBC # BLD: 4.35 M/UL (ref 4.2–5.9)
SODIUM BLD-SCNC: 139 MMOL/L (ref 136–145)
WBC # BLD: 7.8 K/UL (ref 4–11)

## 2020-06-10 PROCEDURE — 36415 COLL VENOUS BLD VENIPUNCTURE: CPT

## 2020-06-10 PROCEDURE — 6360000002 HC RX W HCPCS: Performed by: SURGERY

## 2020-06-10 PROCEDURE — 85027 COMPLETE CBC AUTOMATED: CPT

## 2020-06-10 PROCEDURE — 6370000000 HC RX 637 (ALT 250 FOR IP): Performed by: NURSE PRACTITIONER

## 2020-06-10 PROCEDURE — 6360000002 HC RX W HCPCS: Performed by: NURSE PRACTITIONER

## 2020-06-10 PROCEDURE — 2500000003 HC RX 250 WO HCPCS: Performed by: SURGERY

## 2020-06-10 PROCEDURE — 80048 BASIC METABOLIC PNL TOTAL CA: CPT

## 2020-06-10 PROCEDURE — 2580000003 HC RX 258: Performed by: SURGERY

## 2020-06-10 PROCEDURE — C9113 INJ PANTOPRAZOLE SODIUM, VIA: HCPCS | Performed by: SURGERY

## 2020-06-10 PROCEDURE — 94761 N-INVAS EAR/PLS OXIMETRY MLT: CPT

## 2020-06-10 PROCEDURE — 94660 CPAP INITIATION&MGMT: CPT

## 2020-06-10 RX ORDER — OMEPRAZOLE 20 MG/1
20 CAPSULE, DELAYED RELEASE ORAL DAILY
Qty: 30 CAPSULE | Refills: 3 | Status: SHIPPED | OUTPATIENT
Start: 2020-06-10 | End: 2020-08-25

## 2020-06-10 RX ORDER — OXYCODONE HCL 5 MG/5 ML
5 SOLUTION, ORAL ORAL EVERY 4 HOURS PRN
Status: DISCONTINUED | OUTPATIENT
Start: 2020-06-10 | End: 2020-06-10 | Stop reason: HOSPADM

## 2020-06-10 RX ORDER — KETOROLAC TROMETHAMINE 30 MG/ML
15 INJECTION, SOLUTION INTRAMUSCULAR; INTRAVENOUS ONCE
Status: COMPLETED | OUTPATIENT
Start: 2020-06-10 | End: 2020-06-10

## 2020-06-10 RX ORDER — POTASSIUM CHLORIDE 7.45 MG/ML
10 INJECTION INTRAVENOUS
Status: COMPLETED | OUTPATIENT
Start: 2020-06-10 | End: 2020-06-10

## 2020-06-10 RX ORDER — HYOSCYAMINE SULFATE 0.12 MG/1
1 TABLET SUBLINGUAL EVERY 6 HOURS PRN
Qty: 30 EACH | Refills: 0 | Status: SHIPPED | OUTPATIENT
Start: 2020-06-10 | End: 2020-08-25

## 2020-06-10 RX ORDER — OXYCODONE HCL 5 MG/5 ML
10 SOLUTION, ORAL ORAL EVERY 4 HOURS PRN
Status: DISCONTINUED | OUTPATIENT
Start: 2020-06-10 | End: 2020-06-10 | Stop reason: HOSPADM

## 2020-06-10 RX ORDER — ONDANSETRON 4 MG/1
4 TABLET, FILM COATED ORAL EVERY 6 HOURS PRN
Qty: 30 TABLET | Refills: 0 | Status: SHIPPED | OUTPATIENT
Start: 2020-06-10 | End: 2020-12-23 | Stop reason: ALTCHOICE

## 2020-06-10 RX ORDER — OXYCODONE HYDROCHLORIDE AND ACETAMINOPHEN 5; 325 MG/1; MG/1
1 TABLET ORAL EVERY 6 HOURS PRN
Qty: 28 TABLET | Refills: 0 | Status: SHIPPED | OUTPATIENT
Start: 2020-06-10 | End: 2020-06-17

## 2020-06-10 RX ADMIN — POTASSIUM CHLORIDE 10 MEQ: 10 INJECTION, SOLUTION INTRAVENOUS at 10:08

## 2020-06-10 RX ADMIN — POTASSIUM CHLORIDE 10 MEQ: 10 INJECTION, SOLUTION INTRAVENOUS at 11:10

## 2020-06-10 RX ADMIN — ENOXAPARIN SODIUM 40 MG: 40 INJECTION SUBCUTANEOUS at 08:58

## 2020-06-10 RX ADMIN — SODIUM CHLORIDE 10 ML: 9 INJECTION, SOLUTION INTRAMUSCULAR; INTRAVENOUS; SUBCUTANEOUS at 08:59

## 2020-06-10 RX ADMIN — SODIUM CHLORIDE: 9 INJECTION, SOLUTION INTRAVENOUS at 07:17

## 2020-06-10 RX ADMIN — Medication 10 ML: at 09:07

## 2020-06-10 RX ADMIN — HYDROMORPHONE HYDROCHLORIDE: 10 INJECTION, SOLUTION INTRAMUSCULAR; INTRAVENOUS; SUBCUTANEOUS at 05:53

## 2020-06-10 RX ADMIN — SODIUM CHLORIDE: 9 INJECTION, SOLUTION INTRAVENOUS at 00:15

## 2020-06-10 RX ADMIN — ONDANSETRON 4 MG: 2 INJECTION INTRAMUSCULAR; INTRAVENOUS at 09:07

## 2020-06-10 RX ADMIN — KETOROLAC TROMETHAMINE 15 MG: 30 INJECTION, SOLUTION INTRAMUSCULAR at 15:09

## 2020-06-10 RX ADMIN — PANTOPRAZOLE SODIUM 40 MG: 40 INJECTION, POWDER, FOR SOLUTION INTRAVENOUS at 08:58

## 2020-06-10 RX ADMIN — OXYCODONE HYDROCHLORIDE 5 MG: 5 SOLUTION ORAL at 13:46

## 2020-06-10 RX ADMIN — POTASSIUM CHLORIDE 10 MEQ: 10 INJECTION, SOLUTION INTRAVENOUS at 12:03

## 2020-06-10 RX ADMIN — POTASSIUM CHLORIDE 10 MEQ: 10 INJECTION, SOLUTION INTRAVENOUS at 08:59

## 2020-06-10 ASSESSMENT — PAIN DESCRIPTION - DESCRIPTORS
DESCRIPTORS: SORE

## 2020-06-10 ASSESSMENT — PAIN DESCRIPTION - PROGRESSION
CLINICAL_PROGRESSION: GRADUALLY IMPROVING

## 2020-06-10 ASSESSMENT — PAIN DESCRIPTION - LOCATION
LOCATION: ABDOMEN

## 2020-06-10 ASSESSMENT — PAIN SCALES - GENERAL
PAINLEVEL_OUTOF10: 2
PAINLEVEL_OUTOF10: 5
PAINLEVEL_OUTOF10: 4
PAINLEVEL_OUTOF10: 2

## 2020-06-10 ASSESSMENT — PAIN DESCRIPTION - ONSET
ONSET: ON-GOING

## 2020-06-10 ASSESSMENT — PAIN DESCRIPTION - FREQUENCY
FREQUENCY: CONTINUOUS

## 2020-06-10 ASSESSMENT — PAIN DESCRIPTION - ORIENTATION
ORIENTATION: RIGHT;LEFT
ORIENTATION: LEFT;RIGHT
ORIENTATION: RIGHT;LEFT
ORIENTATION: RIGHT;LEFT;DISTAL

## 2020-06-10 ASSESSMENT — PAIN - FUNCTIONAL ASSESSMENT
PAIN_FUNCTIONAL_ASSESSMENT: ACTIVITIES ARE NOT PREVENTED
PAIN_FUNCTIONAL_ASSESSMENT: PREVENTS OR INTERFERES SOME ACTIVE ACTIVITIES AND ADLS
PAIN_FUNCTIONAL_ASSESSMENT: ACTIVITIES ARE NOT PREVENTED
PAIN_FUNCTIONAL_ASSESSMENT: PREVENTS OR INTERFERES SOME ACTIVE ACTIVITIES AND ADLS

## 2020-06-10 ASSESSMENT — PAIN DESCRIPTION - PAIN TYPE
TYPE: SURGICAL PAIN

## 2020-06-10 NOTE — DISCHARGE SUMMARY
Patient ID:  Shashi Swanson III  [id number]  89 y.o.  1965    Admit date: 6/9/2020    Discharge date and time: 6/10/2020      Admitting Physician: Judy Curtis DO     Discharge Physician: same    Admission Diagnoses: Morbid obesity ([facility] [E66.01]  Morbid obesity with BMI of 50.0-59.9, adult ([facility] [E66.01, Z68.43]  Acute on chronic venous insuffiencey leg ulcer   Discharge Diagnoses: same    Admission Condition: fair    Discharged Condition: stable    Indication for Admission: Surgery: Robotic assisted Laparoscopic Sleeve Gastrectomy, IV hydration, monitoring, pain and nausea management    Hospital Course: 47 y.o. male admitted with morbid obesity who underwent Robotic assisted laparoscopic sleeve gastrectomy. Surgery was uneventful and he was admitted to bariatric post-operative surgical floor in stable condition for IV hydration, monitoring and pain and nausea management. The following morning the pain was tolerable on po pain medication and was taking adequate po. He was discharged in stable condition. Treatments: IV hydration        Disposition: home    Patient Instructions: Activity: activity as tolerated and no driving while on analgesics  Diet: clear liquids  Wound Care: as directed    Follow-up with Dr. Meredith Stanton in two weeks.         Signed:  Mo Kasper  6/10/2020  2:24 PM

## 2020-06-16 ENCOUNTER — TELEPHONE (OUTPATIENT)
Dept: BARIATRICS/WEIGHT MGMT | Age: 55
End: 2020-06-16

## 2020-06-17 PROBLEM — Z01.818 PREOP EXAMINATION: Status: RESOLVED | Noted: 2020-05-18 | Resolved: 2020-06-17

## 2020-06-18 ENCOUNTER — TELEPHONE (OUTPATIENT)
Dept: INTERNAL MEDICINE CLINIC | Age: 55
End: 2020-06-18

## 2020-06-24 ENCOUNTER — OFFICE VISIT (OUTPATIENT)
Dept: BARIATRICS/WEIGHT MGMT | Age: 55
End: 2020-06-24

## 2020-06-24 VITALS
BODY MASS INDEX: 40.43 KG/M2 | DIASTOLIC BLOOD PRESSURE: 86 MMHG | HEART RATE: 110 BPM | TEMPERATURE: 97.3 F | WEIGHT: 315 LBS | SYSTOLIC BLOOD PRESSURE: 141 MMHG | HEIGHT: 74 IN

## 2020-06-24 PROCEDURE — 99024 POSTOP FOLLOW-UP VISIT: CPT | Performed by: SURGERY

## 2020-06-24 NOTE — PROGRESS NOTES
Dietary Assessment Note      Vitals:   Vitals:    20 1020   BP: (!) 141/86   Pulse: 110   Temp: 97.3 °F (36.3 °C)   Weight: (!) 395 lb (179.2 kg)   Height: 6' 2\" (1.88 m)    Patient lost 25 lbs over the past month. Total Weight Loss: 45 lbs    Labs reviewed:  no lab studies available for review at time of visit    Protein intake: 60-80 grams/day     Fluid intake: 48-64 oz/day    Multivitamin/mineral intake: yes 4 Fusion per day    Calcium intake: no    Other: n/a    Exercise: none because of open wound on leg and hip pain    Nutrition Assessment: Pt provided food records. Pt is getting in 2 protein shakes with 1% milk; he is eating oatmeal, sf applesauce, pureed chix salad, peaches, potatoes, green beans.   Pt denies n/v, reflux     Amount able to eat per sittin-4T    Following  rule: yes    Food Intolerances/issues: none    Client Concerns: no real concerns    Goals: advance to soft and mushy at 3 weeks postop     Plan: follow up as needed    Sheila Massing

## 2020-07-06 ENCOUNTER — TELEPHONE (OUTPATIENT)
Dept: BARIATRICS/WEIGHT MGMT | Age: 55
End: 2020-07-06

## 2020-07-06 ASSESSMENT — PATIENT HEALTH QUESTIONNAIRE - PHQ9
1. LITTLE INTEREST OR PLEASURE IN DOING THINGS: 0
SUM OF ALL RESPONSES TO PHQ9 QUESTIONS 1 & 2: 0
SUM OF ALL RESPONSES TO PHQ QUESTIONS 1-9: 0
SUM OF ALL RESPONSES TO PHQ QUESTIONS 1-9: 0
2. FEELING DOWN, DEPRESSED OR HOPELESS: 0

## 2020-07-06 NOTE — TELEPHONE ENCOUNTER
Patient is s/p sleeve 6/9/20 having issues with bowel movements. Would like to know what he could get for it. Asked about metamucil I said generally they suggest colace - he said I don't even know what that is I said its an over the counter stool softener but I will let them tell you that to be certain.     Ph# [phone]

## 2020-07-07 NOTE — TELEPHONE ENCOUNTER
Pt called back, did let pt know that he is able to take OTC colace or miralax, pt was ok with that, will start that.

## 2020-07-10 RX ORDER — CHLORTHALIDONE 25 MG/1
TABLET ORAL
Qty: 30 TABLET | Refills: 0 | Status: SHIPPED | OUTPATIENT
Start: 2020-07-10 | End: 2020-08-18 | Stop reason: SDUPTHER

## 2020-07-17 ENCOUNTER — TELEPHONE (OUTPATIENT)
Dept: BARIATRICS/WEIGHT MGMT | Age: 55
End: 2020-07-17

## 2020-07-17 NOTE — TELEPHONE ENCOUNTER
Patient is s/p sleeve 6/9/20 and stated that he is constipated. Pt states he is getting fluids in and took colace. Stated that the colace is not working so this MA suggested he try miralax.

## 2020-07-23 NOTE — TELEPHONE ENCOUNTER
Patient called the office stating he started the MiraLax 2 days ago and still no relief. He is asking if it is OK to use Magnesium Citrate. Please Advise?

## 2020-07-24 NOTE — TELEPHONE ENCOUNTER
Patient is still having problems going to the bathroom. He has taken colace, miralax and magnesium and still nothing. Pt states he is getting in 48 oz of water in. He also states there is pressure in his stomach and gets a little relief if he farts. This has been going on for two weeks. Please advise.

## 2020-07-27 ENCOUNTER — TELEPHONE (OUTPATIENT)
Dept: BARIATRICS/WEIGHT MGMT | Age: 55
End: 2020-07-27

## 2020-08-12 ENCOUNTER — OFFICE VISIT (OUTPATIENT)
Dept: BARIATRICS/WEIGHT MGMT | Age: 55
End: 2020-08-12

## 2020-08-12 VITALS
HEART RATE: 104 BPM | TEMPERATURE: 97.8 F | WEIGHT: 315 LBS | HEIGHT: 74 IN | BODY MASS INDEX: 40.43 KG/M2 | SYSTOLIC BLOOD PRESSURE: 136 MMHG | DIASTOLIC BLOOD PRESSURE: 80 MMHG

## 2020-08-12 PROCEDURE — 99024 POSTOP FOLLOW-UP VISIT: CPT | Performed by: SURGERY

## 2020-08-12 NOTE — PROGRESS NOTES
Dietary Assessment Note      Vitals:   Vitals:    08/12/20 1511   BP: 136/80   Pulse: 104   Temp: 97.8 °F (36.6 °C)   Weight: (!) 352 lb 12.8 oz (160 kg)   Height: 6' 2\" (1.88 m)    Patient lost 42.2 lbs over the past 6 weeks. Total Weight Loss: 87.2 lbs    Labs reviewed:  no lab studies available for review at time of visit    Protein intake: 60-80 grams/day     Fluid intake: >64 oz/day    Multivitamin/mineral intake: yes 4 Fusion per day    Calcium intake: no    Other: none    Exercise: no    Nutrition Assessment: Protein shake with almond milk for brkst; soup - Progresso light broccoli cheese or beef stew for lunch; chix or fish, beans, veggies for dinner - greens, cabbage, broccoli; snack is protein shake.  Pt denies n/v, reflux     Amount able to eat per sitting: 3oz    Following 30/30/30 rule: yes    Food Intolerances/issues: none    Client Concerns: constipation is much improved; pt is having hip replacement 9/25    Goals: Phase 4    Plan: follow up as needed    Mattie Noonan

## 2020-08-15 NOTE — PROGRESS NOTES
Patient doing well  No N/V/F/C  Tolerating diet  Having regular BM's    Incisions C/D/I    Tolerating phase 3  Advance to phase 4    F/U in 8 weeks    aAliyah Kaur

## 2020-08-17 ENCOUNTER — TELEPHONE (OUTPATIENT)
Dept: INTERNAL MEDICINE CLINIC | Age: 55
End: 2020-08-17

## 2020-08-17 NOTE — TELEPHONE ENCOUNTER
Patient would like chlorthalidone (HYGROTON) 25 MG tablet refilled at the [facility].

## 2020-08-18 ENCOUNTER — VIRTUAL VISIT (OUTPATIENT)
Dept: INTERNAL MEDICINE CLINIC | Age: 55
End: 2020-08-18
Payer: COMMERCIAL

## 2020-08-18 PROCEDURE — 99214 OFFICE O/P EST MOD 30 MIN: CPT | Performed by: INTERNAL MEDICINE

## 2020-08-18 RX ORDER — CHLORTHALIDONE 25 MG/1
TABLET ORAL
Qty: 30 TABLET | Refills: 3 | Status: SHIPPED | OUTPATIENT
Start: 2020-08-18 | End: 2020-09-23 | Stop reason: SDUPTHER

## 2020-08-18 RX ORDER — CHLORTHALIDONE 25 MG/1
TABLET ORAL
Qty: 30 TABLET | Refills: 0 | Status: CANCELLED | OUTPATIENT
Start: 2020-08-18

## 2020-08-18 NOTE — PROGRESS NOTES
2020    TELEHEALTH EVALUATION -- Audio/Visual (During - public health emergency)    PLACE OF SERVICE: PATIENT'S HOME     HPI: SEE BELOW      Kavon Cárdenas III (:  1965) has requested an audio/video evaluation for the following concern(s):      DM -  NOT ON MED .   ? DIET / EXERCISE COMPLIANCE. HBS - NOT CHECKING. EYE EXAM - > 1 YR  HTN- TAKING MED - NEEDS REFILL. . ? DIET / EXERCISE COMPLIANCE. DENIES HA, NO DIZZINESS  DYSLIPIDEMIA -  ? EXERCISE / DIET COMPLIANCE . LABS D/W PT  VIT D DEF - STATES HAS NOT BEEN TAKING MED. PREVIOUS LAB D/W PT   FLAVIO HIP PAIN - RT > LT. ? Tasneem Sis. ? PAIN SCALE. DENIES TRAUMA. FOLLOWING WITH ORTHO. PLAN FOR SURGERY SOON  HYPOKALEMIA -NOTED ON LAST LABS - D/W PT. . DENIES MUSCLE CRAMPS  MORBID OBESITY - DIET / EXERCISE REVIEWED. S/P SURGERY. -. PT FOLLOWING WITH BARIATRIC SURGERY. > 40 LBS WT LOSS        DENIES CP, No SOB, No PALPITATIONS, No COUGH, NO F/C  No ABD PAIN, No N/V, No DIARRHEA, No CONSTIPATION, No MELENA, No HEMATOCHEZIA. No DYSURIA, No FREQ, No URGENCY, No HEMATURIA        Prior to Visit Medications    Medication Sig Taking?  Authorizing Provider   chlorthalidone (HYGROTON) 25 MG tablet TAKE 1 TABLET BY MOUTH EVERY DAY Yes Jerald Coto MD   Hyoscyamine Sulfate SL (LEVSIN/SL) 0.125 MG SUBL Place 1 tablet under the tongue every 6 hours as needed (spasm) Yes HAIM Sorensen CNP   omeprazole (PRILOSEC) 20 MG delayed release capsule Take 1 capsule by mouth Daily Yes HAIM Sorensen CNP   ondansetron (ZOFRAN) 4 MG tablet Take 1 tablet by mouth every 6 hours as needed for Nausea or Vomiting Yes HAIM Sorensen CNP   Compression Stockings MISC by Does not apply route Diagnosis: I83.333  Location of wound: Left Leg  Gradient IV (30-40 mm Hg)  Style: Knee Length  Extremity: Bilateral Yes Donnalee Guicho, DPM   Multiple Vitamin (MULTI VITAMIN PO) Take by mouth daily Yes Historical Provider, MD       Social History     Tobacco Use    Smoking status: Never Smoker    Smokeless tobacco: Never Used   Substance Use Topics    Alcohol use: Yes     Comment: occ    Drug use: Yes     Types: Marijuana     Comment: monthly        ROS: COMPREHENSIVE ROS AS IN HX, REST -VE  History obtained from chart review and the patient    PHYSICAL EXAMINATION:  [ INSTRUCTIONS:  \"[x]\" Indicates a positive item  \"[]\" Indicates a negative item  -- DELETE ALL ITEMS NOT EXAMINED]  Vital Signs: (As obtained by patient/caregiver or practitioner observation)    Blood pressure-  Heart rate-    Respiratory rate-    Temperature-  Pulse oximetry-   PT UNABLE TO CHECK BP / VITALS AT TIME OF VISIT    Constitutional: [x] Appears well-developed and well-nourished [x] No apparent distress      [] Abnormal-   Mental status  [x] Alert and awake  [x] Oriented to person/place/time [x]Able to follow commands      Eyes:  EOM    [x]  Normal  [] Abnormal-  Sclera  [x]  Normal  [] Abnormal -         Discharge [x]  None visible  [] Abnormal -    HENT:   [x] Normocephalic, atraumatic. [] Abnormal   [x] Mouth/Throat: Mucous membranes are moist.     External Ears [x] Normal  [] Abnormal-     Neck: [x] No visualized mass     Pulmonary/Chest: [x] Respiratory effort normal.  [x] No visualized signs of difficulty breathing or respiratory distress        [] Abnormal-      Musculoskeletal:   [] Normal gait with no signs of ataxia         [x] Normal range of motion of neck        [] Abnormal-       Neurological:        [x] No Facial Asymmetry (Cranial nerve 7 motor function) (limited exam to video visit)          [x] No gaze palsy        [] Abnormal-         Skin:        [x] No significant exanthematous lesions or discoloration noted on facial skin         [] Abnormal-            Psychiatric:       [x] Normal Affect [x] No Hallucinations        [] Abnormal-     Other pertinent observable physical exam findings-  AS ABOVE    PREVIOUS LABS REVIEWED AND D/W PT    ASSESSMENT/PLAN:     Diagnosis Orders   1.  Diabetes mellitus, new onset [facility])  COUNSELLED. ADVISED ON DIET/ EXERCISE. MONITOR. GOAL FBS 80- 120, HBA1C < 7  F/U DM EYE EXAM.  F/U LABS  MAKE CHANGES AS NEEDED. 2. Essential hypertension  COUNSELLED. CONTINUE chlorthalidone (HYGROTON) 25 MG - REFILLED  ADVISED LOW NA+ / DASH DIET/ EXERCISE. MONITOR. GOAL </= 120/80  F/U LABS  MAKE CHANGES AS NEEDED. 3. Dyslipidemia  COUNSELLED. ADVISED LOW FAT / CHOL DIET/ EXERCISE.  MONITOR. F/U LABS  GOALS D/W PT.  MAKE CHANGES AS NEEDED. 4. Vitamin D deficiency  COUNSELLED. LAB TO [facility]  READDRESS MED AS NEEDED FOLLOWING LAB  MAKE CHANGES AS NEEDED. 5. Pain of both hip joints  COUNSELLED. ? OA. EXERCISES. ANALGESICS PRN. MONITOR. F/U ORTHO  MAKE CHANGES AS NEEDED. 6. Hypokalemia  COUNSELLED. ADVISED FOODS HIGH IN K+. MONITOR LABS   MAKE CHANGES AS NEEDED       7. Morbid obesity ([address]  COUNSELLED. DIET/ EXERCISE ADVISED. LIFESTYLE MODIFICATION. WT LOSS ADVISED. / ENCOURAGED  MONITOR AND MAKE CHANGES AS NEEDED. MEDICATION SIDE EFFECTS D/W PATIENT     PT STABLE AT TIME OF D/C.     RETURN TO CLINIC  PREVIOUS/ PRN     FOLLOW UP FOR FASTING LABS, EYE EXAM        Due to this being a TeleHealth encounter (During XVBDU-83 public health emergency), evaluation of the following organ systems was limited: Vitals/Constitutional/EENT/Resp/CV/GI//MS/Neuro/Skin/Heme-Lymph-Imm. Pursuant to the emergency declaration under the [facility], [address] [place] [address] authority and the [employer] and [employer] Act, this Virtual Visit was conducted with patient's (and/or legal guardian's) consent, to reduce the patient's risk of exposure to COVID-19 and provide necessary medical care. The patient (and/or legal guardian) has also been advised to contact this office for worsening conditions or problems, and seek emergency medical treatment and/or call 911 if deemed necessary.      Patient identification was verified at the start of the visit: Yes      Services were provided through a video synchronous discussion virtually to substitute for in-person clinic visit. Patient and provider were located at their individual homes. --Rere Bueno MD on 8/18/2020 at 1:01 PM    An electronic signature was used to authenticate this note.

## 2020-08-25 ENCOUNTER — HOSPITAL ENCOUNTER (OUTPATIENT)
Dept: WOUND CARE | Age: 55
Discharge: HOME | End: 2020-08-25
Payer: COMMERCIAL

## 2020-08-25 VITALS
TEMPERATURE: 96.9 F | BODY MASS INDEX: 44.35 KG/M2 | DIASTOLIC BLOOD PRESSURE: 90 MMHG | HEART RATE: 94 BPM | RESPIRATION RATE: 16 BRPM | WEIGHT: 315 LBS | SYSTOLIC BLOOD PRESSURE: 146 MMHG

## 2020-08-25 PROCEDURE — 11042 DBRDMT SUBQ TIS 1ST 20SQCM/<: CPT

## 2020-08-25 PROCEDURE — 6370000000 HC RX 637 (ALT 250 FOR IP): Performed by: PODIATRIST

## 2020-08-25 PROCEDURE — 29581 APPL MULTLAYER CMPRN SYS LEG: CPT

## 2020-08-25 PROCEDURE — 99213 OFFICE O/P EST LOW 20 MIN: CPT

## 2020-08-25 RX ORDER — BACITRACIN, NEOMYCIN, POLYMYXIN B 400; 3.5; 5 [USP'U]/G; MG/G; [USP'U]/G
OINTMENT TOPICAL ONCE
Status: CANCELLED | OUTPATIENT
Start: 2020-08-25

## 2020-08-25 RX ORDER — LIDOCAINE HYDROCHLORIDE 40 MG/ML
SOLUTION TOPICAL ONCE
Status: CANCELLED | OUTPATIENT
Start: 2020-08-25

## 2020-08-25 RX ORDER — GINSENG 100 MG
CAPSULE ORAL ONCE
Status: CANCELLED | OUTPATIENT
Start: 2020-08-25

## 2020-08-25 RX ORDER — CLOBETASOL PROPIONATE 0.5 MG/G
OINTMENT TOPICAL ONCE
Status: CANCELLED | OUTPATIENT
Start: 2020-08-25

## 2020-08-25 RX ORDER — LIDOCAINE HYDROCHLORIDE 20 MG/ML
JELLY TOPICAL ONCE
Status: CANCELLED | OUTPATIENT
Start: 2020-08-25

## 2020-08-25 RX ORDER — LIDOCAINE 40 MG/G
CREAM TOPICAL ONCE
Status: CANCELLED | OUTPATIENT
Start: 2020-08-25

## 2020-08-25 RX ORDER — BETAMETHASONE DIPROPIONATE 0.05 %
OINTMENT (GRAM) TOPICAL ONCE
Status: CANCELLED | OUTPATIENT
Start: 2020-08-25

## 2020-08-25 RX ORDER — LIDOCAINE HYDROCHLORIDE 40 MG/ML
SOLUTION TOPICAL ONCE
Status: COMPLETED | OUTPATIENT
Start: 2020-08-25 | End: 2020-08-25

## 2020-08-25 RX ORDER — GENTAMICIN SULFATE 1 MG/G
OINTMENT TOPICAL ONCE
Status: CANCELLED | OUTPATIENT
Start: 2020-08-25

## 2020-08-25 RX ORDER — BACITRACIN ZINC AND POLYMYXIN B SULFATE 500; 1000 [USP'U]/G; [USP'U]/G
OINTMENT TOPICAL ONCE
Status: CANCELLED | OUTPATIENT
Start: 2020-08-25

## 2020-08-25 RX ORDER — LIDOCAINE 50 MG/G
OINTMENT TOPICAL ONCE
Status: CANCELLED | OUTPATIENT
Start: 2020-08-25

## 2020-08-25 RX ADMIN — LIDOCAINE HYDROCHLORIDE: 40 SOLUTION TOPICAL at 14:51

## 2020-08-25 ASSESSMENT — PAIN DESCRIPTION - PAIN TYPE: TYPE: CHRONIC PAIN

## 2020-08-25 ASSESSMENT — PAIN SCALES - GENERAL: PAINLEVEL_OUTOF10: 9

## 2020-08-25 ASSESSMENT — PAIN DESCRIPTION - LOCATION: LOCATION: HIP

## 2020-08-25 ASSESSMENT — PAIN DESCRIPTION - ORIENTATION: ORIENTATION: RIGHT;LEFT

## 2020-08-25 ASSESSMENT — PAIN DESCRIPTION - FREQUENCY: FREQUENCY: INTERMITTENT

## 2020-08-25 ASSESSMENT — PAIN DESCRIPTION - DESCRIPTORS: DESCRIPTORS: SHARP

## 2020-08-25 NOTE — PROGRESS NOTES
Multilayer Compression Wrap   (Not Unna) Below the Knee    NAME:  Nj Marcus III  YOB: 1965  MEDICAL RECORD NUMBER:  [id number]  DATE:  8/25/2020        Removed old Multilayer wrap if present and washed leg with mild soap/water.   Applied moisturizing agent to dry skin as needed.   Applied primary and secondary dressing as ordered     Applied multilayered dressing below the knee to Left lower leg(s)  (4 Layer Compression Wrap) per 's instructions.   Instructed patient/caregiver not to remove dressing and to keep it clean and dry.   Instructed patient/caregiver on complications to report to provider, such as pain, numbness in toes, heavy drainage, and slippage of dressing.   Instructed patient on purpose of compression dressing and on activity and exercise recommendations.     Applied per   Guidelines    Electronically signed by Katey Millan RN on 8/25/2020 at 3:32 PM

## 2020-08-26 NOTE — PROGRESS NOTES
[facility]   Progress Note and Procedure Note      Graciela Kinney III  MEDICAL RECORD NUMBER:  [id number]  AGE: 54 y.o. GENDER: male  : 1965  EPISODE DATE:  2020    Subjective:     Chief Complaint   Patient presents with    Wound Check     initial         HISTORY of PRESENT ILLNESS HPI     Graciela Kinney III is a 54 y.o. male who presents today for wound/ulcer evaluation. History of Wound Context: patient presents today with cc of a left lower leg wound. He relates that he recently had bariatric surgery and has lost 50 lbs. He is scheduled for a hip replacement next month. Wound/Ulcer Pain Timing/Severity: constant  Quality of pain: burning  Severity:  3  10   Modifying Factors: None  Associated Signs/Symptoms: edema    Ulcer Identification:  Ulcer Type: venous  Contributing Factors: edema and venous stasis    Wound: N/A        PAST MEDICAL HISTORY        Diagnosis Date    Arthritis     Obesity     PPD positive     Venous stasis ulcer of left lower extremity ([address] 10/25/2016       PAST SURGICAL HISTORY    Past Surgical History:   Procedure Laterality Date    COLONOSCOPY      SLEEVE GASTRECTOMY N/A 2020    ROBOTIC ASSISTED LAPAROSCOPIC SLEEVE GASTRECTOMY; LAPAROSCOPIC VENTRAL HERNIA REPAIR performed by Emilee Klinefelter, DO at [address] N/A 2019    EGD BIOPSY performed by Emilee Klinefelter, DO at [address] [facility]    Family History   Problem Relation Age of Onset    Heart Disease Mother     Diabetes Maternal Uncle     Diabetes Paternal Uncle     Cancer Paternal Uncle         ?  pancreatic    Cancer Brother         stomach       SOCIAL HISTORY    Social History     Tobacco Use    Smoking status: Never Smoker    Smokeless tobacco: Never Used   Substance Use Topics    Alcohol use: Yes     Comment: occ    Drug use: Yes     Types: Marijuana     Comment: monthly       ALLERGIES    No Known Allergies    MEDICATIONS    Current Outpatient Medications on File Prior to Encounter   Medication Sig Dispense Refill    chlorthalidone (HYGROTON) 25 MG tablet TAKE 1 TABLET BY MOUTH EVERY DAY 30 tablet 3    Multiple Vitamin (MULTI VITAMIN PO) Take by mouth daily      ondansetron (ZOFRAN) 4 MG tablet Take 1 tablet by mouth every 6 hours as needed for Nausea or Vomiting 30 tablet 0    Compression Stockings MISC by Does not apply route Diagnosis: I83.333  Location of wound: Left Leg  Gradient IV (30-40 mm Hg)  Style: Knee Length  Extremity: Bilateral 1 each 0     No current facility-administered medications on file prior to encounter. REVIEW OF SYSTEMS    Pertinent items are noted in HPI. Review of Systems: A 12 point review of symptoms is unremarkable with the exception of the chief complaint. Patient specifically denies nausea, fever, vomiting, chills, shortness of breath, chest pain, abdominal pain, constipation or difficulty urinating. Objective:        BP (!) 146/90   Pulse 94   Temp 96.9 °F (36.1 °C) (Temporal)   Resp 16   Wt (!) 345 lb 7.4 oz (156.7 kg)   BMI 44.35 kg/m²     Wt Readings from Last 3 Encounters:   08/25/20 (!) 345 lb 7.4 oz (156.7 kg)   08/12/20 (!) 352 lb 12.8 oz (160 kg)   06/24/20 (!) 395 lb (179.2 kg)       PHYSICAL EXAM    DP/PT pulses palpable bilaterally. CFT brisk to all digits. Digits are pink and warm to the touch. Hair growth is normal in appearance. No calf pain with palpation noted. Patient has pitting edema noted on the bilateral lower extremities. There are brawny pigmentary changes noted bilaterally at the distal aspect of the leg. There are hypertrophic skin changes noted bilaterally consistent with chronic venous stasis/lymphedema. Epicritic sensation is grossly intact bilaterally. Negative clonus and babinski reflex is down going. Wound note on the left lower extremity. Wound has fibrotic and nonviable tissue that extends down through and includes the subcutaneous tissue. After debridement the wound has a granular base. There is no surrounding erythema, edema, warmth and no purulent drainage or malodor noted. The wound does not probe or track to bone. Assessment:      Patient Active Problem List   Diagnosis Code    Family history of early CAD Z80.55    PPD positive R76.11    Vitamin D deficiency E55.9    Edema R60.9    Morbid obesity ([address] E66.01    Anemia D64.9    Venous stasis ulcer of left lower extremity ([address] I83.029, L97.929    Venous stasis ulcer of ankle, right ([address] I83.013, L97.319    Idiopathic chronic venous hypertension of both lower extremities with ulcer and inflammation ([address] I87.333, L97.919, L97.929    Non-pressure chronic ulcer of right calf with fat layer exposed ([address] L97.212    Varicose veins of right lower extremity with both ulcer of calf and inflammation ([address] I83.212, L97.219    Prediabetes R73.03    Obesity due to excess calories without serious comorbidity E66.09    Cellulitis and abscess of left lower extremity L03.116, L02.416    Essential hypertension I10    Mixed hyperlipidemia E78.2    Exertional dyspnea R06.09    Abnormal stress test R94.39    S/P coronary angiogram Z98.890    Localized edema R60.0    Venous (peripheral) insufficiency I87.2    Obstructive sleep apnea G47.33    Morbid obesity with BMI of 50.0-59.9, adult (HCC) E66.01, Z68.43    Primary osteoarthritis of both hips M16.0        Excisional Debridement Procedure Note  Indications:  Based on my examination of this patient's wound(s)/ulcer(s) today, debridement is required to promote healing and evaluate the wound base. Performed by: Celestino Cueto DPM    Consent obtained?  Yes    Time out taken: Yes    Pain Control: Anesthetic: 4% Lidocaine Liquid Topical     Debridement: Excisional Debridement    Using curette, #15 blade scalpel, scissors and forceps the wound/ulcer was sharply debrided    down through and including the removal of  epidermis, dermis and subcutaneous tissue. Devitalized Tissue Debrided:  fibrin and slough      Pre Debridement Measurements:  Are located in the [place]  Documentation Flow Sheet   Wound/Ulcer #: 1     Post  Debridement Measurements:    Wound 08/25/20 Leg Left; Lower; Lateral #1 (Active)   Wound Image   08/25/20 1451   Dressing/Treatment Collagen with Ag 08/25/20 1531   Wound Cleansed Rinsed/Irrigated with saline 08/25/20 1451   Wound Length (cm) 2.5 cm 08/25/20 1451   Wound Width (cm) 1.5 cm 08/25/20 1451   Wound Depth (cm) 0.1 cm 08/25/20 1451   Wound Surface Area (cm^2) 3.75 cm^2 08/25/20 1451   Wound Volume (cm^3) 0.38 cm^3 08/25/20 1451   Post-Procedure Length (cm) 2.6 cm 08/25/20 1519   Post-Procedure Width (cm) 1.6 cm 08/25/20 1519   Post-Procedure Depth (cm) 0.3 cm 08/25/20 1519   Post-Procedure Surface Area (cm^2) 4.16 cm^2 08/25/20 1519   Post-Procedure Volume (cm^3) 1.25 cm^3 08/25/20 1519   Distance Tunneling (cm) 0 cm 08/25/20 1451   Undermining Maxium Distance (cm) 0 08/25/20 1451   Wound Assessment Yellow; Red 08/25/20 1451   Drainage Amount Small 08/25/20 1451   Drainage Description Yellow 08/25/20 1451   Odor None 08/25/20 1451   Margins Defined edges 08/25/20 1451   Georgina-wound Assessment Edema; White 08/25/20 1451   Non-staged Wound Description Full thickness 08/25/20 1451   Red%Wound Bed 80 08/25/20 1451   Yellow%Wound Bed 20 08/25/20 1451   Number of days: 0     Incision 06/09/20 Abdomen (Active)   Number of days: 77         Percent of Wound/Ulcer Debrided: 100%    Total Surface Area Debrided:  4.16 sq cm    Diabetic/Pressure/Non Pressure Ulcers only:  Ulcer: Non-Pressure ulcer, fat layer exposed    Bleeding: Minimal    Hemostasis Achieved: by pressure    Procedural Pain: 0  / 10     Post Procedural Pain: 0 / 10     Response to treatment:  Well tolerated by patient. Plan:   E/M x 30 minutes of a new problem of left lower extremity wound. Will treat with multilayer compression dressing bilaterally. Patient will return on Friday to have dressing changed to insure there is adequate compression. Discussed with patient that there is some urgency to get wound healed as his hip surgery will likely be delayed if he has a wound. Encouraged patient to follow up with weight management. Discussed with patient that although his weight did not necessarily cause these wounds, his weight makes them more likely to reulcerate due to edema. Treatment Note please see attached Discharge Instructions    In my professional opinion this patient would benefit from HBO Therapy: No    Written patient dismissal instructions given to patient and signed by patient or POA.          RTC 1 week    Electronically signed by eDsire Huerta DPM on 8/26/2020 at 7:46 AM

## 2020-08-28 ENCOUNTER — HOSPITAL ENCOUNTER (OUTPATIENT)
Dept: WOUND CARE | Age: 55
Discharge: HOME | End: 2020-08-28
Payer: COMMERCIAL

## 2020-08-28 VITALS
DIASTOLIC BLOOD PRESSURE: 84 MMHG | HEART RATE: 81 BPM | SYSTOLIC BLOOD PRESSURE: 135 MMHG | RESPIRATION RATE: 16 BRPM | TEMPERATURE: 97.7 F

## 2020-08-28 PROCEDURE — 29581 APPL MULTLAYER CMPRN SYS LEG: CPT

## 2020-08-28 RX ORDER — BACITRACIN, NEOMYCIN, POLYMYXIN B 400; 3.5; 5 [USP'U]/G; MG/G; [USP'U]/G
OINTMENT TOPICAL ONCE
Status: CANCELLED | OUTPATIENT
Start: 2020-08-28

## 2020-08-28 RX ORDER — GENTAMICIN SULFATE 1 MG/G
OINTMENT TOPICAL ONCE
Status: CANCELLED | OUTPATIENT
Start: 2020-08-28

## 2020-08-28 RX ORDER — LIDOCAINE HYDROCHLORIDE 40 MG/ML
SOLUTION TOPICAL ONCE
Status: CANCELLED | OUTPATIENT
Start: 2020-08-28

## 2020-08-28 RX ORDER — CLOBETASOL PROPIONATE 0.5 MG/G
OINTMENT TOPICAL ONCE
Status: CANCELLED | OUTPATIENT
Start: 2020-08-28

## 2020-08-28 RX ORDER — LIDOCAINE 40 MG/G
CREAM TOPICAL ONCE
Status: CANCELLED | OUTPATIENT
Start: 2020-08-28

## 2020-08-28 RX ORDER — BACITRACIN ZINC AND POLYMYXIN B SULFATE 500; 1000 [USP'U]/G; [USP'U]/G
OINTMENT TOPICAL ONCE
Status: CANCELLED | OUTPATIENT
Start: 2020-08-28

## 2020-08-28 RX ORDER — LIDOCAINE HYDROCHLORIDE 20 MG/ML
JELLY TOPICAL ONCE
Status: CANCELLED | OUTPATIENT
Start: 2020-08-28

## 2020-08-28 RX ORDER — BETAMETHASONE DIPROPIONATE 0.05 %
OINTMENT (GRAM) TOPICAL ONCE
Status: CANCELLED | OUTPATIENT
Start: 2020-08-28

## 2020-08-28 RX ORDER — LIDOCAINE 50 MG/G
OINTMENT TOPICAL ONCE
Status: CANCELLED | OUTPATIENT
Start: 2020-08-28

## 2020-08-28 RX ORDER — GINSENG 100 MG
CAPSULE ORAL ONCE
Status: CANCELLED | OUTPATIENT
Start: 2020-08-28

## 2020-08-28 ASSESSMENT — PAIN DESCRIPTION - LOCATION: LOCATION: HIP

## 2020-08-28 ASSESSMENT — PAIN SCALES - GENERAL: PAINLEVEL_OUTOF10: 6

## 2020-08-28 ASSESSMENT — PAIN DESCRIPTION - ORIENTATION: ORIENTATION: RIGHT;LEFT

## 2020-08-28 ASSESSMENT — PAIN DESCRIPTION - PAIN TYPE: TYPE: CHRONIC PAIN

## 2020-08-28 ASSESSMENT — PAIN DESCRIPTION - DESCRIPTORS: DESCRIPTORS: ACHING

## 2020-08-28 NOTE — PROGRESS NOTES
Multilayer Compression Wrap   (Not Unna) Below the Knee    NAME:  Tori Galindo III  YOB: 1965  MEDICAL RECORD NUMBER:  [id number]  DATE:  8/28/2020        Removed old Multilayer wrap if present and washed leg with mild soap/water.   Applied moisturizing agent to dry skin as needed.   Applied primary and secondary dressing as ordered     Applied multilayered dressing below the knee to Left lower leg(s)  (4 Layer Compression Wrap) per 's instructions.   Instructed patient/caregiver not to remove dressing and to keep it clean and dry.   Instructed patient/caregiver on complications to report to provider, such as pain, numbness in toes, heavy drainage, and slippage of dressing.   Instructed patient on purpose of compression dressing and on activity and exercise recommendations.     Applied per   Guidelines    Electronically signed by Prachi Cottrell RN on 8/28/2020 at 3:54 PM

## 2020-09-04 ENCOUNTER — HOSPITAL ENCOUNTER (OUTPATIENT)
Dept: WOUND CARE | Age: 55
Discharge: HOME | End: 2020-09-04
Payer: COMMERCIAL

## 2020-09-04 PROCEDURE — 29581 APPL MULTLAYER CMPRN SYS LEG: CPT

## 2020-09-04 NOTE — PLAN OF CARE
Multilayer Compression Wrap   (Not Unna) Below the Knee    NAME:  Radha Santana III  YOB: 1965  MEDICAL RECORD NUMBER:  [id number]  DATE:  9/4/2020        Removed old Multilayer wrap if present and washed leg with mild soap/water.   Applied moisturizing agent to dry skin as needed.   Applied primary and secondary dressing as ordered     Applied multilayered dressing below the knee to Left lower leg(s)  (4 Layer Compression Wrap) per 's instructions.   Instructed patient/caregiver not to remove dressing and to keep it clean and dry.   Instructed patient/caregiver on complications to report to provider, such as pain, numbness in toes, heavy drainage, and slippage of dressing.   Instructed patient on purpose of compression dressing and on activity and exercise recommendations.     Applied per   Guidelines    Electronically signed by Steva Litten, RN on 9/4/2020 at 2:15 PM

## 2020-09-08 ENCOUNTER — HOSPITAL ENCOUNTER (OUTPATIENT)
Dept: WOUND CARE | Age: 55
Discharge: HOME | End: 2020-09-08
Payer: COMMERCIAL

## 2020-09-08 VITALS
SYSTOLIC BLOOD PRESSURE: 132 MMHG | DIASTOLIC BLOOD PRESSURE: 86 MMHG | RESPIRATION RATE: 16 BRPM | HEART RATE: 90 BPM | TEMPERATURE: 96.9 F

## 2020-09-08 PROCEDURE — 11042 DBRDMT SUBQ TIS 1ST 20SQCM/<: CPT

## 2020-09-08 PROCEDURE — 29581 APPL MULTLAYER CMPRN SYS LEG: CPT

## 2020-09-08 PROCEDURE — 6370000000 HC RX 637 (ALT 250 FOR IP): Performed by: PODIATRIST

## 2020-09-08 RX ORDER — BACITRACIN, NEOMYCIN, POLYMYXIN B 400; 3.5; 5 [USP'U]/G; MG/G; [USP'U]/G
OINTMENT TOPICAL ONCE
Status: CANCELLED | OUTPATIENT
Start: 2020-09-08

## 2020-09-08 RX ORDER — LIDOCAINE 50 MG/G
OINTMENT TOPICAL ONCE
Status: CANCELLED | OUTPATIENT
Start: 2020-09-08

## 2020-09-08 RX ORDER — BETAMETHASONE DIPROPIONATE 0.05 %
OINTMENT (GRAM) TOPICAL ONCE
Status: CANCELLED | OUTPATIENT
Start: 2020-09-08

## 2020-09-08 RX ORDER — LIDOCAINE 40 MG/G
CREAM TOPICAL ONCE
Status: CANCELLED | OUTPATIENT
Start: 2020-09-08

## 2020-09-08 RX ORDER — BACITRACIN ZINC AND POLYMYXIN B SULFATE 500; 1000 [USP'U]/G; [USP'U]/G
OINTMENT TOPICAL ONCE
Status: CANCELLED | OUTPATIENT
Start: 2020-09-08

## 2020-09-08 RX ORDER — GINSENG 100 MG
CAPSULE ORAL ONCE
Status: CANCELLED | OUTPATIENT
Start: 2020-09-08

## 2020-09-08 RX ORDER — GENTAMICIN SULFATE 1 MG/G
OINTMENT TOPICAL ONCE
Status: CANCELLED | OUTPATIENT
Start: 2020-09-08

## 2020-09-08 RX ORDER — LIDOCAINE HYDROCHLORIDE 40 MG/ML
SOLUTION TOPICAL ONCE
Status: CANCELLED | OUTPATIENT
Start: 2020-09-08

## 2020-09-08 RX ORDER — LIDOCAINE HYDROCHLORIDE 40 MG/ML
SOLUTION TOPICAL ONCE
Status: COMPLETED | OUTPATIENT
Start: 2020-09-08 | End: 2020-09-08

## 2020-09-08 RX ORDER — CLOBETASOL PROPIONATE 0.5 MG/G
OINTMENT TOPICAL ONCE
Status: CANCELLED | OUTPATIENT
Start: 2020-09-08

## 2020-09-08 RX ORDER — LIDOCAINE HYDROCHLORIDE 20 MG/ML
JELLY TOPICAL ONCE
Status: CANCELLED | OUTPATIENT
Start: 2020-09-08

## 2020-09-08 RX ADMIN — LIDOCAINE HYDROCHLORIDE: 40 SOLUTION TOPICAL at 15:28

## 2020-09-08 NOTE — DISCHARGE INSTR - COC
Continuity of Care Form    Patient Name: Chinyere Quan III   :  1965  MRN:  [id number]    Admit date:  2020  Discharge date:  ***    Code Status Order: Prior   Advance Directives:   [facility] Documentation     Date/Time Healthcare Directive Type of Healthcare Directive Copy in [address] Agent's Name Healthcare Agent's Phone Number    20 1526  No, patient does not have an advance directive for healthcare treatment  Other (Comment)  Other (Comment)  -- na -- --          Admitting Physician:  No admitting provider for patient encounter. PCP: Rere Bueno MD    Discharging Nurse: [facility] Unit/Room#: No information available for this encounter.   Discharging Unit Phone Number: ***    Emergency Contact:   Extended Emergency Contact Information  Primary Emergency Contact: Silvia Archer [address] Phone: [phone]  Relation: Spouse    Past Surgical History:  Past Surgical History:   Procedure Laterality Date    COLONOSCOPY      SLEEVE GASTRECTOMY N/A 2020    ROBOTIC ASSISTED LAPAROSCOPIC SLEEVE GASTRECTOMY; LAPAROSCOPIC VENTRAL HERNIA REPAIR performed by Judge Ksenia DO at [address] [place] [address] N/A 2019    [facility]SY [facility]       Immunization History:   Immunization History   Administered Date(s) Administered    Pneumococcal Polysaccharide ([id number]) 2020    Tdap (Boostrix, Adacel) 2013       Active Problems:  Patient Active Problem List   Diagnosis Code    Family history of early CAD Z80.55    PPD positive R76.11    Vitamin D deficiency E55.9    Edema R60.9    Morbid obesity ([address] E66.01    Anemia D64.9    Venous stasis ulcer of left lower extremity ([address] I83.029, L97.929    Venous stasis ulcer of ankle, right ([address] I83.013, L97.319    Idiopathic chronic venous hypertension of both lower extremities with ulcer cm^2 09/08/20 1529   Change in Wound Size % (l*w) 88 09/08/20 1529   Wound Volume (cm^3) 0.04 cm^3 09/08/20 1529   Wound Healing % 89 09/08/20 1529   Post-Procedure Length (cm) 1 cm 09/08/20 1602   Post-Procedure Width (cm) 0.6 cm 09/08/20 1602   Post-Procedure Depth (cm) 0.3 cm 09/08/20 1602   Post-Procedure Surface Area (cm^2) 0.6 cm^2 09/08/20 1602   Post-Procedure Volume (cm^3) 0.18 cm^3 09/08/20 1602   Distance Tunneling (cm) 0 cm 09/08/20 1529   Undermining Maxium Distance (cm) 0 09/08/20 1529   Wound Assessment Red 09/08/20 1529   Drainage Amount Scant 09/08/20 1529   Drainage Description Brown 09/08/20 1529   Odor None 09/08/20 1529   Margins Defined edges 09/08/20 1529   Georgina-wound Assessment Dry 09/08/20 1529   Non-staged Wound Description Full thickness 09/08/20 1529   Red%Wound Bed 100 09/08/20 1529   Yellow%Wound Bed 0 09/08/20 1529   Number of days: 14        Elimination:  Continence:   · Bowel: {YES / VF:59611}  · Bladder: {YES / KU:59895}  Urinary Catheter: {Urinary Catheter:372038537}   Colostomy/Ileostomy/Ileal Conduit: {YES / KF:51281}       Date of Last BM: ***  No intake or output data in the 24 hours ending 09/08/20 1605  No intake/output data recorded.     Safety Concerns:     [employer] Safety Concerns:842524622}    Impairments/Disabilities:      [employer] Impairments/Disabilities:683763547}    Nutrition Therapy:  Current Nutrition Therapy:   [employer] Diet List:480244994}    Routes of Feeding: {CHP DME Other Feedings:459603341}  Liquids: {Slp liquid thickness:02627}  Daily Fluid Restriction: {CHP DME Yes amt example:701478736}  Last Modified Barium Swallow with Video (Video Swallowing Test): {Done Not Done FOKF:667017852}    Treatments at the Time of Hospital Discharge:   Respiratory Treatments: ***  Oxygen Therapy:  {Therapy; copd oxygen:18038}  Ventilator:    {JAYJAY ESCALANTE Vent VWUV:774947559}    Rehab Therapies: {THERAPEUTIC INTERVENTION:3104653224}  Weight Bearing Status/Restrictions: {JAYJAY ESCALANTE Weight Bearin}  Other Medical Equipment (for information only, NOT a DME order):  {EQUIPMENT:976571439}  Other Treatments: ***    Patient's personal belongings (please select all that are sent with patient):  {CHP DME Belongings:497226524}    RN SIGNATURE:  {Esignature:212541004}    CASE MANAGEMENT/SOCIAL WORK SECTION    Inpatient Status Date: ***    Readmission Risk Assessment Score:  Readmission Risk              Risk of Unplanned Readmission:        0           Discharging to Facility/ Agency   · Name:   · Address:  · Phone:  · Fax:    Dialysis Facility (if applicable)   · Name:  · Address:  · Dialysis Schedule:  · Phone:  · Fax:    / signature: {Esignature:036686854}    PHYSICIAN SECTION    Prognosis: {Prognosis:6087453885}    Condition at Discharge: [address] [place] [address] Patient Condition:724318258}    Rehab Potential (if transferring to Rehab): {Prognosis:9472861107}    Recommended Labs or Other Treatments After Discharge: ***    Physician Certification: I certify the above information and transfer of Raven Ordonez III  is necessary for the continuing treatment of the diagnosis listed and that he requires {Admit to Appropriate Level of Care:09034} for {GREATER/LESS:007968878} 30 days.      Update Admission H&P: [facility]}    PHYSICIAN SIGNATURE:  {Esignature:742262904}

## 2020-09-08 NOTE — PROGRESS NOTES
Multilayer Compression Wrap   (Not Unna) Below the Knee    NAME:  Lliian Galindo III  YOB: 1965  MEDICAL RECORD NUMBER:  [id number]  DATE:  9/8/2020        Removed old Multilayer wrap if present and washed leg with mild soap/water.   Applied moisturizing agent to dry skin as needed.   Applied primary and secondary dressing as ordered     Applied multilayered dressing below the knee to Left lower leg(s)  (4 Layer Compression Wrap) per 's instructions.   Instructed patient/caregiver not to remove dressing and to keep it clean and dry.   Instructed patient/caregiver on complications to report to provider, such as pain, numbness in toes, heavy drainage, and slippage of dressing.   Instructed patient on purpose of compression dressing and on activity and exercise recommendations.     Applied per   Guidelines    Electronically signed by Layla Morelos RN on 9/8/2020 at 4:07 PM

## 2020-09-08 NOTE — PROGRESS NOTES
[facility] [address]   Progress Note and Procedure Note      Natalie Duong III  MEDICAL RECORD NUMBER:  [id number]  AGE: 54 y.o. GENDER: male  : 1965  EPISODE DATE:  2020    Subjective:     Chief Complaint   Patient presents with    Wound Check     left lower ler         HISTORY of PRESENT ILLNESS HPI     Natalie Duong III is a 54 y.o. male who presents today for wound/ulcer evaluation. History of Wound Context: patient presents today with cc of a left lower leg wound. He relates that he recently had bariatric surgery and has lost 50 lbs. He is scheduled for a hip replacement next month. Wound/Ulcer Pain Timing/Severity: constant  Quality of pain: burning  Severity:  3 / 10   Modifying Factors: None  Associated Signs/Symptoms: edema    Ulcer Identification:  Ulcer Type: venous  Contributing Factors: edema and venous stasis    Wound: N/A        PAST MEDICAL HISTORY        Diagnosis Date    Arthritis     Obesity     PPD positive     Venous stasis ulcer of left lower extremity ([address] 10/25/2016       PAST SURGICAL HISTORY    Past Surgical History:   Procedure Laterality Date    COLONOSCOPY      SLEEVE GASTRECTOMY N/A 2020    ROBOTIC ASSISTED LAPAROSCOPIC SLEEVE GASTRECTOMY; LAPAROSCOPIC VENTRAL HERNIA REPAIR performed by Clayborn Bernheim, DO at [address] N/A 2019    EGD BIOPSY performed by Clayborn Bernheim, DO at [address] [facility]    Family History   Problem Relation Age of Onset    Heart Disease Mother     Diabetes Maternal Uncle     Diabetes Paternal Uncle     Cancer Paternal Uncle         ?  pancreatic    Cancer Brother         stomach       SOCIAL HISTORY    Social History     Tobacco Use    Smoking status: Never Smoker    Smokeless tobacco: Never Used   Substance Use Topics    Alcohol use: Yes     Comment: occ    Drug use: Yes     Types: Marijuana     Comment: monthly       ALLERGIES    No Known Allergies    MEDICATIONS    Current Outpatient Medications on File Prior to Encounter   Medication Sig Dispense Refill    chlorthalidone (HYGROTON) 25 MG tablet TAKE 1 TABLET BY MOUTH EVERY DAY 30 tablet 3    Compression Stockings MISC by Does not apply route Diagnosis: I83.333  Location of wound: Left Leg  Gradient IV (30-40 mm Hg)  Style: Knee Length  Extremity: Bilateral 1 each 0    Multiple Vitamin (MULTI VITAMIN PO) Take by mouth daily      ondansetron (ZOFRAN) 4 MG tablet Take 1 tablet by mouth every 6 hours as needed for Nausea or Vomiting 30 tablet 0     No current facility-administered medications on file prior to encounter. REVIEW OF SYSTEMS    Pertinent items are noted in HPI. Review of Systems: A 12 point review of symptoms is unremarkable with the exception of the chief complaint. Patient specifically denies nausea, fever, vomiting, chills, shortness of breath, chest pain, abdominal pain, constipation or difficulty urinating. Objective:        /86   Pulse 90   Temp 96.9 °F (36.1 °C) (Skin)   Resp 16     Wt Readings from Last 3 Encounters:   08/25/20 (!) 345 lb 7.4 oz (156.7 kg)   08/12/20 (!) 352 lb 12.8 oz (160 kg)   06/24/20 (!) 395 lb (179.2 kg)       PHYSICAL EXAM    DP/PT pulses palpable bilaterally. CFT brisk to all digits. Digits are pink and warm to the touch. Hair growth is normal in appearance. No calf pain with palpation noted. Patient has pitting edema noted on the bilateral lower extremities. There are brawny pigmentary changes noted bilaterally at the distal aspect of the leg. There are hypertrophic skin changes noted bilaterally consistent with chronic venous stasis/lymphedema. Epicritic sensation is grossly intact bilaterally. Negative clonus and babinski reflex is down going. Wound note on the left lower extremity. Wound has fibrotic and nonviable tissue that extends down through and includes the subcutaneous tissue.   After debridement the wound has a granular base. There is no surrounding erythema, edema, warmth and no purulent drainage or malodor noted. The wound does not probe or track to bone. Assessment:      Patient Active Problem List   Diagnosis Code    Family history of early CAD Z80.55    PPD positive R76.11    Vitamin D deficiency E55.9    Edema R60.9    Morbid obesity ([address] E66.01    Anemia D64.9    Venous stasis ulcer of left lower extremity ([address] I83.029, L97.929    Venous stasis ulcer of ankle, right ([address] I83.013, L97.319    Idiopathic chronic venous hypertension of both lower extremities with ulcer and inflammation ([address] I87.333, L97.919, L97.929    Non-pressure chronic ulcer of right calf with fat layer exposed ([address] L97.212    Varicose veins of right lower extremity with both ulcer of calf and inflammation ([address] I83.212, L97.219    Prediabetes R73.03    Obesity due to excess calories without serious comorbidity E66.09    Cellulitis and abscess of left lower extremity L03.116, L02.416    Essential hypertension I10    Mixed hyperlipidemia E78.2    Exertional dyspnea R06.09    Abnormal stress test R94.39    S/P coronary angiogram Z98.890    Localized edema R60.0    Venous (peripheral) insufficiency I87.2    Obstructive sleep apnea G47.33    Morbid obesity with BMI of 50.0-59.9, adult (HCC) E66.01, Z68.43    Primary osteoarthritis of both hips M16.0        Excisional Debridement Procedure Note  Indications:  Based on my examination of this patient's wound(s)/ulcer(s) today, debridement is required to promote healing and evaluate the wound base. Performed by: Shantal Wood DPM    Consent obtained? Yes    Time out taken: Yes    Pain Control: Anesthetic: 4% Lidocaine Liquid Topical     Debridement: Excisional Debridement    Using curette, #15 blade scalpel, scissors and forceps the wound/ulcer was sharply debrided    down through and including the removal of  epidermis, dermis and subcutaneous tissue. Devitalized Tissue Debrided:  fibrin and slough      Pre Debridement Measurements:  Are located in the [place]  Documentation Flow Sheet   Wound/Ulcer #: 1     Post  Debridement Measurements:    Wound 08/25/20 Leg Left; Lower; Lateral #1 (Active)   Wound Image   08/25/20 1451   Dressing/Treatment Collagen with Ag 09/08/20 1606   Wound Cleansed Rinsed/Irrigated with saline 09/08/20 1529   Wound Length (cm) 0.9 cm 09/08/20 1529   Wound Width (cm) 0.5 cm 09/08/20 1529   Wound Depth (cm) 0.1 cm 09/08/20 1529   Wound Surface Area (cm^2) 0.45 cm^2 09/08/20 1529   Change in Wound Size % (l*w) 88 09/08/20 1529   Wound Volume (cm^3) 0.04 cm^3 09/08/20 1529   Wound Healing % 89 09/08/20 1529   Post-Procedure Length (cm) 1 cm 09/08/20 1602   Post-Procedure Width (cm) 0.6 cm 09/08/20 1602   Post-Procedure Depth (cm) 0.3 cm 09/08/20 1602   Post-Procedure Surface Area (cm^2) 0.6 cm^2 09/08/20 1602   Post-Procedure Volume (cm^3) 0.18 cm^3 09/08/20 1602   Distance Tunneling (cm) 0 cm 09/08/20 1529   Undermining Maxium Distance (cm) 0 09/08/20 1529   Wound Assessment Red 09/08/20 1529   Drainage Amount Scant 09/08/20 1529   Drainage Description Brown 09/08/20 1529   Odor None 09/08/20 1529   Margins Defined edges 09/08/20 1529   Georgina-wound Assessment Dry 09/08/20 1529   Non-staged Wound Description Full thickness 09/08/20 1529   Red%Wound Bed 100 09/08/20 1529   Yellow%Wound Bed 0 09/08/20 1529   Number of days: 14     Incision 06/09/20 Abdomen (Active)   Number of days: 91         Percent of Wound/Ulcer Debrided: 100%    Total Surface Area Debrided:  0.6 sq cm    Diabetic/Pressure/Non Pressure Ulcers only:  Ulcer: Non-Pressure ulcer, fat layer exposed    Bleeding: Minimal    Hemostasis Achieved: by pressure    Procedural Pain: 0  / 10     Post Procedural Pain: 0 / 10     Response to treatment:  Well tolerated by patient. Plan:   E/M x 30 minutes of a new problem of left lower extremity wound.     Will treat with multilayer compression dressing bilaterally. Patient will return on Friday to have dressing changed to insure there is adequate compression. Discussed with patient that there is some urgency to get wound healed as his hip surgery will likely be delayed if he has a wound. The wound has improved significantly in 1 week and should be healed before his scheduled surgery at the end of the month. Discussed with patient that even if he is healed next week will keep him in compression to prevent re-ulceration before his surgery. Encouraged patient to follow up with weight management. Discussed with patient that although his weight did not necessarily cause these wounds, his weight makes them more likely to reulcerate due to edema. Treatment Note please see attached Discharge Instructions    In my professional opinion this patient would benefit from HBO Therapy: No    Written patient dismissal instructions given to patient and signed by patient or POA.          RTC 1 week    Electronically signed by Shivam Church DPM on 9/8/2020 at 4:52 PM

## 2020-09-09 ENCOUNTER — TELEPHONE (OUTPATIENT)
Dept: BARIATRICS/WEIGHT MGMT | Age: 55
End: 2020-09-09

## 2020-09-09 NOTE — TELEPHONE ENCOUNTER
Pt calling in s/p sleeve 6/9/20, has not been able to have a BM in 2wks. Pt has had same problem back in July. Pt stated that he has tried, colace, miralax, milk of magnesium, suppository. And he is not passing. Pt was trying while he was on the phone,  Passing very little. Pt stated in pain while trying. Contact Dr Louisa Davis, suggested pt see GI dr to make sure nothing else is going on.

## 2020-09-10 ENCOUNTER — TELEPHONE (OUTPATIENT)
Dept: INTERNAL MEDICINE CLINIC | Age: 55
End: 2020-09-10

## 2020-09-10 RX ORDER — POTASSIUM CHLORIDE 750 MG/1
10 TABLET, FILM COATED, EXTENDED RELEASE ORAL DAILY
Qty: 7 TABLET | Refills: 0 | Status: SHIPPED | OUTPATIENT
Start: 2020-09-10 | End: 2020-09-23 | Stop reason: ALTCHOICE

## 2020-09-11 ENCOUNTER — TELEPHONE (OUTPATIENT)
Dept: WOUND CARE | Age: 55
End: 2020-09-11

## 2020-09-11 NOTE — TELEPHONE ENCOUNTER
Patient called and CXL his nurse visit for today due to not feeling well. He will follow up at normal scheduled appointment 9/15.

## 2020-09-15 ENCOUNTER — HOSPITAL ENCOUNTER (OUTPATIENT)
Dept: WOUND CARE | Age: 55
Discharge: HOME | End: 2020-09-15
Payer: COMMERCIAL

## 2020-09-15 VITALS
BODY MASS INDEX: 43.25 KG/M2 | WEIGHT: 315 LBS | DIASTOLIC BLOOD PRESSURE: 86 MMHG | SYSTOLIC BLOOD PRESSURE: 138 MMHG | HEART RATE: 83 BPM | TEMPERATURE: 97.7 F | RESPIRATION RATE: 16 BRPM

## 2020-09-15 PROCEDURE — 29581 APPL MULTLAYER CMPRN SYS LEG: CPT

## 2020-09-15 RX ORDER — BACITRACIN, NEOMYCIN, POLYMYXIN B 400; 3.5; 5 [USP'U]/G; MG/G; [USP'U]/G
OINTMENT TOPICAL ONCE
Status: CANCELLED | OUTPATIENT
Start: 2020-09-15

## 2020-09-15 RX ORDER — CLOBETASOL PROPIONATE 0.5 MG/G
OINTMENT TOPICAL ONCE
Status: CANCELLED | OUTPATIENT
Start: 2020-09-15

## 2020-09-15 RX ORDER — BETAMETHASONE DIPROPIONATE 0.05 %
OINTMENT (GRAM) TOPICAL ONCE
Status: CANCELLED | OUTPATIENT
Start: 2020-09-15

## 2020-09-15 RX ORDER — LIDOCAINE 50 MG/G
OINTMENT TOPICAL ONCE
Status: CANCELLED | OUTPATIENT
Start: 2020-09-15

## 2020-09-15 RX ORDER — LIDOCAINE HYDROCHLORIDE 40 MG/ML
SOLUTION TOPICAL ONCE
Status: CANCELLED | OUTPATIENT
Start: 2020-09-15

## 2020-09-15 RX ORDER — LIDOCAINE 40 MG/G
CREAM TOPICAL ONCE
Status: CANCELLED | OUTPATIENT
Start: 2020-09-15

## 2020-09-15 RX ORDER — BACITRACIN ZINC AND POLYMYXIN B SULFATE 500; 1000 [USP'U]/G; [USP'U]/G
OINTMENT TOPICAL ONCE
Status: CANCELLED | OUTPATIENT
Start: 2020-09-15

## 2020-09-15 RX ORDER — GENTAMICIN SULFATE 1 MG/G
OINTMENT TOPICAL ONCE
Status: CANCELLED | OUTPATIENT
Start: 2020-09-15

## 2020-09-15 RX ORDER — GINSENG 100 MG
CAPSULE ORAL ONCE
Status: CANCELLED | OUTPATIENT
Start: 2020-09-15

## 2020-09-15 RX ORDER — LIDOCAINE HYDROCHLORIDE 20 MG/ML
JELLY TOPICAL ONCE
Status: CANCELLED | OUTPATIENT
Start: 2020-09-15

## 2020-09-15 NOTE — PROGRESS NOTES
Multilayer Compression Wrap   (Not Unna) Below the Knee    NAME:  Natalie Duong III  YOB: 1965  MEDICAL RECORD NUMBER:  [id number]  DATE:  9/15/2020        Removed old Multilayer wrap if present and washed leg with mild soap/water.   Applied moisturizing agent to dry skin as needed.   Applied primary and secondary dressing as ordered     Applied multilayered dressing below the knee to Left lower leg(s)  (4 Layer Compression Wrap) per 's instructions.   Instructed patient/caregiver not to remove dressing and to keep it clean and dry.   Instructed patient/caregiver on complications to report to provider, such as pain, numbness in toes, heavy drainage, and slippage of dressing.   Instructed patient on purpose of compression dressing and on activity and exercise recommendations.     Applied per   Guidelines    Electronically signed by Dajuan Palacio RN on 9/15/2020 at 4:10 PM

## 2020-09-15 NOTE — PROGRESS NOTES
Allergies    MEDICATIONS    Current Outpatient Medications on File Prior to Encounter   Medication Sig Dispense Refill    potassium chloride (K-TAB) 10 MEQ extended release tablet Take 1 tablet by mouth daily for 7 days 7 tablet 0    chlorthalidone (HYGROTON) 25 MG tablet TAKE 1 TABLET BY MOUTH EVERY DAY 30 tablet 3    Compression Stockings MISC by Does not apply route Diagnosis: I83.333  Location of wound: Left Leg  Gradient IV (30-40 mm Hg)  Style: Knee Length  Extremity: Bilateral 1 each 0    Multiple Vitamin (MULTI VITAMIN PO) Take by mouth daily      ondansetron (ZOFRAN) 4 MG tablet Take 1 tablet by mouth every 6 hours as needed for Nausea or Vomiting 30 tablet 0     No current facility-administered medications on file prior to encounter. REVIEW OF SYSTEMS    Pertinent items are noted in HPI. Review of Systems: A 12 point review of symptoms is unremarkable with the exception of the chief complaint. Patient specifically denies nausea, fever, vomiting, chills, shortness of breath, chest pain, abdominal pain, constipation or difficulty urinating. Objective:        /86   Pulse 83   Temp 97.7 °F (36.5 °C) (Skin)   Resp 16   Wt (!) 336 lb 13.8 oz (152.8 kg)   BMI 43.25 kg/m²     Wt Readings from Last 3 Encounters:   09/15/20 (!) 336 lb 13.8 oz (152.8 kg)   08/25/20 (!) 345 lb 7.4 oz (156.7 kg)   08/12/20 (!) 352 lb 12.8 oz (160 kg)       PHYSICAL EXAM    DP/PT pulses palpable bilaterally. CFT brisk to all digits. Digits are pink and warm to the touch. Hair growth is normal in appearance. No calf pain with palpation noted. Patient has pitting edema noted on the bilateral lower extremities. There are brawny pigmentary changes noted bilaterally at the distal aspect of the leg. There are hypertrophic skin changes noted bilaterally consistent with chronic venous stasis/lymphedema. Epicritic sensation is grossly intact bilaterally.  Negative clonus and babinski reflex is down going. Wound previously note on the left lower extremity has epithelialized. Edema is well controlled. Assessment:      Patient Active Problem List   Diagnosis Code    Family history of early CAD Z80.55    PPD positive R76.11    Vitamin D deficiency E55.9    Edema R60.9    Morbid obesity ([address] E66.01    Anemia D64.9    Venous stasis ulcer of left lower extremity ([address] I83.029, L97.929    Venous stasis ulcer of ankle, right ([address] I83.013, L97.319    Idiopathic chronic venous hypertension of both lower extremities with ulcer and inflammation ([address] I87.333, L97.919, L97.929    Non-pressure chronic ulcer of right calf with fat layer exposed ([address] L97.212    Varicose veins of right lower extremity with both ulcer of calf and inflammation ([address] I83.212, L97.219    Prediabetes R73.03    Obesity due to excess calories without serious comorbidity E66.09    Cellulitis and abscess of left lower extremity L03.116, L02.416    Essential hypertension I10    Mixed hyperlipidemia E78.2    Exertional dyspnea R06.09    Abnormal stress test R94.39    S/P coronary angiogram Z98.890    Localized edema R60.0    Venous (peripheral) insufficiency I87.2    Obstructive sleep apnea G47.33    Morbid obesity with BMI of 50.0-59.9, adult (HCC) E66.01, Z68.43    Primary osteoarthritis of both hips M16.0          Plan:   E/M x 30 minutes of a problem of left lower extremity wound. Will treat with multilayer compression dressing left to prevent re-ulceration as patient is scheduled to have his hip replaced. If after patient has recovered from his ROXI and he continues to re-ulcerate will refer back to Dr. Patricia Reynolds for further evaluation and management of his venous reflux. Treatment Note please see attached Discharge Instructions    In my professional opinion this patient would benefit from HBO Therapy: No    Written patient dismissal instructions given to patient and signed by patient or POA.          RTC 1 week for dressing change    Electronically signed by Zachary Ponce DPM on 9/15/2020 at 4:39 PM

## 2020-09-17 ENCOUNTER — TELEPHONE (OUTPATIENT)
Dept: INTERNAL MEDICINE CLINIC | Age: 55
End: 2020-09-17

## 2020-09-17 NOTE — TELEPHONE ENCOUNTER
Patient finished potassium that was prescribed wanted to [facility] if physician wants patient ot have labs draw please advise.

## 2020-09-18 ENCOUNTER — TELEPHONE (OUTPATIENT)
Dept: INTERNAL MEDICINE CLINIC | Age: 55
End: 2020-09-18

## 2020-09-18 NOTE — TELEPHONE ENCOUNTER
[facility] would like a copy of the Physicial and Pre-Op faxed to [phone].

## 2020-09-18 NOTE — TELEPHONE ENCOUNTER
I CALLED AND SPOKE TO MARISABEL TO LET HER KNOW PT HAS NOT HAD HIS PRE-OP YET BE WILL BE HAVING IT ON MONDAY

## 2020-09-22 ENCOUNTER — HOSPITAL ENCOUNTER (OUTPATIENT)
Dept: WOUND CARE | Age: 55
Discharge: HOME | End: 2020-09-22
Payer: COMMERCIAL

## 2020-09-22 VITALS
TEMPERATURE: 97 F | DIASTOLIC BLOOD PRESSURE: 90 MMHG | BODY MASS INDEX: 42.77 KG/M2 | WEIGHT: 315 LBS | RESPIRATION RATE: 16 BRPM | SYSTOLIC BLOOD PRESSURE: 152 MMHG | HEART RATE: 87 BPM

## 2020-09-22 PROCEDURE — 99211 OFF/OP EST MAY X REQ PHY/QHP: CPT

## 2020-09-22 RX ORDER — BACITRACIN, NEOMYCIN, POLYMYXIN B 400; 3.5; 5 [USP'U]/G; MG/G; [USP'U]/G
OINTMENT TOPICAL ONCE
Status: CANCELLED | OUTPATIENT
Start: 2020-09-22

## 2020-09-22 RX ORDER — BETAMETHASONE DIPROPIONATE 0.05 %
OINTMENT (GRAM) TOPICAL ONCE
Status: CANCELLED | OUTPATIENT
Start: 2020-09-22

## 2020-09-22 RX ORDER — GENTAMICIN SULFATE 1 MG/G
OINTMENT TOPICAL ONCE
Status: CANCELLED | OUTPATIENT
Start: 2020-09-22

## 2020-09-22 RX ORDER — LIDOCAINE HYDROCHLORIDE 40 MG/ML
SOLUTION TOPICAL ONCE
Status: CANCELLED | OUTPATIENT
Start: 2020-09-22

## 2020-09-22 RX ORDER — LINACLOTIDE 290 UG/1
1 CAPSULE, GELATIN COATED ORAL DAILY
COMMUNITY
Start: 2020-09-16 | End: 2020-12-23

## 2020-09-22 RX ORDER — BACITRACIN ZINC AND POLYMYXIN B SULFATE 500; 1000 [USP'U]/G; [USP'U]/G
OINTMENT TOPICAL ONCE
Status: CANCELLED | OUTPATIENT
Start: 2020-09-22

## 2020-09-22 RX ORDER — LIDOCAINE HYDROCHLORIDE 20 MG/ML
JELLY TOPICAL ONCE
Status: CANCELLED | OUTPATIENT
Start: 2020-09-22

## 2020-09-22 RX ORDER — CLOBETASOL PROPIONATE 0.5 MG/G
OINTMENT TOPICAL ONCE
Status: CANCELLED | OUTPATIENT
Start: 2020-09-22

## 2020-09-22 RX ORDER — GINSENG 100 MG
CAPSULE ORAL ONCE
Status: CANCELLED | OUTPATIENT
Start: 2020-09-22

## 2020-09-22 RX ORDER — LIDOCAINE 50 MG/G
OINTMENT TOPICAL ONCE
Status: CANCELLED | OUTPATIENT
Start: 2020-09-22

## 2020-09-22 RX ORDER — LIDOCAINE 40 MG/G
CREAM TOPICAL ONCE
Status: CANCELLED | OUTPATIENT
Start: 2020-09-22

## 2020-09-22 RX ORDER — GABAPENTIN 300 MG/1
300 CAPSULE ORAL
COMMUNITY
End: 2022-10-18

## 2020-09-23 ENCOUNTER — OFFICE VISIT (OUTPATIENT)
Dept: INTERNAL MEDICINE CLINIC | Age: 55
End: 2020-09-23
Payer: COMMERCIAL

## 2020-09-23 VITALS
SYSTOLIC BLOOD PRESSURE: 118 MMHG | TEMPERATURE: 96.9 F | HEIGHT: 74 IN | RESPIRATION RATE: 14 BRPM | DIASTOLIC BLOOD PRESSURE: 80 MMHG | HEART RATE: 78 BPM | BODY MASS INDEX: 40.43 KG/M2 | WEIGHT: 315 LBS | OXYGEN SATURATION: 95 %

## 2020-09-23 LAB
CHP ED QC CHECK: NORMAL
GLUCOSE BLD-MCNC: 113 MG/DL

## 2020-09-23 PROCEDURE — 82962 GLUCOSE BLOOD TEST: CPT | Performed by: INTERNAL MEDICINE

## 2020-09-23 PROCEDURE — 99214 OFFICE O/P EST MOD 30 MIN: CPT | Performed by: INTERNAL MEDICINE

## 2020-09-23 PROCEDURE — 93000 ELECTROCARDIOGRAM COMPLETE: CPT | Performed by: INTERNAL MEDICINE

## 2020-09-23 RX ORDER — CHLORTHALIDONE 25 MG/1
TABLET ORAL
Qty: 30 TABLET | Refills: 3 | Status: SHIPPED | OUTPATIENT
Start: 2020-09-23 | End: 2020-12-11

## 2020-09-23 NOTE — PROGRESS NOTES
SUBJECTIVE:  Annette Abreu is a 54 y.o. male HERE FOR   Chief Complaint   Patient presents with    Hypertension    Diabetes    Pre-op Exam    Other      PT HERE FOR EVAL / PRWEOP EVAL    PREOP EVAL PER DR Juan Francisco Ahuja  DENIES COUGH, NO F/C, NO INCREASED TENDENCY     FLAVIO HIP PAIN -  OA. RT > LT. SHARP PAIN - INCREASED WITH WALKING. PAIN SCALE 10/10 @ MAX. DENIES TRAUMA. FOLLOWING WITH ORTHO. PLAN FOR SURGERY RT HIP REPLACEMENT 9/25/20    DM -  NOT ON MED .   ? DIET / EXERCISE COMPLIANCE.  HBS - NOT CHECKING.  EYE EXAM  > 1 YR  HTN- TAKING MED . ? DIET / EXERCISE COMPLIANCE. DENIES HA, NO DIZZINESS  DYSLIPIDEMIA -  ? EXERCISE / DIET COMPLIANCE . LABS D/W PT  VIT D DEF - OFF MED. PREVIOUS LAB D/W PT  HYPOKALEMIA -COMPLETED MED . DENIES MUSCLE CRAMPS  MORBID OBESITY - DIET / EXERCISE REVIEWED. S/P SURGERY.  FOLLOWING WITH BARIATRIC SURGERY.        DENIES CP, No SOB, No PALPITATIONS, [facility] [address] [place] [address], NO F/C  No ABD PAIN, No N/V, No DIARRHEA, OCC CONSTIPATION, No MELENA, No HEMATOCHEZIA. No DYSURIA, No FREQ, No URGENCY, No HEMATURIA         PMH: REVIEWED AND UPDATED TODAY    PSH: REVIEWED AND UPDATED TODAY    SOCIAL HX: REVIEWED AND UPDATED TODAY    FAMILY HX: REVIEWED AND UPDATED TODAY    ALLERGY:  Patient has no known allergies. MEDS: REVIEWED  Prior to Visit Medications    Medication Sig Taking? Authorizing Provider   gabapentin (NEURONTIN) 300 MG capsule Take 300 mg by mouth Daily with supper.  Yes Historical Provider, MD   LINZESS 290 MCG CAPS capsule Take 1 tablet by mouth daily Yes Historical Provider, MD   chlorthalidone (HYGROTON) 25 MG tablet TAKE 1 TABLET BY MOUTH EVERY DAY Yes Sherry Rodriguez MD   ondansetron (ZOFRAN) 4 MG tablet Take 1 tablet by mouth every 6 hours as needed for Nausea or Vomiting Yes HAIM Salazar - CNP   Compression Stockings MISC by Does not apply route Diagnosis: I83.333  Location of wound: Left Leg  Gradient IV (30-40 mm Hg)  Style: Knee Length  Extremity: Bilateral Yes Anatoly Camara DPM Multiple Vitamin (MULTI VITAMIN PO) Take by mouth daily Yes Historical Provider, MD   potassium chloride (K-TAB) 10 MEQ extended release tablet Take 1 tablet by mouth daily for 7 days  Cj Louise MD       ROS: COMPREHENSIVE ROS AS IN HX, REST -VE  History obtained from chart review and the patient    OBJECTIVE:   NURSING NOTE AND VITALS REVIEWED  BP (!) 142/84 (Site: Right Upper Arm, Position: Sitting, Cuff Size: Large Adult)   Pulse 78   Temp 96.9 °F (36.1 °C)   Ht 6' 2\" (1.88 m)   Wt (!) 333 lb 9.6 oz (151.3 kg)   SpO2 93%   BMI 42.83 kg/m²     NO ACUTE DISTRESS    REPEAT BP: 118/80 (RT), NO ORTHOSTASIS     RESP: 14    REPEAT PULSE OX : 95% RA    Body mass index is 42.83 kg/m². HEENT: NO PALLOR, ANICTERIC, PERRLA, EOMI, NO CONJUNCTIVAL ERYTHEMA,                 NO SINUS TENDERNESS  NECK:  SUPPLE, TRACHEA MIDLINE, NT, NO JVD, NO CB, NO LA, NO TM, NO STIFFNESS  CHEST: RESPY EFFORT NL, GOOD AE, NO W/R/C  HEART: S1S2+ REG, NO M/G/R  ABD: OBESE, SOFT, NT, NO HSM, BS+  EXT: NO EDEMA, NT, PULSES +. AMANUEL'S -VE  NEURO: ALERT AND ORIENTED X 3, NO MENINGEAL SIGNS, NO TREMORS, NL GAIT, NO FOCAL DEFICITS  PSYCH: FAIRLY GOOD AFFECT  BACK: NT, NO ROM, NO CVA TENDERNESS  HIP: NT, + PAIN WITH MVT RT > LT. ROM RT > LT      PREVIOUS LABS REVIEWED AND D/W PT    ACCUCHECK: 113    EKG: SINUS RHYTHM HR 81. NO ACUTE FINDING COMPARED TO PREVIOUS EKG      ASSESSMENT / PLAN:     Diagnosis Orders   1. Preop examination  COUNSELLED. PREOP EVAL PER REQUEST OF DR Viv Frey  ADVISED AVOID ASA/ NSAIDS PREOP  ADVISED CLOSE SEN / POST OP MONITORING. F/U LABS  PT OTHERWISE OK TO PROCEED WITH PLANNED PROCEDURE . SEE COMPLETED FORM IN CHART. FORM  / LETTER FAXED TO SURGERY AND COPY GIVEN TO PT.  MAKE CHANGES AS NEEDED. 2. Pain of both hip joints  COUNSELLED. PREOP EXAM AS ABOVE  F/U ORTHO  MAKE CHANGES AS NEEDED.        3. Type 2 diabetes mellitus without complication, without long-term current use of insulin ([address]

## 2020-09-23 NOTE — LETTER
[facility]  [address] [place] [address] Drive  [address] [facility]  Phone: [phone]  Fax: [phone]    Micah Holt MD        September 23, 2020     Dr. Hermann Rahman:    Patient: Quinten Smith III   MR Number: <[id number]>   YOB: 1965   Date of Visit: 9/23/2020       Dear Dr. Hermann Rahman: Thank you for referring Quinten Smith III to me for preop evaluation. EKG done - no acute findings - see copy  Labs ordered - [employer] - please follow  Advise close vera/ post op monitoring  Advised avoid ASA/NSAIDS  preop  Please see completed preop form  He is otherwise OK to proceed with planned procedure. If you have questions, please do not hesitate to call me.      Sincerely,          Micah Holt MD

## 2020-09-24 ENCOUNTER — TELEPHONE (OUTPATIENT)
Dept: INTERNAL MEDICINE CLINIC | Age: 55
End: 2020-09-24

## 2020-09-24 RX ORDER — POTASSIUM CHLORIDE 750 MG/1
TABLET, FILM COATED, EXTENDED RELEASE ORAL
Qty: 34 TABLET | Refills: 0 | Status: SHIPPED | OUTPATIENT
Start: 2020-09-24 | End: 2020-10-29 | Stop reason: SDUPTHER

## 2020-10-06 ENCOUNTER — TELEPHONE (OUTPATIENT)
Dept: BARIATRICS/WEIGHT MGMT | Age: 55
End: 2020-10-06

## 2020-10-06 NOTE — PROGRESS NOTES
4 Eyes Admission Assessment     I agree as the admission nurse that 2 RN's have performed a thorough Head to Toe Skin Assessment on the patient. ALL assessment sites listed below have been assessed on admission. Areas assessed by both nurses:   [x]   Head, Face, and Ears   [x]   Shoulders, Back, and Chest  [x]   Arms, Elbows, and Hands   [x]   Coccyx, Sacrum, and Ischum  [x]   Legs, Feet, and Heels        Does the Patient have Skin Breakdown? Skin is warm, dry and flaky. Pt has redness on ABD pannus, interdry placed. Pt wound on left calf, scant serosanguinous drainage. Mepilex in place.  Jonas Prevention initiated:  no  Wound Care Orders initiated:  yes      WOC nurse consulted for Pressure Injury (Stage 3,4, Unstageable, DTI, NWPT, and Complex wounds):  yes    Nurse 1 eSignature: Electronically signed by Josselin Amin RN on 6/9/20 at 1:44 PM EDT    **SHARE this note so that the co-signing nurse is able to place an eSignature**    Nurse 2 eSignature: Electronically signed by Ericka Calvin RN on 6/9/20 at 1:48 PM EDT
PACU Transfer Note    Vitals:    06/09/20 1130   BP: 130/72   Pulse: 75   Resp: 16   Temp: 97 °F (36.1 °C)   SpO2: 100%       In: 1000 [I.V.:1000] 436mL in PACU  Out: 20 EBL; patient has not voided    Pain assessment:  present - adequately treated    Report given to Receiving unit RN. Patient meets pacu d/c criteria. Waiting on room.      6/9/2020 11:37 AM
Pt A&O, VSS. Lap sites CDI, binder in place. IV fluids infusing. Voiding adequately. Ambulating x1 w/ walker. Pain controlled w/ PCA pump. Denies N&V. Tolerated ice chips very well throughout the night. No other requests, will continue to monitor.
Pt got up and ambulated in joseph and urinated. Clear yellow.
SEHRON AND BMP from 5/18 routed to Dr. Douglas Martinez in [facility].
Care  [x] Supplies: [facility] faxed order - 1 4x4 Opticel and 2 medium mepilexes given for home dressing changes until supplies arrive  [] Other    Patient/Caregiver Teaching: etiology, treatment, need to continue dressings/keep covered until healed, compression stockings  Level of patient/caregiver understanding able to:   [x] Indicates understanding       [] Needs reinforcement  [] Unsuccessful      [x] Verbal Understanding  [] Demonstrated understanding       [] No evidence of learning  [] Refused teaching         [] N/A       Electronically signed by Goyo Willis RN, CWOCN on 6/10/2020 at 10:49 AM
imaging, and agree with the Advanced Practice Provider note.     Shantel So
unknown

## 2020-10-06 NOTE — TELEPHONE ENCOUNTER
Dr. Jeter Person-    Patient called stating his hip surgery went well and he is doing good also. He thought you had requested him to call the office, however couldn't remember if it was to talk with him or check-in. Please advise.  Thank you

## 2020-10-21 ENCOUNTER — OFFICE VISIT (OUTPATIENT)
Dept: BARIATRICS/WEIGHT MGMT | Age: 55
End: 2020-10-21
Payer: COMMERCIAL

## 2020-10-21 VITALS
TEMPERATURE: 96.9 F | HEIGHT: 74 IN | DIASTOLIC BLOOD PRESSURE: 81 MMHG | SYSTOLIC BLOOD PRESSURE: 125 MMHG | WEIGHT: 315 LBS | HEART RATE: 84 BPM | BODY MASS INDEX: 40.43 KG/M2

## 2020-10-21 PROCEDURE — 99213 OFFICE O/P EST LOW 20 MIN: CPT | Performed by: SURGERY

## 2020-10-21 NOTE — PROGRESS NOTES
Dietary Assessment Note  Vitals:   Vitals:    10/21/20 1412   BP: 125/81   Pulse: 84   Temp: 96.9 °F (36.1 °C)   Weight: (!) 332 lb 6.4 oz (150.8 kg)   Height: 6' 2\" (1.88 m)   Patient lost 20.4 lbs over past ~2 months. Total Weight Loss: 107.8 lbs    Labs reviewed: reviewed 9/23/20    Protein intake: 60-80 grams/day - 1 protein shake per day    Fluid intake: 48-64 oz/day - water    Multivitamin/mineral intake: yes - 2 fusion per day    Calcium intake: no    Other: none    Exercise: limited d/t hip pain, does walk 10-15 min per day, hip replacement in September 2020    Nutrition Assessment: 4 month post-op visit.   Tracking intake, right ~1000 calories    B- protein shake   S- chicken salad & crackers  L- meatloaf w/ broccoli & boiled potatoes  S- chicken salad  D- similar to lunch  S- none    Amount able to eat per sitting: ~1/2-3/4 cup volume    Following 30/30/30 rule: yes    Food Intolerances/issues: bread / noodles / rice fills him up    Client Concerns: pt states he was feeling constipated & GI put him on linzess which has helped    Goals:   - Continue plan  - Continue tracking  - Take 4 fusion per day    Plan: f/u as directed    [employer]

## 2020-10-21 NOTE — PATIENT INSTRUCTIONS
Patient received dietary handouts and education.     Goals:   - Continue plan  - Continue tracking  - Take 4 fusion per day

## 2020-10-24 NOTE — PROGRESS NOTES
[facility]) Physicians   General & Laparoscopic Surgery  [employer]       HPI:     Park Whelan III is a very pleasant 54 y.o. obese male , Body mass index is 42.68 kg/m². Amita Mott And multiple medical problems who is presenting for bariatric follow up care. Park Whelan III is s/p laparoscopic sleeve gastrectomy by me   Comes today to the clinic without any complaints. Patient denies any nausea, vomiting, fevers, chills, shortness of breath, chest pain, constipation or urinary symptoms. Denies any heartburn nor dysphagia. Patient is feeling very well, and is very active. Patient is very pleased with the weight loss and resolution of co-morbid conditions. Past Medical History:   Diagnosis Date    Arthritis     Obesity     PPD positive     Venous stasis ulcer of left lower extremity ([address] 10/25/2016     Past Surgical History:   Procedure Laterality Date    COLONOSCOPY      SLEEVE GASTRECTOMY N/A 6/9/2020    ROBOTIC ASSISTED LAPAROSCOPIC SLEEVE GASTRECTOMY; LAPAROSCOPIC VENTRAL HERNIA REPAIR performed by Carol lOguin DO at [address] [facility] N/A 9/16/2019    EGD BIOPSY performed by Carol Olguin DO at [facility] ENDOSCOPY     Family History   Problem Relation Age of Onset    Heart Disease Mother     Diabetes Maternal Uncle     Diabetes Paternal Uncle     Cancer Paternal Uncle         ? pancreatic    Cancer Brother         stomach     Social History     Tobacco Use    Smoking status: Never Smoker    Smokeless tobacco: Never Used   Substance Use Topics    Alcohol use: Yes     Comment: occ     I counseled the patient on the importance of not smoking and risks of ETOH. No Known Allergies  Vitals:    10/21/20 1412   BP: 125/81   Pulse: 84   Temp: 96.9 °F (36.1 °C)   Weight: (!) 332 lb 6.4 oz (150.8 kg)   Height: 6' 2\" (1.88 m)       Body mass index is 42.68 kg/m².       Current Outpatient Medications:     potassium chloride (K-TAB) 10 MEQ extended release tablet, 40MEQ ONCE - TODAY THEN CONTINUE 10MEQ DAILY, Disp: 34 tablet, Rfl: 0    chlorthalidone (HYGROTON) 25 MG tablet, TAKE 1 TABLET BY MOUTH EVERY DAY, Disp: 30 tablet, Rfl: 3    gabapentin (NEURONTIN) 300 MG capsule, Take 300 mg by mouth Daily with supper., Disp: , Rfl:     LINZESS 290 MCG CAPS capsule, Take 1 tablet by mouth daily, Disp: , Rfl:     ondansetron (ZOFRAN) 4 MG tablet, Take 1 tablet by mouth every 6 hours as needed for Nausea or Vomiting, Disp: 30 tablet, Rfl: 0    Compression Stockings MISC, by Does not apply route Diagnosis: I83.333 Location of wound: Left Leg Gradient IV (30-40 mm Hg) Style: Knee Length Extremity: Bilateral, Disp: 1 each, Rfl: 0    Multiple Vitamin (MULTI VITAMIN PO), Take by mouth daily, Disp: , Rfl:       Review of Systems - History obtained from the patient  General ROS: negative  Psychological ROS: negative  Hematological and Lymphatic ROS: negative  Endocrine ROS: negative  Respiratory ROS: negative  Cardiovascular ROS: negative  Gastrointestinal ROS:negative  Genito-Urinary ROS: negative  Musculoskeletal ROS: negative   Skin ROS: negative    Physical Exam   Vitals Reviewed   Constitutional: Patient is oriented to person, place, and time. Patient appears well-developed and well-nourished. Patient is active and cooperative. Non-toxic appearance. No distress. Neck: Trachea normal and normal range of motion. No JVD present. Pulmonary/Chest: Effort normal. No accessory muscle usage or stridor. No apnea. No respiratory distress. Cardiovascular: Normal rate and no JVD. Abdominal: Normal appearance. Patient exhibits no distension. Abdomen is soft, obese, non tender. Musculoskeletal: Normal range of motion. Patient exhibits no edema. Neurological: Patient is alert and oriented to person, place, and time. Patient has normal strength. GCS eye subscore is 4. GCS verbal subscore is 5. GCS motor subscore is 6. Skin: Skin is warm and dry. No abrasion and no rash noted.  Patient is not diaphoretic. No cyanosis or erythema. Psychiatric: Patient has a normal mood and affect. Speech is normal and behavior is normal. Cognition and memory are normal.         A/P     Yessenia Rodriguez III is 54 y.o. male , now with Body mass index is 42.68 kg/m². s/p Sleeve gastrectomy, has lost 20 lbs since last visit, total of 108 lbs weight loss. The patient underwent dietary counseling with registered dietician. I have reviewed, discussed and agree with the dietary plan. Patient is trying hard to keep good dietary and behavior modifications. Patient is monitoring portion sizes, food choices and liquid calories. Patient is trying to exercise regularly. Patient pleased with the surgery outcomes. We discussed how his weight affects his overall health including:  Marttia Kern was seen today for follow-up. Diagnoses and all orders for this visit:    Morbid obesity with BMI of 40.0-44.9, adult ([facility] [address] [facility]    Status post laparoscopic sleeve gastrectomy       and importance of weight loss to alleviate those co morbid conditions. I encouraged the patient to continue exercise and keeping healthy eating habits. Also counseled the patient extensively on post surgery care. I spent 15 minutes face to face with patient with more than 50% of the time counseling and/or coordinating care for post-bariatric surgical care. RTC in 3 months  Diet and Exercise      Patient advised that its their responsibility to follow up for studies and/or labs ordered today.

## 2020-10-29 ENCOUNTER — TELEPHONE (OUTPATIENT)
Dept: INTERNAL MEDICINE CLINIC | Age: 55
End: 2020-10-29

## 2020-10-29 RX ORDER — POTASSIUM CHLORIDE 750 MG/1
10 TABLET, FILM COATED, EXTENDED RELEASE ORAL DAILY
Qty: 30 TABLET | Refills: 0 | Status: SHIPPED | OUTPATIENT
Start: 2020-10-29 | End: 2020-12-02 | Stop reason: SDUPTHER

## 2020-12-02 ENCOUNTER — TELEPHONE (OUTPATIENT)
Dept: INTERNAL MEDICINE CLINIC | Age: 55
End: 2020-12-02

## 2020-12-02 RX ORDER — POTASSIUM CHLORIDE 750 MG/1
10 TABLET, FILM COATED, EXTENDED RELEASE ORAL DAILY
Qty: 30 TABLET | Refills: 0 | Status: SHIPPED | OUTPATIENT
Start: 2020-12-02 | End: 2020-12-23 | Stop reason: SDUPTHER

## 2020-12-02 NOTE — TELEPHONE ENCOUNTER
I called and spoke to pt to let him know that he needs to go have his labs drawn before meds can be refilled pt understands

## 2020-12-16 ENCOUNTER — OFFICE VISIT (OUTPATIENT)
Dept: BARIATRICS/WEIGHT MGMT | Age: 55
End: 2020-12-16
Payer: COMMERCIAL

## 2020-12-16 VITALS
WEIGHT: 315 LBS | TEMPERATURE: 98.6 F | DIASTOLIC BLOOD PRESSURE: 74 MMHG | HEIGHT: 74 IN | BODY MASS INDEX: 40.43 KG/M2 | SYSTOLIC BLOOD PRESSURE: 136 MMHG | HEART RATE: 69 BPM

## 2020-12-16 PROCEDURE — 99213 OFFICE O/P EST LOW 20 MIN: CPT | Performed by: SURGERY

## 2020-12-16 NOTE — Clinical Note
Down 111#  Doing great  Hopefully weight loss continues with increased ambulation    Thanks!     Rasheed Vance

## 2020-12-16 NOTE — PROGRESS NOTES
Dietary Assessment Note    Vitals:   Vitals:    12/16/20 1432   Temp: 98.6 °F (37 °C)   Weight: (!) 328 lb 9.6 oz (149.1 kg)   Height: 6' 2\" (1.88 m)    Patient lost 3.8 lbs over 2 months. Total Weight Loss: 111.6#    Labs reviewed: no lab studies available for review at time of visit    Protein intake: 60-80 grams/day     Fluid intake: 48-64 oz/day    Multivitamin/mineral intake: Fusion - how many/day: 2-4    Calcium intake: None    Other: None    Exercise: PT once weekly. Nutrition Assessment: 6 month post-op visit. Pt eats q 3 hours. Not tracking intakes. Breakfast: protein shake     Snack: None    Lunch: turkey/ham 1/2 sandwich with grapes    Snack: baked chicken with some pasta and broccoli    Dinner: turkey dog and some pork and beans    Snack: m&ms OR popcorn    30-30-30: following  Amount at a time: protein 3 oz, healthy serving of veggies    Food Intolerances/issues: none    Client Concerns: None    Goals:    Increase some exercises at home  Protein based snacks and avoid some grazing    Plan: Follow up at 9 months post op      Eduardo Burrell

## 2020-12-23 ENCOUNTER — OFFICE VISIT (OUTPATIENT)
Dept: INTERNAL MEDICINE CLINIC | Age: 55
End: 2020-12-23
Payer: COMMERCIAL

## 2020-12-23 VITALS
TEMPERATURE: 96.8 F | WEIGHT: 315 LBS | HEART RATE: 89 BPM | BODY MASS INDEX: 40.43 KG/M2 | OXYGEN SATURATION: 98 % | HEIGHT: 74 IN | DIASTOLIC BLOOD PRESSURE: 80 MMHG | SYSTOLIC BLOOD PRESSURE: 124 MMHG

## 2020-12-23 LAB
CHP ED QC CHECK: NORMAL
GLUCOSE BLD-MCNC: 111 MG/DL
HBA1C MFR BLD: 5.6 %

## 2020-12-23 PROCEDURE — 82962 GLUCOSE BLOOD TEST: CPT | Performed by: INTERNAL MEDICINE

## 2020-12-23 PROCEDURE — 83036 HEMOGLOBIN GLYCOSYLATED A1C: CPT | Performed by: INTERNAL MEDICINE

## 2020-12-23 PROCEDURE — 99214 OFFICE O/P EST MOD 30 MIN: CPT | Performed by: INTERNAL MEDICINE

## 2020-12-23 RX ORDER — CHLORTHALIDONE 25 MG/1
25 TABLET ORAL DAILY
Qty: 90 TABLET | Refills: 0 | Status: SHIPPED | OUTPATIENT
Start: 2020-12-23 | End: 2021-02-08 | Stop reason: SDUPTHER

## 2020-12-23 RX ORDER — POTASSIUM CHLORIDE 750 MG/1
10 TABLET, FILM COATED, EXTENDED RELEASE ORAL DAILY
Qty: 90 TABLET | Refills: 0 | Status: SHIPPED | OUTPATIENT
Start: 2020-12-23 | End: 2021-01-11

## 2020-12-23 NOTE — PROGRESS NOTES
SUBJECTIVE:  Sylvia Nugent is a 54 y.o. male HERE FOR   Chief Complaint   Patient presents with    Hypertension    Diabetes    Hyperlipidemia    Other        PT HERE FOR EVAL       DM -  NOT ON MED .   ? DIET / EXERCISE COMPLIANCE.  HBS - NOT CHECKING.  EYE EXAM - ? 2020  HTN - TAKING MED . ? DIET / EXERCISE COMPLIANCE. DENIES HA, NO DIZZINESS  DYSLIPIDEMIA -  ? EXERCISE / DIET COMPLIANCE . LABS D/W PT  VIT D DEF - OFF MED. PREVIOUS LAB D/W PT  HYPOKALEMIA -COMPLETED MED . DENIES MUSCLE CRAMPS  MORBID OBESITY - DIET / EXERCISE REVIEWED.  S/P SURGERY. [address] FLAVIO HIP PAIN -  OA. S/P RT HIP REPLACEMENT. PAIN IMPROVED. PT IN PHYSICAL THERAPY  SCREENING HEP B - D/W PT      DENIES CP, No SOB, No PALPITATIONS, [address] [place] [address], NO F/C  No ABD PAIN, No N/V, No DIARRHEA, OCC CONSTIPATION, No MELENA, No HEMATOCHEZIA. No DYSURIA, No FREQ, No URGENCY, No HEMATURIA       PMH: REVIEWED AND UPDATED TODAY    PSH: REVIEWED AND UPDATED TODAY    SOCIAL HX: REVIEWED AND UPDATED TODAY    FAMILY HX: REVIEWED AND UPDATED TODAY    ALLERGY:  Patient has no known allergies. MEDS: REVIEWED  Prior to Visit Medications    Medication Sig Taking? Authorizing Provider   chlorthalidone (HYGROTON) 25 MG tablet TAKE 1 TABLET BY MOUTH EVERY DAY Yes Chika Garcia MD   potassium chloride (K-TAB) 10 MEQ extended release tablet Take 1 tablet by mouth daily [address] Yes Chika Garcia MD   gabapentin (NEURONTIN) 300 MG capsule Take 300 mg by mouth Daily with supper.  Yes Historical Provider, MD   LINZESS 290 MCG CAPS capsule Take 1 tablet by mouth daily Yes Historical Provider, MD   ondansetron (ZOFRAN) 4 MG tablet Take 1 tablet by mouth every 6 hours as needed for Nausea or Vomiting Yes HAIM Millan - CNP   Compression Stockings MISC by Does not apply route Diagnosis: I83.333  Location of wound: Left Leg  Gradient IV (30-40 mm Hg)  Style: Knee Length 5. Hypokalemia  COUNSELLED. CONTINUE MED. MONITOR LABS AND MAKE CHANGES AS NEEDED       6. Morbid obesity ([address]  COUNSELLED. DIET/ EXERCISE ADVISED. LIFESTYLE MODIFICATION. WT LOSS ADVISED. MONITOR AND MAKE CHANGES AS NEEDED. 7. Pain of both hip joints  COUNSELLED. OA. EXERCISES. ANALGESICS PRN. MONITOR. IMPROVING. F/U ORTHO. PHYSICAL THERAPY  MAKE CHANGES AS NEEDED. 8. Need for hepatitis B screening test  COUNSELLED. HEP B WITH LABS - OK WITH PT  MAKE CHANGES AS NEEDED.                          MEDICATION SIDE EFFECTS D/W PATIENT    PT STABLE AT TIME OF D/C.    RETURN TO CLINIC WITHIN 3 MONTHS / PRN    FOLLOW UP FOR FASTING LABS

## 2021-02-08 ENCOUNTER — OFFICE VISIT (OUTPATIENT)
Dept: INTERNAL MEDICINE CLINIC | Age: 56
End: 2021-02-08
Payer: COMMERCIAL

## 2021-02-08 VITALS
DIASTOLIC BLOOD PRESSURE: 78 MMHG | SYSTOLIC BLOOD PRESSURE: 120 MMHG | TEMPERATURE: 97 F | BODY MASS INDEX: 40.43 KG/M2 | HEIGHT: 74 IN | OXYGEN SATURATION: 96 % | WEIGHT: 315 LBS | HEART RATE: 72 BPM | RESPIRATION RATE: 14 BRPM

## 2021-02-08 DIAGNOSIS — I10 ESSENTIAL HYPERTENSION: ICD-10-CM

## 2021-02-08 DIAGNOSIS — E11.9 TYPE 2 DIABETES MELLITUS WITHOUT COMPLICATION, WITHOUT LONG-TERM CURRENT USE OF INSULIN (HCC): ICD-10-CM

## 2021-02-08 DIAGNOSIS — M16.12 ARTHRITIS OF LEFT HIP: ICD-10-CM

## 2021-02-08 DIAGNOSIS — E78.5 DYSLIPIDEMIA: ICD-10-CM

## 2021-02-08 DIAGNOSIS — E55.9 VITAMIN D DEFICIENCY: ICD-10-CM

## 2021-02-08 DIAGNOSIS — E87.6 HYPOKALEMIA: ICD-10-CM

## 2021-02-08 DIAGNOSIS — Z01.818 PREOP EXAMINATION: Primary | ICD-10-CM

## 2021-02-08 DIAGNOSIS — E66.01 MORBID OBESITY (HCC): ICD-10-CM

## 2021-02-08 LAB
CHP ED QC CHECK: NORMAL
GLUCOSE BLD-MCNC: 101 MG/DL

## 2021-02-08 PROCEDURE — 99214 OFFICE O/P EST MOD 30 MIN: CPT | Performed by: INTERNAL MEDICINE

## 2021-02-08 PROCEDURE — 82962 GLUCOSE BLOOD TEST: CPT | Performed by: INTERNAL MEDICINE

## 2021-02-08 RX ORDER — CHLORTHALIDONE 25 MG/1
25 TABLET ORAL DAILY
Qty: 90 TABLET | Refills: 0 | Status: SHIPPED | OUTPATIENT
Start: 2021-02-08 | End: 2021-05-12 | Stop reason: SDUPTHER

## 2021-02-08 RX ORDER — POTASSIUM CHLORIDE 750 MG/1
10 TABLET, FILM COATED, EXTENDED RELEASE ORAL DAILY
Qty: 30 TABLET | Refills: 1 | Status: SHIPPED | OUTPATIENT
Start: 2021-02-08 | End: 2021-05-12 | Stop reason: SDUPTHER

## 2021-02-08 ASSESSMENT — PATIENT HEALTH QUESTIONNAIRE - PHQ9: 2. FEELING DOWN, DEPRESSED OR HOPELESS: 0

## 2021-02-08 NOTE — PATIENT INSTRUCTIONS
TAKE MED AS ADVISED    DIET/ EXERCISE.     FOLLOW UP WITHIN 3 MONTHS / AS NEEDED    FOLLOW UP FOR LABS That's fine

## 2021-02-08 NOTE — LETTER
[facility] Internal Medicine  [address] [place] [address] Drive  [address] [facility]  Phone: [phone]  Fax: [phone]    Jeremías Siddiqi MD        February 8, 2021     Dr. Vielka Martinez:    Patient: Yrn Mark III   MR Number: <[id number]>   YOB: 1965   Date of Visit: 2/8/2021       Dear Dr. Dr. Vielka Martinez:    Thank you for referring Yrn Mark III to me for preop evaluation. EKG done 9/2020 - no acute findings - see copy  Labs ordered [facility] - please follow  Advise close vera / post op monitoring  Advised avoid ASA / NSAIDS preop  Please see completed preop form  He is otherwise OK to proceed with planned procedure. If you have questions, please do not hesitate to call me.      Sincerely,         Jeremías Siddiqi MD

## 2021-02-08 NOTE — PROGRESS NOTES
SUBJECTIVE:  Jose Ogden is a 54 y.o. male HERE FOR   Chief Complaint   Patient presents with    Hypertension    Diabetes    Pre-op Exam        PT HERE FOR EVAL / PREOP      PREOP PER REQUEST OF DR José Manuel Malloy  DENIES COUGH, NO F/C, NO INCREASED BLEEDING TENDENCY     LT HIP ARTHRITIS - PLAN FOR HIP REPLACEMENT 2/16/2021     DM -  NOT ON MED.   ? DIET / EXERCISE COMPLIANCE.  HBS - NOT CHECKING.  EYE EXAM - ? 2020  HTN - TAKING MED. ? DIET / EXERCISE COMPLIANCE. DENIES HA, NO DIZZINESS  DYSLIPIDEMIA -  ? EXERCISE / DIET COMPLIANCE . LABS D/W PT  HYPOKALEMIA - TAKING MED . DENIES MUSCLE CRAMPS  VIT D DEF - OFF MED. PREVIOUS LAB D/W PT  MORBID OBESITY - DIET / EXERCISE REVIEWED.  FOLLOWING [address] [facility]. S/P PRIOR SURGERY.            DENIES CP, No SOB, No PALPITATIONS. No ABD PAIN, No N/V, No DIARRHEA, NO CONSTIPATION, No MELENA, No HEMATOCHEZIA. No DYSURIA, No FREQ, No URGENCY, No HEMATURIA    PMH: REVIEWED AND UPDATED TODAY    PSH: REVIEWED AND UPDATED TODAY    SOCIAL HX: REVIEWED AND UPDATED TODAY    FAMILY HX: REVIEWED AND UPDATED TODAY    ALLERGY:  Patient has no known allergies. MEDS: REVIEWED    Prior to Visit Medications    Medication Sig Taking? Authorizing Provider   DULoxetine HCl (CYMBALTA PO) Take by mouth Yes Historical Provider, MD   potassium chloride (KLOR-CON) 10 MEQ extended release tablet Take 1 tablet by mouth daily Yes Teja Patel MD   chlorthalidone (HYGROTON) 25 MG tablet Take 1 tablet by mouth daily Yes Teja Patel MD   gabapentin (NEURONTIN) 300 MG capsule Take 300 mg by mouth Daily with supper.  Yes Historical Provider, MD   Compression Stockings MISC by Does not apply route Diagnosis: I83.333  Location of wound: Left Leg  Gradient IV (30-40 mm Hg)  Style: Knee Length  Extremity: Bilateral Yes Ashlee Kelley DPM   Multiple Vitamin (MULTI VITAMIN PO) Take by mouth daily Yes Historical Provider, MD        ROS: COMPREHENSIVE ROS AS IN HX, REST -VE  History obtained from chart review and the patient    OBJECTIVE:   NURSING NOTE AND VITALS REVIEWED  /70 (Site: Right Upper Arm, Position: Sitting, Cuff Size: Large Adult)   Pulse 72   Temp 97 °F (36.1 °C)   Resp 14   Ht 6' 2\" (1.88 m)   Wt (!) 321 lb (145.6 kg)   SpO2 96%   BMI 41.21 kg/m²     NO ACUTE DISTRESS    REPEAT BP: 120/78 (RT), NO ORTHOSTASIS     Body mass index is 41.21 kg/m². HEENT: NO PALLOR, ANICTERIC, PERRLA, EOMI, NO CONJUNCTIVAL ERYTHEMA,                 NO SINUS TENDERNESS  NECK:  SUPPLE, TRACHEA MIDLINE, NT, NO JVD, NO CB, NO LA, NO TM, NO STIFFNESS  CHEST: RESPY EFFORT NL, GOOD AE, NO W/R/C  HEART: S1S2+ REG, NO M/G/R  ABD: OBESE, SOFT, NT, NO HSM, BS+  EXT: NO EDEMA, NT, PULSES +. AMANUEL'S -VE  NEURO: ALERT AND ORIENTED X 3, NO MENINGEAL SIGNS, NO TREMORS, AMBULATING WITH A WALKER OTHERWISE, NO FOCAL DEFICITS  PSYCH: FAIRLY GOOD AFFECT  BACK: NT, NO ROM, NO CVA TENDERNESS  HIP: NT, + PAIN WITH MVT - LT, OCC ROM - LT      PREVIOUS LABS / EKG - 9/20 REVIEWED AND D/W PT     ACCUCHECK: 101          ASSESSMENT / PLAN:     Diagnosis Orders   1. Preop examination  COUNSELLED. PREOP EVAL PER REQUEST OF DR Sameer Fontanez  ADVISED AVOID ASA/ NSAIDS PREOP  ADVISED CLOSE SEN / POST OP MONITORING. F/U LABS  PT OTHERWISE OK TO PROCEED WITH PLANNED PROCEDURE . SEE COMPLETED FORM IN CHART. FORM  / LETTER FAXED TO SURGERY AND COPY GIVEN TO PT.  MAKE CHANGES AS NEEDED. 2. Arthritis of left hip  COUNSELLED. PREOP AS ABOVE  F/U ORTHO  MAKE CHANGES AS NEEDED. 3. Type 2 diabetes mellitus without complication, without long-term current use of insulin ([address]  COUNSELLED. ADVISED ON DIET/ EXERCISE. MONITOR. GOALS D/W PT  MAKE CHANGES AS NEEDED. 4. Essential hypertension  COUNSELLED. CONTINUE MED. LOW NA+ / DASH DIET/ EXERCISE. MONITOR. GOAL </= 120/80  F/U LABS  MAKE CHANGES AS NEEDED. 5. Dyslipidemia  COUNSELLED. ADVISED LOW FAT / CHOL DIET/ EXERCISE.  MONITOR.   GOALS D/W PT.  MAKE CHANGES AS NEEDED. 6. Hypokalemia  COUNSELLED. CONTINUE MED. MONITOR MAKE CHANGES AS NEEDED       7. Vitamin D deficiency  COUNSELLED. MONITOR O MED  MAKE CHANGES AS NEEDED. 8. Morbid obesity ([address]  COUNSELLED. DIET/ EXERCISE ADVISED. LIFESTYLE MODIFICATION. WT LOSS ADVISED. MONITOR AND MAKE CHANGES AS NEEDED.                        MEDICATION SIDE EFFECTS D/W PATIENT    PT STABLE AT TIME OF D/C.    RETURN TO CLINIC WITHIN 3 MONTHS / PRN    FOLLOW UP FOR LABS

## 2021-03-10 ENCOUNTER — OFFICE VISIT (OUTPATIENT)
Dept: BARIATRICS/WEIGHT MGMT | Age: 56
End: 2021-03-10
Payer: COMMERCIAL

## 2021-03-10 VITALS
WEIGHT: 309.2 LBS | DIASTOLIC BLOOD PRESSURE: 79 MMHG | TEMPERATURE: 96.4 F | HEIGHT: 74 IN | SYSTOLIC BLOOD PRESSURE: 133 MMHG | BODY MASS INDEX: 39.68 KG/M2 | HEART RATE: 79 BPM

## 2021-03-10 DIAGNOSIS — E66.01 SEVERE OBESITY (BMI 35.0-35.9 WITH COMORBIDITY) (HCC): ICD-10-CM

## 2021-03-10 DIAGNOSIS — Z98.84 STATUS POST LAPAROSCOPIC SLEEVE GASTRECTOMY: Primary | ICD-10-CM

## 2021-03-10 DIAGNOSIS — M16.0 PRIMARY OSTEOARTHRITIS OF BOTH HIPS: ICD-10-CM

## 2021-03-10 PROCEDURE — 99213 OFFICE O/P EST LOW 20 MIN: CPT | Performed by: SURGERY

## 2021-03-10 NOTE — PROGRESS NOTES
[facility]) Physicians   General & Laparoscopic Surgery  [employer] Management [employer]       HPI:     Bessy Martinez III is a very pleasant 54 y.o. obese male , Body mass index is 39.7 kg/m². Beba Dues And multiple medical problems who is presenting for bariatric follow up care. Bessy Martinez III is s/p laparoscopic sleeve gastrectomy by me   Comes today to the clinic without any complaints. Patient denies any nausea, vomiting, fevers, chills, shortness of breath, chest pain, constipation or urinary symptoms. Denies any heartburn nor dysphagia. Patient is feeling very well, and is very active. Patient is very pleased with the weight loss and resolution of co-morbid conditions. Past Medical History:   Diagnosis Date    Arthritis     Obesity     PPD positive     Venous stasis ulcer of left lower extremity ([address] 10/25/2016     Past Surgical History:   Procedure Laterality Date    COLONOSCOPY      JOINT REPLACEMENT Right     SLEEVE GASTRECTOMY N/A 6/9/2020    ROBOTIC ASSISTED LAPAROSCOPIC SLEEVE GASTRECTOMY; LAPAROSCOPIC VENTRAL HERNIA REPAIR performed by Karin Campos DO at [place] [address] N/A 9/16/2019    EGD BIOPSY performed by Karin Campos DO at [facility] ENDOSCOPY     Family History   Problem Relation Age of Onset    Heart Disease Mother     Diabetes Maternal Uncle     Diabetes Paternal Uncle     Cancer Paternal Uncle         ? pancreatic    Cancer Brother         stomach     Social History     Tobacco Use    Smoking status: Never Smoker    Smokeless tobacco: Never Used   Substance Use Topics    Alcohol use: Yes     Comment: occ     I counseled the patient on the importance of not smoking and risks of ETOH. No Known Allergies  Vitals:    03/10/21 1413   BP: 133/79   Pulse: 79   Temp: 96.4 °F (35.8 °C)   Weight: (!) 309 lb 3.2 oz (140.3 kg)   Height: 6' 2\" (1.88 m)       Body mass index is 39.7 kg/m².       Current Outpatient Medications:     DULoxetine HCl (CYMBALTA PO), Take by mouth, Disp: , Rfl:     potassium chloride (KLOR-CON) 10 MEQ extended release tablet, Take 1 tablet by mouth daily, Disp: 30 tablet, Rfl: 1    chlorthalidone (HYGROTON) 25 MG tablet, Take 1 tablet by mouth daily, Disp: 90 tablet, Rfl: 0    gabapentin (NEURONTIN) 300 MG capsule, Take 300 mg by mouth Daily with supper., Disp: , Rfl:     Compression Stockings MISC, by Does not apply route Diagnosis: I83.333 Location of wound: Left Leg Gradient IV (30-40 mm Hg) Style: Knee Length Extremity: Bilateral, Disp: 1 each, Rfl: 0    Multiple Vitamin (MULTI VITAMIN PO), Take by mouth daily, Disp: , Rfl:       Review of Systems - History obtained from the patient  General ROS: negative  Psychological ROS: negative  Hematological and Lymphatic ROS: negative  Endocrine ROS: negative  Respiratory ROS: negative  Cardiovascular ROS: negative  Gastrointestinal ROS:negative  Genito-Urinary ROS: negative  Musculoskeletal ROS: negative   Skin ROS: negative    Physical Exam   Vitals Reviewed   Constitutional: Patient is oriented to person, place, and time. Patient appears well-developed and well-nourished. Patient is active and cooperative. Non-toxic appearance. No distress. Neck: Trachea normal and normal range of motion. No JVD present. Pulmonary/Chest: Effort normal. No accessory muscle usage or stridor. No apnea. No respiratory distress. Cardiovascular: Normal rate and no JVD. Abdominal: Normal appearance. Patient exhibits no distension. Abdomen is soft, obese, non tender. Musculoskeletal: Normal range of motion. Patient exhibits no edema. Neurological: Patient is alert and oriented to person, place, and time. Patient has normal strength. GCS eye subscore is 4. GCS verbal subscore is 5. GCS motor subscore is 6. Skin: Skin is warm and dry. No abrasion and no rash noted. Patient is not diaphoretic. No cyanosis or erythema. Psychiatric: Patient has a normal mood and affect.  Speech is normal and behavior is normal.

## 2021-05-12 ENCOUNTER — OFFICE VISIT (OUTPATIENT)
Dept: INTERNAL MEDICINE CLINIC | Age: 56
End: 2021-05-12
Payer: COMMERCIAL

## 2021-05-12 VITALS
BODY MASS INDEX: 38.89 KG/M2 | SYSTOLIC BLOOD PRESSURE: 120 MMHG | DIASTOLIC BLOOD PRESSURE: 80 MMHG | HEART RATE: 91 BPM | OXYGEN SATURATION: 98 % | HEIGHT: 74 IN | WEIGHT: 303 LBS

## 2021-05-12 DIAGNOSIS — E78.5 DYSLIPIDEMIA: ICD-10-CM

## 2021-05-12 DIAGNOSIS — Z96.642 STATUS POST LEFT HIP REPLACEMENT: ICD-10-CM

## 2021-05-12 DIAGNOSIS — E66.9 OBESITY (BMI 30-39.9): ICD-10-CM

## 2021-05-12 DIAGNOSIS — E11.9 TYPE 2 DIABETES MELLITUS WITHOUT COMPLICATION, WITHOUT LONG-TERM CURRENT USE OF INSULIN (HCC): Primary | ICD-10-CM

## 2021-05-12 DIAGNOSIS — E87.6 HYPOKALEMIA: ICD-10-CM

## 2021-05-12 DIAGNOSIS — E55.9 VITAMIN D DEFICIENCY: ICD-10-CM

## 2021-05-12 DIAGNOSIS — I10 ESSENTIAL HYPERTENSION: ICD-10-CM

## 2021-05-12 LAB
CHP ED QC CHECK: NORMAL
GLUCOSE BLD-MCNC: 102 MG/DL
HBA1C MFR BLD: 5.7 %

## 2021-05-12 PROCEDURE — 82962 GLUCOSE BLOOD TEST: CPT | Performed by: INTERNAL MEDICINE

## 2021-05-12 PROCEDURE — 83036 HEMOGLOBIN GLYCOSYLATED A1C: CPT | Performed by: INTERNAL MEDICINE

## 2021-05-12 PROCEDURE — 99214 OFFICE O/P EST MOD 30 MIN: CPT | Performed by: INTERNAL MEDICINE

## 2021-05-12 RX ORDER — POTASSIUM CHLORIDE 750 MG/1
10 TABLET, FILM COATED, EXTENDED RELEASE ORAL DAILY
Qty: 30 TABLET | Refills: 1 | Status: SHIPPED | OUTPATIENT
Start: 2021-05-12 | End: 2021-08-11 | Stop reason: SDUPTHER

## 2021-05-12 RX ORDER — CHLORTHALIDONE 25 MG/1
25 TABLET ORAL DAILY
Qty: 90 TABLET | Refills: 0 | Status: SHIPPED | OUTPATIENT
Start: 2021-05-12 | End: 2021-08-11 | Stop reason: SDUPTHER

## 2021-05-12 SDOH — ECONOMIC STABILITY: TRANSPORTATION INSECURITY
IN THE PAST 12 MONTHS, HAS THE LACK OF TRANSPORTATION KEPT YOU FROM MEDICAL APPOINTMENTS OR FROM GETTING MEDICATIONS?: NO

## 2021-05-12 ASSESSMENT — PATIENT HEALTH QUESTIONNAIRE - PHQ9
SUM OF ALL RESPONSES TO PHQ9 QUESTIONS 1 & 2: 0
SUM OF ALL RESPONSES TO PHQ QUESTIONS 1-9: 0
SUM OF ALL RESPONSES TO PHQ QUESTIONS 1-9: 0

## 2021-05-12 NOTE — PATIENT INSTRUCTIONS
TAKE MED AS ADVISED    DIET/ EXERCISE.     FOLLOW UP WITHIN 3- 4 MONTHS / AS NEEDED    FOLLOW UP FOR LABS

## 2021-05-12 NOTE — PROGRESS NOTES
SUBJECTIVE:  Reji Cohen is a 54 y.o. male HERE FOR   Chief Complaint   Patient presents with    Diabetes    Hypertension    Other      PT HERE FOR EVAL / PREOP        DM -  NOT ON MED.   ? DIET / EXERCISE COMPLIANCE.  HBS - NOT CHECKING.  EYE EXAM - ? 2020  HTN - TAKING MED. ? DIET / EXERCISE COMPLIANCE. DENIES HA, NO DIZZINESS  DYSLIPIDEMIA -  ? EXERCISE / DIET COMPLIANCE . LABS D/W PT  HYPOKALEMIA - TAKING MED . DENIES MUSCLE CRAMPS  VIT D DEF - OFF MED. PREVIOUS LAB D/W PT  OBESITY - DIET / EXERCISE REVIEWED.  FOLLOWING [address]. S/P PRIOR SURGERY.  WT NOTED  LT HIP ARTHRITIS - S/P HIP REPLACEMENT. DOING WELL. IN PHY. THERAPY        DENIES CP, No SOB, No PALPITATIONS. NO COUGH, NO F/C  No ABD PAIN, No N/V, No DIARRHEA, NO CONSTIPATION, No MELENA, No HEMATOCHEZIA. No DYSURIA, No FREQ, No URGENCY, No HEMATURIA      PMH: REVIEWED AND UPDATED TODAY    PSH: REVIEWED AND UPDATED TODAY    SOCIAL HX: REVIEWED AND UPDATED TODAY    FAMILY HX: REVIEWED AND UPDATED TODAY    ALLERGY:  Patient has no known allergies. MEDS: REVIEWED  Prior to Visit Medications    Medication Sig Taking? Authorizing Provider   DULoxetine HCl (CYMBALTA PO) Take by mouth Yes Historical Provider, MD   potassium chloride (KLOR-CON) 10 MEQ extended release tablet Take 1 tablet by mouth daily Yes Sondra Julien MD   chlorthalidone (HYGROTON) 25 MG tablet Take 1 tablet by mouth daily Yes Sondra Julien MD   gabapentin (NEURONTIN) 300 MG capsule Take 300 mg by mouth Daily with supper.  Yes Historical Provider, MD   Compression Stockings MISC by Does not apply route Diagnosis: I83.333  Location of wound: Left Leg  Gradient IV (30-40 mm Hg)  Style: Knee Length  Extremity: Bilateral Yes Ashlee Kelley DPM   Multiple Vitamin (MULTI VITAMIN PO) Take by mouth daily Yes Historical Provider, MD       ROS: COMPREHENSIVE ROS AS IN HX, REST -VE  History obtained from chart review and the patient    OBJECTIVE:   NURSING NOTE AND VITALS MEDICATION SIDE EFFECTS D/W PATIENT    PT STABLE AT TIME OF D/C.    RETURN TO CLINIC WITHIN 3- 4 MONTHS / PRN    FOLLOW UP FOR LABS

## 2021-06-09 ENCOUNTER — OFFICE VISIT (OUTPATIENT)
Dept: BARIATRICS/WEIGHT MGMT | Age: 56
End: 2021-06-09
Payer: COMMERCIAL

## 2021-06-09 VITALS — HEIGHT: 74 IN | WEIGHT: 296 LBS | BODY MASS INDEX: 37.99 KG/M2

## 2021-06-09 DIAGNOSIS — E66.01 SEVERE OBESITY (BMI 35.0-35.9 WITH COMORBIDITY) (HCC): Primary | ICD-10-CM

## 2021-06-09 DIAGNOSIS — Z98.84 STATUS POST LAPAROSCOPIC SLEEVE GASTRECTOMY: ICD-10-CM

## 2021-06-09 PROCEDURE — 99213 OFFICE O/P EST LOW 20 MIN: CPT | Performed by: SURGERY

## 2021-06-09 NOTE — PROGRESS NOTES
Dietary Assessment Note      Vitals:   Vitals:    06/09/21 1329   Weight: 296 lb (134.3 kg)   Height: 6' 2\" (1.88 m)    Patient lost 13.2 lbs over the past 3 months. Total Weight Loss: 144.2 lbs    Labs reviewed:  no lab studies available for review at time of visit    Protein intake: 60-80 grams/day     Fluid intake: 48-64 oz/day    Multivitamin/mineral intake: yes 2 Fusion daily but not consistent with all 4    Calcium intake: no    Other: sea zaman, liver supplement, ginko biloba    Exercise: yes water aerobics, water therapy; physical therapy 2x     Nutrition Assessment: Brkst is protein shake OR 1 egg scrambled, turkey cano 1, dry wheat toast; lunch is 1/2 turkey ham sandwich with low fat cheese, greens or broccoli or salad; snack is salad with grilled chix; snack is fruit; dinner is protein, veg.      Amount able to eat per sitting: 3oz    Following 30/30/30 rule: yes    Food Intolerances/issues: pizza sauce, rice, pasta    Client Concerns: concern about protein intake    Goals: goal is 75g protein    Plan: follow up as needed    Sia Negron RD, R[place]

## 2021-06-09 NOTE — PROGRESS NOTES
[facility]) Physicians   General & Laparoscopic Surgery  [employer]       HPI:     Felicitas Francis III is a very pleasant 54 y.o. obese male , Body mass index is 38 kg/m². [facility] who is presenting for bariatric follow up care. Felicitas Francis III is s/p laparoscopic sleeve gastrectomy by me   Comes today to the clinic without any complaints. Patient denies any nausea, vomiting, fevers, chills, shortness of breath, chest pain, constipation or urinary symptoms. Denies any heartburn nor dysphagia. Patient is feeling very well, and is very active. Patient is very pleased with the weight loss and resolution of co-morbid conditions. Past Medical History:   Diagnosis Date    Arthritis     Obesity     PPD positive     Venous stasis ulcer of left lower extremity ([address] 10/25/2016     Past Surgical History:   Procedure Laterality Date    COLONOSCOPY      JOINT REPLACEMENT Right     SLEEVE GASTRECTOMY N/A 6/9/2020    ROBOTIC ASSISTED LAPAROSCOPIC SLEEVE GASTRECTOMY; LAPAROSCOPIC VENTRAL HERNIA REPAIR performed by Ingrid Bravo DO at [address] [place] [address] N/A 9/16/2019    EGD BIOPSY performed by Ingrid Bravo DO at [facility] ENDOSCOPY     Family History   Problem Relation Age of Onset    Heart Disease Mother     Diabetes Maternal Uncle     Diabetes Paternal Uncle     Cancer Paternal Uncle         ? pancreatic    Cancer Brother         stomach     Social History     Tobacco Use    Smoking status: Never Smoker    Smokeless tobacco: Never Used   Substance Use Topics    Alcohol use: Yes     Comment: occ     I counseled the patient on the importance of not smoking and risks of ETOH. No Known Allergies  Vitals:    06/09/21 1329   Weight: 296 lb (134.3 kg)   Height: 6' 2\" (1.88 m)       Body mass index is 38 kg/m².       Current Outpatient Medications:     potassium chloride (KLOR-CON) 10 MEQ extended release tablet, Take 1 tablet by mouth daily, Disp: 30 tablet, Rfl: 1    chlorthalidone (HYGROTON) 25 MG tablet, Take 1 tablet by mouth daily, Disp: 90 tablet, Rfl: 0    DULoxetine HCl (CYMBALTA PO), Take by mouth, Disp: , Rfl:     gabapentin (NEURONTIN) 300 MG capsule, Take 300 mg by mouth Daily with supper., Disp: , Rfl:     Compression Stockings MISC, by Does not apply route Diagnosis: I83.333 Location of wound: Left Leg Gradient IV (30-40 mm Hg) Style: Knee Length Extremity: Bilateral, Disp: 1 each, Rfl: 0    Multiple Vitamin (MULTI VITAMIN PO), Take by mouth daily, Disp: , Rfl:       Review of Systems - History obtained from the patient  General ROS: negative  Psychological ROS: negative  Hematological and Lymphatic ROS: negative  Endocrine ROS: negative  Respiratory ROS: negative  Cardiovascular ROS: negative  Gastrointestinal ROS:negative  Genito-Urinary ROS: negative  Musculoskeletal ROS: negative   Skin ROS: negative    Physical Exam   Vitals Reviewed   Constitutional: Patient is oriented to person, place, and time. Patient appears well-developed and well-nourished. Patient is active and cooperative. Non-toxic appearance. No distress. Neck: Trachea normal and normal range of motion. No JVD present. Pulmonary/Chest: Effort normal. No accessory muscle usage or stridor. No apnea. No respiratory distress. Cardiovascular: Normal rate and no JVD. Abdominal: Normal appearance. Patient exhibits no distension. Abdomen is soft, obese, non tender. Musculoskeletal: Normal range of motion. Patient exhibits no edema. Neurological: Patient is alert and oriented to person, place, and time. Patient has normal strength. GCS eye subscore is 4. GCS verbal subscore is 5. GCS motor subscore is 6. Skin: Skin is warm and dry. No abrasion and no rash noted. Patient is not diaphoretic. No cyanosis or erythema. Psychiatric: Patient has a normal mood and affect.  Speech is normal and behavior is normal. Cognition and memory are normal.         A/P     Katie Bushy III is 54 y.o. male , now with Body mass index is 38 kg/m². s/p Sleeve gastrectomy, has lost 13 lbs since last visit, total of 144 lbs weight loss. The patient underwent dietary counseling with registered dietician. I have reviewed, discussed and agree with the dietary plan. Patient is trying hard to keep good dietary and behavior modifications. Patient is monitoring portion sizes, food choices and liquid calories. Patient is trying to exercise regularly. Patient pleased with the surgery outcomes. We discussed how his weight affects his overall health including:  Hanna Babb was seen today for bariatrics post op follow up. Diagnoses and all orders for this visit:    Severe obesity (BMI 35.0-35.9 with comorbidity) ([facility] [address]    Status post laparoscopic sleeve gastrectomy       and importance of weight loss to alleviate those co morbid conditions. I encouraged the patient to continue exercise and keeping healthy eating habits. Also counseled the patient extensively on post surgery care. Total encounter time:  20 minutes including any number of the following: review of labs, imaging, provider notes, outside hospital records; performing examination/evaluation; counseling patient and family; ordering medications/tests; placing referrals and communication with referring physicians; coordination of care, and documentation in the EHR. RTC in 6 months  Diet and Exercise      Patient advised that its their responsibility to follow up for studies and/or labs ordered today.

## 2021-08-11 ENCOUNTER — OFFICE VISIT (OUTPATIENT)
Dept: INTERNAL MEDICINE CLINIC | Age: 56
End: 2021-08-11
Payer: COMMERCIAL

## 2021-08-11 VITALS
BODY MASS INDEX: 38.89 KG/M2 | HEIGHT: 74 IN | SYSTOLIC BLOOD PRESSURE: 120 MMHG | DIASTOLIC BLOOD PRESSURE: 80 MMHG | WEIGHT: 303 LBS | OXYGEN SATURATION: 98 % | HEART RATE: 73 BPM

## 2021-08-11 DIAGNOSIS — E66.9 OBESITY (BMI 30-39.9): ICD-10-CM

## 2021-08-11 DIAGNOSIS — E55.9 VITAMIN D DEFICIENCY: ICD-10-CM

## 2021-08-11 DIAGNOSIS — Z23 NEED FOR HEPATITIS B VACCINATION: ICD-10-CM

## 2021-08-11 DIAGNOSIS — I10 ESSENTIAL HYPERTENSION: Primary | ICD-10-CM

## 2021-08-11 DIAGNOSIS — E11.9 TYPE 2 DIABETES MELLITUS WITHOUT COMPLICATION, WITHOUT LONG-TERM CURRENT USE OF INSULIN (HCC): ICD-10-CM

## 2021-08-11 DIAGNOSIS — E87.6 HYPOKALEMIA: ICD-10-CM

## 2021-08-11 DIAGNOSIS — E78.5 DYSLIPIDEMIA: ICD-10-CM

## 2021-08-11 DIAGNOSIS — L98.7 EXCESS SKIN OF ABDOMEN: ICD-10-CM

## 2021-08-11 LAB
CHP ED QC CHECK: NORMAL
GLUCOSE BLD-MCNC: 110 MG/DL

## 2021-08-11 PROCEDURE — 99214 OFFICE O/P EST MOD 30 MIN: CPT | Performed by: INTERNAL MEDICINE

## 2021-08-11 PROCEDURE — 82962 GLUCOSE BLOOD TEST: CPT | Performed by: INTERNAL MEDICINE

## 2021-08-11 PROCEDURE — 90746 HEPB VACCINE 3 DOSE ADULT IM: CPT | Performed by: INTERNAL MEDICINE

## 2021-08-11 PROCEDURE — 90471 IMMUNIZATION ADMIN: CPT | Performed by: INTERNAL MEDICINE

## 2021-08-11 RX ORDER — POTASSIUM CHLORIDE 750 MG/1
10 TABLET, FILM COATED, EXTENDED RELEASE ORAL DAILY
Qty: 30 TABLET | Refills: 3 | Status: SHIPPED | OUTPATIENT
Start: 2021-08-11 | End: 2022-10-18

## 2021-08-11 RX ORDER — CHLORTHALIDONE 25 MG/1
25 TABLET ORAL DAILY
Qty: 90 TABLET | Refills: 0 | Status: SHIPPED | OUTPATIENT
Start: 2021-08-11 | End: 2022-03-18

## 2021-08-11 SDOH — ECONOMIC STABILITY: FOOD INSECURITY: WITHIN THE PAST 12 MONTHS, YOU WORRIED THAT YOUR FOOD WOULD RUN OUT BEFORE YOU GOT MONEY TO BUY MORE.: NEVER TRUE

## 2021-08-11 SDOH — ECONOMIC STABILITY: FOOD INSECURITY: WITHIN THE PAST 12 MONTHS, THE FOOD YOU BOUGHT JUST DIDN'T LAST AND YOU DIDN'T HAVE MONEY TO GET MORE.: NEVER TRUE

## 2021-08-11 ASSESSMENT — SOCIAL DETERMINANTS OF HEALTH (SDOH): HOW HARD IS IT FOR YOU TO PAY FOR THE VERY BASICS LIKE FOOD, HOUSING, MEDICAL CARE, AND HEATING?: NOT HARD AT ALL

## 2021-08-11 NOTE — PROGRESS NOTES
SUBJECTIVE:  Brigid Garza is a 64 y.o. male HERE FOR   Chief Complaint   Patient presents with    Diabetes    Other     pt would like to talk about skin removal     Hypertension        PT HERE FOR EVAL     HTN - TAKING MED. ? DIET / EXERCISE COMPLIANCE. DENIES HA, NO DIZZINESS   DM -  NOT ON MED.   ? DIET / EXERCISE COMPLIANCE.  HBS - NOT CHECKING.  EYE EXAM - ? 2020  DYSLIPIDEMIA -  ? EXERCISE / DIET COMPLIANCE . LABS D/W PT  HYPOKALEMIA - TAKING MED . DENIES MUSCLE CRAMPS  VIT D DEF - OFF MED. PREVIOUS LAB D/W PT  OBESITY - DIET / EXERCISE REVIEWED.  FOLLOWING [facility]. S/P PRIOR SURGERY.  WT NOTED  C/O EXCESS ABD SKIN FOLD  - NOTED SINCE LOSING WT  NEEDS HEP B - NOT IMMUNE PER LAB      DENIES CP, No SOB, No PALPITATIONS. NO COUGH, NO F/C  No ABD PAIN, No N/V, No DIARRHEA, NO CONSTIPATION, No MELENA, No HEMATOCHEZIA. No DYSURIA, No FREQ, No URGENCY, No HEMATURIA    PMH: REVIEWED AND UPDATED TODAY    PSH: REVIEWED AND UPDATED TODAY    SOCIAL HX: REVIEWED AND UPDATED TODAY    FAMILY HX: REVIEWED AND UPDATED TODAY    ALLERGY:  Patient has no known allergies. MEDS: REVIEWED  Prior to Visit Medications    Medication Sig Taking? Authorizing Provider   potassium chloride (KLOR-CON) 10 MEQ extended release tablet Take 1 tablet by mouth daily Yes Reba Alfaro MD   chlorthalidone (HYGROTON) 25 MG tablet Take 1 tablet by mouth daily Yes Reba Alfaro MD   DULoxetine HCl (CYMBALTA PO) Take by mouth Yes Historical Provider, MD   gabapentin (NEURONTIN) 300 MG capsule Take 300 mg by mouth Daily with supper.  Yes Historical Provider, MD   Compression Stockings MISC by Does not apply route Diagnosis: I83.333  Location of wound: Left Leg  Gradient IV (30-40 mm Hg)  Style: Knee Length  Extremity: Bilateral Yes Ashlee Kelley DPKATHY   Multiple Vitamin (MULTI VITAMIN PO) Take by mouth daily Yes Historical Provider, MD       ROS: COMPREHENSIVE ROS AS IN HX, REST -VE  History obtained from chart review and the patient      OBJECTIVE:   NURSING NOTE AND VITALS REVIEWED  /70 (Site: Left Upper Arm, Position: Sitting, Cuff Size: Large Adult)   Pulse 73   Ht 6' 2\" (1.88 m)   Wt (!) 303 lb (137.4 kg)   SpO2 98%   BMI 38.90 kg/m²     NO ACUTE DISTRESS    REPEAT BP:  120/80 (LT), NO ORTHOSTASIS     Body mass index is 38.9 kg/m². HEENT: NO PALLOR, ANICTERIC, PERRLA, EOMI, NO CONJUNCTIVAL ERYTHEMA,                 NO SINUS TENDERNESS  NECK:  SUPPLE, TRACHEA MIDLINE, NT, NO JVD, NO CB, NO LA, NO TM, NO STIFFNESS  CHEST: RESPY EFFORT NL, GOOD AE, NO W/R/C  HEART: S1S2+ REG, NO M/G/R  ABD: OBESE, SOFT, NT, NO HSM, BS+. EXCESS SKIN NOTED  EXT: NO EDEMA, NT, PULSES +. AMANUEL'S -VE  NEURO: ALERT AND ORIENTED X 3, NO MENINGEAL SIGNS, NO TREMORS, NL GAIT, NO FOCAL DEFICITS  PSYCH: FAIRLY GOOD AFFECT  BACK: NT, NO ROM, NO CVA TENDERNESS    PREVIOUS LABS REVIEWED AND D/W PT    ACCUCHECK: 110      ASSESSMENT / PLAN:     Diagnosis Orders   1. Essential hypertension  COUNSELLED. CONTINUE MED. LOW NA+ / DASH DIET/ EXERCISE. MONITOR. GOAL </= 120/80  F/U LABS  MAKE CHANGES AS NEEDED. 2. Type 2 diabetes mellitus without complication, without long-term current use of insulin ([facility] [address]  COUNSELLED. CONTINUE DIET/ EXERCISE. MONITOR. GOALS D/W PT  F/U LABS  OBTAIN OPHTHAL REPORT  MAKE CHANGES AS NEEDED. 3. Dyslipidemia  COUNSELLED. DEFERRED MED  ADVISED LOW FAT / CHOL DIET/ EXERCISE.  MONITOR LABS. GOALS D/W PT.  MAKE CHANGES AS NEEDED. 4. Hypokalemia  COUNSELLED. CONTINUE MED. MONITOR LABS AND MAKE CHANGES AS NEEDED       5. Vitamin D deficiency  COUNSELLED. MONITOR OFF MED  MAKE CHANGES AS NEEDED. 6. Obesity (BMI 30-39. 9)  COUNSELLED. DIET/ EXERCISE ADVISED. LIFESTYLE MODIFICATION. WT LOSS ADVISED / ENCOURAGED. MONITOR AND MAKE CHANGES AS NEEDED. 7. Excess skin of abdomen  COUNSELLED. REFER PLASTIC SURGERY FOR EVAL  MAKE CHANGES AS NEEDED. 8. Need for hepatitis B vaccination  COUNSELLED.  S/E D/W PT. HEP VACCINE GIVEN. PT TOLERATED.                          MEDICATION SIDE EFFECTS D/W PATIENT    RETURN TO CLINIC WITHIN 3 MONTHS / PRN    FOLLOW UP FOR FASTING LABS, PLASTIC SURGERY

## 2021-08-11 NOTE — PATIENT INSTRUCTIONS
TAKE MED AS ADVISED    DIET/ EXERCISE.     FOLLOW UP WITHIN 3 MONTHS / AS NEEDED    FOLLOW UP FOR FASTING LABS, PLASTIC SURGERY

## 2021-08-18 ENCOUNTER — INITIAL CONSULT (OUTPATIENT)
Dept: SURGERY | Age: 56
End: 2021-08-18
Payer: COMMERCIAL

## 2021-08-18 VITALS
BODY MASS INDEX: 38.68 KG/M2 | WEIGHT: 301.4 LBS | SYSTOLIC BLOOD PRESSURE: 139 MMHG | HEART RATE: 80 BPM | OXYGEN SATURATION: 98 % | HEIGHT: 74 IN | DIASTOLIC BLOOD PRESSURE: 81 MMHG

## 2021-08-18 DIAGNOSIS — M79.3 PANNICULITIS: Primary | ICD-10-CM

## 2021-08-18 PROCEDURE — 99204 OFFICE O/P NEW MOD 45 MIN: CPT | Performed by: SURGERY

## 2021-08-18 NOTE — PROGRESS NOTES
[facility]    Consultation requested by: Sussy Vladivia DO    CC: Body contouring    HPI: 64 y.o. male with a PMHx as delineated below who presents in consultation for body contouring. She underwent a sleeve gastrectomy with Dr. Joe Yoo (6/20) losing a total of 140 lbs. Since that time, he notes that he has had problems with excess skin below his abdomen. He notes persistent rashes with chronic skin irritation requiring prescription antimicrobial therapy without any improvement. He has had an odor in the past as well. He notes that it limits his activities of daily living - having to lift to urinate. Given these symptoms, plastic surgery was consulted for assistance with management.      Bariatric surgery: Yes / date: 6/20 (Type: sleeve gastrectomy)    Max weight (lbs): 444  Lowest weight (lbs): 301  Current (lbs): 301  Weight change in the past 3 months: Yes (losing weight)  Max BMI: 58  Current BMI: 39.7    History of DVT/PE: No  Previous body contouring procedures: No  Smoking: No    Past Medical History:   Diagnosis Date    Arthritis     Obesity     PPD positive     Venous stasis ulcer of left lower extremity ([facility] [address] 10/25/2016     Past Surgical History:   Procedure Laterality Date    COLONOSCOPY      JOINT REPLACEMENT Right     SLEEVE GASTRECTOMY N/A 6/9/2020    ROBOTIC ASSISTED LAPAROSCOPIC SLEEVE GASTRECTOMY; LAPAROSCOPIC VENTRAL HERNIA REPAIR performed by Sussy Valdivia DO at [facility] N/A 9/16/2019    EGD BIOPSY performed by Sussy Valdivia DO at [facility] History     Socioeconomic History    Marital status:      Spouse name: Not on file    Number of children: 1    Years of education: Not on file    Highest education level: Not on file   Occupational History    Occupation:    Tobacco Use    Smoking status: Never Smoker    Smokeless tobacco: Never Used   Vaping Use    Vaping Use: Never used   Substance and Sexual Activity    Alcohol use: Yes     Comment: occ    Drug use: Yes     Types: Marijuana     Comment: monthly    Sexual activity: Not on file   Other Topics Concern    Not on file   Social History Narrative    Not on file     Social Determinants of Health     Financial Resource Strain: Low Risk     Difficulty of Paying Living Expenses: Not hard at all   Food Insecurity: No Food Insecurity    Worried About Running Out of Food in the Last Year: Never true    Marcie of Food in the Last Year: Never true   Transportation Needs: No Transportation Needs    Lack of Transportation (Medical): No    Lack of Transportation (Non-Medical): No   Physical Activity:     Days of Exercise per Week:     Minutes of Exercise per Session:    Stress:     Feeling of Stress :    Social Connections:     Frequency of Communication with Friends and Family:     Frequency of Social Gatherings with Friends and Family:     Attends Anabaptism Services:     Active Member of Clubs or Organizations:     Attends Club or Organization Meetings:     Marital Status:    Intimate Partner Violence:     Fear of Current or Ex-Partner:     Emotionally Abused:     Physically Abused:     Sexually Abused:      Family History   Problem Relation Age of Onset    Heart Disease Mother     Diabetes Maternal Uncle     Diabetes Paternal Uncle     Cancer Paternal Uncle         ? pancreatic    Cancer Brother         stomach       Allergy: No Known Allergies    ROS History obtained from the patient  General ROS: negative  Psychological ROS: negative  Ophthalmic ROS: negative  Neurological ROS: negative  ENT ROS: negative  Allergy and Immunology ROS: negative  Hematological and Lymphatic ROS: negative  Endocrine ROS: negative  Respiratory ROS: negative  Cardiovascular ROS: negative  Gastrointestinal ROS: See HPI  Genito-Urinary ROS: negative  Musculoskeletal ROS: negative   Skin ROS: See HPI.     EXAM    /81   Pulse 80   Ht 6' 2\" (1.88 m)   Wt (!) 301 lb 6.4 oz (136.7 kg)   SpO2 98%   BMI 38.70 kg/m²     GEN: NAD, pleasant, obese  CVS: RRR  PULM: No respiratory distress  HEENT: PERRLA/EOMI; dentition (wearing mask), hearing appears within normal limits  NECK: Supple with trachea in midline, no masses  ABD: soft/NT/obese  No palpable hernia, but exam limited secondary to habitus  Grade 2 pannus  EXT: No lymphedema noted  NEURO: No focal deficits, no obvious CN deficits    IMP: 64 y.o. male with panniculitis and excess skin  PLAN: Would benefit from panniculectomy for functional improvement as well as FdL component abdominoplsaty for improved contour. However, will need to have reached weight stability prior to scheduling. He will follow-up in 3 months. The complexity of body contouring procedures and wound healing physiology were discussed with the patient. He was counseled on ideal medical optimization (nutrition, weight stability, etc.). We also discussed the pros & cons of staging for multiple procedures including controlling tension from various sites and postoperative care managment. Clinical photos were obtained. The risks, benefits, alternatives, outcomes, personnel involved were discussed and all questions were answered in a satisfactory manner according to the patient. Specifically,the risks including, but not limited to: bleeding possibly requiring transfusion orreoperation, infection, seroma, nonhealing of wounds, poor cosmetic outcome, scarring, VTE (DVT/PE), and death were discussed. The patient was counseled at length about the risks of ben Covid-19 during their perioperative period and any recovery window from their procedure. The patient was made aware that bne Covid-19  may worsen their prognosis for recovering from their procedure  and lend to a higher morbidity and/or mortality risk. All material risks, benefits, and reasonable alternatives including postponing the procedure were discussed.  The patient does wish to proceed with the procedure at this time.     Patrcia Boast, MD  [address] [place] [address] Reconstructive Surgery  [phone]  08/18/21

## 2021-09-22 ENCOUNTER — OFFICE VISIT (OUTPATIENT)
Dept: BARIATRICS/WEIGHT MGMT | Age: 56
End: 2021-09-22
Payer: COMMERCIAL

## 2021-09-22 VITALS
DIASTOLIC BLOOD PRESSURE: 69 MMHG | SYSTOLIC BLOOD PRESSURE: 140 MMHG | WEIGHT: 304 LBS | HEIGHT: 74 IN | HEART RATE: 65 BPM | BODY MASS INDEX: 39.01 KG/M2

## 2021-09-22 DIAGNOSIS — Z98.84 STATUS POST LAPAROSCOPIC SLEEVE GASTRECTOMY: ICD-10-CM

## 2021-09-22 DIAGNOSIS — E66.01 SEVERE OBESITY (BMI 35.0-35.9 WITH COMORBIDITY) (HCC): Primary | ICD-10-CM

## 2021-09-22 PROCEDURE — 99213 OFFICE O/P EST LOW 20 MIN: CPT | Performed by: SURGERY

## 2021-09-22 NOTE — PROGRESS NOTES
Dietary Assessment Note      Vitals:   Vitals:    09/22/21 1343   Weight: (!) 304 lb (137.9 kg)   Height: 6' 2\" (1.88 m)    Patient lost 7.6 lbs over past 3.5 months. Total Weight Loss: 136 lbs    Labs reviewed: no new nutrition related labs     Protein intake: <60 grams/day     Fluid intake: 48-64 oz/day    Multivitamin/mineral intake: 0-4 Fusion per day     Calcium intake: none     Other: sea zaman, liver supplement, ginko biloba    Exercise: pool 3 x week for 45 minutes + light lifting - no longer on restrictions     Nutrition Assessment: 1 year 3 mo s/p sleeve post-op visit. Breakfast: 1 egg with 1 piece of turkey cano and 1 slice of toast     Snack:    Lunch: chicken salad     Snack:    Dinner: grilled chicken/fish with broccoli/green beans/greens and potato     Snack:    Fluids: mostly water, SF koolaid     Amount able to eat per sitting: 3 oz + 1 cup of side     Following 30/30/30 rule: Yes    Food Intolerances/issues: pizza sauce, rice, pasta    Client Concerns: not reaching protein goal     Goals:    Take 4 fusion  Continue with exercise   Meal plan and prep the night before   Eat 4 x day   Reach at least 60 grams of protein per day   Do not exceed current portions     Plan: F/U per Provider     Madyson Durbin RD, LD

## 2021-09-22 NOTE — PATIENT INSTRUCTIONS
Patient received dietary handouts and education. Goals:    Take 4 fusion  Continue with exercise   Meal plan and prep the night before   Eat 4 x day   Reach at least 60 grams of protein per day   Do not exceed current portions

## 2021-10-17 NOTE — PROGRESS NOTES
[facility]) Physicians   General & Laparoscopic Surgery  Weight Management Solutions       HPI:     Enrique Douglas III is a very pleasant 64 y.o. obese male , Body mass index is 39.03 kg/m². Diane Raddle And multiple medical problems who is presenting for bariatric follow up care. Enrique Douglas III is s/p laparoscopic sleeve gastrectomy by me   Comes today to the clinic without any complaints. Patient denies any nausea, vomiting, fevers, chills, shortness of breath, chest pain, constipation or urinary symptoms. Denies any heartburn nor dysphagia. Patient is feeling very well, and is very active. Patient is very pleased with the weight loss and resolution of co-morbid conditions. Past Medical History:   Diagnosis Date    Arthritis     Obesity     PPD positive     Venous stasis ulcer of left lower extremity ([address] 10/25/2016     Past Surgical History:   Procedure Laterality Date    COLONOSCOPY      JOINT REPLACEMENT Right     SLEEVE GASTRECTOMY N/A 6/9/2020    ROBOTIC ASSISTED LAPAROSCOPIC SLEEVE GASTRECTOMY; LAPAROSCOPIC VENTRAL HERNIA REPAIR performed by Esther Larson DO at [facility] N/A 9/16/2019    EGD BIOPSY performed by Esther Larson DO at [facility] ENDOSCOPY     Family History   Problem Relation Age of Onset    Heart Disease Mother     Diabetes Maternal Uncle     Diabetes Paternal Uncle     Cancer Paternal Uncle         ? pancreatic    Cancer Brother         stomach     Social History     Tobacco Use    Smoking status: Never Smoker    Smokeless tobacco: Never Used   Substance Use Topics    Alcohol use: Yes     Comment: occ     I counseled the patient on the importance of not smoking and risks of ETOH. No Known Allergies  Vitals:    09/22/21 1343 09/22/21 1348   BP: (!) 161/76 (!) 140/69   Pulse: 65    Weight: (!) 304 lb (137.9 kg)    Height: 6' 2\" (1.88 m)        Body mass index is 39.03 kg/m².       Current Outpatient Medications:     potassium chloride (KLOR-CON) 10 MEQ extended release tablet, Take 1 tablet by mouth daily, Disp: 30 tablet, Rfl: 3    chlorthalidone (HYGROTON) 25 MG tablet, Take 1 tablet by mouth daily, Disp: 90 tablet, Rfl: 0    DULoxetine HCl (CYMBALTA PO), Take by mouth, Disp: , Rfl:     gabapentin (NEURONTIN) 300 MG capsule, Take 300 mg by mouth Daily with supper., Disp: , Rfl:     Compression Stockings MISC, by Does not apply route Diagnosis: I83.333 Location of wound: Left Leg Gradient IV (30-40 mm Hg) Style: Knee Length Extremity: Bilateral, Disp: 1 each, Rfl: 0    Multiple Vitamin (MULTI VITAMIN PO), Take by mouth daily, Disp: , Rfl:       Review of Systems - History obtained from the patient  General ROS: negative  Psychological ROS: negative  Hematological and Lymphatic ROS: negative  Endocrine ROS: negative  Respiratory ROS: negative  Cardiovascular ROS: negative  Gastrointestinal ROS:negative  Genito-Urinary ROS: negative  Musculoskeletal ROS: negative   Skin ROS: negative    Physical Exam   Vitals Reviewed   Constitutional: Patient is oriented to person, place, and time. Patient appears well-developed and well-nourished. Patient is active and cooperative. Non-toxic appearance. No distress. Neck: Trachea normal and normal range of motion. No JVD present. Pulmonary/Chest: Effort normal. No accessory muscle usage or stridor. No apnea. No respiratory distress. Cardiovascular: Normal rate and no JVD. Abdominal: Normal appearance. Patient exhibits no distension. Abdomen is soft, obese, non tender. Musculoskeletal: Normal range of motion. Patient exhibits no edema. Neurological: Patient is alert and oriented to person, place, and time. Patient has normal strength. GCS eye subscore is 4. GCS verbal subscore is 5. GCS motor subscore is 6. Skin: Skin is warm and dry. No abrasion and no rash noted. Patient is not diaphoretic. No cyanosis or erythema. Psychiatric: Patient has a normal mood and affect.  Speech is normal and behavior is normal. Cognition and memory are normal.         A/P     Michelle Mills III is 64 y.o. male , now with Body mass index is 39.03 kg/m². s/p Sleeve gastrectomy, has lost 7 lbs since last visit, total of 136 lbs weight loss. The patient underwent dietary counseling with registered dietician. I have reviewed, discussed and agree with the dietary plan. Patient is trying hard to keep good dietary and behavior modifications. Patient is monitoring portion sizes, food choices and liquid calories. Patient is trying to exercise regularly. Patient pleased with the surgery outcomes. We discussed how his weight affects his overall health including:  Daniel Samson was seen today for follow up after procedure. Diagnoses and all orders for this visit:    Severe obesity (BMI 35.0-35.9 with comorbidity) ([facility] [address]    Status post laparoscopic sleeve gastrectomy       and importance of weight loss to alleviate those co morbid conditions. I encouraged the patient to continue exercise and keeping healthy eating habits. Also counseled the patient extensively on post surgery care. Total encounter time:  20 minutes including any number of the following: review of labs, imaging, provider notes, outside hospital records; performing examination/evaluation; counseling patient and family; ordering medications/tests; placing referrals and communication with referring physicians; coordination of care, and documentation in the EHR. RTC in 3 months  Diet and Exercise      Patient advised that its their responsibility to follow up for studies and/or labs ordered today.

## 2022-03-17 DIAGNOSIS — I10 ESSENTIAL HYPERTENSION: ICD-10-CM

## 2022-03-18 RX ORDER — CHLORTHALIDONE 25 MG/1
25 TABLET ORAL DAILY
Qty: 14 TABLET | Refills: 0 | Status: SHIPPED | OUTPATIENT
Start: 2022-03-18 | End: 2022-07-06

## 2022-07-06 DIAGNOSIS — I10 ESSENTIAL HYPERTENSION: ICD-10-CM

## 2022-07-06 RX ORDER — CHLORTHALIDONE 25 MG/1
25 TABLET ORAL DAILY
Qty: 14 TABLET | Refills: 0 | Status: SHIPPED | OUTPATIENT
Start: 2022-07-06 | End: 2022-10-18

## 2022-07-06 NOTE — TELEPHONE ENCOUNTER
Last visit 8/11/21  Next visit none   Called pt spoke with him he stated he will call later to make appointment

## 2022-08-15 DIAGNOSIS — E87.6 HYPOKALEMIA: ICD-10-CM

## 2022-08-15 DIAGNOSIS — I10 ESSENTIAL HYPERTENSION: ICD-10-CM

## 2022-08-15 RX ORDER — CHLORTHALIDONE 25 MG/1
25 TABLET ORAL DAILY
Qty: 14 TABLET | Refills: 0 | OUTPATIENT
Start: 2022-08-15

## 2022-08-15 RX ORDER — POTASSIUM CHLORIDE 750 MG/1
10 TABLET, FILM COATED, EXTENDED RELEASE ORAL DAILY
Qty: 30 TABLET | Refills: 3 | OUTPATIENT
Start: 2022-08-15

## 2022-08-24 NOTE — PROGRESS NOTES
Serjio Rios is a 47 y.o. male patient. No current facility-administered medications for this encounter. No Known Allergies  Active Problems:    * No active hospital problems. *  Resolved Problems:    * No resolved hospital problems. *    Blood pressure 120/69, pulse 64, temperature 98.4 °F (36.9 °C), temperature source Oral, resp. rate 18, height 6' 2\" (1.88 m), weight (!) 420 lb (190.5 kg), SpO2 96 %. Subjective:  Symptoms:  Stable. Diet:  Adequate intake. Activity level: Normal.    Pain:  He complains of pain that is mild. He reports pain is unchanged. Pain is partially controlled. Objective:  General Appearance:  Comfortable, well-appearing, in no acute distress, not in pain and in pain (Chronic hip pain). Vital signs: (most recent): Blood pressure 120/69, pulse 64, temperature 98.4 °F (36.9 °C), temperature source Oral, resp. rate 18, height 6' 2\" (1.88 m), weight (!) 420 lb (190.5 kg), SpO2 96 %. Vital signs are normal.    Output: Producing urine and producing stool. Lungs:  Normal effort. Heart: Normal rate. Abdomen: Abdomen is soft. There is no abdominal tenderness. Extremities: Normal range of motion. Neurological: Patient is alert and oriented to person, place and time. Skin:  Warm and dry. (Venous stasis ulcerations: Pt states that left leg is healed, right leg opened up recently as is seeing wound doctor this week.  Area covered at present.)    Assessment & Plan    Jerod Avendaño RN  9/16/2019 Performed

## 2022-10-18 ENCOUNTER — OFFICE VISIT (OUTPATIENT)
Dept: ORTHOPEDIC SURGERY | Age: 57
End: 2022-10-18
Payer: COMMERCIAL

## 2022-10-18 VITALS — HEIGHT: 74 IN | WEIGHT: 304 LBS | BODY MASS INDEX: 39.01 KG/M2

## 2022-10-18 DIAGNOSIS — M25.552 LEFT HIP PAIN: Primary | ICD-10-CM

## 2022-10-18 PROCEDURE — 99203 OFFICE O/P NEW LOW 30 MIN: CPT | Performed by: PHYSICIAN ASSISTANT

## 2022-10-18 RX ORDER — TIZANIDINE 4 MG/1
4 TABLET ORAL 3 TIMES DAILY PRN
Qty: 30 TABLET | Refills: 0 | Status: SHIPPED | OUTPATIENT
Start: 2022-10-18

## 2022-10-18 SDOH — HEALTH STABILITY: PHYSICAL HEALTH: ON AVERAGE, HOW MANY DAYS PER WEEK DO YOU ENGAGE IN MODERATE TO STRENUOUS EXERCISE (LIKE A BRISK WALK)?: 2 DAYS

## 2022-10-18 ASSESSMENT — SOCIAL DETERMINANTS OF HEALTH (SDOH)
WITHIN THE LAST YEAR, HAVE TO BEEN RAPED OR FORCED TO HAVE ANY KIND OF SEXUAL ACTIVITY BY YOUR PARTNER OR EX-PARTNER?: NO
WITHIN THE LAST YEAR, HAVE YOU BEEN HUMILIATED OR EMOTIONALLY ABUSED IN OTHER WAYS BY YOUR PARTNER OR EX-PARTNER?: NO
WITHIN THE LAST YEAR, HAVE YOU BEEN KICKED, HIT, SLAPPED, OR OTHERWISE PHYSICALLY HURT BY YOUR PARTNER OR EX-PARTNER?: NO
WITHIN THE LAST YEAR, HAVE YOU BEEN AFRAID OF YOUR PARTNER OR EX-PARTNER?: NO

## 2022-12-05 ENCOUNTER — PATIENT MESSAGE (OUTPATIENT)
Dept: BARIATRICS/WEIGHT MGMT | Age: 57
End: 2022-12-05

## 2022-12-05 NOTE — TELEPHONE ENCOUNTER
Attempt to contact for Bariatric Follow Up.  [employer] message sent and letter mailed to address on file in [facility]

## 2023-02-21 ENCOUNTER — TELEPHONE (OUTPATIENT)
Dept: INTERNAL MEDICINE CLINIC | Age: 58
End: 2023-02-21

## 2023-03-16 ENCOUNTER — OFFICE VISIT (OUTPATIENT)
Dept: INTERNAL MEDICINE CLINIC | Age: 58
End: 2023-03-16
Payer: COMMERCIAL

## 2023-03-16 VITALS
HEART RATE: 65 BPM | DIASTOLIC BLOOD PRESSURE: 80 MMHG | OXYGEN SATURATION: 97 % | BODY MASS INDEX: 43.4 KG/M2 | WEIGHT: 315 LBS | SYSTOLIC BLOOD PRESSURE: 138 MMHG

## 2023-03-16 DIAGNOSIS — M79.89 LEFT LEG SWELLING: Primary | ICD-10-CM

## 2023-03-16 DIAGNOSIS — R60.0 LOCALIZED EDEMA: ICD-10-CM

## 2023-03-16 DIAGNOSIS — E78.5 DYSLIPIDEMIA ASSOCIATED WITH TYPE 2 DIABETES MELLITUS (HCC): ICD-10-CM

## 2023-03-16 DIAGNOSIS — E11.59 HYPERTENSION ASSOCIATED WITH DIABETES (HCC): ICD-10-CM

## 2023-03-16 DIAGNOSIS — I15.2 HYPERTENSION ASSOCIATED WITH DIABETES (HCC): ICD-10-CM

## 2023-03-16 DIAGNOSIS — E87.6 HYPOKALEMIA: ICD-10-CM

## 2023-03-16 DIAGNOSIS — E11.69 DYSLIPIDEMIA ASSOCIATED WITH TYPE 2 DIABETES MELLITUS (HCC): ICD-10-CM

## 2023-03-16 DIAGNOSIS — E11.9 TYPE 2 DIABETES MELLITUS WITHOUT COMPLICATION, WITHOUT LONG-TERM CURRENT USE OF INSULIN (HCC): ICD-10-CM

## 2023-03-16 DIAGNOSIS — E66.01 MORBID OBESITY (HCC): ICD-10-CM

## 2023-03-16 DIAGNOSIS — Z12.5 SCREENING FOR PROSTATE CANCER: ICD-10-CM

## 2023-03-16 LAB
CHP ED QC CHECK: NORMAL
GLUCOSE BLD-MCNC: 102 MG/DL
HBA1C MFR BLD: 5.7 %

## 2023-03-16 PROCEDURE — 3044F HG A1C LEVEL LT 7.0%: CPT | Performed by: INTERNAL MEDICINE

## 2023-03-16 PROCEDURE — 3075F SYST BP GE 130 - 139MM HG: CPT | Performed by: INTERNAL MEDICINE

## 2023-03-16 PROCEDURE — 82962 GLUCOSE BLOOD TEST: CPT | Performed by: INTERNAL MEDICINE

## 2023-03-16 PROCEDURE — 99215 OFFICE O/P EST HI 40 MIN: CPT | Performed by: INTERNAL MEDICINE

## 2023-03-16 PROCEDURE — 83036 HEMOGLOBIN GLYCOSYLATED A1C: CPT | Performed by: INTERNAL MEDICINE

## 2023-03-16 PROCEDURE — 3079F DIAST BP 80-89 MM HG: CPT | Performed by: INTERNAL MEDICINE

## 2023-03-16 RX ORDER — LOSARTAN POTASSIUM 25 MG/1
25 TABLET ORAL DAILY
Qty: 30 TABLET | Refills: 3 | Status: SHIPPED | OUTPATIENT
Start: 2023-03-16

## 2023-03-16 RX ORDER — FUROSEMIDE 20 MG/1
20 TABLET ORAL DAILY PRN
Qty: 30 TABLET | Refills: 0 | Status: SHIPPED | OUTPATIENT
Start: 2023-03-16

## 2023-03-16 RX ORDER — IBUPROFEN 800 MG/1
800 TABLET ORAL PRN
COMMUNITY

## 2023-03-16 ASSESSMENT — PATIENT HEALTH QUESTIONNAIRE - PHQ9
SUM OF ALL RESPONSES TO PHQ QUESTIONS 1-9: 0
1. LITTLE INTEREST OR PLEASURE IN DOING THINGS: 0
SUM OF ALL RESPONSES TO PHQ QUESTIONS 1-9: 0
SUM OF ALL RESPONSES TO PHQ QUESTIONS 1-9: 0
2. FEELING DOWN, DEPRESSED OR HOPELESS: 0
SUM OF ALL RESPONSES TO PHQ9 QUESTIONS 1 & 2: 0
SUM OF ALL RESPONSES TO PHQ QUESTIONS 1-9: 0

## 2023-03-16 NOTE — PROGRESS NOTES
SUBJECTIVE:  Bud Bai is a 62 y.o. male HERE FOR   Chief Complaint   Patient presents with    Follow-up    Diabetes      PT HERE FOR EVAL     C/O SWELLING - LT LEG. ? 2 WEEKS. ? NO PAIN. NO REDNESS. NO F/C    DM -  NOT ON MED.   ? DIET / EXERCISE COMPLIANCE. HBS - NOT CHECKING. EYE EXAM -  > 1 YEAR  HTN - HAS NOT BEEN TAKING MED > 1 MONTH. ? DIET / EXERCISE COMPLIANCE. DENIES HA, NO DIZZINESS   DYSLIPIDEMIA -  ? EXERCISE / DIET COMPLIANCE . PREVIOUS LABS D/W PT  HYPOKALEMIA - NOT TAKING MED NOW. DENIES MUSCLE CRAMPS  MORBID OBESITY - DIET / EXERCISE REVIEWED. FOLLOWING WITH BARIATRIC SURGERY. S/P PRIOR SURGERY. WT GAIN NOTED  SCREENING PROSTATE CA D/W PT     DENIES CP, No SOB, No PALPITATIONS. NO COUGH, NO F/C  No ABD PAIN, No N/V, No DIARRHEA, NO CONSTIPATION, No MELENA, No HEMATOCHEZIA. No DYSURIA, No FREQ, No URGENCY, No HEMATURIA    PMH: REVIEWED AND UPDATED TODAY    PSH: REVIEWED AND UPDATED TODAY    SOCIAL HX: REVIEWED AND UPDATED TODAY    FAMILY HX: REVIEWED AND UPDATED TODAY    ALLERGY:  Patient has no known allergies. MEDS: REVIEWED    Prior to Visit Medications    Medication Sig Taking?  Authorizing Provider   ibuprofen (ADVIL;MOTRIN) 800 MG tablet Take 800 mg by mouth as needed for Pain Yes Historical Provider, MD   Compression Stockings MISC by Does not apply route Diagnosis: I83.333  Location of wound: Left Leg  Gradient IV (30-40 mm Hg)  Style: Knee Length  Extremity: Bilateral Yes Ashlee Kelley DPM   Multiple Vitamin (MULTI VITAMIN PO) Take by mouth daily Yes Historical Provider, MD   tiZANidine (ZANAFLEX) 4 MG tablet Take 1 tablet by mouth 3 times daily as needed (muscle spasm)  Patient not taking: Reported on 3/16/2023  SAMINA Rodríguez         ROS: COMPREHENSIVE ROS AS IN HX, REST -VE  History obtained from chart review and the patient       OBJECTIVE:   NURSING NOTE AND VITALS REVIEWED  /80   Pulse 65   Wt (!) 338 lb (153.3 kg)   SpO2 97%   BMI 43.40 kg/m²     NO ACUTE DISTRESS    REPEAT BP: 138/80 (RT), NO ORTHOSTASIS     Body mass index is 43.4 kg/m². HEENT: NO PALLOR, ANICTERIC, PERRLA, EOMI, NO CONJUNCTIVAL ERYTHEMA,                 NO SINUS TENDERNESS  NECK:  SUPPLE, TRACHEA MIDLINE, NT, NO JVD, NO CB, NO LA, NO TM, NO STIFFNESS  CHEST: RESPY EFFORT NL, GOOD AE, NO W/R/C  HEART: S1S2+ REG, NO M/G/R  ABD: OBESE, SOFT, NT, NO HSM, BS+  EXT: + EDEMA - LT > > RT, NT - RT. ? TENDERNESS - LT, PULSES +. AMANUEL'S -VE - RT, EQUIVOCAL - LT. NO ERYTHEMA, NO WARMTH  NEURO: ALERT AND ORIENTED X 3, NO MENINGEAL SIGNS, NO TREMORS, AMBULATING WITH A CANE - + LIMP OTHERWISE, NO FOCAL DEFICITS  PSYCH: FAIRLY GOOD AFFECT  BACK: NT, NO ROM, NO CVA TENDERNESS     PREVIOUS LABS REVIEWED AND D/W PT    ACCUCHECK: 102    POC HBA1C: 5.7      ASSESSMENT / PLAN:     Diagnosis Orders   1. Left leg swelling  COUNSELLED. DOPPLER TO R/O DVT. MONITOR  MAKE CHANGES AS NEEDED. 2. Type 2 diabetes mellitus without complication, without long-term current use of insulin ([facility]  COUNSELLED. CONTROLLED. DIET/ EXERCISE. MONITOR. .  GOALS D/W PT  F/U DM EYE EXAM.  FF/U LABS  MAKE CHANGES AS NEEDED. 3. Hypertension associated with diabetes ([facility]  COUNSELLED. NOT AT GOAL. START ON NEW MED - COZAAR 25 MG  ADVISED LOW NA+ / DASH DIET/ EXERCISE. MONITOR. GOAL </= 130/80  F/U LABS  MAKE CHANGES AS NEEDED. 4. Dyslipidemia associated with type 2 diabetes mellitus ([facility]  COUNSELLED. READDRESS STATIN  ADVISED LOW FAT / CHOL DIET/ EXERCISE.  MONITOR. F/U LABS  GOALS D/W PT.  MAKE CHANGES AS NEEDED. 5. Hypokalemia  COUNSELLED. MONITOR OFF MED. F/U LAB  MAKE CHANGES AS NEEDED. 6. Morbid obesity ([facility]  COUNSELLED. DIET/ EXERCISE ADVISED. LIFESTYLE MODIFICATION. WT LOSS ADVISED. MONITOR AND MAKE CHANGES AS NEEDED. 7. Localized edema  COUNSELLED. ADVISED LOW NA+ DIET. ELEVATE LEGS. COMPRESSION STOCKINGS. START ON LASIX 20 MG DAILY / PRN  MONITOR . MAKE CHANGES AS NEEDED.         8. Screening for prostate cancer  COUNSELLED. PSA WITH LABS - OK WITH PT  MAKE CHANGES AS NEEDED.                PT DECLINED INFLUENZA VACCINE        MORE THAN HALF THE TIME SPENT ON HISTORY, PHYSICAL, ASSESSMENT AND PLAN, DISCUSSION AND COUNSELLING     MEDICATION SIDE EFFECTS D/W PATIENT    RETURN TO CLINIC WITHIN 6 WEEKS / PRN    FOLLOW UP FOR FASTING LABS, DOPPLER

## 2023-03-16 NOTE — PATIENT INSTRUCTIONS
TAKE MED AS ADVISED    DIET/ EXERCISE. FOLLOW UP WITHIN 6 WEEKS / AS NEEDED    FOLLOW UP FOR FASTING LABS, [address] [facility] Laboratory Locations - No appointment necessary. @ indicates the location is open Saturdays in addition to Monday through Friday. Call your preferred location for test preparation, business hours and other information you need. [employer] accepts [employer]. [facility]     @ [address] [facility] [address] [facility]. [place], [address]    Ph: [address]   650 [place] [facility], 6500 [facility] [address]    Ph: [phone]   @ [address] [place] [address].[facility]    Ph: [address]    Ph: [phone]  @ [address] [place] [address]   Ph: [phone]  @ [address] [place] [address]. [place] [facility] [address]    Ph: [address] [facility] [address]   Ph: [phone]    [place]    @ [facility]. [place] [place] [address]    Ph: [phone]  [facility]   [address] [facility], [address]   Ph: [address]. [facility] 62, [address]    [facility]: [address] [facility] Bradley Mcgraw    Ph: [phone]   [facility]   [address] 2026 [facility]. Bradley [place]   Ph: 501 [facility] Med.  [address] [place] [place], [place] [address]    Ph: [phone]

## 2023-03-17 ENCOUNTER — HOSPITAL ENCOUNTER (OUTPATIENT)
Dept: VASCULAR LAB | Age: 58
Discharge: HOME | End: 2023-03-17

## 2023-03-17 ENCOUNTER — TELEPHONE (OUTPATIENT)
Dept: INTERNAL MEDICINE CLINIC | Age: 58
End: 2023-03-17

## 2023-03-17 DIAGNOSIS — M79.89 LEFT LEG SWELLING: ICD-10-CM

## 2023-03-17 PROCEDURE — 93971 EXTREMITY STUDY: CPT

## 2023-04-25 DIAGNOSIS — E11.69 DYSLIPIDEMIA ASSOCIATED WITH TYPE 2 DIABETES MELLITUS (HCC): ICD-10-CM

## 2023-04-25 DIAGNOSIS — E11.9 TYPE 2 DIABETES MELLITUS WITHOUT COMPLICATION, WITHOUT LONG-TERM CURRENT USE OF INSULIN (HCC): ICD-10-CM

## 2023-04-25 DIAGNOSIS — I15.2 HYPERTENSION ASSOCIATED WITH DIABETES (HCC): ICD-10-CM

## 2023-04-25 DIAGNOSIS — E11.59 HYPERTENSION ASSOCIATED WITH DIABETES (HCC): ICD-10-CM

## 2023-04-25 DIAGNOSIS — R60.0 LOCALIZED EDEMA: ICD-10-CM

## 2023-04-25 DIAGNOSIS — Z12.5 SCREENING FOR PROSTATE CANCER: ICD-10-CM

## 2023-04-25 DIAGNOSIS — E78.5 DYSLIPIDEMIA ASSOCIATED WITH TYPE 2 DIABETES MELLITUS (HCC): ICD-10-CM

## 2023-04-25 LAB
25(OH)D3 SERPL-MCNC: 19.1 NG/ML
ALBUMIN SERPL-MCNC: 3.9 G/DL (ref 3.4–5)
ALBUMIN/GLOB SERPL: 1.6 {RATIO} (ref 1.1–2.2)
ALP SERPL-CCNC: 77 U/L (ref 40–129)
ALT SERPL-CCNC: 15 U/L (ref 10–40)
ANION GAP SERPL CALCULATED.3IONS-SCNC: 10 MMOL/L (ref 3–16)
AST SERPL-CCNC: 17 U/L (ref 15–37)
BASOPHILS # BLD: 0 K/UL (ref 0–0.2)
BASOPHILS NFR BLD: 0.4 %
BILIRUB SERPL-MCNC: 0.6 MG/DL (ref 0–1)
BUN SERPL-MCNC: 12 MG/DL (ref 7–20)
CALCIUM SERPL-MCNC: 9.1 MG/DL (ref 8.3–10.6)
CHLORIDE SERPL-SCNC: 106 MMOL/L (ref 99–110)
CHOLEST SERPL-MCNC: 173 MG/DL (ref 0–199)
CO2 SERPL-SCNC: 26 MMOL/L (ref 21–32)
CREAT SERPL-MCNC: 0.9 MG/DL (ref 0.9–1.3)
CREAT UR-MCNC: 134.8 MG/DL (ref 39–259)
DEPRECATED RDW RBC AUTO: 14.4 % (ref 12.4–15.4)
EOSINOPHIL # BLD: 0.1 K/UL (ref 0–0.6)
EOSINOPHIL NFR BLD: 4.2 %
GFR SERPLBLD CREATININE-BSD FMLA CKD-EPI: >60 ML/MIN/{1.73_M2}
GLUCOSE SERPL-MCNC: 98 MG/DL (ref 70–99)
HCT VFR BLD AUTO: 36.7 % (ref 40.5–52.5)
HDLC SERPL-MCNC: 56 MG/DL (ref 40–60)
HGB BLD-MCNC: 12.3 G/DL (ref 13.5–17.5)
LDLC SERPL CALC-MCNC: 105 MG/DL
LYMPHOCYTES # BLD: 0.8 K/UL (ref 1–5.1)
LYMPHOCYTES NFR BLD: 24.3 %
MCH RBC QN AUTO: 30.1 PG (ref 26–34)
MCHC RBC AUTO-ENTMCNC: 33.6 G/DL (ref 31–36)
MCV RBC AUTO: 89.8 FL (ref 80–100)
MICROALBUMIN UR DL<=1MG/L-MCNC: <1.2 MG/DL
MICROALBUMIN/CREAT UR: NORMAL MG/G (ref 0–30)
MONOCYTES # BLD: 0.4 K/UL (ref 0–1.3)
MONOCYTES NFR BLD: 11.4 %
NEUTROPHILS # BLD: 2.1 K/UL (ref 1.7–7.7)
NEUTROPHILS NFR BLD: 59.7 %
PLATELET # BLD AUTO: 228 K/UL (ref 135–450)
PMV BLD AUTO: 8 FL (ref 5–10.5)
POTASSIUM SERPL-SCNC: 4.3 MMOL/L (ref 3.5–5.1)
PROT SERPL-MCNC: 6.4 G/DL (ref 6.4–8.2)
PSA SERPL DL<=0.01 NG/ML-MCNC: 1.28 NG/ML (ref 0–4)
RBC # BLD AUTO: 4.08 M/UL (ref 4.2–5.9)
SODIUM SERPL-SCNC: 142 MMOL/L (ref 136–145)
TRIGL SERPL-MCNC: 60 MG/DL (ref 0–150)
TSH SERPL DL<=0.005 MIU/L-ACNC: 1.19 UIU/ML (ref 0.27–4.2)
VLDLC SERPL CALC-MCNC: 12 MG/DL
WBC # BLD AUTO: 3.4 K/UL (ref 4–11)

## 2023-05-15 DIAGNOSIS — R60.0 LOCALIZED EDEMA: ICD-10-CM

## 2023-05-15 NOTE — TELEPHONE ENCOUNTER
Medication:   Requested Prescriptions     Pending Prescriptions Disp Refills    furosemide (LASIX) 20 MG tablet 30 tablet 0     Sig: Take 1 tablet by mouth daily as needed (SWELLING)        Last Filled:      Patient Phone Number: [phone] (home)     Last appt: 3/16/2023   Next appt: 6/21/2023    Last OARRS: No flowsheet data found.

## 2023-05-15 NOTE — TELEPHONE ENCOUNTER
----- Message from Averylive Johnsoncarla sent at 5/15/2023  9:55 AM EDT -----  Subject: Refill Request    QUESTIONS  Name of Medication? furosemide (LASIX) 20 MG tablet  Patient-reported dosage and instructions? 20 MG Tablet 1 Tablet daily by   mouth as needed for swelling  How many days do you have left? 0  Preferred [facility]acy? [id number]  Pharmacy phone number (if available)? [phone]  Additional Information for Provider? The patient is out of this medication   and has an appointment with PCP Carlitos Potter scheduled 6/21/2023 4:00   PM as it was next available.   ---------------------------------------------------------------------------  --------------  [address]  What is the best way for the office to contact you? Do not leave any   message, patient will call back for answer,OK to respond with electronic   message via "[employer] portal (only for patients who have registered "[employer]   account)  Preferred Call Back Phone Number? [phone]  ---------------------------------------------------------------------------  --------------  SCRIPT ANSWERS  Relationship to Patient?  Self

## 2023-05-17 RX ORDER — FUROSEMIDE 20 MG/1
20 TABLET ORAL DAILY PRN
Qty: 30 TABLET | Refills: 0 | Status: SHIPPED | OUTPATIENT
Start: 2023-05-17

## 2023-07-24 DIAGNOSIS — R60.0 LOCALIZED EDEMA: ICD-10-CM

## 2023-07-24 RX ORDER — FUROSEMIDE 20 MG/1
TABLET ORAL
Qty: 30 TABLET | Refills: 0 | Status: SHIPPED | OUTPATIENT
Start: 2023-07-24

## 2023-08-26 DIAGNOSIS — R60.0 LOCALIZED EDEMA: ICD-10-CM

## 2023-08-26 DIAGNOSIS — E11.59 HYPERTENSION ASSOCIATED WITH DIABETES (HCC): ICD-10-CM

## 2023-08-26 DIAGNOSIS — I15.2 HYPERTENSION ASSOCIATED WITH DIABETES (HCC): ICD-10-CM

## 2023-08-28 RX ORDER — FUROSEMIDE 20 MG/1
TABLET ORAL
Qty: 30 TABLET | Refills: 0 | Status: SHIPPED | OUTPATIENT
Start: 2023-08-28 | End: 2023-10-11

## 2023-08-28 RX ORDER — LOSARTAN POTASSIUM 25 MG/1
25 TABLET ORAL DAILY
Qty: 30 TABLET | Refills: 0 | Status: SHIPPED | OUTPATIENT
Start: 2023-08-28 | End: 2023-09-07 | Stop reason: SDUPTHER

## 2023-09-07 ENCOUNTER — OFFICE VISIT (OUTPATIENT)
Dept: INTERNAL MEDICINE CLINIC | Age: 58
End: 2023-09-07
Payer: COMMERCIAL

## 2023-09-07 VITALS
TEMPERATURE: 98.6 F | DIASTOLIC BLOOD PRESSURE: 90 MMHG | WEIGHT: 315 LBS | HEART RATE: 68 BPM | SYSTOLIC BLOOD PRESSURE: 130 MMHG | OXYGEN SATURATION: 96 % | BODY MASS INDEX: 46.07 KG/M2

## 2023-09-07 DIAGNOSIS — I15.2 HYPERTENSION ASSOCIATED WITH DIABETES (HCC): ICD-10-CM

## 2023-09-07 DIAGNOSIS — L03.90 WOUND CELLULITIS: ICD-10-CM

## 2023-09-07 DIAGNOSIS — E78.5 DYSLIPIDEMIA ASSOCIATED WITH TYPE 2 DIABETES MELLITUS (HCC): ICD-10-CM

## 2023-09-07 DIAGNOSIS — E11.69 DYSLIPIDEMIA ASSOCIATED WITH TYPE 2 DIABETES MELLITUS (HCC): ICD-10-CM

## 2023-09-07 DIAGNOSIS — E66.01 MORBID OBESITY (HCC): ICD-10-CM

## 2023-09-07 DIAGNOSIS — E11.9 TYPE 2 DIABETES MELLITUS WITHOUT COMPLICATION, WITHOUT LONG-TERM CURRENT USE OF INSULIN (HCC): Primary | ICD-10-CM

## 2023-09-07 DIAGNOSIS — E11.59 HYPERTENSION ASSOCIATED WITH DIABETES (HCC): ICD-10-CM

## 2023-09-07 LAB
CHP ED QC CHECK: NORMAL
GLUCOSE BLD-MCNC: 109 MG/DL
HBA1C MFR BLD: 6.2 %

## 2023-09-07 PROCEDURE — 82962 GLUCOSE BLOOD TEST: CPT | Performed by: INTERNAL MEDICINE

## 2023-09-07 PROCEDURE — 3080F DIAST BP >= 90 MM HG: CPT | Performed by: INTERNAL MEDICINE

## 2023-09-07 PROCEDURE — 83036 HEMOGLOBIN GLYCOSYLATED A1C: CPT | Performed by: INTERNAL MEDICINE

## 2023-09-07 PROCEDURE — 99214 OFFICE O/P EST MOD 30 MIN: CPT | Performed by: INTERNAL MEDICINE

## 2023-09-07 PROCEDURE — 3044F HG A1C LEVEL LT 7.0%: CPT | Performed by: INTERNAL MEDICINE

## 2023-09-07 PROCEDURE — 3075F SYST BP GE 130 - 139MM HG: CPT | Performed by: INTERNAL MEDICINE

## 2023-09-07 RX ORDER — DULOXETIN HYDROCHLORIDE 30 MG/1
30 CAPSULE, DELAYED RELEASE ORAL DAILY
COMMUNITY
Start: 2021-02-03 | End: 2023-09-07

## 2023-09-07 RX ORDER — AMOXICILLIN 250 MG
2 CAPSULE ORAL DAILY
COMMUNITY
Start: 2021-02-16 | End: 2023-09-07

## 2023-09-07 RX ORDER — GABAPENTIN 300 MG/1
300 CAPSULE ORAL EVERY EVENING
COMMUNITY
End: 2023-09-07

## 2023-09-07 RX ORDER — CEPHALEXIN 500 MG/1
CAPSULE ORAL
COMMUNITY
Start: 2020-09-26 | End: 2023-09-07

## 2023-09-07 RX ORDER — MELOXICAM 15 MG/1
TABLET ORAL
COMMUNITY
Start: 2020-07-13 | End: 2023-09-07

## 2023-09-07 RX ORDER — POTASSIUM CHLORIDE 750 MG/1
10 CAPSULE, EXTENDED RELEASE ORAL DAILY
COMMUNITY
End: 2023-09-07 | Stop reason: ALTCHOICE

## 2023-09-07 RX ORDER — OXYCODONE HYDROCHLORIDE 5 MG/1
TABLET ORAL
COMMUNITY
Start: 2020-10-08 | End: 2023-09-07

## 2023-09-07 RX ORDER — PREDNISONE 10 MG/1
TABLET ORAL
COMMUNITY
Start: 2020-09-26 | End: 2023-09-07

## 2023-09-07 RX ORDER — ACETAMINOPHEN 325 MG/1
TABLET ORAL
COMMUNITY
End: 2023-09-07

## 2023-09-07 RX ORDER — LOSARTAN POTASSIUM 25 MG/1
25 TABLET ORAL DAILY
Qty: 90 TABLET | Refills: 0 | Status: SHIPPED | OUTPATIENT
Start: 2023-09-07

## 2023-09-07 RX ORDER — SULFAMETHOXAZOLE AND TRIMETHOPRIM 800; 160 MG/1; MG/1
1 TABLET ORAL 2 TIMES DAILY
Qty: 20 TABLET | Refills: 0 | Status: SHIPPED | OUTPATIENT
Start: 2023-09-07 | End: 2023-09-17

## 2023-09-07 RX ORDER — IBUPROFEN 800 MG/1
TABLET ORAL
COMMUNITY
End: 2023-09-07 | Stop reason: SDUPTHER

## 2023-09-07 RX ORDER — ROSUVASTATIN CALCIUM 5 MG/1
5 TABLET, COATED ORAL NIGHTLY
Qty: 90 TABLET | Refills: 0 | Status: SHIPPED | OUTPATIENT
Start: 2023-09-07

## 2023-09-07 NOTE — PATIENT INSTRUCTIONS
TAKE MED AS ADVISED    DIET/ EXERCISE. FOLLOW UP WITHIN 3 MONTHS / AS NEEDED    FOLLOW UP FOR FASTING LABS, [facility]  Laboratory Locations - No appointment necessary. ? indicates the location is open Saturdays in addition to Monday through Friday. Call your preferred location for test preparation, business hours and other information you need. [employer] accepts [employer]. [facility]  [facility]    ? [place] [id number] [facility]. [facility], [address]    Ph: [address] [facility], [address]    Ph: [phone]   ? [address]    [facility], [address] [facility]    Ph: [address] [place], [address] [address] [facility] [address]    Ph: [phone] ? [place]   [address]   Ph: [phone]  ? 707 [facility], 211 [facility]    Ph: [place] [address] [facility], 1235 [facility]   Ph: [phone]    NORTH    ? [place], [place] [address]    Ph: [phone]  [facility]   1221 [facility], [facility]   Ph: [place]. [facility] [address] [facility]: [address]ley, [address] [place] [address]    Ph: 713 [facility].  [address] [place] [address]    Ph: [phone]

## 2023-09-07 NOTE — PROGRESS NOTES
SUBJECTIVE:  Yolanda Robles is a 62 y.o. male HERE FOR   Chief Complaint   Patient presents with    Follow-up    Hypertension    Diabetes        PT HERE FOR EVAL     C/O SWELLING - LT LEG. ? 2 WEEKS. ? NO PAIN. NO REDNESS. NO F/C     DM -  NOT ON MED.   ? DIET / EXERCISE COMPLIANCE. HBS - NOT CHECKING. EYE EXAM -  > 1 YEAR  HTN - HAS NOT BEEN TAKING MED > PAST 1 WEEK. BP NOTED. ? DIET / EXERCISE COMPLIANCE. DENIES HA, NO DIZZINESS   DYSLIPIDEMIA -  ? EXERCISE / DIET COMPLIANCE . PREVIOUS LABS D/W PT  C/O WOUND - LT LEG. PAST 2 WEEKS. + DISCHARGE. DENIES TRAUMA  MORBID OBESITY - DIET / EXERCISE REVIEWED. S/P PRIOR SURGERY. FOLLOWING WITH BARIATRIC SURGERY. WT GAIN NOTED  HYPOKALEMIA - RESOLVED. NOT TAKING MED NOW. DENIES MUSCLE CRAMPS     DENIES CP, No SOB, No PALPITATIONS. NO COUGH, NO F/C  No ABD PAIN, No N/V, No DIARRHEA, NO CONSTIPATION, No MELENA, No HEMATOCHEZIA. No DYSURIA, No FREQ, No URGENCY, No HEMATURIA      PMH: REVIEWED AND UPDATED TODAY    PSH: REVIEWED AND UPDATED TODAY    SOCIAL HX: REVIEWED AND UPDATED TODAY    FAMILY HX: REVIEWED AND UPDATED TODAY    ALLERGY:  Patient has no known allergies. MEDS: REVIEWED  Prior to Visit Medications    Medication Sig Taking?  Authorizing Provider   ibuprofen (ADVIL;MOTRIN) 800 MG tablet  Yes Historical Provider, MD   furosemide (LASIX) 20 MG tablet TAKE 1 TABLET BY MOUTH DAILY AS NEEDED FOR SWELLING Yes Jetta Kayser, MD   losartan (COZAAR) 25 MG tablet TAKE 1 TABLET BY MOUTH DAILY Yes Jetta Kayser, MD   ibuprofen (ADVIL;MOTRIN) 800 MG tablet Take 1 tablet by mouth as needed for Pain Yes Historical Provider, MD   Compression Stockings MISC by Does not apply route Diagnosis: I83.333  Location of wound: Left Leg  Gradient IV (30-40 mm Hg)  Style: Knee Length  Extremity: Bilateral Yes Ashlee Kelley DPKATHY   Multiple Vitamin (MULTI VITAMIN PO) Take by mouth daily Yes Historical Provider, MD   senna-docusate (PERICOLACE) 8.6-50 MG per tablet Take 2 tablets by

## 2023-09-11 ENCOUNTER — HOSPITAL ENCOUNTER (OUTPATIENT)
Dept: WOUND CARE | Age: 58
Discharge: HOME | End: 2023-09-11
Payer: COMMERCIAL

## 2023-09-11 VITALS
SYSTOLIC BLOOD PRESSURE: 150 MMHG | TEMPERATURE: 97 F | HEART RATE: 69 BPM | RESPIRATION RATE: 16 BRPM | BODY MASS INDEX: 40.43 KG/M2 | HEIGHT: 74 IN | WEIGHT: 315 LBS | DIASTOLIC BLOOD PRESSURE: 93 MMHG

## 2023-09-11 DIAGNOSIS — L97.929 IDIOPATHIC CHRONIC VENOUS HYPERTENSION OF LEFT LOWER EXTREMITY WITH ULCER (HCC): ICD-10-CM

## 2023-09-11 DIAGNOSIS — I83.029 VENOUS STASIS ULCER OF LEFT LOWER EXTREMITY (HCC): Primary | ICD-10-CM

## 2023-09-11 DIAGNOSIS — I87.312 IDIOPATHIC CHRONIC VENOUS HYPERTENSION OF LEFT LOWER EXTREMITY WITH ULCER (HCC): ICD-10-CM

## 2023-09-11 DIAGNOSIS — L97.929 VENOUS STASIS ULCER OF LEFT LOWER EXTREMITY (HCC): Primary | ICD-10-CM

## 2023-09-11 PROCEDURE — 6370000000 HC RX 637 (ALT 250 FOR IP): Performed by: SPECIALIST

## 2023-09-11 PROCEDURE — 11042 DBRDMT SUBQ TIS 1ST 20SQCM/<: CPT

## 2023-09-11 PROCEDURE — 99213 OFFICE O/P EST LOW 20 MIN: CPT

## 2023-09-11 PROCEDURE — 29581 APPL MULTLAYER CMPRN SYS LEG: CPT

## 2023-09-11 RX ORDER — IBUPROFEN 200 MG
TABLET ORAL ONCE
OUTPATIENT
Start: 2023-09-11 | End: 2023-09-11

## 2023-09-11 RX ORDER — LIDOCAINE HYDROCHLORIDE 20 MG/ML
JELLY TOPICAL ONCE
OUTPATIENT
Start: 2023-09-11 | End: 2023-09-11

## 2023-09-11 RX ORDER — GENTAMICIN SULFATE 1 MG/G
OINTMENT TOPICAL ONCE
OUTPATIENT
Start: 2023-09-11 | End: 2023-09-11

## 2023-09-11 RX ORDER — LIDOCAINE HYDROCHLORIDE 40 MG/ML
SOLUTION TOPICAL ONCE
OUTPATIENT
Start: 2023-09-11 | End: 2023-09-11

## 2023-09-11 RX ORDER — BETAMETHASONE DIPROPIONATE 0.05 %
OINTMENT (GRAM) TOPICAL ONCE
OUTPATIENT
Start: 2023-09-11 | End: 2023-09-11

## 2023-09-11 RX ORDER — LIDOCAINE 50 MG/G
OINTMENT TOPICAL ONCE
OUTPATIENT
Start: 2023-09-11 | End: 2023-09-11

## 2023-09-11 RX ORDER — BACITRACIN ZINC AND POLYMYXIN B SULFATE 500; 1000 [USP'U]/G; [USP'U]/G
OINTMENT TOPICAL ONCE
OUTPATIENT
Start: 2023-09-11 | End: 2023-09-11

## 2023-09-11 RX ORDER — LIDOCAINE 40 MG/G
CREAM TOPICAL ONCE
OUTPATIENT
Start: 2023-09-11 | End: 2023-09-11

## 2023-09-11 RX ORDER — SODIUM CHLOR/HYPOCHLOROUS ACID 0.033 %
SOLUTION, IRRIGATION IRRIGATION ONCE
OUTPATIENT
Start: 2023-09-11 | End: 2023-09-11

## 2023-09-11 RX ORDER — GINSENG 100 MG
CAPSULE ORAL ONCE
OUTPATIENT
Start: 2023-09-11 | End: 2023-09-11

## 2023-09-11 RX ORDER — LIDOCAINE HYDROCHLORIDE 40 MG/ML
SOLUTION TOPICAL ONCE
Status: COMPLETED | OUTPATIENT
Start: 2023-09-11 | End: 2023-09-11

## 2023-09-11 RX ORDER — CLOBETASOL PROPIONATE 0.5 MG/G
OINTMENT TOPICAL ONCE
OUTPATIENT
Start: 2023-09-11 | End: 2023-09-11

## 2023-09-11 RX ADMIN — LIDOCAINE HYDROCHLORIDE: 40 SOLUTION TOPICAL at 09:32

## 2023-09-11 NOTE — DISCHARGE INSTRUCTIONS
[facility] Physician Orders and Discharge Instructions  [address] [facility]. [facility]  Telephone: [phone]  NAME:  Olya Gómez III  YOB: 1965  MEDICAL RECORD NUMBER:  [id number]  DATE: 9/11/23     Return Appointment:  Return Appointment: With Jase Jones MD  in  1 [facility])  [] Return Appointment for a Wound Assessment with the nurse on:     Future Appointments   Date Time Provider [address]   12/7/2023  2:20 PM Farzad Borja MD [facility]: none    Medically necessary services for evaluation and treatment: []Skilled Nursing (using clean technique) []PT (Eval & Treat) []OT (Eval & Treat) []Social Work []Dietician []Other:      Wound care instructions: If you smoke we ask that you refrain from smoking. Smoking inhibits wounds from healing. When taking antibiotics take the entire prescription as ordered. Do not stop taking until medication is all gone unless otherwise instructed. Exercise as tolerated. Keep weight off wounds and reposition every 2 hours if applicable. Do not get wounds wet in the bath or shower unless otherwise instructed by your physician. If your wound is on your foot or leg, you may purchase a cast bag. Please ask at the pharmacy. Wash hands with soap and water prior to and after every dressing change. [x]Wash wounds with: Vashe wash - Apply enough Vashe to soak a piece of gauze and place on wound bed for 5-10 minutes. No need to rinse after soaking. [x]Georgina wound Topical Treatments: Do not apply lotions, creams, or ointments to the skin around the wound bed unless directed as followed:   Apply around the wound: Moisturizing lotion         [x]Wound Location: left lower leg   Apply Primary Dressing to wound: Foam with silver (I.e. Polymem Ag)  Secondary Dressing: 4X4 gauze pad   Avoid contact of tape with skin if possible.   When to change

## 2023-09-11 NOTE — PROGRESS NOTES
Multilayer Compression Wrap   (Not Unna) Below the Knee    NAME:  Em Marcus III  YOB: 1965  MEDICAL RECORD NUMBER:  [id number]  DATE:  9/11/2023       Removed old Multilayer wrap if present and washed leg with mild soap/water. Applied moisturizing agent to dry skin as needed. Applied primary and secondary dressing as ordered    Applied multilayered dressing below the knee to Left lower leg(s)  (4 Layer Compression Wrap ) . Instructed patient/caregiver not to remove dressing and to keep it clean and dry. Instructed patient/caregiver on complications to report to provider, such as pain, numbness in toes, heavy drainage, and slippage of dressing. Instructed patient on purpose of compression dressing and on activity and exercise recommendations.    Applied per   Guidelines    Electronically signed by Fe Gusman RN on 9/11/2023 at 9:58 AM
your wound care, please contact the Rudolph Rodriguez at [phone]. Hours of operation:  Mon:  8AM - 2PM  Tue: 11AM - 5PM  Wed: CLOSED  Thur: 8AM - 4:30PM  Fri:  8AM - 4:30PM  The office is closed on all major holidays. Please give us 24-48 business hours to return your call. These hours of operation are subject to change. If you need help with your wounds and cannot wait until we are available, contact your PCP or go to your preferred emergency room. Call your doctor now or seek immediate medical care if:    You have symptoms of infection, such as: Increased pain, swelling, warmth, or redness. Red streaks leading from the area. Pus draining from the area. A fever.          [] Patient unable to sign Discharge Instructions given to ECF/Transportation/POA         Electronically signed by Tee Melissa MD on 9/11/2023 at 10:03 AM

## 2023-09-18 ENCOUNTER — HOSPITAL ENCOUNTER (OUTPATIENT)
Dept: WOUND CARE | Age: 58
Discharge: HOME | End: 2023-09-18
Payer: COMMERCIAL

## 2023-09-18 VITALS
TEMPERATURE: 97 F | RESPIRATION RATE: 16 BRPM | WEIGHT: 315 LBS | BODY MASS INDEX: 45.01 KG/M2 | SYSTOLIC BLOOD PRESSURE: 151 MMHG | DIASTOLIC BLOOD PRESSURE: 88 MMHG | HEART RATE: 65 BPM

## 2023-09-18 DIAGNOSIS — L97.929 IDIOPATHIC CHRONIC VENOUS HYPERTENSION OF LEFT LOWER EXTREMITY WITH ULCER (HCC): Primary | ICD-10-CM

## 2023-09-18 DIAGNOSIS — I83.029 VENOUS STASIS ULCER OF LEFT LOWER EXTREMITY (HCC): ICD-10-CM

## 2023-09-18 DIAGNOSIS — I87.312 IDIOPATHIC CHRONIC VENOUS HYPERTENSION OF LEFT LOWER EXTREMITY WITH ULCER (HCC): Primary | ICD-10-CM

## 2023-09-18 DIAGNOSIS — L97.929 VENOUS STASIS ULCER OF LEFT LOWER EXTREMITY (HCC): ICD-10-CM

## 2023-09-18 PROCEDURE — 6370000000 HC RX 637 (ALT 250 FOR IP): Performed by: SPECIALIST

## 2023-09-18 PROCEDURE — 11042 DBRDMT SUBQ TIS 1ST 20SQCM/<: CPT

## 2023-09-18 PROCEDURE — 29581 APPL MULTLAYER CMPRN SYS LEG: CPT

## 2023-09-18 RX ORDER — LIDOCAINE HYDROCHLORIDE 40 MG/ML
SOLUTION TOPICAL ONCE
OUTPATIENT
Start: 2023-09-18 | End: 2023-09-18

## 2023-09-18 RX ORDER — TRIAMCINOLONE ACETONIDE 1 MG/G
OINTMENT TOPICAL ONCE
OUTPATIENT
Start: 2023-09-18 | End: 2023-09-18

## 2023-09-18 RX ORDER — SODIUM CHLOR/HYPOCHLOROUS ACID 0.033 %
SOLUTION, IRRIGATION IRRIGATION ONCE
OUTPATIENT
Start: 2023-09-18 | End: 2023-09-18

## 2023-09-18 RX ORDER — IBUPROFEN 200 MG
TABLET ORAL ONCE
OUTPATIENT
Start: 2023-09-18 | End: 2023-09-18

## 2023-09-18 RX ORDER — GENTAMICIN SULFATE 1 MG/G
OINTMENT TOPICAL ONCE
OUTPATIENT
Start: 2023-09-18 | End: 2023-09-18

## 2023-09-18 RX ORDER — LIDOCAINE 40 MG/G
CREAM TOPICAL ONCE
OUTPATIENT
Start: 2023-09-18 | End: 2023-09-18

## 2023-09-18 RX ORDER — LIDOCAINE 50 MG/G
OINTMENT TOPICAL ONCE
OUTPATIENT
Start: 2023-09-18 | End: 2023-09-18

## 2023-09-18 RX ORDER — LIDOCAINE HYDROCHLORIDE 20 MG/ML
JELLY TOPICAL ONCE
OUTPATIENT
Start: 2023-09-18 | End: 2023-09-18

## 2023-09-18 RX ORDER — CLOBETASOL PROPIONATE 0.5 MG/G
OINTMENT TOPICAL ONCE
OUTPATIENT
Start: 2023-09-18 | End: 2023-09-18

## 2023-09-18 RX ORDER — BACITRACIN ZINC AND POLYMYXIN B SULFATE 500; 1000 [USP'U]/G; [USP'U]/G
OINTMENT TOPICAL ONCE
OUTPATIENT
Start: 2023-09-18 | End: 2023-09-18

## 2023-09-18 RX ORDER — BETAMETHASONE DIPROPIONATE 0.05 %
OINTMENT (GRAM) TOPICAL ONCE
OUTPATIENT
Start: 2023-09-18 | End: 2023-09-18

## 2023-09-18 RX ORDER — LIDOCAINE HYDROCHLORIDE 40 MG/ML
SOLUTION TOPICAL ONCE
Status: COMPLETED | OUTPATIENT
Start: 2023-09-18 | End: 2023-09-18

## 2023-09-18 RX ORDER — GINSENG 100 MG
CAPSULE ORAL ONCE
OUTPATIENT
Start: 2023-09-18 | End: 2023-09-18

## 2023-09-18 RX ADMIN — LIDOCAINE HYDROCHLORIDE: 40 SOLUTION TOPICAL at 09:26

## 2023-09-18 NOTE — DISCHARGE INSTRUCTIONS
411 [facility] Physician Orders and Discharge Instructions  [address] [place] [address]. [facility]  Telephone: [phone]  NAME:  Miguel Hassan III  YOB: 1965  MEDICAL RECORD NUMBER:  [id number]  DATE: 9/18/23     Return Appointment:  Return Appointment: With Dorma Mohs, MD  in  1 [facility])  [] Return Appointment for a Wound Assessment with the nurse on:     Future Appointments   Date Time Provider [address]   12/7/2023  2:20 PM Rosamaria Granados MD [facility]: none    Medically necessary services for evaluation and treatment: []Skilled Nursing (using clean technique) []PT (Eval & Treat) []OT (Eval & Treat) []Social Work []Dietician []Other:      Wound care instructions: If you smoke we ask that you refrain from smoking. Smoking inhibits wounds from healing. When taking antibiotics take the entire prescription as ordered. Do not stop taking until medication is all gone unless otherwise instructed. Exercise as tolerated. Keep weight off wounds and reposition every 2 hours if applicable. Do not get wounds wet in the bath or shower unless otherwise instructed by your physician. If your wound is on your foot or leg, you may purchase a cast bag. Please ask at the pharmacy. Wash hands with soap and water prior to and after every dressing change. [x]Wash wounds with: Vashe wash - Apply enough Vashe to soak a piece of gauze and place on wound bed for 5-10 minutes. No need to rinse after soaking. [x]Georgina wound Topical Treatments: Do not apply lotions, creams, or ointments to the skin around the wound bed unless directed as followed:   Apply around the wound: Moisturizing lotion         [x]Wound Location: left lower leg   Apply Primary Dressing to wound: Foam with silver (I.e. Polymem Ag)  Secondary Dressing: 4X4 gauze pad   Avoid contact of tape with skin if possible.   When to change

## 2023-09-18 NOTE — PROGRESS NOTES
Multilayer Compression Wrap   (Not Unna) Below the Knee    NAME:  Kailyn Branch III  YOB: 1965  MEDICAL RECORD NUMBER:  [id number]  DATE:  9/18/2023       Removed old Multilayer wrap if present and washed leg with mild soap/water. Applied moisturizing agent to dry skin as needed. Applied primary and secondary dressing as ordered    Applied multilayered dressing below the knee to Left lower leg(s)  (4 Layer Compression Wrap ) . Instructed patient/caregiver not to remove dressing and to keep it clean and dry. Instructed patient/caregiver on complications to report to provider, such as pain, numbness in toes, heavy drainage, and slippage of dressing. Instructed patient on purpose of compression dressing and on activity and exercise recommendations.    Applied per   Guidelines    Electronically signed by Sydni Ybarra RN on 9/18/2023 at 10:19 AM
sign Discharge Instructions given to ECF/Transportation/POA         Electronically signed by Luisa Greene MD on 9/18/2023 at 12:28 PM

## 2023-09-25 ENCOUNTER — HOSPITAL ENCOUNTER (OUTPATIENT)
Dept: WOUND CARE | Age: 58
Discharge: HOME | End: 2023-09-25
Payer: COMMERCIAL

## 2023-09-25 VITALS
TEMPERATURE: 97.7 F | WEIGHT: 315 LBS | BODY MASS INDEX: 44.27 KG/M2 | SYSTOLIC BLOOD PRESSURE: 162 MMHG | DIASTOLIC BLOOD PRESSURE: 67 MMHG | HEART RATE: 69 BPM

## 2023-09-25 DIAGNOSIS — L97.929 VENOUS STASIS ULCER OF LEFT LOWER EXTREMITY (HCC): ICD-10-CM

## 2023-09-25 DIAGNOSIS — I83.029 VENOUS STASIS ULCER OF LEFT LOWER EXTREMITY (HCC): ICD-10-CM

## 2023-09-25 DIAGNOSIS — L97.929 IDIOPATHIC CHRONIC VENOUS HYPERTENSION OF LEFT LOWER EXTREMITY WITH ULCER (HCC): Primary | ICD-10-CM

## 2023-09-25 DIAGNOSIS — I87.312 IDIOPATHIC CHRONIC VENOUS HYPERTENSION OF LEFT LOWER EXTREMITY WITH ULCER (HCC): Primary | ICD-10-CM

## 2023-09-25 PROCEDURE — 11042 DBRDMT SUBQ TIS 1ST 20SQCM/<: CPT

## 2023-09-25 PROCEDURE — 6370000000 HC RX 637 (ALT 250 FOR IP): Performed by: SPECIALIST

## 2023-09-25 PROCEDURE — 29581 APPL MULTLAYER CMPRN SYS LEG: CPT

## 2023-09-25 RX ORDER — SODIUM CHLOR/HYPOCHLOROUS ACID 0.033 %
SOLUTION, IRRIGATION IRRIGATION ONCE
Status: COMPLETED | OUTPATIENT
Start: 2023-09-25 | End: 2023-09-25

## 2023-09-25 RX ORDER — TRIAMCINOLONE ACETONIDE 1 MG/G
OINTMENT TOPICAL ONCE
OUTPATIENT
Start: 2023-09-25 | End: 2023-09-25

## 2023-09-25 RX ORDER — LIDOCAINE HYDROCHLORIDE 20 MG/ML
JELLY TOPICAL ONCE
OUTPATIENT
Start: 2023-09-25 | End: 2023-09-25

## 2023-09-25 RX ORDER — LIDOCAINE HYDROCHLORIDE 40 MG/ML
SOLUTION TOPICAL ONCE
OUTPATIENT
Start: 2023-09-25 | End: 2023-09-25

## 2023-09-25 RX ORDER — CLOBETASOL PROPIONATE 0.5 MG/G
OINTMENT TOPICAL ONCE
OUTPATIENT
Start: 2023-09-25 | End: 2023-09-25

## 2023-09-25 RX ORDER — GENTAMICIN SULFATE 1 MG/G
OINTMENT TOPICAL ONCE
OUTPATIENT
Start: 2023-09-25 | End: 2023-09-25

## 2023-09-25 RX ORDER — BACITRACIN ZINC AND POLYMYXIN B SULFATE 500; 1000 [USP'U]/G; [USP'U]/G
OINTMENT TOPICAL ONCE
OUTPATIENT
Start: 2023-09-25 | End: 2023-09-25

## 2023-09-25 RX ORDER — BETAMETHASONE DIPROPIONATE 0.05 %
OINTMENT (GRAM) TOPICAL ONCE
OUTPATIENT
Start: 2023-09-25 | End: 2023-09-25

## 2023-09-25 RX ORDER — LIDOCAINE 40 MG/G
CREAM TOPICAL ONCE
OUTPATIENT
Start: 2023-09-25 | End: 2023-09-25

## 2023-09-25 RX ORDER — LIDOCAINE HYDROCHLORIDE 40 MG/ML
SOLUTION TOPICAL ONCE
Status: COMPLETED | OUTPATIENT
Start: 2023-09-25 | End: 2023-09-25

## 2023-09-25 RX ORDER — IBUPROFEN 200 MG
TABLET ORAL ONCE
OUTPATIENT
Start: 2023-09-25 | End: 2023-09-25

## 2023-09-25 RX ORDER — LIDOCAINE 50 MG/G
OINTMENT TOPICAL ONCE
OUTPATIENT
Start: 2023-09-25 | End: 2023-09-25

## 2023-09-25 RX ORDER — SODIUM CHLOR/HYPOCHLOROUS ACID 0.033 %
SOLUTION, IRRIGATION IRRIGATION ONCE
OUTPATIENT
Start: 2023-09-25 | End: 2023-09-25

## 2023-09-25 RX ORDER — GINSENG 100 MG
CAPSULE ORAL ONCE
OUTPATIENT
Start: 2023-09-25 | End: 2023-09-25

## 2023-09-25 RX ADMIN — LIDOCAINE HYDROCHLORIDE: 40 SOLUTION TOPICAL at 09:25

## 2023-09-25 RX ADMIN — Medication: at 10:05

## 2023-09-25 NOTE — PATIENT INSTRUCTIONS
411 [facility] Physician Orders and Discharge Instructions  [address] [facility]. [facility]  Telephone: [phone]  NAME:  Thad Adams III  YOB: 1965  MEDICAL RECORD NUMBER:  [id number]  DATE: 9/25/23     Return Appointment:  Return Appointment: With Leon Sloan MD  in  1 [facility])  [] Return Appointment for a Wound Assessment with the nurse on:     Future Appointments   Date Time Provider [address]   12/7/2023  2:20 PM Pepe Lovelace MD [facility]: none    Medically necessary services for evaluation and treatment: []Skilled Nursing (using clean technique) []PT (Eval & Treat) []OT (Eval & Treat) []Social Work []Dietician []Other:      Wound care instructions: If you smoke we ask that you refrain from smoking. Smoking inhibits wounds from healing. When taking antibiotics take the entire prescription as ordered. Do not stop taking until medication is all gone unless otherwise instructed. Exercise as tolerated. Keep weight off wounds and reposition every 2 hours if applicable. Do not get wounds wet in the bath or shower unless otherwise instructed by your physician. If your wound is on your foot or leg, you may purchase a cast bag. Please ask at the pharmacy. Wash hands with soap and water prior to and after every dressing change. [x]Wash wounds with: Vashe wash - Apply enough Vashe to soak a piece of gauze and place on wound bed for 5-10 minutes. No need to rinse after soaking. [x]Georgina wound Topical Treatments: Do not apply lotions, creams, or ointments to the skin around the wound bed unless directed as followed:   Apply around the wound: Moisturizing lotion         [x]Wound Location: left lower leg   Apply Primary Dressing to wound: [place]  Secondary Dressing: 4X4 gauze pad   Avoid contact of tape with skin if possible.   When to change Dressing: DO NOT

## 2023-09-25 NOTE — PROGRESS NOTES
Multilayer Compression Wrap   (Not Unna) Below the Knee    NAME:  Thad Adams III  YOB: 1965  MEDICAL RECORD NUMBER:  [id number]  DATE:  9/25/2023       Removed old Multilayer wrap if present and washed leg with mild soap/water. Applied moisturizing agent to dry skin as needed. Applied primary and secondary dressing as ordered    Applied multilayered dressing below the knee to Left lower leg(s)  (4 Layer Compression Wrap ) . Instructed patient/caregiver not to remove dressing and to keep it clean and dry. Instructed patient/caregiver on complications to report to provider, such as pain, numbness in toes, heavy drainage, and slippage of dressing. Instructed patient on purpose of compression dressing and on activity and exercise recommendations.    Applied per   Guidelines    Electronically signed by Destiny Wilkes RN on 9/25/2023 at 10:03 AM
Mickey Quintana MD on 9/25/2023 at 10:40 AM

## 2023-10-02 ENCOUNTER — HOSPITAL ENCOUNTER (OUTPATIENT)
Dept: WOUND CARE | Age: 58
Discharge: HOME | End: 2023-10-02
Payer: COMMERCIAL

## 2023-10-02 VITALS
WEIGHT: 315 LBS | TEMPERATURE: 97.1 F | HEART RATE: 65 BPM | DIASTOLIC BLOOD PRESSURE: 75 MMHG | BODY MASS INDEX: 44.78 KG/M2 | RESPIRATION RATE: 16 BRPM | SYSTOLIC BLOOD PRESSURE: 132 MMHG

## 2023-10-02 DIAGNOSIS — L97.929 IDIOPATHIC CHRONIC VENOUS HYPERTENSION OF LEFT LOWER EXTREMITY WITH ULCER (HCC): Primary | ICD-10-CM

## 2023-10-02 DIAGNOSIS — I87.312 IDIOPATHIC CHRONIC VENOUS HYPERTENSION OF LEFT LOWER EXTREMITY WITH ULCER (HCC): Primary | ICD-10-CM

## 2023-10-02 DIAGNOSIS — I83.029 VENOUS STASIS ULCER OF LEFT LOWER EXTREMITY (HCC): ICD-10-CM

## 2023-10-02 DIAGNOSIS — L97.929 VENOUS STASIS ULCER OF LEFT LOWER EXTREMITY (HCC): ICD-10-CM

## 2023-10-02 PROCEDURE — 11042 DBRDMT SUBQ TIS 1ST 20SQCM/<: CPT

## 2023-10-02 PROCEDURE — 6370000000 HC RX 637 (ALT 250 FOR IP): Performed by: SPECIALIST

## 2023-10-02 PROCEDURE — 29581 APPL MULTLAYER CMPRN SYS LEG: CPT

## 2023-10-02 PROCEDURE — 11042 DBRDMT SUBQ TIS 1ST 20SQCM/<: CPT | Performed by: SPECIALIST

## 2023-10-02 RX ORDER — LIDOCAINE 50 MG/G
OINTMENT TOPICAL ONCE
OUTPATIENT
Start: 2023-10-02 | End: 2023-10-02

## 2023-10-02 RX ORDER — SODIUM CHLOR/HYPOCHLOROUS ACID 0.033 %
SOLUTION, IRRIGATION IRRIGATION ONCE
OUTPATIENT
Start: 2023-10-02 | End: 2023-10-02

## 2023-10-02 RX ORDER — GENTAMICIN SULFATE 1 MG/G
OINTMENT TOPICAL ONCE
OUTPATIENT
Start: 2023-10-02 | End: 2023-10-02

## 2023-10-02 RX ORDER — BETAMETHASONE DIPROPIONATE 0.05 %
OINTMENT (GRAM) TOPICAL ONCE
OUTPATIENT
Start: 2023-10-02 | End: 2023-10-02

## 2023-10-02 RX ORDER — CLOBETASOL PROPIONATE 0.5 MG/G
OINTMENT TOPICAL ONCE
OUTPATIENT
Start: 2023-10-02 | End: 2023-10-02

## 2023-10-02 RX ORDER — LIDOCAINE HYDROCHLORIDE 20 MG/ML
JELLY TOPICAL ONCE
OUTPATIENT
Start: 2023-10-02 | End: 2023-10-02

## 2023-10-02 RX ORDER — IBUPROFEN 200 MG
TABLET ORAL ONCE
OUTPATIENT
Start: 2023-10-02 | End: 2023-10-02

## 2023-10-02 RX ORDER — LIDOCAINE HYDROCHLORIDE 40 MG/ML
SOLUTION TOPICAL ONCE
Status: COMPLETED | OUTPATIENT
Start: 2023-10-02 | End: 2023-10-02

## 2023-10-02 RX ORDER — LIDOCAINE 40 MG/G
CREAM TOPICAL ONCE
OUTPATIENT
Start: 2023-10-02 | End: 2023-10-02

## 2023-10-02 RX ORDER — BACITRACIN ZINC AND POLYMYXIN B SULFATE 500; 1000 [USP'U]/G; [USP'U]/G
OINTMENT TOPICAL ONCE
OUTPATIENT
Start: 2023-10-02 | End: 2023-10-02

## 2023-10-02 RX ORDER — GINSENG 100 MG
CAPSULE ORAL ONCE
OUTPATIENT
Start: 2023-10-02 | End: 2023-10-02

## 2023-10-02 RX ORDER — TRIAMCINOLONE ACETONIDE 1 MG/G
OINTMENT TOPICAL ONCE
OUTPATIENT
Start: 2023-10-02 | End: 2023-10-02

## 2023-10-02 RX ORDER — LIDOCAINE HYDROCHLORIDE 40 MG/ML
SOLUTION TOPICAL ONCE
OUTPATIENT
Start: 2023-10-02 | End: 2023-10-02

## 2023-10-02 RX ADMIN — LIDOCAINE HYDROCHLORIDE: 40 SOLUTION TOPICAL at 09:25

## 2023-10-02 NOTE — PATIENT INSTRUCTIONS
5PM  Wed: CLOSED  Thur: 8AM - 4:30PM  Fri:  8AM - 4:30PM  The office is closed on all major holidays. Please give us 24-48 business hours to return your call. These hours of operation are subject to change. If you need help with your wounds and cannot wait until we are available, contact your PCP or go to your preferred emergency room. Call your doctor now or seek immediate medical care if:    You have symptoms of infection, such as: Increased pain, swelling, warmth, or redness. Red streaks leading from the area. Pus draining from the area. A fever.          [] Patient unable to sign Discharge Instructions given to ECF/Transportation/POA

## 2023-10-02 NOTE — PROGRESS NOTES
Insurance Verification Request      Ordering Center: [facility]  [address] [facility] [address] [place] Phone Number: [phone]  Fax Number: [phone]    Facility NPI: [id number]  Facility Tax ID [id number]    Provider Information:      Provider's Name: Dr. Kristen Salas   Provider's NPI: Terri Rajan MD,  NPI: [id number]     Patient Information:     Felix Monique III  [address]   [phone]   : 1965  AGE: 62 y.o. GENDER: male   TODAYS DATE:  10/2/2023    Insurance:     PRIMARY INSURANCE:  Plan: BCBS - [place] PPO  Coverage: BCBS  Effective Date: 2023  Group Number: [unfilled]  Subscriber Number: [id number] - ([address] [place] [address])    Payer/Plan Subscr  Sex Relation Sub. Ins. ID Effective Group Num   1.  Rudolph Villalba* [birth year]1964 Female Spouse [id number]                                    [address]       Application/product Information:     Benefit Verification Request:    Reason for Request: New Wound    [employer] ([employer]) FAX# [phone]    Trunk/Arms/Legs [id number] (1st 25 sq cm)    [employer]    Has patient been treated with any other Skin Substitute on this Wound:     No        WOUND #: 1    Total Square CM: 7.13    Procedure setting: Hospital Outpatient Department    Is the Patient currently in a skilled nursing facility: No     Is the wound work related: No    Procedure date: 10/16/2023    Patient Information:     Additional Dx Codes: I87.312,L97.929    Problem List Items Addressed This Visit          Circulatory    Idiopathic chronic venous hypertension of left lower extremity with ulcer ([address]) - Primary    Relevant Orders    Initiate Outpatient Wound Care Protocol    VL Reflux Rei Insufficiency Stdy Left       Other    Venous stasis ulcer of left lower extremity ([address])    Relevant Orders    Initiate Outpatient Wound Care Protocol       Advanced Modality Checklist  Is Wound Greater than 1.0 sq cm? : Y

## 2023-10-02 NOTE — PROGRESS NOTES
[facility]  Progress Note and Procedure Note      Jam Mackey III  MEDICAL RECORD NUMBER:  [id number]  AGE: 62 y.o. GENDER: male  : 1965  EPISODE DATE:  10/2/2023    Subjective:     Chief Complaint   Patient presents with    Wound Check     Left arm         HISTORY of PRESENT ILLNESS HPI     Jam Mackey III is a 62 y.o. male who presents today for wound/ulcer evaluation. History of Wound Context: Patient well-known to wound care having last been seen some 3 years ago for chronic venous insufficiency and ulceration left lower extremity. He was discharged with instructions to wear compression. However patient states he is only wearing the sock and not the CircAid Velcro. In addition he has undergone a gastric sleeve resection for his morbid obesity but unfortunately he has gained a significant amount of weight back. He has not had a follow-up with the bariatric surgeon. Also he has had both hips replaced. Describes several weeks swelling of his left lower extremity with recent notation of an ulcer similar to what was treated in the past on his left lateral knee.   Patient has worn a 4-layer compression to left lower extremity this past week without difficulty  Wound/Ulcer Pain Timing/Severity: none  Quality of pain: N/A  Severity:  0 / 10   Modifying Factors: None  Associated Signs/Symptoms: edema    Ulcer Identification:  Ulcer Type: venous    Contributing Factors: edema, venous stasis, lymphedema, and obesity    Acute Wound: N/A not an acute wound    PAST MEDICAL HISTORY        Diagnosis Date    Arthritis     Obesity     PPD positive     Venous stasis ulcer of left lower extremity ([address]) 10/25/2016       PAST SURGICAL HISTORY    Past Surgical History:   Procedure Laterality Date    COLONOSCOPY      JOINT REPLACEMENT Bilateral     SLEEVE GASTRECTOMY N/A 2020    ROBOTIC ASSISTED LAPAROSCOPIC SLEEVE GASTRECTOMY; LAPAROSCOPIC VENTRAL HERNIA REPAIR performed by Natalie Ferguson

## 2023-10-02 NOTE — PROGRESS NOTES
Multilayer Compression Wrap   (Not Unna) Below the Knee    NAME:  Ivelisse Villalobos III  YOB: 1965  MEDICAL RECORD NUMBER:  [id number]  DATE:  10/2/2023       Removed old Multilayer wrap if present and washed leg with mild soap/water. Applied moisturizing agent to dry skin as needed. Applied primary and secondary dressing as ordered    Applied multilayered dressing below the knee to Left lower leg(s)  (4 Layer Compression Wrap ) . Instructed patient/caregiver not to remove dressing and to keep it clean and dry. Instructed patient/caregiver on complications to report to provider, such as pain, numbness in toes, heavy drainage, and slippage of dressing. Instructed patient on purpose of compression dressing and on activity and exercise recommendations.    Applied per   Guidelines    Electronically signed by Ellie Freedman RN on 10/2/2023 at 9:55 AM

## 2023-10-11 DIAGNOSIS — R60.0 LOCALIZED EDEMA: ICD-10-CM

## 2023-10-11 RX ORDER — FUROSEMIDE 20 MG/1
TABLET ORAL
Qty: 30 TABLET | Refills: 0 | Status: SHIPPED | OUTPATIENT
Start: 2023-10-11

## 2023-10-11 NOTE — TELEPHONE ENCOUNTER
Medication:   Requested Prescriptions     Pending Prescriptions Disp Refills    furosemide (LASIX) 20 MG tablet [Pharmacy Med Name: FUROSEMIDE 20MG TABLETS] 30 tablet 0     Sig: TAKE 1 TABLET BY MOUTH DAILY AS NEEDED FOR SWELLING        Last Filled:      Patient Phone Number: [phone] (home)     Last appt: 9/7/2023   Next appt: 12/7/2023    Last OARRS:        No data to display

## 2023-10-12 ENCOUNTER — HOSPITAL ENCOUNTER (OUTPATIENT)
Dept: WOUND CARE | Age: 58
Discharge: HOME | End: 2023-10-12
Attending: SPECIALIST
Payer: COMMERCIAL

## 2023-10-12 VITALS
SYSTOLIC BLOOD PRESSURE: 165 MMHG | HEART RATE: 63 BPM | TEMPERATURE: 96.8 F | DIASTOLIC BLOOD PRESSURE: 98 MMHG | RESPIRATION RATE: 16 BRPM

## 2023-10-12 DIAGNOSIS — L97.929 VENOUS STASIS ULCER OF LEFT LOWER EXTREMITY (HCC): ICD-10-CM

## 2023-10-12 DIAGNOSIS — L97.929 IDIOPATHIC CHRONIC VENOUS HYPERTENSION OF LEFT LOWER EXTREMITY WITH ULCER (HCC): Primary | ICD-10-CM

## 2023-10-12 DIAGNOSIS — I83.029 VENOUS STASIS ULCER OF LEFT LOWER EXTREMITY (HCC): ICD-10-CM

## 2023-10-12 DIAGNOSIS — I87.312 IDIOPATHIC CHRONIC VENOUS HYPERTENSION OF LEFT LOWER EXTREMITY WITH ULCER (HCC): Primary | ICD-10-CM

## 2023-10-12 PROCEDURE — 11042 DBRDMT SUBQ TIS 1ST 20SQCM/<: CPT

## 2023-10-12 PROCEDURE — 11042 DBRDMT SUBQ TIS 1ST 20SQCM/<: CPT | Performed by: SPECIALIST

## 2023-10-12 PROCEDURE — 29581 APPL MULTLAYER CMPRN SYS LEG: CPT

## 2023-10-12 RX ORDER — GINSENG 100 MG
CAPSULE ORAL ONCE
OUTPATIENT
Start: 2023-10-12 | End: 2023-10-12

## 2023-10-12 RX ORDER — TRIAMCINOLONE ACETONIDE 1 MG/G
OINTMENT TOPICAL ONCE
OUTPATIENT
Start: 2023-10-12 | End: 2023-10-12

## 2023-10-12 RX ORDER — GENTAMICIN SULFATE 1 MG/G
OINTMENT TOPICAL ONCE
OUTPATIENT
Start: 2023-10-12 | End: 2023-10-12

## 2023-10-12 RX ORDER — BACITRACIN ZINC AND POLYMYXIN B SULFATE 500; 1000 [USP'U]/G; [USP'U]/G
OINTMENT TOPICAL ONCE
OUTPATIENT
Start: 2023-10-12 | End: 2023-10-12

## 2023-10-12 RX ORDER — LIDOCAINE 50 MG/G
OINTMENT TOPICAL ONCE
OUTPATIENT
Start: 2023-10-12 | End: 2023-10-12

## 2023-10-12 RX ORDER — LIDOCAINE HYDROCHLORIDE 40 MG/ML
SOLUTION TOPICAL ONCE
OUTPATIENT
Start: 2023-10-12 | End: 2023-10-12

## 2023-10-12 RX ORDER — CLOBETASOL PROPIONATE 0.5 MG/G
OINTMENT TOPICAL ONCE
OUTPATIENT
Start: 2023-10-12 | End: 2023-10-12

## 2023-10-12 RX ORDER — IBUPROFEN 200 MG
TABLET ORAL ONCE
OUTPATIENT
Start: 2023-10-12 | End: 2023-10-12

## 2023-10-12 RX ORDER — BETAMETHASONE DIPROPIONATE 0.05 %
OINTMENT (GRAM) TOPICAL ONCE
OUTPATIENT
Start: 2023-10-12 | End: 2023-10-12

## 2023-10-12 RX ORDER — SODIUM CHLOR/HYPOCHLOROUS ACID 0.033 %
SOLUTION, IRRIGATION IRRIGATION ONCE
OUTPATIENT
Start: 2023-10-12 | End: 2023-10-12

## 2023-10-12 RX ORDER — LIDOCAINE HYDROCHLORIDE 20 MG/ML
JELLY TOPICAL ONCE
OUTPATIENT
Start: 2023-10-12 | End: 2023-10-12

## 2023-10-12 RX ORDER — LIDOCAINE HYDROCHLORIDE 40 MG/ML
SOLUTION TOPICAL ONCE
Status: DISCONTINUED | OUTPATIENT
Start: 2023-10-12 | End: 2023-10-13 | Stop reason: HOSPADM

## 2023-10-12 RX ORDER — LIDOCAINE 40 MG/G
CREAM TOPICAL ONCE
OUTPATIENT
Start: 2023-10-12 | End: 2023-10-12

## 2023-10-12 NOTE — PATIENT INSTRUCTIONS
[address] [facility] Physician Orders and Discharge Instructions  [address] [place] [address]. [facility]  Telephone: [phone]  NAME:  Ho Rivero III  YOB: 1965  MEDICAL RECORD NUMBER:  [id number]  DATE: 10/12/23     Return Appointment:  Return Appointment: With Fidel Castaneda MD  in  1 [facility])  [] Return Appointment for a Wound Assessment with the nurse on:     Future Appointments   Date Time Provider [address]   12/7/2023  2:20 PM César Patterson MD [facility]: none    Medically necessary services for evaluation and treatment: []Skilled Nursing (using clean technique) []PT (Eval & Treat) []OT (Eval & Treat) []Social Work []Dietician []Other:      Wound care instructions: If you smoke we ask that you refrain from smoking. Smoking inhibits wounds from healing. When taking antibiotics take the entire prescription as ordered. Do not stop taking until medication is all gone unless otherwise instructed. Exercise as tolerated. Keep weight off wounds and reposition every 2 hours if applicable. Do not get wounds wet in the bath or shower unless otherwise instructed by your physician. If your wound is on your foot or leg, you may purchase a cast bag. Please ask at the pharmacy. Wash hands with soap and water prior to and after every dressing change. [x]Wash wounds with: Vashe wash - Apply enough Vashe to soak a piece of gauze and place on wound bed for 5-10 minutes. No need to rinse after soaking. [x]Georgina wound Topical Treatments: Do not apply lotions, creams, or ointments to the skin around the wound bed unless directed as followed:   Apply around the wound: Moisturizing lotion         [x]Wound Location: left lower leg   Apply Primary Dressing to wound: Beverly  Secondary Dressing: 4X4 gauze pad   Avoid contact of tape with skin if possible.   When to change Dressing: DO NOT

## 2023-10-12 NOTE — PROGRESS NOTES
Multilayer Compression Wrap   (Not Unna) Below the Knee    NAME:  Gloria Ambriz III  YOB: 1965  MEDICAL RECORD NUMBER:  [id number]  DATE:  10/12/2023       Removed old Multilayer wrap if present and washed leg with mild soap/water. Applied moisturizing agent to dry skin as needed. Applied primary and secondary dressing as ordered    Applied multilayered dressing below the knee to Left lower leg(s)  (4 Layer Compression Wrap ) . Instructed patient/caregiver not to remove dressing and to keep it clean and dry. Instructed patient/caregiver on complications to report to provider, such as pain, numbness in toes, heavy drainage, and slippage of dressing. Instructed patient on purpose of compression dressing and on activity and exercise recommendations.    Applied per   Guidelines    Electronically signed by Margarita Padgett RN on 10/12/2023 at 9:04 AM
10/12/23 0858   Post-Procedure Width (cm) 1.6 cm 10/12/23 0858   Post-Procedure Depth (cm) 0.3 cm 10/12/23 0858   Post-Procedure Surface Area (cm^2) 3.52 cm^2 10/12/23 0858   Post-Procedure Volume (cm^3) 1. 056 cm^3 10/12/23 0858   Wound Assessment Fibrin;Pink/red 10/12/23 0837   Drainage Amount Small (< 25%) 10/12/23 0837   Drainage Description Brown;Serosanguinous 10/12/23 0837   Odor None 10/12/23 0837   Georgina-wound Assessment Dry/flaky 10/12/23 0837   Margins Defined edges 10/12/23 0837   Wound Thickness Description not for Pressure Injury Full thickness 10/12/23 0837   Number of days: 30          Percent of Wound Debrided: 100%    Total Surface Area Debrided:  3.5 sq cm    Diabetic/Pressure/Non Pressure Ulcers only:  Ulcer: Non-Pressure ulcer, fat layer exposed    Bleeding: Minimal    Hemostasis Achieved: by pressure    Procedural Pain: 0  / 10     Post Procedural Pain: 0 / 10     Response to treatment:  Well tolerated by patient. Significant improvement in wound measurements        Plan:   Once again of asked patient to get reflux study. However he will probably not need advanced skin Sub as ulcer is slowly epithelializing  Treatment Note: Please see attached Discharge Instructions. These instructions were given and signed by the patient or POA    New Prescriptions    No medications on file       Orders Placed This Encounter   Procedures    Initiate Outpatient Wound Care Protocol         Patient 68 [facility] Physician Orders and Discharge Instructions  [address] [address] [address] [place] [address]. [facility]  Telephone: [phone]  NAME:  Dante Lynn III  YOB: 1965  MEDICAL RECORD NUMBER:  [id number]  DATE: 10/12/23     Return Appointment:  Return Appointment: With Chucho Costello MD  in  1 [facility])  [] Return Appointment for a Wound Assessment with the nurse on:     Future Appointments   Date Time Provider [address]

## 2023-10-19 ENCOUNTER — HOSPITAL ENCOUNTER (OUTPATIENT)
Dept: WOUND CARE | Age: 58
Discharge: HOME | End: 2023-10-19
Attending: SPECIALIST
Payer: COMMERCIAL

## 2023-10-19 VITALS
TEMPERATURE: 98.6 F | DIASTOLIC BLOOD PRESSURE: 81 MMHG | HEART RATE: 74 BPM | RESPIRATION RATE: 18 BRPM | SYSTOLIC BLOOD PRESSURE: 135 MMHG

## 2023-10-19 DIAGNOSIS — I87.312 IDIOPATHIC CHRONIC VENOUS HYPERTENSION OF LEFT LOWER EXTREMITY WITH ULCER (HCC): Primary | ICD-10-CM

## 2023-10-19 DIAGNOSIS — L97.929 IDIOPATHIC CHRONIC VENOUS HYPERTENSION OF LEFT LOWER EXTREMITY WITH ULCER (HCC): Primary | ICD-10-CM

## 2023-10-19 DIAGNOSIS — I83.029 VENOUS STASIS ULCER OF LEFT LOWER EXTREMITY (HCC): ICD-10-CM

## 2023-10-19 DIAGNOSIS — L97.929 VENOUS STASIS ULCER OF LEFT LOWER EXTREMITY (HCC): ICD-10-CM

## 2023-10-19 PROCEDURE — 6370000000 HC RX 637 (ALT 250 FOR IP): Performed by: SPECIALIST

## 2023-10-19 PROCEDURE — 29581 APPL MULTLAYER CMPRN SYS LEG: CPT

## 2023-10-19 PROCEDURE — 11042 DBRDMT SUBQ TIS 1ST 20SQCM/<: CPT

## 2023-10-19 RX ORDER — LIDOCAINE 40 MG/G
CREAM TOPICAL ONCE
OUTPATIENT
Start: 2023-10-19 | End: 2023-10-19

## 2023-10-19 RX ORDER — CLOBETASOL PROPIONATE 0.5 MG/G
OINTMENT TOPICAL ONCE
OUTPATIENT
Start: 2023-10-19 | End: 2023-10-19

## 2023-10-19 RX ORDER — GINSENG 100 MG
CAPSULE ORAL ONCE
OUTPATIENT
Start: 2023-10-19 | End: 2023-10-19

## 2023-10-19 RX ORDER — LIDOCAINE 50 MG/G
OINTMENT TOPICAL ONCE
OUTPATIENT
Start: 2023-10-19 | End: 2023-10-19

## 2023-10-19 RX ORDER — TRIAMCINOLONE ACETONIDE 1 MG/G
OINTMENT TOPICAL ONCE
OUTPATIENT
Start: 2023-10-19 | End: 2023-10-19

## 2023-10-19 RX ORDER — SODIUM CHLOR/HYPOCHLOROUS ACID 0.033 %
SOLUTION, IRRIGATION IRRIGATION ONCE
OUTPATIENT
Start: 2023-10-19 | End: 2023-10-19

## 2023-10-19 RX ORDER — BACITRACIN ZINC AND POLYMYXIN B SULFATE 500; 1000 [USP'U]/G; [USP'U]/G
OINTMENT TOPICAL ONCE
OUTPATIENT
Start: 2023-10-19 | End: 2023-10-19

## 2023-10-19 RX ORDER — SODIUM CHLOR/HYPOCHLOROUS ACID 0.033 %
SOLUTION, IRRIGATION IRRIGATION ONCE
Status: COMPLETED | OUTPATIENT
Start: 2023-10-19 | End: 2023-10-19

## 2023-10-19 RX ORDER — GENTAMICIN SULFATE 1 MG/G
OINTMENT TOPICAL ONCE
OUTPATIENT
Start: 2023-10-19 | End: 2023-10-19

## 2023-10-19 RX ORDER — IBUPROFEN 200 MG
TABLET ORAL ONCE
OUTPATIENT
Start: 2023-10-19 | End: 2023-10-19

## 2023-10-19 RX ORDER — LIDOCAINE HYDROCHLORIDE 40 MG/ML
SOLUTION TOPICAL ONCE
Status: COMPLETED | OUTPATIENT
Start: 2023-10-19 | End: 2023-10-19

## 2023-10-19 RX ORDER — BETAMETHASONE DIPROPIONATE 0.05 %
OINTMENT (GRAM) TOPICAL ONCE
OUTPATIENT
Start: 2023-10-19 | End: 2023-10-19

## 2023-10-19 RX ORDER — LIDOCAINE HYDROCHLORIDE 40 MG/ML
SOLUTION TOPICAL ONCE
OUTPATIENT
Start: 2023-10-19 | End: 2023-10-19

## 2023-10-19 RX ORDER — LIDOCAINE HYDROCHLORIDE 20 MG/ML
JELLY TOPICAL ONCE
OUTPATIENT
Start: 2023-10-19 | End: 2023-10-19

## 2023-10-19 RX ADMIN — LIDOCAINE HYDROCHLORIDE: 40 SOLUTION TOPICAL at 09:28

## 2023-10-19 RX ADMIN — Medication: at 09:53

## 2023-10-19 NOTE — PATIENT INSTRUCTIONS
411 [facility] Physician Orders and Discharge Instructions  [address] [place] [address]. [facility]  Telephone: [phone]  NAME:  Olya Gómez III  YOB: 1965  MEDICAL RECORD NUMBER:  [id number]  DATE: 10/19/23     Return Appointment:  Return Appointment: With Jase Jones MD  in  1 [facility])  [] Return Appointment for a Wound Assessment with the nurse on:     Future Appointments   Date Time Provider [address]   12/7/2023  2:20 PM Farzad Borja MD [facility]: none    Medically necessary services for evaluation and treatment: []Skilled Nursing (using clean technique) []PT (Eval & Treat) []OT (Eval & Treat) []Social Work []Dietician []Other:      Wound care instructions: If you smoke we ask that you refrain from smoking. Smoking inhibits wounds from healing. When taking antibiotics take the entire prescription as ordered. Do not stop taking until medication is all gone unless otherwise instructed. Exercise as tolerated. Keep weight off wounds and reposition every 2 hours if applicable. Do not get wounds wet in the bath or shower unless otherwise instructed by your physician. If your wound is on your foot or leg, you may purchase a cast bag. Please ask at the pharmacy. Wash hands with soap and water prior to and after every dressing change. [x]Wash wounds with: Vashe wash - Apply enough Vashe to soak a piece of gauze and place on wound bed for 5-10 minutes. No need to rinse after soaking. [x]Georgina wound Topical Treatments: Do not apply lotions, creams, or ointments to the skin around the wound bed unless directed as followed:   Apply around the wound: Moisturizing lotion         [x]Wound Location: left lower leg   Apply Primary Dressing to wound: Beverly  Secondary Dressing: 4X4 gauze pad   Avoid contact of tape with skin if possible.   When to change Dressing: DO NOT

## 2023-10-19 NOTE — PROGRESS NOTES
(cm^3) 0.351 cm^3 10/19/23 0947   Distance Tunneling (cm) 0 cm 10/19/23 0916   Undermining Maxium Distance (cm) 0 10/19/23 0916   Wound Assessment Dry;Hyper granulation tissue;Slough 10/19/23 0916   Drainage Amount Small (< 25%) 10/19/23 0916   Drainage Description Thick; Serosanguinous 10/19/23 0916   Odor None 10/19/23 0916   Georgina-wound Assessment Dry/flaky 10/19/23 0916   Margins Defined edges 10/19/23 0916   Wound Thickness Description not for Pressure Injury Full thickness 10/19/23 0916   Number of days: 38          Percent of Wound Debrided: 100%    Total Surface Area Debrided:  1.1 sq cm    Diabetic/Pressure/Non Pressure Ulcers only:  Ulcer: Non-Pressure ulcer, fat layer exposed    Bleeding: Minimal    Hemostasis Achieved: by pressure    Procedural Pain: 0  / 10     Post Procedural Pain: 0 / 10     Response to treatment:  Well tolerated by patient. Significant improvement in ulcer measurements        Plan:     Treatment Note: Please see attached Discharge Instructions. These instructions were given and signed by the patient or POA    New Prescriptions    No medications on file       Orders Placed This Encounter   Procedures    Initiate Outpatient Wound Care Protocol         Patient 68 [facility] Physician Orders and Discharge Instructions  [address] [place]. [facility]  Telephone: [phone]  NAME:  Jessie Wagner III  YOB: 1965  MEDICAL RECORD NUMBER:  [id number]  DATE: 10/19/23     Return Appointment:  Return Appointment: With Júnior Coleman MD  in  1 [facility])  [] Return Appointment for a Wound Assessment with the nurse on:     Future Appointments   Date Time Provider [address]   12/7/2023  2:20 PM Kari Faulkner MD [facility]: none    Medically necessary services for evaluation and treatment: []Skilled Nursing (using clean technique) []PT (Eval &

## 2023-10-26 ENCOUNTER — HOSPITAL ENCOUNTER (OUTPATIENT)
Dept: WOUND CARE | Age: 58
Discharge: HOME | End: 2023-10-26
Attending: SPECIALIST
Payer: COMMERCIAL

## 2023-10-26 DIAGNOSIS — L97.929 VENOUS STASIS ULCER OF LEFT LOWER EXTREMITY (HCC): ICD-10-CM

## 2023-10-26 DIAGNOSIS — I83.029 VENOUS STASIS ULCER OF LEFT LOWER EXTREMITY (HCC): ICD-10-CM

## 2023-10-26 DIAGNOSIS — I87.312 IDIOPATHIC CHRONIC VENOUS HYPERTENSION OF LEFT LOWER EXTREMITY WITH ULCER (HCC): Primary | ICD-10-CM

## 2023-10-26 DIAGNOSIS — L97.929 IDIOPATHIC CHRONIC VENOUS HYPERTENSION OF LEFT LOWER EXTREMITY WITH ULCER (HCC): Primary | ICD-10-CM

## 2023-10-26 PROCEDURE — 6370000000 HC RX 637 (ALT 250 FOR IP): Performed by: SPECIALIST

## 2023-10-26 PROCEDURE — 99213 OFFICE O/P EST LOW 20 MIN: CPT | Performed by: SPECIALIST

## 2023-10-26 PROCEDURE — 29581 APPL MULTLAYER CMPRN SYS LEG: CPT

## 2023-10-26 RX ORDER — LIDOCAINE HYDROCHLORIDE 20 MG/ML
JELLY TOPICAL ONCE
OUTPATIENT
Start: 2023-10-26 | End: 2023-10-26

## 2023-10-26 RX ORDER — LIDOCAINE 40 MG/G
CREAM TOPICAL ONCE
OUTPATIENT
Start: 2023-10-26 | End: 2023-10-26

## 2023-10-26 RX ORDER — BACITRACIN ZINC AND POLYMYXIN B SULFATE 500; 1000 [USP'U]/G; [USP'U]/G
OINTMENT TOPICAL ONCE
OUTPATIENT
Start: 2023-10-26 | End: 2023-10-26

## 2023-10-26 RX ORDER — GENTAMICIN SULFATE 1 MG/G
OINTMENT TOPICAL ONCE
OUTPATIENT
Start: 2023-10-26 | End: 2023-10-26

## 2023-10-26 RX ORDER — BETAMETHASONE DIPROPIONATE 0.05 %
OINTMENT (GRAM) TOPICAL ONCE
OUTPATIENT
Start: 2023-10-26 | End: 2023-10-26

## 2023-10-26 RX ORDER — CLOBETASOL PROPIONATE 0.5 MG/G
OINTMENT TOPICAL ONCE
OUTPATIENT
Start: 2023-10-26 | End: 2023-10-26

## 2023-10-26 RX ORDER — TRIAMCINOLONE ACETONIDE 1 MG/G
OINTMENT TOPICAL ONCE
OUTPATIENT
Start: 2023-10-26 | End: 2023-10-26

## 2023-10-26 RX ORDER — LIDOCAINE HYDROCHLORIDE 40 MG/ML
SOLUTION TOPICAL ONCE
Status: COMPLETED | OUTPATIENT
Start: 2023-10-26 | End: 2023-10-26

## 2023-10-26 RX ORDER — LIDOCAINE HYDROCHLORIDE 40 MG/ML
SOLUTION TOPICAL ONCE
OUTPATIENT
Start: 2023-10-26 | End: 2023-10-26

## 2023-10-26 RX ORDER — LIDOCAINE 50 MG/G
OINTMENT TOPICAL ONCE
OUTPATIENT
Start: 2023-10-26 | End: 2023-10-26

## 2023-10-26 RX ORDER — IBUPROFEN 200 MG
TABLET ORAL ONCE
OUTPATIENT
Start: 2023-10-26 | End: 2023-10-26

## 2023-10-26 RX ORDER — SODIUM CHLOR/HYPOCHLOROUS ACID 0.033 %
SOLUTION, IRRIGATION IRRIGATION ONCE
OUTPATIENT
Start: 2023-10-26 | End: 2023-10-26

## 2023-10-26 RX ORDER — GINSENG 100 MG
CAPSULE ORAL ONCE
OUTPATIENT
Start: 2023-10-26 | End: 2023-10-26

## 2023-10-26 RX ADMIN — LIDOCAINE HYDROCHLORIDE: 40 SOLUTION TOPICAL at 09:05

## 2023-10-26 NOTE — PROGRESS NOTES
Multilayer Compression Wrap   (Not Unna) Below the Knee    NAME:  Tray Eden III  YOB: 1965  MEDICAL RECORD NUMBER:  [id number]  DATE:  10/26/2023       Removed old Multilayer wrap if present and washed leg with mild soap/water. Applied moisturizing agent to dry skin as needed. Applied primary and secondary dressing as ordered    Applied multilayered dressing below the knee to Left lower leg(s)  (4 Layer Compression Wrap ) . Instructed patient/caregiver not to remove dressing and to keep it clean and dry. Instructed patient/caregiver on complications to report to provider, such as pain, numbness in toes, heavy drainage, and slippage of dressing. Instructed patient on purpose of compression dressing and on activity and exercise recommendations.    Applied per   Guidelines    Electronically signed by Marsha Young RN on 10/26/2023 at 9:31 AM
[address]. [facility]  Telephone: [phone]  NAME:  Shanon Dasilva III  YOB: 1965  MEDICAL RECORD NUMBER:  [id number]  DATE: 10/26/23     Return Appointment:  Return Appointment: With Noel Lopes MD  in  1 [facility])  [] Return Appointment for a Wound Assessment with the nurse on:     Future Appointments   Date Time Provider [address]   12/7/2023  2:20 PM Hyacinth Enriquez MD [facility]: none    Medically necessary services for evaluation and treatment: []Skilled Nursing (using clean technique) []PT (Eval & Treat) []OT (Eval & Treat) []Social Work []Dietician []Other:      Wound care instructions: If you smoke we ask that you refrain from smoking. Smoking inhibits wounds from healing. When taking antibiotics take the entire prescription as ordered. Do not stop taking until medication is all gone unless otherwise instructed. Exercise as tolerated. Keep weight off wounds and reposition every 2 hours if applicable. Do not get wounds wet in the bath or shower unless otherwise instructed by your physician. If your wound is on your foot or leg, you may purchase a cast bag. Please ask at the pharmacy. Wash hands with soap and water prior to and after every dressing change. [x]Wash wounds with: Vashe wash - Apply enough Vashe to soak a piece of gauze and place on wound bed for 5-10 minutes. No need to rinse after soaking. [x]Georgina wound Topical Treatments: Do not apply lotions, creams, or ointments to the skin around the wound bed unless directed as followed:   Apply around the wound: Moisturizing lotion         [x]Wound Location: left lower leg   Apply Primary Dressing to wound: Beverly  Secondary Dressing: 4X4 gauze pad   Avoid contact of tape with skin if possible.   When to change Dressing: DO NOT CHANGE    [x] Multilayer Compression Wrap:  Type: Applied on Left lower leg(s)  4 Layer Compression

## 2023-11-02 ENCOUNTER — HOSPITAL ENCOUNTER (OUTPATIENT)
Dept: WOUND CARE | Age: 58
Discharge: HOME | End: 2023-11-02
Attending: SPECIALIST
Payer: COMMERCIAL

## 2023-11-02 VITALS
SYSTOLIC BLOOD PRESSURE: 141 MMHG | DIASTOLIC BLOOD PRESSURE: 100 MMHG | RESPIRATION RATE: 18 BRPM | TEMPERATURE: 97.7 F | HEART RATE: 78 BPM

## 2023-11-02 DIAGNOSIS — L97.929 VENOUS STASIS ULCER OF LEFT LOWER EXTREMITY (HCC): ICD-10-CM

## 2023-11-02 DIAGNOSIS — L97.929 IDIOPATHIC CHRONIC VENOUS HYPERTENSION OF LEFT LOWER EXTREMITY WITH ULCER (HCC): Primary | ICD-10-CM

## 2023-11-02 DIAGNOSIS — I87.312 IDIOPATHIC CHRONIC VENOUS HYPERTENSION OF LEFT LOWER EXTREMITY WITH ULCER (HCC): Primary | ICD-10-CM

## 2023-11-02 DIAGNOSIS — I83.029 VENOUS STASIS ULCER OF LEFT LOWER EXTREMITY (HCC): ICD-10-CM

## 2023-11-02 PROCEDURE — 99213 OFFICE O/P EST LOW 20 MIN: CPT | Performed by: SPECIALIST

## 2023-11-02 PROCEDURE — 6370000000 HC RX 637 (ALT 250 FOR IP): Performed by: SPECIALIST

## 2023-11-02 PROCEDURE — 99211 OFF/OP EST MAY X REQ PHY/QHP: CPT

## 2023-11-02 RX ORDER — LIDOCAINE HYDROCHLORIDE 40 MG/ML
SOLUTION TOPICAL ONCE
Status: CANCELLED | OUTPATIENT
Start: 2023-11-02 | End: 2023-11-02

## 2023-11-02 RX ORDER — LIDOCAINE HYDROCHLORIDE 20 MG/ML
JELLY TOPICAL ONCE
Status: CANCELLED | OUTPATIENT
Start: 2023-11-02 | End: 2023-11-02

## 2023-11-02 RX ORDER — GINSENG 100 MG
CAPSULE ORAL ONCE
Status: CANCELLED | OUTPATIENT
Start: 2023-11-02 | End: 2023-11-02

## 2023-11-02 RX ORDER — LIDOCAINE HYDROCHLORIDE 40 MG/ML
SOLUTION TOPICAL ONCE
Status: DISCONTINUED | OUTPATIENT
Start: 2023-11-02 | End: 2023-11-03 | Stop reason: HOSPADM

## 2023-11-02 RX ORDER — BETAMETHASONE DIPROPIONATE 0.05 %
OINTMENT (GRAM) TOPICAL ONCE
Status: CANCELLED | OUTPATIENT
Start: 2023-11-02 | End: 2023-11-02

## 2023-11-02 RX ORDER — SODIUM CHLOR/HYPOCHLOROUS ACID 0.033 %
SOLUTION, IRRIGATION IRRIGATION ONCE
Status: CANCELLED | OUTPATIENT
Start: 2023-11-02 | End: 2023-11-02

## 2023-11-02 RX ORDER — LIDOCAINE 50 MG/G
OINTMENT TOPICAL ONCE
Status: CANCELLED | OUTPATIENT
Start: 2023-11-02 | End: 2023-11-02

## 2023-11-02 RX ORDER — CLOBETASOL PROPIONATE 0.5 MG/G
OINTMENT TOPICAL ONCE
Status: CANCELLED | OUTPATIENT
Start: 2023-11-02 | End: 2023-11-02

## 2023-11-02 RX ORDER — LIDOCAINE 40 MG/G
CREAM TOPICAL ONCE
Status: CANCELLED | OUTPATIENT
Start: 2023-11-02 | End: 2023-11-02

## 2023-11-02 RX ORDER — TRIAMCINOLONE ACETONIDE 1 MG/G
OINTMENT TOPICAL ONCE
Status: CANCELLED | OUTPATIENT
Start: 2023-11-02 | End: 2023-11-02

## 2023-11-02 RX ORDER — GENTAMICIN SULFATE 1 MG/G
OINTMENT TOPICAL ONCE
Status: CANCELLED | OUTPATIENT
Start: 2023-11-02 | End: 2023-11-02

## 2023-11-02 RX ORDER — BACITRACIN ZINC AND POLYMYXIN B SULFATE 500; 1000 [USP'U]/G; [USP'U]/G
OINTMENT TOPICAL ONCE
Status: CANCELLED | OUTPATIENT
Start: 2023-11-02 | End: 2023-11-02

## 2023-11-02 RX ORDER — IBUPROFEN 200 MG
TABLET ORAL ONCE
Status: CANCELLED | OUTPATIENT
Start: 2023-11-02 | End: 2023-11-02

## 2023-11-02 NOTE — PROGRESS NOTES
[facility]  Progress Note and Procedure Note      Jam Mackey III  AGE: 62 y.o. GENDER: male  : 1965  EPISODE DATE:  2023      Subjective:     Chief Complaint   Patient presents with    Wound Check     Left lower eg         HISTORY of PRESENT ILLNESS HPI     Jam Mackey III is a 62 y.o. male who presents today for wound evaluation. History of Wound Context: Patient well-known to wound care having last been seen some 3 years ago for chronic venous insufficiency and ulceration left lower extremity. He was discharged with instructions to wear compression. However patient states he is only wearing the sock and not the CircAid Velcro. In addition he has undergone a gastric sleeve resection for his morbid obesity but unfortunately he has gained a significant amount of weight back. He has not had a follow-up with the bariatric surgeon. Also he has had both hips replaced. Describes several weeks swelling of his left lower extremity with recent notation of an ulcer similar to what was treated in the past on his left lateral knee. Patient has worn a 4-layer compression to left lower extremity this past week without difficulty.   Wound Pain Timing/Severity: none  Quality of pain: N/A  Severity:  0 / 10   Modifying Factors: None  Associated Signs/Symptoms: edema    Wound Identification:  Wound Type: venous  Contributing Factors: edema, venous stasis, lymphedema, and obesity        PAST MEDICAL HISTORY        Diagnosis Date    Arthritis     Obesity     PPD positive     Venous stasis ulcer of left lower extremity ([address]) 10/25/2016       PAST SURGICAL HISTORY    Past Surgical History:   Procedure Laterality Date    COLONOSCOPY      JOINT REPLACEMENT Bilateral     SLEEVE GASTRECTOMY N/A 2020    ROBOTIC ASSISTED LAPAROSCOPIC SLEEVE GASTRECTOMY; LAPAROSCOPIC VENTRAL HERNIA REPAIR performed by Natalie Ferguson DO at [address] [place] [address] N/A 2019    EGD BIOPSY

## 2023-11-02 NOTE — PATIENT INSTRUCTIONS
[address] [address] [address] [place] [address]. [facility]  Telephone: [phone]    NAME:  Miguel Hassan III  YOB: 1965  MEDICAL RECORD NUMBER:  [id number]  DATE:  11/2/2023      Congratulations! You have completed your treatment program!    To prevent Venous Wounds from recurring again:  Elevate your legs at least parallel to the ground while sitting. Wear your prescription support stockings everyday and remove at night while you sleep. Replace your stockings every 3-6 months so that your legs continue to get the support they need. Inspect your legs and feet daily and report any increased swelling, unusual redness or new wounds to your physician. Wash your legs and feet regularly with mild soap and water and moisturize daily. Continue to use compression pumps if prescribed by your physician. Avoid overeating. Extra weight adds pressure on the veins in your legs. Limit salty foods as they promote swelling or fluid retention in your legs. Additional instructions for healed wound/skin care: none      Call the [address] [place] [address] if your wound reopens, if new wounds occur, or if you have any other questions about your follow up care [phone].      [] Patient unable to sign Discharge Instructions given to ECF/Transportation/POA    Electronically signed by Mayra Levy RN on 11/2/2023 at 11:34 AM

## 2023-11-29 ENCOUNTER — PATIENT MESSAGE (OUTPATIENT)
Dept: BARIATRICS/WEIGHT MGMT | Age: 58
End: 2023-11-29

## 2023-11-29 NOTE — TELEPHONE ENCOUNTER
Attempt to contact for Bariatric Follow Up.  [employer] message sent and letter mailed to address on file in [facility]

## 2023-11-30 DIAGNOSIS — R60.0 LOCALIZED EDEMA: ICD-10-CM

## 2023-12-01 RX ORDER — FUROSEMIDE 20 MG/1
TABLET ORAL
Qty: 30 TABLET | Refills: 0 | Status: SHIPPED | OUTPATIENT
Start: 2023-12-01

## 2024-12-26 NOTE — TELEPHONE ENCOUNTER
Attempted to contact patient to schedule initial visit with Dr. Lizbeth Murillo.     Patient does have WLS coverage with [employer] (see BMI form)
From home c/o asthma attack since this morning, has been using his albuterol pump but its not helping

## (undated) DEVICE — SUTURE VCRL SZ 2-0 L27IN ABSRB UD L26MM SH 1/2 CIR J417H

## (undated) DEVICE — 40583 XL ADVANCED TRENDELENBURG POSITIONING KIT: Brand: 40583 XL ADVANCED TRENDELENBURG POSITIONING KIT

## (undated) DEVICE — RELOAD STPL L60MM H1.5-3.6MM REG TISS BLU GRIPPING SURF B

## (undated) DEVICE — 60 ML SYRINGE,CATHETER TIP: Brand: MONOJECT

## (undated) DEVICE — SUTURE ETHBND EXCEL SZ 0 L30IN NONABSORBABLE GRN L26MM SH X834H

## (undated) DEVICE — BLADELESS OBTURATOR, LONG: Brand: WECK VISTA

## (undated) DEVICE — GARMENT,MEDLINE,DVT,INT,CALF,LG, GEN2: Brand: MEDLINE

## (undated) DEVICE — GLOVE ORANGE PI 7   MSG9070

## (undated) DEVICE — BINDER ABD H12IN FOR 62-74IN WAIST UNIV 4 PNL PREM DSGN E

## (undated) DEVICE — TROCAR: Brand: KII FIOS FIRST ENTRY

## (undated) DEVICE — BOWL MED L 32OZ PLAS W/ MOLD GRAD EZ OPN PEEL PCH

## (undated) DEVICE — CADIERE FORCEPS: Brand: ENDOWRIST

## (undated) DEVICE — DRAPE,LAP,CHOLE,W/TROUGHS,STERILE: Brand: MEDLINE

## (undated) DEVICE — CANNULA SEAL

## (undated) DEVICE — ADHESIVE SKIN CLSR 0.7ML TOP DERMBND ADV

## (undated) DEVICE — SURGICAL SET UP - SURE SET: Brand: MEDLINE INDUSTRIES, INC.

## (undated) DEVICE — SYRINGE MED 10ML POLYPR LUERSLIP TIP FLAT TOP W/O SFTY DISP

## (undated) DEVICE — GOWN,SIRUS,POLYRNF,BRTHSLV,XL,30/CS: Brand: MEDLINE

## (undated) DEVICE — SURE SET-DOUBLE BASIN-LF: Brand: MEDLINE INDUSTRIES, INC.

## (undated) DEVICE — ELECTROSURGICAL PENCIL ROCKER SWITCH NON COATED BLADE ELECTRODE 10 FT (3 M) CORD HOLSTER: Brand: MEGADYNE

## (undated) DEVICE — RELOAD STPL H4.1X2MM DIA60MM THCK TISS GRN 6 ROW PWR GST B

## (undated) DEVICE — LAPAROSCOPIC SCISSORS: Brand: EPIX LAPAROSCOPIC SCISSORS

## (undated) DEVICE — VESSEL SEALER EXTEND: Brand: ENDOWRIST

## (undated) DEVICE — AGENT HEMSTAT W2XL4IN OXIDIZED REGENERATED CELOS ABSRB

## (undated) DEVICE — FORCEPS BX L240CM DIA2.4MM L NDL RAD JAW 4 133340

## (undated) DEVICE — JEWISH HOSPITAL TURNOVER KIT: Brand: MEDLINE INDUSTRIES, INC.

## (undated) DEVICE — PLATE ES AD W 9FT CRD 2

## (undated) DEVICE — SPONGE,LAP,4"X18",XR,ST,5/PK,40PK/CS: Brand: MEDLINE INDUSTRIES, INC.

## (undated) DEVICE — APPLICATOR MEDICATED 26 CC SOLUTION HI LT ORNG CHLORAPREP

## (undated) DEVICE — ANTI-FOG SOLUTION WITH FOAM PAD: Brand: DEVON

## (undated) DEVICE — SUTURE VCRL SZ 0 L54IN ABSRB VLT W/O NDL POLYGLACTIN 910 J616H

## (undated) DEVICE — TROCAR: Brand: KII OPTICAL ACCESS SYSTEM

## (undated) DEVICE — ARM DRAPE

## (undated) DEVICE — SUTURE VCRL SZ 4-0 L18IN ABSRB UD L19MM PS-2 3/8 CIR PRIM J496H

## (undated) DEVICE — SOLUTION ANTIFOG VIS SYS CLEARIFY LAPSCP

## (undated) DEVICE — PUMP SUC IRR TBNG L10FT W/ HNDPC ASSEMB STRYKEFLOW 2

## (undated) DEVICE — GOWN,SIRUS,POLYRNF,BRTHSLV,LG,30/CS: Brand: MEDLINE

## (undated) DEVICE — RELOAD STPL L60MM H1-2.6MM MESENTERY THN TISS WHT 6 ROW

## (undated) DEVICE — SOLUTION INJ LR VISIV 1000ML BG

## (undated) DEVICE — DEVICE CLSR 10/12MM XL PRT SYS SUT PASS ST DISP CARTER

## (undated) DEVICE — RELOAD STPL L60MM H2.3-4.2MM VERY THCK TISS BLK 6 ROW

## (undated) DEVICE — NEEDLE INSUF L150MM DIA2MM DISP FOR PNEUMOPERI ENDOPATH

## (undated) DEVICE — LARGE NEEDLE DRIVER: Brand: ENDOWRIST

## (undated) DEVICE — VISIGI 3D®  CALIBRATION SYSTEM  SIZE 36FR STD W/ BULB: Brand: BOEHRINGER® VISIGI 3D™ SLEEVE GASTRECTOMY CALIBRATION SYSTEM, SIZE 36FR W/BULB

## (undated) DEVICE — STANDARD HYPODERMIC NEEDLE,POLYPROPYLENE HUB: Brand: MONOJECT

## (undated) DEVICE — COLUMN DRAPE

## (undated) DEVICE — BLANKET WRM W29.9XL79.1IN UP BODY FORC AIR MISTRAL-AIR

## (undated) DEVICE — SOLUTION IV 1000ML 0.9% SOD CHL

## (undated) DEVICE — GLOVE SURG SZ 75 L12IN THK75MIL DK GRN LTX FREE

## (undated) DEVICE — [HIGH FLOW INSUFFLATOR,  DO NOT USE IF PACKAGE IS DAMAGED,  KEEP DRY,  KEEP AWAY FROM SUNLIGHT,  PROTECT FROM HEAT AND RADIOACTIVE SOURCES.]: Brand: PNEUMOSURE